# Patient Record
Sex: MALE | Race: BLACK OR AFRICAN AMERICAN | NOT HISPANIC OR LATINO | URBAN - METROPOLITAN AREA
[De-identification: names, ages, dates, MRNs, and addresses within clinical notes are randomized per-mention and may not be internally consistent; named-entity substitution may affect disease eponyms.]

---

## 2017-07-20 PROBLEM — Z00.00 ENCOUNTER FOR PREVENTIVE HEALTH EXAMINATION: Status: ACTIVE | Noted: 2017-07-20

## 2017-07-27 ENCOUNTER — INPATIENT (INPATIENT)
Facility: HOSPITAL | Age: 72
LOS: 5 days | Discharge: HOME HEALTH | DRG: 234 | End: 2017-08-02
Attending: THORACIC SURGERY (CARDIOTHORACIC VASCULAR SURGERY) | Admitting: INTERNAL MEDICINE
Payer: COMMERCIAL

## 2017-07-27 VITALS
HEIGHT: 68 IN | TEMPERATURE: 98 F | OXYGEN SATURATION: 100 % | DIASTOLIC BLOOD PRESSURE: 90 MMHG | RESPIRATION RATE: 18 BRPM | WEIGHT: 139.99 LBS | HEART RATE: 53 BPM | SYSTOLIC BLOOD PRESSURE: 186 MMHG

## 2017-07-27 DIAGNOSIS — I25.10 ATHEROSCLEROTIC HEART DISEASE OF NATIVE CORONARY ARTERY WITHOUT ANGINA PECTORIS: ICD-10-CM

## 2017-07-27 DIAGNOSIS — R94.39 ABNORMAL RESULT OF OTHER CARDIOVASCULAR FUNCTION STUDY: ICD-10-CM

## 2017-07-27 DIAGNOSIS — Z98.890 OTHER SPECIFIED POSTPROCEDURAL STATES: Chronic | ICD-10-CM

## 2017-07-27 LAB
ALBUMIN SERPL ELPH-MCNC: 4 G/DL — SIGNIFICANT CHANGE UP (ref 3.3–5)
ALBUMIN SERPL ELPH-MCNC: 4.2 G/DL — SIGNIFICANT CHANGE UP (ref 3.3–5)
ALP SERPL-CCNC: 63 U/L — SIGNIFICANT CHANGE UP (ref 40–120)
ALP SERPL-CCNC: 64 U/L — SIGNIFICANT CHANGE UP (ref 40–120)
ALT FLD-CCNC: 11 U/L RC — SIGNIFICANT CHANGE UP (ref 10–45)
ALT FLD-CCNC: 12 U/L RC — SIGNIFICANT CHANGE UP (ref 10–45)
ANION GAP SERPL CALC-SCNC: 11 MMOL/L — SIGNIFICANT CHANGE UP (ref 5–17)
APTT BLD: 41.4 SEC — HIGH (ref 27.5–37.4)
APTT BLD: > 200 SEC (ref 27.5–37.4)
AST SERPL-CCNC: 17 U/L — SIGNIFICANT CHANGE UP (ref 10–40)
AST SERPL-CCNC: 20 U/L — SIGNIFICANT CHANGE UP (ref 10–40)
BASE EXCESS BLDA CALC-SCNC: 0.9 MMOL/L — SIGNIFICANT CHANGE UP (ref -2–2)
BILIRUB DIRECT SERPL-MCNC: 0.1 MG/DL — SIGNIFICANT CHANGE UP (ref 0–0.2)
BILIRUB INDIRECT FLD-MCNC: 0.3 MG/DL — SIGNIFICANT CHANGE UP (ref 0.2–1)
BILIRUB SERPL-MCNC: 0.4 MG/DL — SIGNIFICANT CHANGE UP (ref 0.2–1.2)
BILIRUB SERPL-MCNC: 0.5 MG/DL — SIGNIFICANT CHANGE UP (ref 0.2–1.2)
BLD GP AB SCN SERPL QL: NEGATIVE — SIGNIFICANT CHANGE UP
BUN SERPL-MCNC: 15 MG/DL — SIGNIFICANT CHANGE UP (ref 7–23)
CALCIUM SERPL-MCNC: 9.5 MG/DL — SIGNIFICANT CHANGE UP (ref 8.4–10.5)
CHLORIDE SERPL-SCNC: 103 MMOL/L — SIGNIFICANT CHANGE UP (ref 96–108)
CHOLEST SERPL-MCNC: 110 MG/DL — SIGNIFICANT CHANGE UP (ref 10–199)
CO2 BLDA-SCNC: 26 MMOL/L — SIGNIFICANT CHANGE UP (ref 22–30)
CO2 SERPL-SCNC: 28 MMOL/L — SIGNIFICANT CHANGE UP (ref 22–31)
CREAT SERPL-MCNC: 1.08 MG/DL — SIGNIFICANT CHANGE UP (ref 0.5–1.3)
GLUCOSE SERPL-MCNC: 92 MG/DL — SIGNIFICANT CHANGE UP (ref 70–99)
HBA1C BLD-MCNC: 5.6 % — SIGNIFICANT CHANGE UP (ref 4–5.6)
HCO3 BLDA-SCNC: 25 MMOL/L — SIGNIFICANT CHANGE UP (ref 21–29)
HCT VFR BLD CALC: 40.8 % — SIGNIFICANT CHANGE UP (ref 39–50)
HCT VFR BLD CALC: 41 % — SIGNIFICANT CHANGE UP (ref 39–50)
HDLC SERPL-MCNC: 37 MG/DL — LOW (ref 40–125)
HGB BLD-MCNC: 14.3 G/DL — SIGNIFICANT CHANGE UP (ref 13–17)
HGB BLD-MCNC: 14.3 G/DL — SIGNIFICANT CHANGE UP (ref 13–17)
INR BLD: 1.42 RATIO — HIGH (ref 0.88–1.16)
INR BLD: 1.57 RATIO — HIGH (ref 0.88–1.16)
LIPID PNL WITH DIRECT LDL SERPL: 66 MG/DL — SIGNIFICANT CHANGE UP
MCHC RBC-ENTMCNC: 30 PG — SIGNIFICANT CHANGE UP (ref 27–34)
MCHC RBC-ENTMCNC: 30.1 PG — SIGNIFICANT CHANGE UP (ref 27–34)
MCHC RBC-ENTMCNC: 34.8 GM/DL — SIGNIFICANT CHANGE UP (ref 32–36)
MCHC RBC-ENTMCNC: 35 GM/DL — SIGNIFICANT CHANGE UP (ref 32–36)
MCV RBC AUTO: 85.8 FL — SIGNIFICANT CHANGE UP (ref 80–100)
MCV RBC AUTO: 86.1 FL — SIGNIFICANT CHANGE UP (ref 80–100)
NT-PROBNP SERPL-SCNC: 208 PG/ML — SIGNIFICANT CHANGE UP (ref 0–300)
PCO2 BLDA: 39 MMHG — SIGNIFICANT CHANGE UP (ref 32–46)
PH BLDA: 7.42 — SIGNIFICANT CHANGE UP (ref 7.35–7.45)
PLATELET # BLD AUTO: 194 K/UL — SIGNIFICANT CHANGE UP (ref 150–400)
PLATELET # BLD AUTO: 205 K/UL — SIGNIFICANT CHANGE UP (ref 150–400)
PO2 BLDA: 77 MMHG — SIGNIFICANT CHANGE UP (ref 74–108)
POTASSIUM SERPL-MCNC: 4.3 MMOL/L — SIGNIFICANT CHANGE UP (ref 3.5–5.3)
POTASSIUM SERPL-SCNC: 4.3 MMOL/L — SIGNIFICANT CHANGE UP (ref 3.5–5.3)
PROT SERPL-MCNC: 8 G/DL — SIGNIFICANT CHANGE UP (ref 6–8.3)
PROT SERPL-MCNC: 8.3 G/DL — SIGNIFICANT CHANGE UP (ref 6–8.3)
PROTHROM AB SERPL-ACNC: 15.5 SEC — HIGH (ref 9.8–12.7)
PROTHROM AB SERPL-ACNC: 17.1 SEC — HIGH (ref 9.8–12.7)
RBC # BLD: 4.76 M/UL — SIGNIFICANT CHANGE UP (ref 4.2–5.8)
RBC # BLD: 4.76 M/UL — SIGNIFICANT CHANGE UP (ref 4.2–5.8)
RBC # FLD: 11.8 % — SIGNIFICANT CHANGE UP (ref 10.3–14.5)
RBC # FLD: 11.8 % — SIGNIFICANT CHANGE UP (ref 10.3–14.5)
RH IG SCN BLD-IMP: POSITIVE — SIGNIFICANT CHANGE UP
RH IG SCN BLD-IMP: POSITIVE — SIGNIFICANT CHANGE UP
SAO2 % BLDA: 96 % — SIGNIFICANT CHANGE UP (ref 92–96)
SODIUM SERPL-SCNC: 142 MMOL/L — SIGNIFICANT CHANGE UP (ref 135–145)
TOTAL CHOLESTEROL/HDL RATIO MEASUREMENT: 3 RATIO — LOW (ref 3.4–9.6)
TRIGL SERPL-MCNC: 36 MG/DL — SIGNIFICANT CHANGE UP (ref 10–149)
WBC # BLD: 5.1 K/UL — SIGNIFICANT CHANGE UP (ref 3.8–10.5)
WBC # BLD: 6.6 K/UL — SIGNIFICANT CHANGE UP (ref 3.8–10.5)
WBC # FLD AUTO: 5.1 K/UL — SIGNIFICANT CHANGE UP (ref 3.8–10.5)
WBC # FLD AUTO: 6.6 K/UL — SIGNIFICANT CHANGE UP (ref 3.8–10.5)

## 2017-07-27 PROCEDURE — 93458 L HRT ARTERY/VENTRICLE ANGIO: CPT | Mod: 26,GC

## 2017-07-27 PROCEDURE — 71010: CPT | Mod: 26

## 2017-07-27 PROCEDURE — 71250 CT THORAX DX C-: CPT | Mod: 26

## 2017-07-27 PROCEDURE — 92978 ENDOLUMINL IVUS OCT C 1ST: CPT | Mod: 26,LD,GC

## 2017-07-27 RX ORDER — CARVEDILOL PHOSPHATE 80 MG/1
3.12 CAPSULE, EXTENDED RELEASE ORAL EVERY 12 HOURS
Qty: 0 | Refills: 0 | Status: DISCONTINUED | OUTPATIENT
Start: 2017-07-27 | End: 2017-07-28

## 2017-07-27 RX ORDER — ASPIRIN/CALCIUM CARB/MAGNESIUM 324 MG
325 TABLET ORAL DAILY
Qty: 0 | Refills: 0 | Status: DISCONTINUED | OUTPATIENT
Start: 2017-07-27 | End: 2017-07-28

## 2017-07-27 RX ORDER — CEFUROXIME AXETIL 250 MG
1500 TABLET ORAL ONCE
Qty: 0 | Refills: 0 | Status: DISCONTINUED | OUTPATIENT
Start: 2017-07-27 | End: 2017-07-28

## 2017-07-27 RX ORDER — ENOXAPARIN SODIUM 100 MG/ML
40 INJECTION SUBCUTANEOUS DAILY
Qty: 0 | Refills: 0 | Status: DISCONTINUED | OUTPATIENT
Start: 2017-07-27 | End: 2017-07-28

## 2017-07-27 RX ORDER — LEVOTHYROXINE SODIUM 125 MCG
50 TABLET ORAL DAILY
Qty: 0 | Refills: 0 | Status: DISCONTINUED | OUTPATIENT
Start: 2017-07-27 | End: 2017-07-28

## 2017-07-27 RX ORDER — ATORVASTATIN CALCIUM 80 MG/1
20 TABLET, FILM COATED ORAL AT BEDTIME
Qty: 0 | Refills: 0 | Status: DISCONTINUED | OUTPATIENT
Start: 2017-07-27 | End: 2017-07-28

## 2017-07-27 RX ORDER — LATANOPROST 0.05 MG/ML
1 SOLUTION/ DROPS OPHTHALMIC; TOPICAL AT BEDTIME
Qty: 0 | Refills: 0 | Status: DISCONTINUED | OUTPATIENT
Start: 2017-07-27 | End: 2017-07-28

## 2017-07-27 RX ORDER — CHLORHEXIDINE GLUCONATE 213 G/1000ML
1 SOLUTION TOPICAL
Qty: 0 | Refills: 0 | Status: DISCONTINUED | OUTPATIENT
Start: 2017-07-27 | End: 2017-07-28

## 2017-07-27 RX ORDER — SODIUM CHLORIDE 9 MG/ML
3 INJECTION INTRAMUSCULAR; INTRAVENOUS; SUBCUTANEOUS EVERY 8 HOURS
Qty: 0 | Refills: 0 | Status: DISCONTINUED | OUTPATIENT
Start: 2017-07-27 | End: 2017-07-28

## 2017-07-27 RX ORDER — AMLODIPINE BESYLATE 2.5 MG/1
2.5 TABLET ORAL ONCE
Qty: 0 | Refills: 0 | Status: COMPLETED | OUTPATIENT
Start: 2017-07-27 | End: 2017-07-27

## 2017-07-27 RX ORDER — ACETAMINOPHEN 500 MG
650 TABLET ORAL ONCE
Qty: 0 | Refills: 0 | Status: COMPLETED | OUTPATIENT
Start: 2017-07-27 | End: 2017-07-27

## 2017-07-27 RX ADMIN — ATORVASTATIN CALCIUM 20 MILLIGRAM(S): 80 TABLET, FILM COATED ORAL at 23:17

## 2017-07-27 RX ADMIN — Medication 650 MILLIGRAM(S): at 23:16

## 2017-07-27 RX ADMIN — CARVEDILOL PHOSPHATE 3.12 MILLIGRAM(S): 80 CAPSULE, EXTENDED RELEASE ORAL at 23:17

## 2017-07-27 RX ADMIN — CHLORHEXIDINE GLUCONATE 1 APPLICATION(S): 213 SOLUTION TOPICAL at 23:00

## 2017-07-27 RX ADMIN — AMLODIPINE BESYLATE 2.5 MILLIGRAM(S): 2.5 TABLET ORAL at 14:05

## 2017-07-27 RX ADMIN — SODIUM CHLORIDE 3 MILLILITER(S): 9 INJECTION INTRAMUSCULAR; INTRAVENOUS; SUBCUTANEOUS at 23:20

## 2017-07-27 NOTE — H&P CARDIOLOGY - PMH
CAD (coronary artery disease)    COPD (chronic obstructive pulmonary disease)    Former smoker    Glaucoma    Gout    HTN (hypertension)    Hypothyroid    MI (myocardial infarction)  2011 stent in [place]  Stented coronary artery

## 2017-07-27 NOTE — H&P CARDIOLOGY - FAMILY HISTORY
Father  Still living? Unknown  Family history of cancer, Age at diagnosis: Age Unknown     Mother  Still living? No  Family history of cancer, Age at diagnosis: Age Unknown

## 2017-07-27 NOTE — H&P CARDIOLOGY - HISTORY OF PRESENT ILLNESS
72 year old mal ept with PMHx of COPD, HTN, HLD, gout, hypothyroidism, former smoker, MI, CAD with stent(80% Ramus, 50% LM, no official report) in 2011 in [place] presents for cardiac cath. Pt reports he has been experiencing SOB on exertion for past 5 months, evaluated by Dr. Calero and had abnormal stress test. Pt denies chest pain, SOB or dizziness currently.     Stress test: only 2013 test results available small, mild, fixed perfusion defect of the anterior and anteroseptal wall consistent with infarct without significant ischemia EF 54%  CXR 4/2017: COPD with chr. pulmonary fibrosis 72 year old mal ept with PMHx of COPD, HTN, HLD, gout, hypothyroidism, former smoker, MI, CAD with stent(80% Ramus, 50% LM, no official report) in 2011 in [place] presents for cardiac cath. Pt reports he has been experiencing SOB on exertion for past 5 months, evaluated by Dr. Calero and had abnormal stress test. Pt denies chest pain, SOB or dizziness currently.     Amlodipine 2.5mg po given to maximize antianginal therapy and SBP>180     Stress test: only 2013 test results available (small, mild, fixed perfusion defect of the anterior and anteroseptal wall consistent with infarct without significant ischemia EF 54%), current stress test results requested to Lindsay Calero's office [phone]  CXR 4/2017: COPD with chr. pulmonary fibrosis

## 2017-07-27 NOTE — CONSULT NOTE ADULT - SUBJECTIVE AND OBJECTIVE BOX
History of Present Illness:  72y Male with PMHx of COPD, HTN, HLD, gout, hypothyroidism, former smoker, MI, CAD with stent 2011. Pt referred for cath s/p abnormal stress test and c/o REYNOLDS. Cath demonstrates 3VD.    Past Medical History  Hypothyroid  Glaucoma  Stented coronary artery  CAD (coronary artery disease)  MI (myocardial infarction): 2011 stent in [place]  HTN (hypertension)  Gout  Former smoker  COPD (chronic obstructive pulmonary disease)      Past Surgical History  H/O foot surgery      MEDICATIONS  (STANDING):  sodium chloride 0.9% lock flush 3 milliLiter(s) IV Push every 8 hours  chlorhexidine 4% Liquid 1 Application(s) Topical two times a day  cefuroxime  IVPB 1500 milliGRAM(s) IV Intermittent once  atorvastatin 20 milliGRAM(s) Oral at bedtime  aspirin 325 milliGRAM(s) Oral daily  carvedilol 3.125 milliGRAM(s) Oral every 12 hours  levothyroxine 50 MICROGram(s) Oral daily  latanoprost 0.005% Ophthalmic Solution 1 Drop(s) Both EYES at bedtime    MEDICATIONS  (PRN):    Antiplatelet therapy:                           Last dose/amt:    Allergies: No Known Allergies      SOCIAL HISTORY:  Smoker: [ ] Yes  [ x] No        PACK YEARS:  20                       WHEN QUIT? 25 yrs ago  ETOH use: [ ] Yes  [x ] No              FREQUENCY / QUANTITY:  Ilicit Drug use:  [ ] Yes  [ x] No  Occupation:  Live with:  Assist device use:    Relevant Family History  FAMILY HISTORY:  Family history of cancer       Review of Systems  GENERAL:  Fevers[] chills[] sweats[] fatigue[] weight loss[] weight gain []                                        NEURO:  parathesias[] seizures []  syncope []  confusion []                                                                                  EYES: glasses[]  blurry vision[]  discharge[] pain[] glaucoma []                                                                            ENMT:  difficulty hearing []  vertigo[]  dysphagia[] epistaxis[] recent dental work []                                      CV:  chest pain[] palpitations[] REYNOLDS [] diaphoresis [] edema[]                                                                                             RESPIRATORY:  wheezing[] SOB[x] cough [] sputum[] hemoptysis[]                                                                    GI:  nausea[]  vomiting []  diarrhea[] constipation [] melena []                                                                        : hematuria[ ]  dysuria[ ] urgency[] incontinence[]                                                                                              MUSKULOSKELETAL:  arthritis[ ]  joint swelling [ ] muscle weakness [ ]                                                                  SKIN/BREAST:  rash[ ] itching [ ]  hair loss[ ] masses[ ]                                                                                                PSYCH:  dementia [ ] depresion [ ] anxiety[ ]                                                                                                                  HEME/LYMPH:  bruises easily[ ] enlarged lymph nodes[ ] tender lymph nodes[ ]                                                 ENDOCRINE:  cold intolerance[ ] heat intolerance[ ] polydipsia[ ]                                                                              PHYSICAL EXAM  Vital Signs Last 24 Hrs  T(C): 36.4 (27 Jul 2017 13:26), Max: 36.4 (27 Jul 2017 13:26)  T(F): 97.6 (27 Jul 2017 13:26), Max: 97.6 (27 Jul 2017 13:26)  HR: 53 (27 Jul 2017 13:26) (53 - 53)  BP: 186/90 (27 Jul 2017 13:26) (186/90 - 186/90)  BP(mean): 122 (27 Jul 2017 13:26) (122 - 122)  RR: 18 (27 Jul 2017 13:26) (18 - 18)  SpO2: 100% (27 Jul 2017 13:26) (100% - 100%)    General: Well nourished, well developed, no acute distress.                                                         Neuro: Normal exam oriented to person/place & time with no focal motor or sensory  deficits.                    Eyes: Normal exam of conjunctiva & lids, pupils equally reactive.   ENT: Normal exam of nasal/oral mucosa with absence of cyanosis.   Neck: Normal exam of jugular veins, trachea & thyroid.   Chest: Normal lung exam with good air movement absence of wheezes, rales, or rhonchi:                                                                          CV:  Auscultation: normal [ ] S3[ ] S4[ ] Irregular [ ] Rub[ ] Clicks[ ]  Murmurs none:[ ]systolic [ ]  diastolic [ ] holosystolic [ ]  Carotids: No Bruits[ ] Other____________ Abdominal Aorta: normal [ ] nonpalpable[ ]                                                                         GI: Normal exam of abdomen, liver & spleen with no noted masses or tenderness.                                                                                              Extremities: Normal no evidence of cyanosis or deformity Edema: none[ ]trace[ ]1+[ ]2+[ ]3+[ ]4+[ ]  Lower Extremity Pulses: Right[ ] Left[ ]Varicosities[ ]  SKIN : Normal exam to inspection & palation.                                                           LABS:                        14.3   5.1   )-----------( 205      ( 27 Jul 2017 13:28 )             41.0     07-27    142  |  103  |  15  ----------------------------<  92  4.3   |  28  |  1.08    Ca    9.5      27 Jul 2017 13:28    TPro  8.3  /  Alb  4.2  /  TBili  0.5  /  DBili  x   /  AST  20  /  ALT  12  /  AlkPhos  64  07-27 History of Present Illness:  72y Male with PMHx of COPD, HTN, HLD, gout, hypothyroidism, former smoker, MI, CAD with stent 2011. Pt referred for cath s/p abnormal stress test and c/o REYNOLDS. Cath demonstrates 3VD.    Past Medical History  Hypothyroid  Glaucoma  Stented coronary artery  CAD (coronary artery disease)  MI (myocardial infarction): 2011 stent in [place]  HTN (hypertension)  Gout  Former smoker  COPD (chronic obstructive pulmonary disease)      Past Surgical History  H/O foot surgery      MEDICATIONS  (STANDING):  sodium chloride 0.9% lock flush 3 milliLiter(s) IV Push every 8 hours  chlorhexidine 4% Liquid 1 Application(s) Topical two times a day  cefuroxime  IVPB 1500 milliGRAM(s) IV Intermittent once  atorvastatin 20 milliGRAM(s) Oral at bedtime  aspirin 325 milliGRAM(s) Oral daily  carvedilol 3.125 milliGRAM(s) Oral every 12 hours  levothyroxine 50 MICROGram(s) Oral daily  latanoprost 0.005% Ophthalmic Solution 1 Drop(s) Both EYES at bedtime    MEDICATIONS  (PRN):    Antiplatelet therapy:                           Last dose/amt:    Allergies: No Known Allergies      SOCIAL HISTORY:  Smoker: [ ] Yes  [ x] No        PACK YEARS:  20                       WHEN QUIT? 25 yrs ago  ETOH use: [ ] Yes  [x ] No              FREQUENCY / QUANTITY:  Ilicit Drug use:  [ ] Yes  [ x] No  Occupation:  Live with:  Assist device use:    Relevant Family History  FAMILY HISTORY:  Family history of cancer       Review of Systems  GENERAL:  Fevers[] chills[] sweats[] fatigue[] weight loss[] weight gain []                                        NEURO:  parathesias[] seizures []  syncope []  confusion []                                                                                  EYES: glasses[]  blurry vision[]  discharge[] pain[] glaucoma []                                                                            ENMT:  difficulty hearing []  vertigo[]  dysphagia[] epistaxis[] recent dental work []                                      CV:  chest pain[] palpitations[] REYNOLDS [] diaphoresis [] edema[]                                                                                             RESPIRATORY:  wheezing[] SOB[x] cough [] sputum[] hemoptysis[]                                                                    GI:  nausea[]  vomiting []  diarrhea[] constipation [] melena []                                                                        : hematuria[ ]  dysuria[ ] urgency[] incontinence[]                                                                                              MUSKULOSKELETAL:  arthritis[ ]  joint swelling [ ] muscle weakness [ ]                                                                  SKIN/BREAST:  rash[ ] itching [ ]  hair loss[ ] masses[ ]                                                                                                PSYCH:  dementia [ ] depresion [ ] anxiety[ ]                                                                                                                  HEME/LYMPH:  bruises easily[ ] enlarged lymph nodes[ ] tender lymph nodes[ ]                                                 ENDOCRINE:  cold intolerance[ ] heat intolerance[ ] polydipsia[ ]                                                                              PHYSICAL EXAM  Vital Signs Last 24 Hrs  T(C): 36.4 (27 Jul 2017 13:26), Max: 36.4 (27 Jul 2017 13:26)  T(F): 97.6 (27 Jul 2017 13:26), Max: 97.6 (27 Jul 2017 13:26)  HR: 53 (27 Jul 2017 13:26) (53 - 53)  BP: 186/90 (27 Jul 2017 13:26) (186/90 - 186/90)  BP(mean): 122 (27 Jul 2017 13:26) (122 - 122)  RR: 18 (27 Jul 2017 13:26) (18 - 18)  SpO2: 100% (27 Jul 2017 13:26) (100% - 100%)    General: Well nourished, well developed, no acute distress.                                                         Neuro: Normal exam oriented to person/place & time with no focal motor or sensory  deficits.                    Eyes: Normal exam of conjunctiva & lids, pupils equally reactive.   ENT: Normal exam of nasal/oral mucosa with absence of cyanosis.   Neck: Normal exam of jugular veins, trachea & thyroid.   Chest: Normal lung exam with good air movement absence of wheezes, rales, or rhonchi:                                                                          CV:  Auscultation: normal [x ] S3[ ] S4[ ] Irregular [ ] Rub[ ] Clicks[ ]  Murmurs none:[x ]systolic [ ]  diastolic [ ] holosystolic [ ]  Carotids: No Bruits[x ] Other____________ Abdominal Aorta: normal [ ] nonpalpable[ ]                                                                         GI: Normal exam of abdomen, liver & spleen with no noted masses or tenderness.                                                                                              Extremities: Normal no evidence of cyanosis or deformity Edema: none[x ]trace[ ]1+[ ]2+[ ]3+[ ]4+[ ]  Lower Extremity Pulses: Right[ ] Left[ ]Varicosities[ ]  SKIN : Normal exam to inspection & palation.                                                           LABS:                        14.3   5.1   )-----------( 205      ( 27 Jul 2017 13:28 )             41.0     07-27    142  |  103  |  15  ----------------------------<  92  4.3   |  28  |  1.08    Ca    9.5      27 Jul 2017 13:28    TPro  8.3  /  Alb  4.2  /  TBili  0.5  /  DBili  x   /  AST  20  /  ALT  12  /  AlkPhos  64  07-27

## 2017-07-27 NOTE — CONSULT NOTE ADULT - ASSESSMENT
73 y/o M Hx of COPD, HTN, HLD, gout, hypothyroidism, former smoker, MI, CAD with stent 2011. Preop for 3VD.

## 2017-07-28 ENCOUNTER — APPOINTMENT (OUTPATIENT)
Dept: CARDIOTHORACIC SURGERY | Facility: HOSPITAL | Age: 72
End: 2017-07-28
Payer: MEDICARE

## 2017-07-28 DIAGNOSIS — J44.9 CHRONIC OBSTRUCTIVE PULMONARY DISEASE, UNSPECIFIED: ICD-10-CM

## 2017-07-28 LAB
ALBUMIN SERPL ELPH-MCNC: 2.5 G/DL — LOW (ref 3.3–5)
ALP SERPL-CCNC: 43 U/L — SIGNIFICANT CHANGE UP (ref 40–120)
ALT FLD-CCNC: 9 U/L RC — LOW (ref 10–45)
ANION GAP SERPL CALC-SCNC: 15 MMOL/L — SIGNIFICANT CHANGE UP (ref 5–17)
APTT BLD: 34 SEC — SIGNIFICANT CHANGE UP (ref 27.5–37.4)
AST SERPL-CCNC: 14 U/L — SIGNIFICANT CHANGE UP (ref 10–40)
BASOPHILS # BLD AUTO: 0 K/UL — SIGNIFICANT CHANGE UP (ref 0–0.2)
BASOPHILS NFR BLD AUTO: 0.6 % — SIGNIFICANT CHANGE UP (ref 0–2)
BILIRUB SERPL-MCNC: 0.5 MG/DL — SIGNIFICANT CHANGE UP (ref 0.2–1.2)
BUN SERPL-MCNC: 14 MG/DL — SIGNIFICANT CHANGE UP (ref 7–23)
CALCIUM SERPL-MCNC: 7.5 MG/DL — LOW (ref 8.4–10.5)
CHLORIDE SERPL-SCNC: 109 MMOL/L — HIGH (ref 96–108)
CK MB BLD-MCNC: 5.1 % — HIGH (ref 0–3.5)
CK MB CFR SERPL CALC: 7.8 NG/ML — HIGH (ref 0–6.7)
CK SERPL-CCNC: 152 U/L — SIGNIFICANT CHANGE UP (ref 30–200)
CO2 SERPL-SCNC: 23 MMOL/L — SIGNIFICANT CHANGE UP (ref 22–31)
CREAT SERPL-MCNC: 0.89 MG/DL — SIGNIFICANT CHANGE UP (ref 0.5–1.3)
EOSINOPHIL # BLD AUTO: 0.3 K/UL — SIGNIFICANT CHANGE UP (ref 0–0.5)
EOSINOPHIL NFR BLD AUTO: 5.8 % — SIGNIFICANT CHANGE UP (ref 0–6)
FIBRINOGEN PPP-MCNC: 353 MG/DL — SIGNIFICANT CHANGE UP (ref 310–510)
GAS PNL BLDA: SIGNIFICANT CHANGE UP
GLUCOSE SERPL-MCNC: 95 MG/DL — SIGNIFICANT CHANGE UP (ref 70–99)
HCT VFR BLD CALC: 34 % — LOW (ref 39–50)
HGB BLD-MCNC: 11.6 G/DL — LOW (ref 13–17)
INR BLD: 1.64 RATIO — HIGH (ref 0.88–1.16)
LYMPHOCYTES # BLD AUTO: 1 K/UL — SIGNIFICANT CHANGE UP (ref 1–3.3)
LYMPHOCYTES # BLD AUTO: 16.5 % — SIGNIFICANT CHANGE UP (ref 13–44)
MCHC RBC-ENTMCNC: 29.5 PG — SIGNIFICANT CHANGE UP (ref 27–34)
MCHC RBC-ENTMCNC: 34.1 GM/DL — SIGNIFICANT CHANGE UP (ref 32–36)
MCV RBC AUTO: 86.4 FL — SIGNIFICANT CHANGE UP (ref 80–100)
MONOCYTES # BLD AUTO: 0.2 K/UL — SIGNIFICANT CHANGE UP (ref 0–0.9)
MONOCYTES NFR BLD AUTO: 4.1 % — SIGNIFICANT CHANGE UP (ref 2–14)
MRSA PCR RESULT.: SIGNIFICANT CHANGE UP
NEUTROPHILS # BLD AUTO: 4.4 K/UL — SIGNIFICANT CHANGE UP (ref 1.8–7.4)
NEUTROPHILS NFR BLD AUTO: 73.1 % — SIGNIFICANT CHANGE UP (ref 43–77)
PLATELET # BLD AUTO: 149 K/UL — LOW (ref 150–400)
POTASSIUM SERPL-MCNC: 3.9 MMOL/L — SIGNIFICANT CHANGE UP (ref 3.5–5.3)
POTASSIUM SERPL-SCNC: 3.9 MMOL/L — SIGNIFICANT CHANGE UP (ref 3.5–5.3)
PROT SERPL-MCNC: 5.3 G/DL — LOW (ref 6–8.3)
PROTHROM AB SERPL-ACNC: 18.1 SEC — HIGH (ref 9.8–12.7)
RBC # BLD: 3.93 M/UL — LOW (ref 4.2–5.8)
RBC # FLD: 11.8 % — SIGNIFICANT CHANGE UP (ref 10.3–14.5)
S AUREUS DNA NOSE QL NAA+PROBE: SIGNIFICANT CHANGE UP
SODIUM SERPL-SCNC: 147 MMOL/L — HIGH (ref 135–145)
T3 SERPL-MCNC: 106 NG/DL — SIGNIFICANT CHANGE UP (ref 80–200)
T4 AB SER-ACNC: 9.7 UG/DL — SIGNIFICANT CHANGE UP (ref 4.6–12)
TROPONIN T SERPL-MCNC: 0.08 NG/ML — HIGH (ref 0–0.06)
TSH SERPL-MCNC: 2.2 UIU/ML — SIGNIFICANT CHANGE UP (ref 0.27–4.2)
WBC # BLD: 6 K/UL — SIGNIFICANT CHANGE UP (ref 3.8–10.5)
WBC # FLD AUTO: 6 K/UL — SIGNIFICANT CHANGE UP (ref 3.8–10.5)

## 2017-07-28 PROCEDURE — 71010: CPT | Mod: 26

## 2017-07-28 PROCEDURE — 94010 BREATHING CAPACITY TEST: CPT | Mod: 26

## 2017-07-28 PROCEDURE — 33533 CABG ARTERIAL SINGLE: CPT | Mod: AS

## 2017-07-28 PROCEDURE — 33533 CABG ARTERIAL SINGLE: CPT

## 2017-07-28 RX ORDER — CEFUROXIME AXETIL 250 MG
1500 TABLET ORAL EVERY 8 HOURS
Qty: 0 | Refills: 0 | Status: COMPLETED | OUTPATIENT
Start: 2017-07-28 | End: 2017-07-29

## 2017-07-28 RX ORDER — POTASSIUM CHLORIDE 20 MEQ
10 PACKET (EA) ORAL
Qty: 0 | Refills: 0 | Status: COMPLETED | OUTPATIENT
Start: 2017-07-28

## 2017-07-28 RX ORDER — POTASSIUM CHLORIDE 20 MEQ
10 PACKET (EA) ORAL
Qty: 0 | Refills: 0 | Status: DISCONTINUED | OUTPATIENT
Start: 2017-07-28 | End: 2017-07-29

## 2017-07-28 RX ORDER — IPRATROPIUM/ALBUTEROL SULFATE 18-103MCG
3 AEROSOL WITH ADAPTER (GRAM) INHALATION EVERY 6 HOURS
Qty: 0 | Refills: 0 | Status: DISCONTINUED | OUTPATIENT
Start: 2017-07-28 | End: 2017-08-02

## 2017-07-28 RX ORDER — ACETAMINOPHEN 500 MG
1000 TABLET ORAL ONCE
Qty: 0 | Refills: 0 | Status: COMPLETED | OUTPATIENT
Start: 2017-07-28 | End: 2017-07-28

## 2017-07-28 RX ORDER — PANTOPRAZOLE SODIUM 20 MG/1
40 TABLET, DELAYED RELEASE ORAL DAILY
Qty: 0 | Refills: 0 | Status: DISCONTINUED | OUTPATIENT
Start: 2017-07-28 | End: 2017-07-29

## 2017-07-28 RX ORDER — LEVOTHYROXINE SODIUM 125 MCG
50 TABLET ORAL DAILY
Qty: 0 | Refills: 0 | Status: DISCONTINUED | OUTPATIENT
Start: 2017-07-28 | End: 2017-08-02

## 2017-07-28 RX ORDER — ASPIRIN/CALCIUM CARB/MAGNESIUM 324 MG
325 TABLET ORAL ONCE
Qty: 0 | Refills: 0 | Status: COMPLETED | OUTPATIENT
Start: 2017-07-28 | End: 2017-07-28

## 2017-07-28 RX ORDER — LATANOPROST 0.05 MG/ML
1 SOLUTION/ DROPS OPHTHALMIC; TOPICAL AT BEDTIME
Qty: 0 | Refills: 0 | Status: DISCONTINUED | OUTPATIENT
Start: 2017-07-28 | End: 2017-08-02

## 2017-07-28 RX ORDER — MEPERIDINE HYDROCHLORIDE 50 MG/ML
25 INJECTION INTRAMUSCULAR; INTRAVENOUS; SUBCUTANEOUS ONCE
Qty: 0 | Refills: 0 | Status: DISCONTINUED | OUTPATIENT
Start: 2017-07-28 | End: 2017-07-29

## 2017-07-28 RX ORDER — METOCLOPRAMIDE HCL 10 MG
10 TABLET ORAL EVERY 8 HOURS
Qty: 0 | Refills: 0 | Status: COMPLETED | OUTPATIENT
Start: 2017-07-28 | End: 2017-07-30

## 2017-07-28 RX ORDER — POTASSIUM CHLORIDE 20 MEQ
10 PACKET (EA) ORAL
Qty: 0 | Refills: 0 | Status: COMPLETED | OUTPATIENT
Start: 2017-07-28 | End: 2017-07-28

## 2017-07-28 RX ORDER — POTASSIUM CHLORIDE 20 MEQ
10 PACKET (EA) ORAL ONCE
Qty: 0 | Refills: 0 | Status: DISCONTINUED | OUTPATIENT
Start: 2017-07-28 | End: 2017-07-29

## 2017-07-28 RX ORDER — ASPIRIN/CALCIUM CARB/MAGNESIUM 324 MG
300 TABLET ORAL ONCE
Qty: 0 | Refills: 0 | Status: DISCONTINUED | OUTPATIENT
Start: 2017-07-28 | End: 2017-07-28

## 2017-07-28 RX ORDER — FENTANYL CITRATE 50 UG/ML
25 INJECTION INTRAVENOUS ONCE
Qty: 0 | Refills: 0 | Status: DISCONTINUED | OUTPATIENT
Start: 2017-07-28 | End: 2017-07-28

## 2017-07-28 RX ORDER — DOCUSATE SODIUM 100 MG
100 CAPSULE ORAL THREE TIMES A DAY
Qty: 0 | Refills: 0 | Status: DISCONTINUED | OUTPATIENT
Start: 2017-07-28 | End: 2017-08-02

## 2017-07-28 RX ORDER — INSULIN HUMAN 100 [IU]/ML
2 INJECTION, SOLUTION SUBCUTANEOUS
Qty: 100 | Refills: 0 | Status: DISCONTINUED | OUTPATIENT
Start: 2017-07-28 | End: 2017-07-29

## 2017-07-28 RX ORDER — ASPIRIN/CALCIUM CARB/MAGNESIUM 324 MG
325 TABLET ORAL DAILY
Qty: 0 | Refills: 0 | Status: DISCONTINUED | OUTPATIENT
Start: 2017-07-29 | End: 2017-08-02

## 2017-07-28 RX ORDER — NICARDIPINE HYDROCHLORIDE 30 MG/1
3 CAPSULE, EXTENDED RELEASE ORAL
Qty: 40 | Refills: 0 | Status: DISCONTINUED | OUTPATIENT
Start: 2017-07-28 | End: 2017-07-29

## 2017-07-28 RX ADMIN — LATANOPROST 1 DROP(S): 0.05 SOLUTION/ DROPS OPHTHALMIC; TOPICAL at 16:54

## 2017-07-28 RX ADMIN — PANTOPRAZOLE SODIUM 40 MILLIGRAM(S): 20 TABLET, DELAYED RELEASE ORAL at 13:38

## 2017-07-28 RX ADMIN — FENTANYL CITRATE 25 MICROGRAM(S): 50 INJECTION INTRAVENOUS at 20:00

## 2017-07-28 RX ADMIN — Medication 400 MILLIGRAM(S): at 22:15

## 2017-07-28 RX ADMIN — Medication 10 MILLIGRAM(S): at 21:34

## 2017-07-28 RX ADMIN — Medication 100 MILLIEQUIVALENT(S): at 13:30

## 2017-07-28 RX ADMIN — Medication 325 MILLIGRAM(S): at 19:35

## 2017-07-28 RX ADMIN — Medication 100 MILLIEQUIVALENT(S): at 19:35

## 2017-07-28 RX ADMIN — Medication 100 MILLIGRAM(S): at 16:55

## 2017-07-28 RX ADMIN — Medication 100 MILLIGRAM(S): at 21:34

## 2017-07-28 RX ADMIN — Medication 400 MILLIGRAM(S): at 14:00

## 2017-07-28 RX ADMIN — FENTANYL CITRATE 25 MICROGRAM(S): 50 INJECTION INTRAVENOUS at 20:15

## 2017-07-28 RX ADMIN — Medication 50 MICROGRAM(S): at 06:30

## 2017-07-28 RX ADMIN — CARVEDILOL PHOSPHATE 3.12 MILLIGRAM(S): 80 CAPSULE, EXTENDED RELEASE ORAL at 06:30

## 2017-07-28 RX ADMIN — Medication 1000 MILLIGRAM(S): at 22:45

## 2017-07-28 RX ADMIN — Medication 10 MILLIGRAM(S): at 13:44

## 2017-07-28 RX ADMIN — Medication 100 MILLIEQUIVALENT(S): at 14:00

## 2017-07-28 RX ADMIN — SODIUM CHLORIDE 3 MILLILITER(S): 9 INJECTION INTRAMUSCULAR; INTRAVENOUS; SUBCUTANEOUS at 06:30

## 2017-07-28 RX ADMIN — Medication 100 MILLIEQUIVALENT(S): at 14:19

## 2017-07-28 RX ADMIN — Medication 650 MILLIGRAM(S): at 00:16

## 2017-07-28 RX ADMIN — Medication 3 MILLILITER(S): at 18:50

## 2017-07-28 RX ADMIN — CHLORHEXIDINE GLUCONATE 1 APPLICATION(S): 213 SOLUTION TOPICAL at 06:00

## 2017-07-28 RX ADMIN — Medication 100 MILLIEQUIVALENT(S): at 20:00

## 2017-07-28 RX ADMIN — Medication 3 MILLILITER(S): at 23:23

## 2017-07-28 NOTE — BRIEF OPERATIVE NOTE - PROCEDURE
CABG with single autogenous graft of internal mammary artery  07/28/2017  CABG x1 With LIMA to LAD off pump  Active  CHART1

## 2017-07-28 NOTE — CONSULT NOTE ADULT - PROBLEM SELECTOR RECOMMENDATION 2
Pt has COPD and the ct scan chest done showed scattered TIB opacities and mucus plugging with some nodules: Given the history of smoking and no real clinical features of infection, mucus plugging is more likely. In any event, he is post op, and received some antibiotics: He needs to be monitored clinically. He needs to be followed up with these nodules and to me malignancy seems less likely: Some mild enlargement of lymph nodes only. He does have little larger left lower lobe opacity and he will need repeat ct scan chest after dc: Fro now observe clinically.

## 2017-07-28 NOTE — CONSULT NOTE ADULT - ASSESSMENT
71 y/o M Hx of COPD, HTN, HLD, gout, hypothyroidism, former smoker, MI, CAD with stent 2011. Preop for 3VD.

## 2017-07-28 NOTE — CONSULT NOTE ADULT - SUBJECTIVE AND OBJECTIVE BOX
Patient is a 72y old  Male who presents with a chief complaint of     HPI:  72 year old mal ept with PMHx of COPD, HTN, HLD, gout, hypothyroidism, former smoker, MI, CAD with stent(80% Ramus, 50% LM, no official report) in 2011 in [place] presents for cardiac cath. Pt reports he has been experiencing SOB on exertion for past 5 months, evaluated by Dr. Calero and had abnormal stress test. Pt denies chest pain, SOB or dizziness currently.     Amlodipine 2.5mg po given to maximize antianginal therapy and SBP>180     Stress test: only 2013 test results available (small, mild, fixed perfusion defect of the anterior and anteroseptal wall consistent with infarct without significant ischemia EF 54%), current stress test results requested to Lindsay Calero's office [phone]  CXR 4/2017: COPD with chr. pulmonary fibrosis (27 Jul 2017 13:26)      ?FOLLOWING PRESENT  [ ] Hx of PE/DVT, [ ] Hx COPD, [ ] Hx of Asthma, [ ] Hx of Hospitalization, [ ]  Hx of BiPAP/CPAP use, [ ] Hx of KARON    Allergies    No Known Allergies    Intolerances        PAST MEDICAL & SURGICAL HISTORY:  Hypothyroid  Glaucoma  Stented coronary artery  CAD (coronary artery disease)  MI (myocardial infarction): 2011 stent in [place]  HTN (hypertension)  Gout  Former smoker  COPD (chronic obstructive pulmonary disease)  H/O foot surgery      FAMILY HISTORY:  Family history of cancer      Social History: [  ] TOBACCO                  [  ] ETOH                                 [  ] IVDA/DRUGS    REVIEW OF SYSTEMS      General:	    Skin/Breast:  	  Ophthalmologic:  	  ENMT:	    Respiratory and Thorax:  	  Cardiovascular:	    Gastrointestinal:	    Genitourinary:	    Musculoskeletal:	    Neurological:	    Psychiatric:	    Hematology/Lymphatics:	    Endocrine:	    Allergic/Immunologic:	    MEDICATIONS  (STANDING):  pantoprazole  Injectable 40 milliGRAM(s) IV Push daily  docusate sodium 100 milliGRAM(s) Oral three times a day  potassium chloride  10 mEq/50 mL IVPB 10 milliEquivalent(s) IV Intermittent every 1 hour  potassium chloride  10 mEq/50 mL IVPB 10 milliEquivalent(s) IV Intermittent every 1 hour  potassium chloride  10 mEq/50 mL IVPB 10 milliEquivalent(s) IV Intermittent once  insulin Infusion 2 Unit(s)/Hr (2 mL/Hr) IV Continuous <Continuous>  aspirin Suppository 300 milliGRAM(s) Rectal once  metoclopramide Injectable 10 milliGRAM(s) IV Push every 8 hours  cefuroxime  IVPB 1500 milliGRAM(s) IV Intermittent every 8 hours  levothyroxine 50 MICROGram(s) Oral daily  latanoprost 0.005% Ophthalmic Solution 1 Drop(s) Both EYES at bedtime    MEDICATIONS  (PRN):  meperidine     Injectable 25 milliGRAM(s) IV Push once PRN For Shivering       Vital Signs Last 24 Hrs  T(C): 36.4 (28 Jul 2017 05:40), Max: 37 (27 Jul 2017 20:49)  T(F): 97.6 (28 Jul 2017 05:40), Max: 98.6 (27 Jul 2017 20:49)  HR: 58 (28 Jul 2017 12:39) (53 - 62)  BP: 164/79 (28 Jul 2017 05:40) (164/79 - 186/90)  BP(mean): 122 (27 Jul 2017 13:26) (122 - 122)  RR: 18 (28 Jul 2017 05:40) (18 - 18)  SpO2: 100% (28 Jul 2017 12:39) (98% - 100%)    Mode: AC/ CMV (Assist Control/ Continuous Mandatory Ventilation)  RR (machine): 10  TV (machine): 550  FiO2: 100  PEEP: 5  ITime: 1  MAP: 11  PIP: 25      I&O's Summary      Physical Exam:   GENERAL: NAD, well-groomed, well-developed  HEENT: KULWINDER/   Atraumatic, Normocephalic  ENMT: No tonsillar erythema, exudates, or enlargement; Moist mucous membranes, Good dentition, No lesions  NECK: Supple, No JVD, Normal thyroid  CHEST/LUNG: Clear to auscultation bilaterally; No rales, rhonchi, wheezing, or rubs  CVS: Regular rate and rhythm; No murmurs, rubs, or gallops  GI: : Soft, Nontender, Nondistended; Bowel sounds present  NERVOUS SYSTEM:  Alert & Oriented X3, Good concentration; Motor Strength 5/5 B/L upper and lower extremities; DTRs 2+ intact and symmetric  EXTREMITIES:  2+ Peripheral Pulses, No clubbing, cyanosis, or edema  LYMPH: No lymphadenopathy noted  SKIN: No rashes or lesions  ENDOCRINOLOGY: No Thyromegaly  PSYCH: Appropriate    Labs:  ABG - ( 28 Jul 2017 12:32 )  pH: 7.32  /  pCO2: 47    /  pO2: 468   / HCO3: 24    / Base Excess: -2.0  /  SaO2: 100                                         14.3   6.6   )-----------( 194      ( 27 Jul 2017 23:08 )             40.8                         14.3   5.1   )-----------( 205      ( 27 Jul 2017 13:28 )             41.0     07-27    142  |  103  |  15  ----------------------------<  92  4.3   |  28  |  1.08    Ca    9.5      27 Jul 2017 13:28    TPro  8.0  /  Alb  4.0  /  TBili  0.4  /  DBili  0.1  /  AST  17  /  ALT  11  /  AlkPhos  63  07-27  TPro  8.3  /  Alb  4.2  /  TBili  0.5  /  DBili  x   /  AST  20  /  ALT  12  /  AlkPhos  64  07-27    CAPILLARY BLOOD GLUCOSE        LIVER FUNCTIONS - ( 27 Jul 2017 23:08 )  Alb: 4.0 g/dL / Pro: 8.0 g/dL / ALK PHOS: 63 U/L / ALT: 11 U/L RC / AST: 17 U/L / GGT: x           PT/INR - ( 27 Jul 2017 23:08 )   PT: 15.5 sec;   INR: 1.42 ratio         PTT - ( 27 Jul 2017 23:08 )  PTT:41.4 sec    D DImer  Serum Pro-Brain Natriuretic Peptide: 208 pg/mL (07-27 @ 23:08)    Cultures:                           Studies  Chest X-RAY  CT SCAN Chest   CT Abdomen  Venous Dopplers: LE:   Others The ct scan chest is abnormal and pt is an ex smoker  Patient is a 72y old  Male who presents with a chief complaint of     HPI:  72 year old mal ept with PMHx of COPD, HTN, HLD, gout, hypothyroidism, former smoker, MI, CAD with stent(80% Ramus, 50% LM, no official report) in 2011 in [place] presents for cardiac cath. Pt reports he has been experiencing SOB on exertion for past 5 months, evaluated by Dr. Calero and had abnormal stress test. Pt denies chest pain, SOB or dizziness currently.     Amlodipine 2.5mg po given to maximize antianginal therapy and SBP>180     Stress test: only 2013 test results available (small, mild, fixed perfusion defect of the anterior and anteroseptal wall consistent with infarct without significant ischemia EF 54%), current stress test results requested to Lindsay Calero's office [phone]  CXR 4/2017: COPD with chr. pulmonary fibrosis (27 Jul 2017 13:26)      ?FOLLOWING PRESENT  [x ] Hx of PE/DVT, [y] Hx COPD, [x ] Hx of Asthma, [x ] Hx of Hospitalization, [x ]  Hx of BiPAP/CPAP use, [x ] Hx of KARON    Allergies    No Known Allergies    Intolerances        PAST MEDICAL & SURGICAL HISTORY:  Hypothyroid  Glaucoma  Stented coronary artery  CAD (coronary artery disease)  MI (myocardial infarction): 2011 stent in [place]  HTN (hypertension)  Gout  Former smoker  COPD (chronic obstructive pulmonary disease)  H/O foot surgery      FAMILY HISTORY:  Family history of cancer      Social History: [ yes ] TOBACCO    > 30 yrs              [ x ] ETOH                                 [ x ] IVDA/DRUGS    REVIEW OF SYSTEMS      General:	x    Skin/Breast:x  	  Ophthalmologic:x  	  ENMT:	x    Respiratory and Thorax: chest pain  	  Cardiovascular:	x    Gastrointestinal:	x    Genitourinary:	x    Musculoskeletal:x	    Neurological:	x    Psychiatric:	x    Hematology/Lymphatics:x	    Endocrine:	x    Allergic/Immunologic:	    MEDICATIONS  (STANDING):  pantoprazole  Injectable 40 milliGRAM(s) IV Push daily  docusate sodium 100 milliGRAM(s) Oral three times a day  potassium chloride  10 mEq/50 mL IVPB 10 milliEquivalent(s) IV Intermittent every 1 hour  potassium chloride  10 mEq/50 mL IVPB 10 milliEquivalent(s) IV Intermittent every 1 hour  potassium chloride  10 mEq/50 mL IVPB 10 milliEquivalent(s) IV Intermittent once  insulin Infusion 2 Unit(s)/Hr (2 mL/Hr) IV Continuous <Continuous>  aspirin Suppository 300 milliGRAM(s) Rectal once  metoclopramide Injectable 10 milliGRAM(s) IV Push every 8 hours  cefuroxime  IVPB 1500 milliGRAM(s) IV Intermittent every 8 hours  levothyroxine 50 MICROGram(s) Oral daily  latanoprost 0.005% Ophthalmic Solution 1 Drop(s) Both EYES at bedtime    MEDICATIONS  (PRN):  meperidine     Injectable 25 milliGRAM(s) IV Push once PRN For Shivering       Vital Signs Last 24 Hrs  T(C): 36.4 (28 Jul 2017 05:40), Max: 37 (27 Jul 2017 20:49)  T(F): 97.6 (28 Jul 2017 05:40), Max: 98.6 (27 Jul 2017 20:49)  HR: 58 (28 Jul 2017 12:39) (53 - 62)  BP: 164/79 (28 Jul 2017 05:40) (164/79 - 186/90)  BP(mean): 122 (27 Jul 2017 13:26) (122 - 122)  RR: 18 (28 Jul 2017 05:40) (18 - 18)  SpO2: 100% (28 Jul 2017 12:39) (98% - 100%)    Mode: AC/ CMV (Assist Control/ Continuous Mandatory Ventilation)  RR (machine): 10  TV (machine): 550  FiO2: 100  PEEP: 5  ITime: 1  MAP: 11  PIP: 25      I&O's Summary      Physical Exam:   GENERAL: NAD, well-groomed, well-developed  HEENT: KULWINDER/   Atraumatic, Normocephalic  ENMT: No tonsillar erythema, exudates, or enlargement; Moist mucous membranes, Good dentition, No lesions  NECK: Supple, No JVD, Normal thyroid  CHEST/LUNG: coarse rhochi+  CVS: Regular rate and rhythm; No murmurs, rubs, or gallops  GI: : Soft, Nontender, Nondistended; Bowel sounds present  NERVOUS SYSTEM:  Alert & Oriented X3  EXTREMITIES:  2+ Peripheral Pulses, No clubbing, cyanosis, or edema  LYMPH: No lymphadenopathy noted  SKIN: No rashes or lesions  ENDOCRINOLOGY: No Thyromegaly  PSYCH: Appropriate    Labs:  ABG - ( 28 Jul 2017 12:32 )  pH: 7.32  /  pCO2: 47    /  pO2: 468   / HCO3: 24    / Base Excess: -2.0  /  SaO2: 100                             14.3   6.6   )-----------( 194      ( 27 Jul 2017 23:08 )             40.8                         14.3   5.1   )-----------( 205      ( 27 Jul 2017 13:28 )             41.0     07-27    142  |  103  |  15  ----------------------------<  92  4.3   |  28  |  1.08    Ca    9.5      27 Jul 2017 13:28    TPro  8.0  /  Alb  4.0  /  TBili  0.4  /  DBili  0.1  /  AST  17  /  ALT  11  /  AlkPhos  63  07-27  TPro  8.3  /  Alb  4.2  /  TBili  0.5  /  DBili  x   /  AST  20  /  ALT  12  /  AlkPhos  64  07-27    CAPILLARY BLOOD GLUCOSE        LIVER FUNCTIONS - ( 27 Jul 2017 23:08 )  Alb: 4.0 g/dL / Pro: 8.0 g/dL / ALK PHOS: 63 U/L / ALT: 11 U/L RC / AST: 17 U/L / GGT: x           PT/INR - ( 27 Jul 2017 23:08 )   PT: 15.5 sec;   INR: 1.42 ratio         PTT - ( 27 Jul 2017 23:08 )  PTT:41.4 sec    D DImer  Serum Pro-Brain Natriuretic Peptide: 208 pg/mL (07-27 @ 23:08)    Cultures:     Studies  Chest X-RAY  CT SCAN Chest   CT Abdomen  Venous Dopplers: LE:   Others    < from: Xray Chest 1 View AP -PORTABLE-Routine (07.28.17 @ 11:57) >    EXAM:  CHEST PORTABLE ROUTINE                            PROCEDURE DATE:  07/28/2017            INTERPRETATION:  CLINICAL INDICATION: Postop, CABG.    EXAM: Frontal view of the chest with comparison made to chest radiograph   on 7/27/2017    FINDINGS:   Status post median sternotomy and CABG.  Endotracheal tube with tip above the marium. Right-sided catheter with   tip in the SVC. 2 mediastinal drains in place. Left-sided chest tube in   place.  Left costophrenic angle cannot be fully visualized. No definite pleural   effusions or pneumothorax.  Mild prominence of the hilar vasculature. There are no pleural effusions   or pneumothorax.  The heart size is normal.   No acute bony pathology.    IMPRESSION:   Status post median sternotomy and CABG. Mild pulmonary edema. All lines   and tubes in place.    < end of copied text >      < from: CT Chest No Cont (07.27.17 @ 22:18) >  IMPRESSION:    Scattered tree-in-bud nodules with scattered nodular and patchy opacities   which may be infectious/inflammatory versus mucoid impaction.  A few   focal nodules measure up to 7 mm which are likely related to the same   process. Three-month follow-up CT needed for complete evaluation, to   exclude underlying abnormality.    < end of copied text >

## 2017-07-29 DIAGNOSIS — J44.9 CHRONIC OBSTRUCTIVE PULMONARY DISEASE, UNSPECIFIED: ICD-10-CM

## 2017-07-29 DIAGNOSIS — I25.118 ATHEROSCLEROTIC HEART DISEASE OF NATIVE CORONARY ARTERY WITH OTHER FORMS OF ANGINA PECTORIS: ICD-10-CM

## 2017-07-29 DIAGNOSIS — Z29.9 ENCOUNTER FOR PROPHYLACTIC MEASURES, UNSPECIFIED: ICD-10-CM

## 2017-07-29 DIAGNOSIS — I10 ESSENTIAL (PRIMARY) HYPERTENSION: ICD-10-CM

## 2017-07-29 LAB
ALBUMIN SERPL ELPH-MCNC: 3.5 G/DL — SIGNIFICANT CHANGE UP (ref 3.3–5)
ALP SERPL-CCNC: 54 U/L — SIGNIFICANT CHANGE UP (ref 40–120)
ALT FLD-CCNC: 12 U/L RC — SIGNIFICANT CHANGE UP (ref 10–45)
ANION GAP SERPL CALC-SCNC: 16 MMOL/L — SIGNIFICANT CHANGE UP (ref 5–17)
APTT BLD: 29.3 SEC — SIGNIFICANT CHANGE UP (ref 27.5–37.4)
AST SERPL-CCNC: 24 U/L — SIGNIFICANT CHANGE UP (ref 10–40)
BILIRUB SERPL-MCNC: 0.5 MG/DL — SIGNIFICANT CHANGE UP (ref 0.2–1.2)
BUN SERPL-MCNC: 16 MG/DL — SIGNIFICANT CHANGE UP (ref 7–23)
CALCIUM SERPL-MCNC: 8.5 MG/DL — SIGNIFICANT CHANGE UP (ref 8.4–10.5)
CHLORIDE SERPL-SCNC: 108 MMOL/L — SIGNIFICANT CHANGE UP (ref 96–108)
CK MB BLD-MCNC: 2.8 % — SIGNIFICANT CHANGE UP (ref 0–3.5)
CK MB CFR SERPL CALC: 12.3 NG/ML — HIGH (ref 0–6.7)
CK SERPL-CCNC: 439 U/L — HIGH (ref 30–200)
CO2 SERPL-SCNC: 19 MMOL/L — LOW (ref 22–31)
CREAT SERPL-MCNC: 0.93 MG/DL — SIGNIFICANT CHANGE UP (ref 0.5–1.3)
GAS PNL BLDA: SIGNIFICANT CHANGE UP
GAS PNL BLDA: SIGNIFICANT CHANGE UP
GLUCOSE SERPL-MCNC: 147 MG/DL — HIGH (ref 70–99)
HCT VFR BLD CALC: 39.2 % — SIGNIFICANT CHANGE UP (ref 39–50)
HGB BLD-MCNC: 13.5 G/DL — SIGNIFICANT CHANGE UP (ref 13–17)
INR BLD: 1.53 RATIO — HIGH (ref 0.88–1.16)
MCHC RBC-ENTMCNC: 30 PG — SIGNIFICANT CHANGE UP (ref 27–34)
MCHC RBC-ENTMCNC: 34.5 GM/DL — SIGNIFICANT CHANGE UP (ref 32–36)
MCV RBC AUTO: 86.7 FL — SIGNIFICANT CHANGE UP (ref 80–100)
PLATELET # BLD AUTO: 172 K/UL — SIGNIFICANT CHANGE UP (ref 150–400)
POTASSIUM SERPL-MCNC: 4.3 MMOL/L — SIGNIFICANT CHANGE UP (ref 3.5–5.3)
POTASSIUM SERPL-SCNC: 4.3 MMOL/L — SIGNIFICANT CHANGE UP (ref 3.5–5.3)
PROT SERPL-MCNC: 7.1 G/DL — SIGNIFICANT CHANGE UP (ref 6–8.3)
PROTHROM AB SERPL-ACNC: 16.8 SEC — HIGH (ref 9.8–12.7)
RBC # BLD: 4.52 M/UL — SIGNIFICANT CHANGE UP (ref 4.2–5.8)
RBC # FLD: 11.8 % — SIGNIFICANT CHANGE UP (ref 10.3–14.5)
SODIUM SERPL-SCNC: 143 MMOL/L — SIGNIFICANT CHANGE UP (ref 135–145)
TROPONIN T SERPL-MCNC: 0.11 NG/ML — HIGH (ref 0–0.06)
WBC # BLD: 11.7 K/UL — HIGH (ref 3.8–10.5)
WBC # FLD AUTO: 11.7 K/UL — HIGH (ref 3.8–10.5)

## 2017-07-29 PROCEDURE — 71010: CPT | Mod: 26

## 2017-07-29 PROCEDURE — 93010 ELECTROCARDIOGRAM REPORT: CPT

## 2017-07-29 RX ORDER — LISINOPRIL 2.5 MG/1
1 TABLET ORAL
Qty: 0 | Refills: 0 | COMMUNITY

## 2017-07-29 RX ORDER — CARVEDILOL PHOSPHATE 80 MG/1
3.12 CAPSULE, EXTENDED RELEASE ORAL EVERY 12 HOURS
Qty: 0 | Refills: 0 | Status: DISCONTINUED | OUTPATIENT
Start: 2017-07-29 | End: 2017-07-31

## 2017-07-29 RX ORDER — INSULIN LISPRO 100/ML
VIAL (ML) SUBCUTANEOUS
Qty: 0 | Refills: 0 | Status: DISCONTINUED | OUTPATIENT
Start: 2017-07-29 | End: 2017-07-30

## 2017-07-29 RX ORDER — ATORVASTATIN CALCIUM 80 MG/1
40 TABLET, FILM COATED ORAL AT BEDTIME
Qty: 0 | Refills: 0 | Status: DISCONTINUED | OUTPATIENT
Start: 2017-07-29 | End: 2017-08-02

## 2017-07-29 RX ORDER — INDOMETHACIN 50 MG
1 CAPSULE ORAL
Qty: 0 | Refills: 0 | COMMUNITY

## 2017-07-29 RX ORDER — COLCHICINE 0.6 MG
1 TABLET ORAL
Qty: 0 | Refills: 0 | COMMUNITY

## 2017-07-29 RX ORDER — ACLIDINIUM BROMIDE 400 UG/1
1 POWDER, METERED RESPIRATORY (INHALATION)
Qty: 0 | Refills: 0 | COMMUNITY

## 2017-07-29 RX ORDER — POTASSIUM CHLORIDE 20 MEQ
10 PACKET (EA) ORAL
Qty: 0 | Refills: 0 | Status: COMPLETED | OUTPATIENT
Start: 2017-07-29 | End: 2017-07-29

## 2017-07-29 RX ORDER — CARVEDILOL PHOSPHATE 80 MG/1
1 CAPSULE, EXTENDED RELEASE ORAL
Qty: 0 | Refills: 0 | COMMUNITY

## 2017-07-29 RX ORDER — PANTOPRAZOLE SODIUM 20 MG/1
40 TABLET, DELAYED RELEASE ORAL
Qty: 0 | Refills: 0 | Status: DISCONTINUED | OUTPATIENT
Start: 2017-07-29 | End: 2017-08-02

## 2017-07-29 RX ORDER — OXYCODONE AND ACETAMINOPHEN 5; 325 MG/1; MG/1
1 TABLET ORAL EVERY 6 HOURS
Qty: 0 | Refills: 0 | Status: DISCONTINUED | OUTPATIENT
Start: 2017-07-29 | End: 2017-08-02

## 2017-07-29 RX ORDER — ENOXAPARIN SODIUM 100 MG/ML
40 INJECTION SUBCUTANEOUS DAILY
Qty: 0 | Refills: 0 | Status: DISCONTINUED | OUTPATIENT
Start: 2017-07-29 | End: 2017-08-02

## 2017-07-29 RX ORDER — FLUTICASONE PROPIONATE 50 MCG
1 SPRAY, SUSPENSION NASAL
Qty: 0 | Refills: 0 | COMMUNITY

## 2017-07-29 RX ORDER — OXYCODONE AND ACETAMINOPHEN 5; 325 MG/1; MG/1
2 TABLET ORAL EVERY 6 HOURS
Qty: 0 | Refills: 0 | Status: DISCONTINUED | OUTPATIENT
Start: 2017-07-29 | End: 2017-08-02

## 2017-07-29 RX ORDER — INSULIN LISPRO 100/ML
VIAL (ML) SUBCUTANEOUS AT BEDTIME
Qty: 0 | Refills: 0 | Status: DISCONTINUED | OUTPATIENT
Start: 2017-07-29 | End: 2017-07-30

## 2017-07-29 RX ORDER — FENTANYL CITRATE 50 UG/ML
25 INJECTION INTRAVENOUS ONCE
Qty: 0 | Refills: 0 | Status: DISCONTINUED | OUTPATIENT
Start: 2017-07-29 | End: 2017-07-29

## 2017-07-29 RX ADMIN — ENOXAPARIN SODIUM 40 MILLIGRAM(S): 100 INJECTION SUBCUTANEOUS at 11:35

## 2017-07-29 RX ADMIN — Medication 0.1 MILLIGRAM(S): at 05:21

## 2017-07-29 RX ADMIN — Medication 100 MILLIEQUIVALENT(S): at 04:48

## 2017-07-29 RX ADMIN — FENTANYL CITRATE 25 MICROGRAM(S): 50 INJECTION INTRAVENOUS at 02:15

## 2017-07-29 RX ADMIN — PANTOPRAZOLE SODIUM 40 MILLIGRAM(S): 20 TABLET, DELAYED RELEASE ORAL at 11:36

## 2017-07-29 RX ADMIN — Medication 50 MICROGRAM(S): at 05:21

## 2017-07-29 RX ADMIN — CARVEDILOL PHOSPHATE 3.12 MILLIGRAM(S): 80 CAPSULE, EXTENDED RELEASE ORAL at 17:25

## 2017-07-29 RX ADMIN — NICARDIPINE HYDROCHLORIDE 15 MG/HR: 30 CAPSULE, EXTENDED RELEASE ORAL at 05:07

## 2017-07-29 RX ADMIN — Medication 100 MILLIGRAM(S): at 21:24

## 2017-07-29 RX ADMIN — Medication 3 MILLILITER(S): at 17:25

## 2017-07-29 RX ADMIN — CARVEDILOL PHOSPHATE 3.12 MILLIGRAM(S): 80 CAPSULE, EXTENDED RELEASE ORAL at 11:53

## 2017-07-29 RX ADMIN — FENTANYL CITRATE 25 MICROGRAM(S): 50 INJECTION INTRAVENOUS at 02:00

## 2017-07-29 RX ADMIN — OXYCODONE AND ACETAMINOPHEN 1 TABLET(S): 5; 325 TABLET ORAL at 12:05

## 2017-07-29 RX ADMIN — Medication 10 MILLIGRAM(S): at 14:00

## 2017-07-29 RX ADMIN — Medication 100 MILLIGRAM(S): at 17:26

## 2017-07-29 RX ADMIN — Medication 100 MILLIGRAM(S): at 05:21

## 2017-07-29 RX ADMIN — Medication 100 MILLIGRAM(S): at 23:07

## 2017-07-29 RX ADMIN — INSULIN HUMAN 2 UNIT(S)/HR: 100 INJECTION, SOLUTION SUBCUTANEOUS at 05:07

## 2017-07-29 RX ADMIN — Medication 3 MILLILITER(S): at 05:28

## 2017-07-29 RX ADMIN — Medication 325 MILLIGRAM(S): at 11:35

## 2017-07-29 RX ADMIN — Medication 100 MILLIGRAM(S): at 08:02

## 2017-07-29 RX ADMIN — Medication 3 MILLILITER(S): at 12:00

## 2017-07-29 RX ADMIN — Medication 100 MILLIGRAM(S): at 00:01

## 2017-07-29 RX ADMIN — OXYCODONE AND ACETAMINOPHEN 1 TABLET(S): 5; 325 TABLET ORAL at 11:35

## 2017-07-29 RX ADMIN — ATORVASTATIN CALCIUM 40 MILLIGRAM(S): 80 TABLET, FILM COATED ORAL at 21:24

## 2017-07-29 RX ADMIN — LATANOPROST 1 DROP(S): 0.05 SOLUTION/ DROPS OPHTHALMIC; TOPICAL at 22:09

## 2017-07-29 RX ADMIN — Medication 10 MILLIGRAM(S): at 21:25

## 2017-07-29 RX ADMIN — Medication 3 MILLILITER(S): at 23:09

## 2017-07-29 RX ADMIN — Medication 10 MILLIGRAM(S): at 05:21

## 2017-07-29 RX ADMIN — OXYCODONE AND ACETAMINOPHEN 2 TABLET(S): 5; 325 TABLET ORAL at 21:20

## 2017-07-29 RX ADMIN — OXYCODONE AND ACETAMINOPHEN 2 TABLET(S): 5; 325 TABLET ORAL at 20:40

## 2017-07-29 NOTE — PHYSICAL THERAPY INITIAL EVALUATION ADULT - ADDITIONAL COMMENTS
7/27 CT chest: Scattered tree-in-bud nodules with scattered nodular and patchy opacities which may be infectious/inflammatory versus mucoid impaction.  A few focal nodules measure up to 7 mm which are likely related to the same process. Three-month follow-up CT needed for complete evaluation, to exclude underlying abnormality. 7/28 XR chest: Status post median sternotomy and CABG. Mild pulmonary edema. All lines and tubes in place. 7/29 XR chest: Pneumoperitoneum with elevation of right hemidiaphragm. Recommend CT abd and pelvis to further evaluate for source of free air.  Interval decrease in bilateral interstitial markings.

## 2017-07-29 NOTE — PROGRESS NOTE ADULT - PROBLEM SELECTOR PLAN 2
C/w ASA, cardene. Clonidine 0.1mg PO Q8H started. Consider starting beta blocker if heart rate can tolerate.

## 2017-07-29 NOTE — PHYSICAL THERAPY INITIAL EVALUATION ADULT - PERTINENT HX OF CURRENT PROBLEM, REHAB EVAL
72 year old male pt with PMHx of COPD, HTN, HLD, gout, hypothyroidism, former smoker, MI, CAD with stent(80% Ramus, 50% LM, no official report) in 2011 in [place] presents for cardiac cath. Pt reports he has been experiencing SOB on exertion for past 5 months, evaluated by Dr. Calero and had abnormal stress test. Pt denies chest pain, SOB or dizziness currently.

## 2017-07-29 NOTE — PROGRESS NOTE ADULT - SUBJECTIVE AND OBJECTIVE BOX
Pulmonary f/u (covering Dr Balbuena)  Pt complains of post op pain, otherwise no significant cough or sputum production  /56 on nicardipine drip, SpO2 97% on NC  Alert and oriented, NAD  MMM  reg rhythm  no crackles or wheeze  abd soft  no significant edema    Labs, CXR, CT chest reviewed.

## 2017-07-29 NOTE — PROGRESS NOTE ADULT - ASSESSMENT
71 y/o M Hx of COPD, HTN, HLD, gout, hypothyroidism, voice hoarseness, former smoker, MI, CAD with stent 2011 now POD 1 s/p C1L off pump. 71 y/o M Hx of COPD, HTN, HLD, gout, hypothyroidism, voice hoarseness, former smoker, MI, CAD with stent 2011 now POD 1 s/p C1L off pump. Now remains on cardene gtt for HTN and insulin gtt for hyperglycemia. Extubated, A&Ox3, asymptomatic. 73 y/o M Hx of COPD, HTN, HLD, gout, hypothyroidism, voice hoarseness, former smoker, MI, CAD with stent 2011 now POD 1 s/p C1L off pump. Now remains on cardene gtt for HTN and insulin gtt for hyperglycemia. Extubated, A&Ox3, asymptomatic. UOP 40cc/hr.

## 2017-07-29 NOTE — PROGRESS NOTE ADULT - PROBLEM SELECTOR PLAN 1
C/w ASA, cardene. Consider starting beta blocker. C/w ASA, cardene. Clonidine 0.1mg PO Q8H started. Consider starting beta blocker if heart rate can tolerate. Start statin.

## 2017-07-29 NOTE — PHYSICAL THERAPY INITIAL EVALUATION ADULT - LIVES WITH, PROFILE
spouse/pt lives with spouse in  with 4 steps at entrance with no handrails, +20 steps landing +3 steps/unilateral HR to 2nd level with bedroom/bathroom.  PTA pt independent with all functional mobility, & ambulating without AD.

## 2017-07-29 NOTE — PROGRESS NOTE ADULT - SUBJECTIVE AND OBJECTIVE BOX
Operation  POD  Narrative     Vital Signs Last 24 Hrs  T(C): 36 (28 Jul 2017 23:00), Max: 36.9 (28 Jul 2017 15:00)  T(F): 96.8 (28 Jul 2017 23:00), Max: 98.4 (28 Jul 2017 15:00)  HR: 68 (29 Jul 2017 02:00) (53 - 88)  BP: 164/79 (28 Jul 2017 05:40) (164/79 - 164/79)  BP(mean): --  RR: 14 (29 Jul 2017 02:00) (7 - 31)  SpO2: 100% (29 Jul 2017 02:00) (98% - 100%)  I&O's Detail    28 Jul 2017 07:01  -  29 Jul 2017 02:53  --------------------------------------------------------  IN:    Albumin 5%  - 250 mL: 250 mL    IV PiggyBack: 550 mL    Lactated Ringers IV Bolus: 1000 mL    niCARdipine Infusion: 195 mL    Oral Fluid: 480 mL  Total IN: 2475 mL    OUT:    Chest Tube: 280 mL    Chest Tube: 150 mL    Indwelling Catheter - Urethral: 1135 mL  Total OUT: 1565 mL    Total NET: 910 mL          LABS:    MEDICATIONS  (STANDING):  pantoprazole  Injectable 40 milliGRAM(s) IV Push daily  docusate sodium 100 milliGRAM(s) Oral three times a day  potassium chloride  10 mEq/50 mL IVPB 10 milliEquivalent(s) IV Intermittent every 1 hour  potassium chloride  10 mEq/50 mL IVPB 10 milliEquivalent(s) IV Intermittent every 1 hour  potassium chloride  10 mEq/50 mL IVPB 10 milliEquivalent(s) IV Intermittent once  insulin Infusion 2 Unit(s)/Hr (2 mL/Hr) IV Continuous <Continuous>  aspirin enteric coated 325 milliGRAM(s) Oral daily  metoclopramide Injectable 10 milliGRAM(s) IV Push every 8 hours  cefuroxime  IVPB 1500 milliGRAM(s) IV Intermittent every 8 hours  levothyroxine 50 MICROGram(s) Oral daily  latanoprost 0.005% Ophthalmic Solution 1 Drop(s) Both EYES at bedtime  ALBUTerol/ipratropium for Nebulization 3 milliLiter(s) Nebulizer every 6 hours  niCARdipine Infusion 3 mG/Hr (15 mL/Hr) IV Continuous <Continuous>    MEDICATIONS  (PRN):      General: A+Ox3, in NAD  Chest: sternal dressing C/D/I  Heart: S1, S2, RRR  Lungs: CTA B/L, without W/R/R  Abdomen: Soft, ND, NT, positive BS  Extremeties: without edema B/L LE, positive DP pulses B/L     Does the patient have a history of CHF:  -If yes, systolic or diastolic:  -pre-operative EF:    Extubation within 24 hours:    Does the patient have a history of kidney disease:  -give CKD stage if applicable:  -what is patient's baseline Creatinine:    Beta Blockers contraindicated for the first 24 hours due to vasopressor/inotrpic medication:  -If on pressors, indication:    Did the patient receive transfusion of blood and/or products:  -If yes, indication:    DVT PPX:    Hanna-operative antibiotics discontinued within 48 hours of CTU admission:  -name/date/time of discontinue    RADIOLOGY & ADDITIONAL TESTS:    Patient care discussed on morning interdisciplinary rounds with CTS team. Operation C1L off pump  POD 1  Narrative 73 y/o M Hx of COPD, HTN, HLD, gout, hypothyroidism, former smoker, MI, CAD with stent 2011 now POD 1 s/p C1L off pump.    Vital Signs Last 24 Hrs  T(C): 36 (28 Jul 2017 23:00), Max: 36.9 (28 Jul 2017 15:00)  T(F): 96.8 (28 Jul 2017 23:00), Max: 98.4 (28 Jul 2017 15:00)  HR: 68 (29 Jul 2017 02:00) (53 - 88)  BP: 164/79 (28 Jul 2017 05:40) (164/79 - 164/79)  BP(mean): --  RR: 14 (29 Jul 2017 02:00) (7 - 31)  SpO2: 100% (29 Jul 2017 02:00) (98% - 100%)  I&O's Detail    28 Jul 2017 07:01  -  29 Jul 2017 02:53  --------------------------------------------------------  IN:    Albumin 5%  - 250 mL: 250 mL    IV PiggyBack: 550 mL    Lactated Ringers IV Bolus: 1000 mL    niCARdipine Infusion: 195 mL    Oral Fluid: 480 mL  Total IN: 2475 mL    OUT:    Chest Tube: 280 mL    Chest Tube: 150 mL    Indwelling Catheter - Urethral: 1135 mL  Total OUT: 1565 mL    Total NET: 910 mL    MEDICATIONS  (STANDING):  pantoprazole  Injectable 40 milliGRAM(s) IV Push daily  docusate sodium 100 milliGRAM(s) Oral three times a day  potassium chloride  10 mEq/50 mL IVPB 10 milliEquivalent(s) IV Intermittent every 1 hour  potassium chloride  10 mEq/50 mL IVPB 10 milliEquivalent(s) IV Intermittent every 1 hour  potassium chloride  10 mEq/50 mL IVPB 10 milliEquivalent(s) IV Intermittent once  insulin Infusion 2 Unit(s)/Hr (2 mL/Hr) IV Continuous <Continuous>  aspirin enteric coated 325 milliGRAM(s) Oral daily  metoclopramide Injectable 10 milliGRAM(s) IV Push every 8 hours  cefuroxime  IVPB 1500 milliGRAM(s) IV Intermittent every 8 hours  levothyroxine 50 MICROGram(s) Oral daily  latanoprost 0.005% Ophthalmic Solution 1 Drop(s) Both EYES at bedtime  ALBUTerol/ipratropium for Nebulization 3 milliLiter(s) Nebulizer every 6 hours  niCARdipine Infusion 3 mG/Hr (15 mL/Hr) IV Continuous <Continuous>    General: A+Ox3, in NAD  Chest: sternal dressing C/D/I  Heart: S1, S2, RRR  Lungs: CTA B/L, without W/R/R  Abdomen: Soft, ND, NT, positive BS  Extremeties: without edema B/L LE, positive DP pulses B/L     Does the patient have a history of CHF: No    Extubation within 24 hours:    Does the patient have a history of kidney disease:  -give CKD stage if applicable:  -what is patient's baseline Creatinine:    Beta Blockers contraindicated for the first 24 hours due to vasopressor/inotropic medication: No    Did the patient receive transfusion of blood and/or products: No    DVT PPX: SCDs    Hanna-operative antibiotics discontinued within 48 hours of CTU admission:  -name/date/time of discontinue    RADIOLOGY & ADDITIONAL TESTS:    Patient care discussed on morning interdisciplinary rounds with CTS team. Operation C1L off pump  POD 1  Narrative 71 y/o M Hx of COPD, HTN, HLD, gout, hypothyroidism, former smoker, MI, CAD with stent 2011 now POD 1 s/p C1L off pump.    Vital Signs Last 24 Hrs  T(C): 36 (28 Jul 2017 23:00), Max: 36.9 (28 Jul 2017 15:00)  T(F): 96.8 (28 Jul 2017 23:00), Max: 98.4 (28 Jul 2017 15:00)  HR: 68 (29 Jul 2017 02:00) (53 - 88)  BP: 164/79 (28 Jul 2017 05:40) (164/79 - 164/79)  BP(mean): --  RR: 14 (29 Jul 2017 02:00) (7 - 31)  SpO2: 100% (29 Jul 2017 02:00) (98% - 100%)  I&O's Detail    28 Jul 2017 07:01  -  29 Jul 2017 02:53  --------------------------------------------------------  IN:    Albumin 5%  - 250 mL: 250 mL    IV PiggyBack: 550 mL    Lactated Ringers IV Bolus: 1000 mL    niCARdipine Infusion: 195 mL    Oral Fluid: 480 mL  Total IN: 2475 mL    OUT:    Chest Tube: 280 mL    Chest Tube: 150 mL    Indwelling Catheter - Urethral: 1135 mL  Total OUT: 1565 mL    Total NET: 910 mL    MEDICATIONS  (STANDING):  pantoprazole  Injectable 40 milliGRAM(s) IV Push daily  docusate sodium 100 milliGRAM(s) Oral three times a day  potassium chloride  10 mEq/50 mL IVPB 10 milliEquivalent(s) IV Intermittent every 1 hour  potassium chloride  10 mEq/50 mL IVPB 10 milliEquivalent(s) IV Intermittent every 1 hour  potassium chloride  10 mEq/50 mL IVPB 10 milliEquivalent(s) IV Intermittent once  insulin Infusion 2 Unit(s)/Hr (2 mL/Hr) IV Continuous <Continuous>  aspirin enteric coated 325 milliGRAM(s) Oral daily  metoclopramide Injectable 10 milliGRAM(s) IV Push every 8 hours  cefuroxime  IVPB 1500 milliGRAM(s) IV Intermittent every 8 hours  levothyroxine 50 MICROGram(s) Oral daily  latanoprost 0.005% Ophthalmic Solution 1 Drop(s) Both EYES at bedtime  ALBUTerol/ipratropium for Nebulization 3 milliLiter(s) Nebulizer every 6 hours  niCARdipine Infusion 3 mG/Hr (15 mL/Hr) IV Continuous <Continuous>    General: A+Ox3, in NAD  Chest: sternal dressing C/D/I  Heart: S1, S2, RRR  Lungs: CTA B/L, without W/R/R  Abdomen: Soft, ND, NT, positive BS  Extremeties: without edema B/L LE, positive DP pulses B/L     Does the patient have a history of CHF: No    Extubation within 24 hours: Yes    Does the patient have a history of kidney disease: No  -what is patient's baseline Creatinine: 1.08    Beta Blockers contraindicated for the first 24 hours due to vasopressor/inotropic medication: No    Did the patient receive transfusion of blood and/or products: No    DVT PPX: SCDs    Hanna-operative antibiotics discontinued within 48 hours of CTU admission:  -name/date/time of discontinue 7/30/17 @ 00:00 Operation C1L off pump  POD 1  Narrative 73 y/o M Hx of COPD, HTN, HLD, gout, hypothyroidism, former smoker, MI, CAD with stent 2011 now POD 1 s/p C1L off pump.    Vital Signs Last 24 Hrs  T(C): 36 (28 Jul 2017 23:00), Max: 36.9 (28 Jul 2017 15:00)  T(F): 96.8 (28 Jul 2017 23:00), Max: 98.4 (28 Jul 2017 15:00)  HR: 68 (29 Jul 2017 02:00) (53 - 88)  BP: 164/79 (28 Jul 2017 05:40) (164/79 - 164/79)  BP(mean): --  RR: 14 (29 Jul 2017 02:00) (7 - 31)  SpO2: 100% (29 Jul 2017 02:00) (98% - 100%)  I&O's Detail    28 Jul 2017 07:01  -  29 Jul 2017 02:53  --------------------------------------------------------  IN:    Albumin 5%  - 250 mL: 250 mL    IV PiggyBack: 550 mL    Lactated Ringers IV Bolus: 1000 mL    niCARdipine Infusion: 195 mL    Oral Fluid: 480 mL  Total IN: 2475 mL    OUT:    Chest Tube: 280 mL    Chest Tube: 150 mL    Indwelling Catheter - Urethral: 1135 mL  Total OUT: 1565 mL    Total NET: 910 mL    MEDICATIONS  (STANDING):  pantoprazole  Injectable 40 milliGRAM(s) IV Push daily  docusate sodium 100 milliGRAM(s) Oral three times a day  potassium chloride  10 mEq/50 mL IVPB 10 milliEquivalent(s) IV Intermittent every 1 hour  potassium chloride  10 mEq/50 mL IVPB 10 milliEquivalent(s) IV Intermittent every 1 hour  potassium chloride  10 mEq/50 mL IVPB 10 milliEquivalent(s) IV Intermittent once  insulin Infusion 2 Unit(s)/Hr (2 mL/Hr) IV Continuous <Continuous>  aspirin enteric coated 325 milliGRAM(s) Oral daily  metoclopramide Injectable 10 milliGRAM(s) IV Push every 8 hours  cefuroxime  IVPB 1500 milliGRAM(s) IV Intermittent every 8 hours  levothyroxine 50 MICROGram(s) Oral daily  latanoprost 0.005% Ophthalmic Solution 1 Drop(s) Both EYES at bedtime  ALBUTerol/ipratropium for Nebulization 3 milliLiter(s) Nebulizer every 6 hours  niCARdipine Infusion 3 mG/Hr (15 mL/Hr) IV Continuous <Continuous>    General: A+Ox3, in NAD. RIJ intact.  Chest: sternal dressing C/D/I. 2 mediastinal chest tubes and 1 left pleural chest tube intact.  Heart: S1, S2, RRR. + PW, VVI 40.  Lungs: CTA B/L, without W/R/R  Abdomen: Soft, ND, NT, positive BS.  Extremities: without edema B/L LE, positive DP pulses B/L. + yu.    Does the patient have a history of CHF: No    Extubation within 24 hours: Yes    Does the patient have a history of kidney disease: No  -what is patient's baseline Creatinine: 1.08    Beta Blockers contraindicated for the first 24 hours due to vasopressor/inotropic medication: No    Did the patient receive transfusion of blood and/or products: No    DVT PPX: SCDs    Hanna-operative antibiotics discontinued within 48 hours of CTU admission:  -name/date/time of discontinue 7/30/17 @ 00:00 Operation C1L off pump  POD 1  Narrative 73 y/o M Hx of COPD, HTN, HLD, gout, hypothyroidism, former smoker, MI, CAD with stent 2011 now POD 1 s/p C1L off pump.    Vital Signs Last 24 Hrs  T(C): 36 (28 Jul 2017 23:00), Max: 36.9 (28 Jul 2017 15:00)  T(F): 96.8 (28 Jul 2017 23:00), Max: 98.4 (28 Jul 2017 15:00)  HR: 68 (29 Jul 2017 02:00) (53 - 88)  BP: 164/79 (28 Jul 2017 05:40) (164/79 - 164/79)  BP(mean): --  RR: 14 (29 Jul 2017 02:00) (7 - 31)  SpO2: 100% (29 Jul 2017 02:00) (98% - 100%)  I&O's Detail    28 Jul 2017 07:01  -  29 Jul 2017 02:53  --------------------------------------------------------  IN:    Albumin 5%  - 250 mL: 250 mL    IV PiggyBack: 550 mL    Lactated Ringers IV Bolus: 1000 mL    niCARdipine Infusion: 195 mL    Oral Fluid: 480 mL  Total IN: 2475 mL    OUT:    Chest Tube: 280 mL    Chest Tube: 150 mL    Indwelling Catheter - Urethral: 1135 mL  Total OUT: 1565 mL    Total NET: 910 mL    MEDICATIONS  (STANDING):  pantoprazole  Injectable 40 milliGRAM(s) IV Push daily  docusate sodium 100 milliGRAM(s) Oral three times a day  potassium chloride  10 mEq/50 mL IVPB 10 milliEquivalent(s) IV Intermittent every 1 hour  potassium chloride  10 mEq/50 mL IVPB 10 milliEquivalent(s) IV Intermittent every 1 hour  potassium chloride  10 mEq/50 mL IVPB 10 milliEquivalent(s) IV Intermittent once  insulin Infusion 2 Unit(s)/Hr (2 mL/Hr) IV Continuous <Continuous>  aspirin enteric coated 325 milliGRAM(s) Oral daily  metoclopramide Injectable 10 milliGRAM(s) IV Push every 8 hours  cefuroxime  IVPB 1500 milliGRAM(s) IV Intermittent every 8 hours  levothyroxine 50 MICROGram(s) Oral daily  latanoprost 0.005% Ophthalmic Solution 1 Drop(s) Both EYES at bedtime  ALBUTerol/ipratropium for Nebulization 3 milliLiter(s) Nebulizer every 6 hours  niCARdipine Infusion 3 mG/Hr (15 mL/Hr) IV Continuous <Continuous>    General: A+Ox3, in NAD. RIJ intact.  Chest: sternal dressing C/D/I. 2 mediastinal chest tubes and 1 left pleural chest tube intact.  Heart: S1, S2, RRR. + PW, VVI 40.  Lungs: CTA B/L, without W/R/R  Abdomen: Soft, ND, NT, positive BS.  Extremities: without edema B/L LE, positive DP pulses B/L. Left radial a-line intact. + yu.    Does the patient have a history of CHF: No    Extubation within 24 hours: Yes    Does the patient have a history of kidney disease: No  -what is patient's baseline Creatinine: 1.08    Beta Blockers contraindicated for the first 24 hours due to vasopressor/inotropic medication: No    Did the patient receive transfusion of blood and/or products: No    DVT PPX: SCDs    Hanna-operative antibiotics discontinued within 48 hours of CTU admission:  -name/date/time of discontinue 7/30/17 @ 00:00 Operation C1L off pump  POD 1  Narrative 73 y/o M Hx of COPD, HTN, HLD, gout, hypothyroidism, former smoker, MI, CAD with stent 2011 now POD 1 s/p C1L off pump.    Vital Signs Last 24 Hrs  T(C): 36 (28 Jul 2017 23:00), Max: 36.9 (28 Jul 2017 15:00)  T(F): 96.8 (28 Jul 2017 23:00), Max: 98.4 (28 Jul 2017 15:00)  HR: 68 (29 Jul 2017 02:00) (53 - 88)  BP: 164/79 (28 Jul 2017 05:40) (164/79 - 164/79)  RR: 14 (29 Jul 2017 02:00) (7 - 31)  SpO2: 100% (29 Jul 2017 02:00) (98% - 100%)  I&O's Detail    28 Jul 2017 07:01  -  29 Jul 2017 02:53  --------------------------------------------------------  IN:    Albumin 5%  - 250 mL: 250 mL    IV PiggyBack: 550 mL    Lactated Ringers IV Bolus: 1000 mL    niCARdipine Infusion: 195 mL    Oral Fluid: 480 mL  Total IN: 2475 mL    OUT:    Chest Tube: 280 mL    Chest Tube: 150 mL    Indwelling Catheter - Urethral: 1135 mL  Total OUT: 1565 mL    Total NET: 910 mL    MEDICATIONS  (STANDING):  pantoprazole  Injectable 40 milliGRAM(s) IV Push daily  docusate sodium 100 milliGRAM(s) Oral three times a day  potassium chloride  10 mEq/50 mL IVPB 10 milliEquivalent(s) IV Intermittent every 1 hour  potassium chloride  10 mEq/50 mL IVPB 10 milliEquivalent(s) IV Intermittent every 1 hour  potassium chloride  10 mEq/50 mL IVPB 10 milliEquivalent(s) IV Intermittent once  insulin Infusion 2 Unit(s)/Hr (2 mL/Hr) IV Continuous <Continuous>  aspirin enteric coated 325 milliGRAM(s) Oral daily  metoclopramide Injectable 10 milliGRAM(s) IV Push every 8 hours  cefuroxime  IVPB 1500 milliGRAM(s) IV Intermittent every 8 hours  levothyroxine 50 MICROGram(s) Oral daily  latanoprost 0.005% Ophthalmic Solution 1 Drop(s) Both EYES at bedtime  ALBUTerol/ipratropium for Nebulization 3 milliLiter(s) Nebulizer every 6 hours  niCARdipine Infusion 3 mG/Hr (15 mL/Hr) IV Continuous <Continuous>    General: A+Ox3, in NAD. RIJ intact.  Chest: sternal dressing C/D/I. 2 mediastinal chest tubes and 1 left pleural chest tube intact.  Heart: S1, S2, RRR. + PW, VVI 40.  Lungs: CTA B/L, without W/R/R  Abdomen: Soft, ND, NT, positive BS.  Extremities: without edema B/L LE, positive DP pulses B/L. Left radial a-line intact. + yu.    Does the patient have a history of CHF: No    Extubation within 24 hours: Yes    Does the patient have a history of kidney disease: No  -what is patient's baseline Creatinine: 1.08    Beta Blockers contraindicated for the first 24 hours due to vasopressor/inotropic medication: No    Did the patient receive transfusion of blood and/or products: No    DVT PPX: SCDs    Hanna-operative antibiotics discontinued within 48 hours of CTU admission:  -name/date/time of discontinue 7/30/17 @ 00:00

## 2017-07-29 NOTE — PROGRESS NOTE ADULT - ASSESSMENT
72 male with COPD, CAD, HTN, Gout, now s/p CABG.  CT chest shows emphysema and multiple tree-in-bud opacities in upper lobes.  May be due to VOLODYMYR.    Recommendations:  - incentive spirometry, bronchodilators, OOB  - repeat chest imaging after discharge  -titrate down Fio2 to maintain SpO2 92%  Dr Balbuena to return monday.

## 2017-07-30 LAB
ANION GAP SERPL CALC-SCNC: 7 MMOL/L — SIGNIFICANT CHANGE UP (ref 5–17)
APTT BLD: 25.5 SEC — LOW (ref 27.5–37.4)
BUN SERPL-MCNC: 16 MG/DL — SIGNIFICANT CHANGE UP (ref 7–23)
CALCIUM SERPL-MCNC: 8.7 MG/DL — SIGNIFICANT CHANGE UP (ref 8.4–10.5)
CHLORIDE SERPL-SCNC: 107 MMOL/L — SIGNIFICANT CHANGE UP (ref 96–108)
CO2 SERPL-SCNC: 28 MMOL/L — SIGNIFICANT CHANGE UP (ref 22–31)
CREAT SERPL-MCNC: 0.98 MG/DL — SIGNIFICANT CHANGE UP (ref 0.5–1.3)
GLUCOSE SERPL-MCNC: 111 MG/DL — HIGH (ref 70–99)
HCT VFR BLD CALC: 36.8 % — LOW (ref 39–50)
HGB BLD-MCNC: 12.2 G/DL — LOW (ref 13–17)
INR BLD: 1.58 RATIO — HIGH (ref 0.88–1.16)
MCHC RBC-ENTMCNC: 28.8 PG — SIGNIFICANT CHANGE UP (ref 27–34)
MCHC RBC-ENTMCNC: 33.1 GM/DL — SIGNIFICANT CHANGE UP (ref 32–36)
MCV RBC AUTO: 87.1 FL — SIGNIFICANT CHANGE UP (ref 80–100)
PLATELET # BLD AUTO: 160 K/UL — SIGNIFICANT CHANGE UP (ref 150–400)
POTASSIUM SERPL-MCNC: 4.2 MMOL/L — SIGNIFICANT CHANGE UP (ref 3.5–5.3)
POTASSIUM SERPL-SCNC: 4.2 MMOL/L — SIGNIFICANT CHANGE UP (ref 3.5–5.3)
PROTHROM AB SERPL-ACNC: 17.2 SEC — HIGH (ref 9.8–12.7)
RBC # BLD: 4.22 M/UL — SIGNIFICANT CHANGE UP (ref 4.2–5.8)
RBC # FLD: 11.9 % — SIGNIFICANT CHANGE UP (ref 10.3–14.5)
SODIUM SERPL-SCNC: 142 MMOL/L — SIGNIFICANT CHANGE UP (ref 135–145)
WBC # BLD: 12.6 K/UL — HIGH (ref 3.8–10.5)
WBC # FLD AUTO: 12.6 K/UL — HIGH (ref 3.8–10.5)

## 2017-07-30 PROCEDURE — 71010: CPT | Mod: 26

## 2017-07-30 PROCEDURE — 71010: CPT | Mod: 26,77

## 2017-07-30 RX ORDER — HYDRALAZINE HCL 50 MG
25 TABLET ORAL
Qty: 0 | Refills: 0 | Status: DISCONTINUED | OUTPATIENT
Start: 2017-07-30 | End: 2017-07-31

## 2017-07-30 RX ORDER — HYDRALAZINE HCL 50 MG
10 TABLET ORAL
Qty: 0 | Refills: 0 | Status: DISCONTINUED | OUTPATIENT
Start: 2017-07-30 | End: 2017-07-30

## 2017-07-30 RX ORDER — AMLODIPINE BESYLATE 2.5 MG/1
5 TABLET ORAL DAILY
Qty: 0 | Refills: 0 | Status: DISCONTINUED | OUTPATIENT
Start: 2017-07-30 | End: 2017-07-30

## 2017-07-30 RX ORDER — LISINOPRIL 2.5 MG/1
40 TABLET ORAL DAILY
Qty: 0 | Refills: 0 | Status: DISCONTINUED | OUTPATIENT
Start: 2017-07-30 | End: 2017-08-01

## 2017-07-30 RX ORDER — HYDRALAZINE HCL 50 MG
5 TABLET ORAL ONCE
Qty: 0 | Refills: 0 | Status: COMPLETED | OUTPATIENT
Start: 2017-07-30 | End: 2017-07-30

## 2017-07-30 RX ORDER — POLYETHYLENE GLYCOL 3350 17 G/17G
17 POWDER, FOR SOLUTION ORAL DAILY
Qty: 0 | Refills: 0 | Status: DISCONTINUED | OUTPATIENT
Start: 2017-07-30 | End: 2017-08-02

## 2017-07-30 RX ORDER — NICARDIPINE HYDROCHLORIDE 30 MG/1
3 CAPSULE, EXTENDED RELEASE ORAL
Qty: 40 | Refills: 0 | Status: DISCONTINUED | OUTPATIENT
Start: 2017-07-30 | End: 2017-07-30

## 2017-07-30 RX ADMIN — Medication 100 MILLIGRAM(S): at 06:26

## 2017-07-30 RX ADMIN — LATANOPROST 1 DROP(S): 0.05 SOLUTION/ DROPS OPHTHALMIC; TOPICAL at 21:34

## 2017-07-30 RX ADMIN — Medication 3 MILLILITER(S): at 11:27

## 2017-07-30 RX ADMIN — Medication 10 MILLIGRAM(S): at 06:26

## 2017-07-30 RX ADMIN — OXYCODONE AND ACETAMINOPHEN 2 TABLET(S): 5; 325 TABLET ORAL at 16:58

## 2017-07-30 RX ADMIN — ENOXAPARIN SODIUM 40 MILLIGRAM(S): 100 INJECTION SUBCUTANEOUS at 11:27

## 2017-07-30 RX ADMIN — Medication 10 MILLIGRAM(S): at 13:16

## 2017-07-30 RX ADMIN — LISINOPRIL 40 MILLIGRAM(S): 2.5 TABLET ORAL at 11:27

## 2017-07-30 RX ADMIN — Medication 3 MILLILITER(S): at 17:01

## 2017-07-30 RX ADMIN — CARVEDILOL PHOSPHATE 3.12 MILLIGRAM(S): 80 CAPSULE, EXTENDED RELEASE ORAL at 17:54

## 2017-07-30 RX ADMIN — Medication 325 MILLIGRAM(S): at 11:27

## 2017-07-30 RX ADMIN — PANTOPRAZOLE SODIUM 40 MILLIGRAM(S): 20 TABLET, DELAYED RELEASE ORAL at 06:26

## 2017-07-30 RX ADMIN — Medication 50 MICROGRAM(S): at 06:27

## 2017-07-30 RX ADMIN — CARVEDILOL PHOSPHATE 3.12 MILLIGRAM(S): 80 CAPSULE, EXTENDED RELEASE ORAL at 06:26

## 2017-07-30 RX ADMIN — Medication 5 MILLIGRAM(S): at 13:16

## 2017-07-30 RX ADMIN — Medication 25 MILLIGRAM(S): at 16:48

## 2017-07-30 RX ADMIN — AMLODIPINE BESYLATE 5 MILLIGRAM(S): 2.5 TABLET ORAL at 09:45

## 2017-07-30 RX ADMIN — OXYCODONE AND ACETAMINOPHEN 2 TABLET(S): 5; 325 TABLET ORAL at 02:31

## 2017-07-30 RX ADMIN — ATORVASTATIN CALCIUM 40 MILLIGRAM(S): 80 TABLET, FILM COATED ORAL at 21:34

## 2017-07-30 RX ADMIN — OXYCODONE AND ACETAMINOPHEN 2 TABLET(S): 5; 325 TABLET ORAL at 03:10

## 2017-07-30 RX ADMIN — Medication 100 MILLIGRAM(S): at 21:34

## 2017-07-30 RX ADMIN — Medication 5 MILLIGRAM(S): at 16:48

## 2017-07-30 RX ADMIN — Medication 3 MILLILITER(S): at 06:26

## 2017-07-30 RX ADMIN — POLYETHYLENE GLYCOL 3350 17 GRAM(S): 17 POWDER, FOR SOLUTION ORAL at 11:27

## 2017-07-30 RX ADMIN — Medication 100 MILLIGRAM(S): at 13:18

## 2017-07-30 RX ADMIN — OXYCODONE AND ACETAMINOPHEN 2 TABLET(S): 5; 325 TABLET ORAL at 17:40

## 2017-07-30 RX ADMIN — Medication 3 MILLILITER(S): at 23:44

## 2017-07-30 NOTE — PROGRESS NOTE ADULT - ASSESSMENT
71 y/o M Hx of COPD, HTN, HLD, gout, hypothyroidism, voice hoarseness, former smoker, MI, CAD with stent 2011 7/28- s/p C1L off pump. Now remains on cardene gtt for HTN and insulin gtt for hyperglycemia. Extubated, A&Ox3, asymptomatic. UOP 40cc/hr.  [facility]  [facility]/30 Chest tube D/C -xary to follow-  D/C IJ  Norvasc 5 given x1 Lisinopril 40-daily  restarted  transfer to floor

## 2017-07-30 NOTE — PROGRESS NOTE ADULT - SUBJECTIVE AND OBJECTIVE BOX
Subjective Hello ambulated no acute distress    VITAL SIGNS    Telemetry:      Vital Signs Last 24 Hrs  T(C): 36.6 (17 @ 07:34), Max: 37 (17 @ 06:11)  T(F): 97.8 (17 @ 07:34), Max: 98.6 (17 @ 06:11)  HR: 80 (17 @ 08:21) (65 - 84)  BP: 169/81 (17 @ 08:21) (114/57 - 174/82)  RR: 18 (17 @ 07:34) (16 - 19)  SpO2: 98% (17 @ 07:34) (95% - 99%)                    @ 07:01  -   @ 07:00  --------------------------------------------------------  IN: 1030 mL / OUT: 2260 mL / NET: -1230 mL          Daily     Daily Weight in k.4 (2017 06:11)        CAPILLARY BLOOD GLUCOSE  119 (2017 08:00)  112 (2017 21:46)  130 (2017 16:15)  120 (2017 11:00)              Drains:     MS         [  ] Drainage:                 L Pleural  [  ]  Drainage:                R Pleural  [  ]  Drainage:    Pacing Wires        [  ]   Settings:                                  Isolated  [  ]    Coumadin    [ ] YES          [  ]      NO         Reason:                               PHYSICAL EXAM        Neurology: alert and oriented x 3, nonfocal, no gross deficits    CV :    Sternal Wound :  CDI , Stable    Lungs:    Abdomen: soft, nontender, nondistended, positive bowel sounds, last bowel movement     :               Extremities:                                           Physical Therapy Rec:   Home  [  ]   Home w/ PT  [  ]  Rehab  [  ]    Discussed with Cardiothoracic Team at AM rounds. Subjective Hello ambulated no acute distress    VITAL SIGNS    Telemetry:  NSR 60-80    Vital Signs Last 24 Hrs  T(C): 36.6 (17 @ 07:34), Max: 37 (17 @ 06:11)  HR: 80 (17 @ 08:21) (65 - 84)  BP: 169/81 (17 @ 08:21) (114/57 - 174/82)  RR: 18 (17 @ 07:34) (16 - 19)  SpO2: 98% (17 @ 07:34) (95% - 99%)            @ 07:01  -   @ 07:00  --------------------------------------------------------  IN: 1030 mL / OUT: 2260 mL / NET: -1230 mL    Daily Weight in k.4 (2017 06:11)    CAPILLARY BLOOD GLUCOSE  119 (2017 08:00)  112 (2017 21:46)  130 (2017 16:15)  120 (2017 11:00)              Drains:      L Pleural  [ x ]  Drainage:               :    Pacing Wires        [  ]   Settings:                                  Isolated  [  ]    Coumadin    [ ] YES          [  ]      NO         Reason:                               PHYSICAL EXAM        Neurology: alert and oriented x 3, nonfocal, no gross deficits    CV :    Sternal Wound :  CDI , Stable    Lungs:    Abdomen: soft, nontender, nondistended, positive bowel sounds, last bowel movement     :               Extremities:                                           Physical Therapy Rec:   Home  [  ]   Home w/ PT  [  ]  Rehab  [  ]    Discussed with Cardiothoracic Team at AM rounds. Subjective Hello ambulated no acute distress    VITAL SIGNS    Telemetry:  NSR 60-80    Vital Signs Last 24 Hrs  T(C): 36.6 (17 @ 07:34), Max: 37 (17 @ 06:11)  HR: 80 (17 @ 08:21) (65 - 84)  BP: 169/81 (17 @ 08:21) (114/57 - 174/82)  RR: 18 (17 @ 07:34) (16 - 19)  SpO2: 98% (17 @ 07:34) (95% - 99%)            @ 07:01  -   @ 07:00  --------------------------------------------------------  IN: 1030 mL / OUT: 2260 mL / NET: -1230 mL    Daily Weight in k.4 (2017 06:11)    CAPILLARY BLOOD GLUCOSE  119 (2017 08:00)  112 (2017 21:46)  130 (2017 16:15)  120 (2017 11:00)    Drains:      L Pleural  [ x ]  Drainage:   Pacing Wires   x2  ep, off    PHYSICAL EXAM  Neurology: alert and oriented x 3, nonfocal, no gross deficits  CV RRR S1S2:  Sternal Wound :  CDI , Stable  Lungs  B/L breath sounds -anterior expiratory wheeze cleared with cough   Abdomen: soft, nontender, nondistended, positive bowel sounds,  :  voiding           Extremities: trace edema + DP        Physical Therapy Recommedation-Home w/ PT    Discussed with Cardiothoracic Team at AM rounds.

## 2017-07-31 ENCOUNTER — TRANSCRIPTION ENCOUNTER (OUTPATIENT)
Age: 72
End: 2017-07-31

## 2017-07-31 DIAGNOSIS — R91.8 OTHER NONSPECIFIC ABNORMAL FINDING OF LUNG FIELD: ICD-10-CM

## 2017-07-31 LAB
ANION GAP SERPL CALC-SCNC: 11 MMOL/L — SIGNIFICANT CHANGE UP (ref 5–17)
BUN SERPL-MCNC: 11 MG/DL — SIGNIFICANT CHANGE UP (ref 7–23)
CALCIUM SERPL-MCNC: 9.3 MG/DL — SIGNIFICANT CHANGE UP (ref 8.4–10.5)
CHLORIDE SERPL-SCNC: 102 MMOL/L — SIGNIFICANT CHANGE UP (ref 96–108)
CO2 SERPL-SCNC: 26 MMOL/L — SIGNIFICANT CHANGE UP (ref 22–31)
CREAT SERPL-MCNC: 0.92 MG/DL — SIGNIFICANT CHANGE UP (ref 0.5–1.3)
GLUCOSE SERPL-MCNC: 118 MG/DL — HIGH (ref 70–99)
HCT VFR BLD CALC: 39.5 % — SIGNIFICANT CHANGE UP (ref 39–50)
HGB BLD-MCNC: 13.6 G/DL — SIGNIFICANT CHANGE UP (ref 13–17)
MCHC RBC-ENTMCNC: 29.6 PG — SIGNIFICANT CHANGE UP (ref 27–34)
MCHC RBC-ENTMCNC: 34.3 GM/DL — SIGNIFICANT CHANGE UP (ref 32–36)
MCV RBC AUTO: 86.5 FL — SIGNIFICANT CHANGE UP (ref 80–100)
PLATELET # BLD AUTO: 184 K/UL — SIGNIFICANT CHANGE UP (ref 150–400)
POTASSIUM SERPL-MCNC: 4.1 MMOL/L — SIGNIFICANT CHANGE UP (ref 3.5–5.3)
POTASSIUM SERPL-SCNC: 4.1 MMOL/L — SIGNIFICANT CHANGE UP (ref 3.5–5.3)
RBC # BLD: 4.57 M/UL — SIGNIFICANT CHANGE UP (ref 4.2–5.8)
RBC # FLD: 11.7 % — SIGNIFICANT CHANGE UP (ref 10.3–14.5)
SODIUM SERPL-SCNC: 139 MMOL/L — SIGNIFICANT CHANGE UP (ref 135–145)
WBC # BLD: 9.8 K/UL — SIGNIFICANT CHANGE UP (ref 3.8–10.5)
WBC # FLD AUTO: 9.8 K/UL — SIGNIFICANT CHANGE UP (ref 3.8–10.5)

## 2017-07-31 PROCEDURE — 71010: CPT | Mod: 26

## 2017-07-31 RX ORDER — TIOTROPIUM BROMIDE 18 UG/1
1 CAPSULE ORAL; RESPIRATORY (INHALATION) DAILY
Qty: 0 | Refills: 0 | Status: DISCONTINUED | OUTPATIENT
Start: 2017-07-31 | End: 2017-08-02

## 2017-07-31 RX ORDER — CARVEDILOL PHOSPHATE 80 MG/1
6.25 CAPSULE, EXTENDED RELEASE ORAL EVERY 12 HOURS
Qty: 0 | Refills: 0 | Status: DISCONTINUED | OUTPATIENT
Start: 2017-07-31 | End: 2017-08-01

## 2017-07-31 RX ORDER — HYDRALAZINE HCL 50 MG
5 TABLET ORAL ONCE
Qty: 0 | Refills: 0 | Status: COMPLETED | OUTPATIENT
Start: 2017-07-31 | End: 2017-07-31

## 2017-07-31 RX ORDER — ALPRAZOLAM 0.25 MG
0.25 TABLET ORAL AT BEDTIME
Qty: 0 | Refills: 0 | Status: DISCONTINUED | OUTPATIENT
Start: 2017-07-31 | End: 2017-08-02

## 2017-07-31 RX ORDER — HYDRALAZINE HCL 50 MG
25 TABLET ORAL THREE TIMES A DAY
Qty: 0 | Refills: 0 | Status: DISCONTINUED | OUTPATIENT
Start: 2017-07-31 | End: 2017-08-01

## 2017-07-31 RX ORDER — CARVEDILOL PHOSPHATE 80 MG/1
3.12 CAPSULE, EXTENDED RELEASE ORAL ONCE
Qty: 0 | Refills: 0 | Status: COMPLETED | OUTPATIENT
Start: 2017-07-31 | End: 2017-07-31

## 2017-07-31 RX ADMIN — Medication 25 MILLIGRAM(S): at 21:42

## 2017-07-31 RX ADMIN — CARVEDILOL PHOSPHATE 3.12 MILLIGRAM(S): 80 CAPSULE, EXTENDED RELEASE ORAL at 06:38

## 2017-07-31 RX ADMIN — CARVEDILOL PHOSPHATE 6.25 MILLIGRAM(S): 80 CAPSULE, EXTENDED RELEASE ORAL at 18:42

## 2017-07-31 RX ADMIN — OXYCODONE AND ACETAMINOPHEN 2 TABLET(S): 5; 325 TABLET ORAL at 22:15

## 2017-07-31 RX ADMIN — Medication 3 MILLILITER(S): at 14:50

## 2017-07-31 RX ADMIN — CARVEDILOL PHOSPHATE 3.12 MILLIGRAM(S): 80 CAPSULE, EXTENDED RELEASE ORAL at 09:01

## 2017-07-31 RX ADMIN — Medication 325 MILLIGRAM(S): at 14:49

## 2017-07-31 RX ADMIN — Medication 25 MILLIGRAM(S): at 06:38

## 2017-07-31 RX ADMIN — LISINOPRIL 40 MILLIGRAM(S): 2.5 TABLET ORAL at 06:38

## 2017-07-31 RX ADMIN — Medication 100 MILLIGRAM(S): at 21:42

## 2017-07-31 RX ADMIN — OXYCODONE AND ACETAMINOPHEN 2 TABLET(S): 5; 325 TABLET ORAL at 21:44

## 2017-07-31 RX ADMIN — LATANOPROST 1 DROP(S): 0.05 SOLUTION/ DROPS OPHTHALMIC; TOPICAL at 21:42

## 2017-07-31 RX ADMIN — OXYCODONE AND ACETAMINOPHEN 2 TABLET(S): 5; 325 TABLET ORAL at 14:53

## 2017-07-31 RX ADMIN — OXYCODONE AND ACETAMINOPHEN 2 TABLET(S): 5; 325 TABLET ORAL at 15:20

## 2017-07-31 RX ADMIN — Medication 100 MILLIGRAM(S): at 14:49

## 2017-07-31 RX ADMIN — ENOXAPARIN SODIUM 40 MILLIGRAM(S): 100 INJECTION SUBCUTANEOUS at 14:49

## 2017-07-31 RX ADMIN — OXYCODONE AND ACETAMINOPHEN 2 TABLET(S): 5; 325 TABLET ORAL at 06:39

## 2017-07-31 RX ADMIN — PANTOPRAZOLE SODIUM 40 MILLIGRAM(S): 20 TABLET, DELAYED RELEASE ORAL at 06:38

## 2017-07-31 RX ADMIN — ATORVASTATIN CALCIUM 40 MILLIGRAM(S): 80 TABLET, FILM COATED ORAL at 21:42

## 2017-07-31 RX ADMIN — POLYETHYLENE GLYCOL 3350 17 GRAM(S): 17 POWDER, FOR SOLUTION ORAL at 14:49

## 2017-07-31 RX ADMIN — Medication 50 MICROGRAM(S): at 06:38

## 2017-07-31 RX ADMIN — Medication 3 MILLILITER(S): at 06:38

## 2017-07-31 RX ADMIN — Medication 5 MILLIGRAM(S): at 00:37

## 2017-07-31 RX ADMIN — Medication 100 MILLIGRAM(S): at 06:38

## 2017-07-31 RX ADMIN — OXYCODONE AND ACETAMINOPHEN 2 TABLET(S): 5; 325 TABLET ORAL at 07:15

## 2017-07-31 RX ADMIN — Medication 3 MILLILITER(S): at 18:41

## 2017-07-31 RX ADMIN — Medication 25 MILLIGRAM(S): at 14:50

## 2017-07-31 NOTE — PROGRESS NOTE ADULT - SUBJECTIVE AND OBJECTIVE BOX
Patient is a 72y old  Male who presents with a chief complaint of     headache+  some chest pain +    Any change in ROS:     MEDICATIONS  (STANDING):  docusate sodium 100 milliGRAM(s) Oral three times a day  aspirin enteric coated 325 milliGRAM(s) Oral daily  levothyroxine 50 MICROGram(s) Oral daily  latanoprost 0.005% Ophthalmic Solution 1 Drop(s) Both EYES at bedtime  ALBUTerol/ipratropium for Nebulization 3 milliLiter(s) Nebulizer every 6 hours  atorvastatin 40 milliGRAM(s) Oral at bedtime  enoxaparin Injectable 40 milliGRAM(s) SubCutaneous daily  pantoprazole    Tablet 40 milliGRAM(s) Oral before breakfast  polyethylene glycol 3350 17 Gram(s) Oral daily  lisinopril 40 milliGRAM(s) Oral daily  hydrALAZINE 25 milliGRAM(s) Oral three times a day  carvedilol 6.25 milliGRAM(s) Oral every 12 hours    MEDICATIONS  (PRN):  oxyCODONE    5 mG/acetaminophen 325 mG 1 Tablet(s) Oral every 6 hours PRN Moderate Pain (4 - 6)  oxyCODONE    5 mG/acetaminophen 325 mG 2 Tablet(s) Oral every 6 hours PRN Severe Pain (7 - 10)  ALPRAZolam 0.25 milliGRAM(s) Oral at bedtime PRN insomnia    Vital Signs Last 24 Hrs  T(C): 36.7 (31 Jul 2017 07:13), Max: 37 (31 Jul 2017 06:31)  T(F): 98 (31 Jul 2017 07:13), Max: 98.6 (31 Jul 2017 06:31)  HR: 84 (31 Jul 2017 07:13) (71 - 98)  BP: 161/96 (31 Jul 2017 07:13) (142/68 - 187/89)  BP(mean): 122 (31 Jul 2017 07:13) (98 - 128)  RR: 18 (31 Jul 2017 07:13) (18 - 18)  SpO2: 97% (31 Jul 2017 07:13) (93% - 100%)    I&O's Summary    30 Jul 2017 07:01  -  31 Jul 2017 07:00  --------------------------------------------------------  IN: 1140 mL / OUT: 3691 mL / NET: -2551 mL    31 Jul 2017 07:01  -  31 Jul 2017 10:31  --------------------------------------------------------  IN: 200 mL / OUT: 200 mL / NET: 0 mL          Physical Exam:   GENERAL: NAD, well-groomed, well-developed  HEENT: KULWINDER/   Atraumatic, Normocephalic  ENMT: No tonsillar erythema, exudates, or enlargement; Moist mucous membranes, Good dentition, No lesions  NECK: Supple, No JVD, Normal thyroid  CHEST/LUNG: poor air entry bilaterally   CVS: Regular rate and rhythm; No murmurs, rubs, or gallops  GI: : Soft, Nontender, Nondistended; Bowel sounds present  NERVOUS SYSTEM:  Alert & Oriented X  EXTREMITIES:  2+ Peripheral Pulses, No clubbing, cyanosis, or edema  LYMPH: No lymphadenopathy noted  SKIN: No rashes or lesions  ENDOCRINOLOGY: No Thyromegaly  PSYCH: Appropriate    Labs:                              13.6   9.8   )-----------( 184      ( 31 Jul 2017 02:59 )             39.5                         12.2   12.6  )-----------( 160      ( 30 Jul 2017 02:50 )             36.8                         13.5   11.7  )-----------( 172      ( 29 Jul 2017 01:45 )             39.2                         11.6   6.0   )-----------( 149      ( 28 Jul 2017 12:44 )             34.0                         14.3   6.6   )-----------( 194      ( 27 Jul 2017 23:08 )             40.8                         14.3   5.1   )-----------( 205      ( 27 Jul 2017 13:28 )             41.0     07-31    139  |  102  |  11  ----------------------------<  118<H>  4.1   |  26  |  0.92  07-30    142  |  107  |  16  ----------------------------<  111<H>  4.2   |  28  |  0.98  07-29    143  |  108  |  16  ----------------------------<  147<H>  4.3   |  19<L>  |  0.93  07-28    147<H>  |  109<H>  |  14  ----------------------------<  95  3.9   |  23  |  0.89  07-27    142  |  103  |  15  ----------------------------<  92  4.3   |  28  |  1.08    Ca    9.3      31 Jul 2017 02:59  Ca    8.7      30 Jul 2017 02:50    TPro  7.1  /  Alb  3.5  /  TBili  0.5  /  DBili  x   /  AST  24  /  ALT  12  /  AlkPhos  54  07-29  TPro  5.3<L>  /  Alb  2.5<L>  /  TBili  0.5  /  DBili  x   /  AST  14  /  ALT  9<L>  /  AlkPhos  43  07-28  TPro  8.0  /  Alb  4.0  /  TBili  0.4  /  DBili  0.1  /  AST  17  /  ALT  11  /  AlkPhos  63  07-27  TPro  8.3  /  Alb  4.2  /  TBili  0.5  /  DBili  x   /  AST  20  /  ALT  12  /  AlkPhos  64  07-27    CAPILLARY BLOOD GLUCOSE            PT/INR - ( 30 Jul 2017 02:50 )   PT: 17.2 sec;   INR: 1.58 ratio         PTT - ( 30 Jul 2017 02:50 )  PTT:25.5 sec    Cultures:   Studies  Chest X-RAY  CT SCAN Chest < from: Xray Chest 1 View AP- PORTABLE-Urgent (07.30.17 @ 12:14) >    EXAM:  CHEST-PORTABLE URGENT                            PROCEDURE DATE:  07/30/2017            INTERPRETATION:    DATE OF STUDY: 7/30/17 at 12:16hrs.    PRIOR: Earlier 7/30/17 chest.    CLINICAL INDICATION: S/P OHS.    TECHNIQUE: portable chest.    FINDINGS:   Right Cordis sheath has been removed.  S/P sternotomy.  Left chest tube has been removed.  The heart size is normal.  The lungs are clear. No pleural effusion or pneumothorax.    IMPRESSION:   Clear lungs.    < end of copied text >    Venous Dopplers: LE:   CT Abdomen  Others      < from: CT Chest No Cont (07.27.17 @ 22:18) >  IMPRESSION:    Scattered tree-in-bud nodules with scattered nodular and patchy opacities   which may be infectious/inflammatory versus mucoid impaction.  A few   focal nodules measure up to 7 mm which are likely related to the same   process. Three-month follow-up CT needed for complete evaluation, to   exclude underlying abnormality.    < end of copied text >

## 2017-07-31 NOTE — PROGRESS NOTE ADULT - SUBJECTIVE AND OBJECTIVE BOX
VITAL SIGNS    Telemetry:  rsr 70-90  Vital Signs Last 24 Hrs  T(C): 36.7 (17 @ 12:00), Max: 37 (17 @ 06:31)  T(F): 98.1 (17 @ 12:00), Max: 98.6 (17 @ 06:31)  HR: 80 (17 @ 12:00) (75 - 98)  BP: 168/96 (17 @ 12:00) (142/68 - 187/89)  RR: 18 (17 @ 12:00) (18 - 18)  SpO2: 98% (17 @ 12:00) (93% - 100%)             @ 07:  -   @ 07:00  --------------------------------------------------------  IN: 1140 mL / OUT: 3691 mL / NET: -2551 mL     @ 07:  -   @ 12:46  --------------------------------------------------------  IN: 200 mL / OUT: 200 mL / NET: 0 mL       Daily     Daily Weight in k.8 (2017 07:13)  Admit Wt: Drug Dosing Weight  Height (cm): 172.72 (2017 20:49)  Weight (kg): 61 (2017 20:49)  BMI (kg/m2): 20.4 (2017 20:49)  BSA (m2): 1.73 (2017 20:49)      PHYSICAL EXAM    Subjective: "I feel pretty good."  Neurology: alert and oriented x 3, nonfocal, no gross deficits  CV : tele: rsr 70-90  Sternal Wound :  CDI with dressing , Stable  +pw- vvi 40   Lungs: clear. RR easy, unlabored   Abdomen: soft, nontender, nondistended, positive bowel sounds, + bowel movement   Neg N/V/D   :  pt voiding without difficulty   Extremities:   CORONADO; neg edema, neg calf tenderness. PPP bilaterally     PW: +pw - vvi 40   Chest tubes: none

## 2017-07-31 NOTE — DISCHARGE NOTE ADULT - MEDICATION SUMMARY - MEDICATIONS TO TAKE
I will START or STAY ON the medications listed below when I get home from the hospital:    acetaminophen-oxycodone 325 mg-5 mg oral tablet  -- 1 tab(s) by mouth every 6 hours, As needed, Moderate Pain (4 - 6) MDD:4  -- Indication: For Pain    aspirin 325 mg oral delayed release tablet  -- 1 tab(s) by mouth once a day  -- Indication: For blood thinner    lisinopril 40 mg oral tablet  -- 1 tab(s) by mouth 2 times a day  -- Indication: For Hypetension    atorvastatin 40 mg oral tablet  -- 1 tab(s) by mouth once a day (at bedtime)  -- Indication: For Cholesterol    carvedilol 12.5 mg oral tablet  -- 1 tab(s) by mouth every 12 hours  -- Indication: For anti-arrythmic    tiotropium 18 mcg inhalation capsule  -- 1 cap(s) inhaled once a day  -- Indication: For bronchodilator    albuterol 90 mcg/inh inhalation aerosol  -- 2 puff(s) inhaled 4 times a day, As Needed  -- Indication: For bronchodilator    amLODIPine 5 mg oral tablet  -- 1 tab(s) by mouth once a day  -- Indication: For Hypertension    docusate sodium 100 mg oral capsule  -- 1 cap(s) by mouth 3 times a day  -- Indication: For Stool softener    latanoprost 0.005% ophthalmic solution  -- 1 drop(s) to each affected eye once a day (in the evening)  -- Indication: For Eye drops    pantoprazole 40 mg oral delayed release tablet  -- 1 tab(s) by mouth once a day (before a meal)  -- Indication: For antacid    levothyroxine 50 mcg (0.05 mg) oral tablet  -- 1 tab(s) by mouth once a day  -- Indication: For Hypothyroid I will START or STAY ON the medications listed below when I get home from the hospital:    acetaminophen-oxycodone 325 mg-5 mg oral tablet  -- 1 tab(s) by mouth every 6 hours, As needed, Moderate Pain (4 - 6) MDD:4  -- Indication: For Pain    aspirin 325 mg oral delayed release tablet  -- 1 tab(s) by mouth once a day  -- Indication: For blood thinner    lisinopril 40 mg oral tablet  -- 1 tab(s) by mouth 2 times a day  -- Indication: For Hypetension    atorvastatin 40 mg oral tablet  -- 1 tab(s) by mouth once a day (at bedtime)  -- Indication: For Cholesterol    carvedilol 12.5 mg oral tablet  -- 1 tab(s) by mouth every 12 hours  -- Indication: For anti-arrythmic    albuterol 90 mcg/inh inhalation aerosol  -- 2 puff(s) inhaled 4 times a day, As Needed  -- Indication: For bronchodilator    Tudorza Pressair 400 mcg/inh inhalation powder  -- 400 microgram(s) inhaled 2 times a day  -- Check with your doctor before becoming pregnant.  For inhalation only.  Obtain medical advice before taking any non-prescription drugs as some may affect the action of this medication.    -- Indication: For inhaler- bronchodilator     amLODIPine 5 mg oral tablet  -- 1 tab(s) by mouth once a day  -- Indication: For Hypertension    docusate sodium 100 mg oral capsule  -- 1 cap(s) by mouth 3 times a day  -- Indication: For Stool softener    latanoprost 0.005% ophthalmic solution  -- 1 drop(s) to each affected eye once a day (in the evening)  -- Indication: For Eye drops    pantoprazole 40 mg oral delayed release tablet  -- 1 tab(s) by mouth once a day (before a meal)  -- Indication: For antacid    levothyroxine 50 mcg (0.05 mg) oral tablet  -- 1 tab(s) by mouth once a day  -- Indication: For Hypothyroid

## 2017-07-31 NOTE — DISCHARGE NOTE ADULT - INSTRUCTIONS
no added salt; low fat; low cholesterol, low salt diet Appointments, Diet, Activity, Medication Administration, Incision Care

## 2017-07-31 NOTE — DISCHARGE NOTE ADULT - PATIENT PORTAL LINK FT
“You can access the [employer] Patient Portal, offered by [facility], by registering with the following website: [email or web address]”

## 2017-07-31 NOTE — DISCHARGE NOTE ADULT - HOSPITAL COURSE
71 y/o M Hx of COPD, HTN, HLD, gout, hypothyroidism, voice hoarseness, former smoker, MI, CAD with stent 2011 7/28- s/p C1L off pump. Now remains on cardene gtt for HTN and insulin gtt for hyperglycemia. Extubated  pulm consulted for copd and pulm nodules noted on Chest Ct preop- continue bronchodilators as per Dr. Evans and follow up Chest ct in 6-8 weeks   7/30 HTN- ace and hydrazaline initiated  7/31 pt stable- transferred to floor today  8/1 ace d/c- b-blockers increased for htn and norvasc added/ pw d/c   8/2 discharge pt home as per Dr. Campbell

## 2017-07-31 NOTE — PROGRESS NOTE ADULT - PROBLEM SELECTOR PLAN 1
continue postop care  ASA/ increase bblockers coreg 6.25 bid as per Dr. Campbell today   Atorvastatin 40 mg  isolate pw today   transfer to floor  discharge planing- home tue/ wed when bp controlled

## 2017-07-31 NOTE — DISCHARGE NOTE ADULT - NS AS ACTIVITY OBS
Do not make important decisions/Do not drive or operate machinery/Showering allowed/No Heavy lifting/straining/Walking-Outdoors allowed/Walking-Indoors allowed/no driving until cleared by DR. Campbell/Sex allowed/Stairs allowed

## 2017-07-31 NOTE — DISCHARGE NOTE ADULT - PLAN OF CARE
complete recovery shower daily  weigh yourself daily  continue current prescriptions as ordered  increase activity as tolerated   no added salt; low fat; low cholesterol, low salt diet.   follow up with Cardiologist, Dr. Martell,  in 1-2 weeks. Call to schedule appointment.  follow up with cardiac surgeon, Dr. Campbell on Aug 14th at 10:00 am. Call to confirm appointment [phone]

## 2017-07-31 NOTE — DISCHARGE NOTE ADULT - CARE PLAN
Principal Discharge DX:	S/P CABG x 1  Goal:	complete recovery  Instructions for follow-up, activity and diet:	shower daily  weigh yourself daily  continue current prescriptions as ordered  increase activity as tolerated   no added salt; low fat; low cholesterol, low salt diet.   follow up with Cardiologist, Dr. Martell,  in 1-2 weeks. Call to schedule appointment.  follow up with cardiac surgeon, Dr. Campbell on Aug 14th at 10:00 am. Call to confirm appointment [phone] Principal Discharge DX:	S/P CABG x 1  Goal:	complete recovery  Instructions for follow-up, activity and diet:	shower daily  weigh yourself daily  continue current prescriptions as ordered  increase activity as tolerated   no added salt; low fat; low cholesterol, low salt diet.   follow up with Cardiologist, Dr. Martell,  in 1-2 weeks. Call to schedule appointment.  follow up with cardiac surgeon, Dr. Campbell on Aug 14th at 10:00 am. Call to confirm appointment [phone] Principal Discharge DX:	S/P CABG x 1  Goal:	complete recovery  Instructions for follow-up, activity and diet:	shower daily  weigh yourself daily  continue current prescriptions as ordered  increase activity as tolerated   no added salt; low fat; low cholesterol, low salt diet.   follow up with Cardiologist, Dr. Martell,  in 1-2 weeks. Call to schedule appointment.  follow up with cardiac surgeon, Dr. Campbell on Aug 14th at 10:00 am. Call to confirm appointment [phone] Principal Discharge DX:	S/P CABG x 1  Goal:	complete recovery  Instructions for follow-up, activity and diet:	shower daily  weigh yourself daily  continue current prescriptions as ordered  increase activity as tolerated   no added salt; low fat; low cholesterol, low salt diet.   follow up with Cardiologist, Dr. Martell,  in 1-2 weeks. Call to schedule appointment.  follow up with cardiac surgeon, Dr. Campbell on Aug 14th at 10:00 am. Call to confirm appointment [phone] Principal Discharge DX:	S/P CABG x 1  Goal:	complete recovery  Instructions for follow-up, activity and diet:	shower daily  weigh yourself daily  continue current prescriptions as ordered  increase activity as tolerated   no added salt; low fat; low cholesterol, low salt diet.   follow up with Cardiologist, Dr. Martell,  in 1-2 weeks. Call to schedule appointment.  follow up with cardiac surgeon, Dr. Campbell on Aug 14th at 10:00 am. Call to confirm appointment [phone] Principal Discharge DX:	S/P CABG x 1  Goal:	complete recovery  Instructions for follow-up, activity and diet:	shower daily  weigh yourself daily  continue current prescriptions as ordered  increase activity as tolerated   no added salt; low fat; low cholesterol, low salt diet.   follow up with Cardiologist, Dr. Martell,  in 1-2 weeks. Call to schedule appointment.  follow up with cardiac surgeon, Dr. Campbell on Aug 14th at 10:00 am. Call to confirm appointment [phone]

## 2017-07-31 NOTE — DISCHARGE NOTE ADULT - HOME CARE AGENCY
[facility] [phone] for RN Assessment and P/T evaluation. Start of Care the day after discharge

## 2017-07-31 NOTE — CHART NOTE - NSCHARTNOTEFT_GEN_A_CORE
Called by RN for HTN: current /84  Received lisinopril and hydralazine    Vital Signs Last 24 Hrs  T(C): 36.6 (30 Jul 2017 23:38), Max: 37 (30 Jul 2017 06:11)  T(F): 97.9 (30 Jul 2017 23:38), Max: 98.6 (30 Jul 2017 06:11)  HR: 82 (30 Jul 2017 23:38) (71 - 93)  BP: 170/84 (30 Jul 2017 23:52) (142/68 - 187/89)  BP(mean): 119 (30 Jul 2017 23:38) (98 - 128)  RR: 18 (30 Jul 2017 23:38) (18 - 19)  SpO2: 99% (30 Jul 2017 23:38) (93% - 100%)    MEDICATIONS  (STANDING):  docusate sodium 100 milliGRAM(s) Oral three times a day  aspirin enteric coated 325 milliGRAM(s) Oral daily  levothyroxine 50 MICROGram(s) Oral daily  latanoprost 0.005% Ophthalmic Solution 1 Drop(s) Both EYES at bedtime  ALBUTerol/ipratropium for Nebulization 3 milliLiter(s) Nebulizer every 6 hours  atorvastatin 40 milliGRAM(s) Oral at bedtime  enoxaparin Injectable 40 milliGRAM(s) SubCutaneous daily  pantoprazole    Tablet 40 milliGRAM(s) Oral before breakfast  carvedilol 3.125 milliGRAM(s) Oral every 12 hours  polyethylene glycol 3350 17 Gram(s) Oral daily  lisinopril 40 milliGRAM(s) Oral daily  hydrALAZINE 25 milliGRAM(s) Oral two times a day  hydrALAZINE Injectable 5 milliGRAM(s) IV Push once     Review of Systems: negative unless indicated   GENERAL:  Fevers[] chills[] sweats[] fatigue[] weight loss[] weight gain []                                        NEURO:  parathesias[] seizures []  syncope []  confusion []    headache [  ]   Denies headache and visual changes                                                                           EYES: glasses[]  blurry vision[]  discharge[] pain[] glaucoma []                                                                            ENMT:  difficulty hearing []  vertigo[]  dysphagia[] epistaxis[] recent dental work []                                      CV:  chest pain[] palpitations[] REYNOLDS [] diaphoresis [] edema[]                                                                                             RESPIRATORY:  wheezing[] SOB[] cough [] sputum[] hemoptysis[]                                                                    GI:  nausea[]  vomiting []  diarrhea[] constipation [] melena []                                                                        : hematuria[ ]  dysuria[ ] urgency[] incontinence[]                                                                                              MUSKULOSKELETAL:  arthritis[ ]  joint swelling [ ] muscle weakness [ ]              General: WN/WD NAD  offers no complaints  Neurology: A&Ox3, nonfocal, CORONADO x 4  ENT:  Mucosa moist, no ulcerations  Neck:  Supple, no sinuses or palpable masses  Lymphatic:  No palpable cervical, supraclavicular, axillary or inguinal adenopathy  Respiratory: CTA B/L   CV: RRR, S1S2, no murmur  Abdominal: Soft, NT, ND no palpable mass  Incisions: intact, no erythema or drainage         Assessment:  73 y/o M Hx of COPD, HTN, HLD, gout, hypothyroidism, voice hoarseness, former smoker, MI, CAD with stent 2011 7/28- s/p C1L off pump requiring  cardene gtt in the immediate post-op period for HTN now with persistent hypertension, ACE started today along with po hydralazine    Plan:  1) administer 5mg IVP hydralazine  2) continue to monitor     Will discuss with CTU attending on AM rounds

## 2017-07-31 NOTE — DISCHARGE NOTE ADULT - CARE PROVIDER_API CALL
Cheryl Campbell), Surgery; Thoracic and Cardiac Surgery  [address] [place] [address]  Phone: [phone]  Fax: [phone]    Robert Martell), Cardiovascular Disease; Nuclear Cardiology  [address] [place] [address]  Phone: [phone]  Fax: [phone]

## 2017-07-31 NOTE — DISCHARGE NOTE ADULT - MEDICATION SUMMARY - MEDICATIONS TO STOP TAKING
I will STOP taking the medications listed below when I get home from the hospital:    atorvastatin 20 mg oral tablet  -- 1 tab(s) by mouth once a day

## 2017-07-31 NOTE — DISCHARGE NOTE ADULT - NSFTFSERV1RD_GEN_ALL_CORE
medication teaching and assessment/observation and assessment/wound care and assessment/teaching and training

## 2017-07-31 NOTE — DISCHARGE NOTE ADULT - OTHER SIGNIFICANT FINDINGS
VSS  tele: rsr 70-80  midsternal incision cdi gabo  lungs clear  neg calf tenderness/ neg edema  discharge pt home as per dr. Campbell today 8/2

## 2017-07-31 NOTE — PROGRESS NOTE ADULT - ASSESSMENT
71 y/o M Hx of COPD, HTN, HLD, gout, hypothyroidism, voice hoarseness, former smoker, MI, CAD with stent 2011 7/28- s/p C1L off pump. Now remains on cardene gtt for HTN and insulin gtt for hyperglycemia. Extubated  pulm consulted for copd and pulm nodules noted on Chest Ct preop- continue bronchodilators as per Dr. Evans    7/30 HTN- ace and hydrazaline initiated  7/31 pt stable- transferred to floor today  discharge planning- home tue/wed 71 y/o M Hx of COPD, HTN, HLD, gout, hypothyroidism, voice hoarseness, former smoker, MI, CAD with stent 2011 7/28- s/p C1L off pump. Now remains on cardene gtt for HTN and insulin gtt for hyperglycemia. Extubated  pulm consulted for copd and pulm nodules noted on Chest Ct preop- continue bronchodilators as per Dr. Evans and follow up Chest ct in 6-8 weeks   7/30 HTN- ace and hydrazaline initiated  7/31 pt stable- transferred to floor today  discharge planning- home tue/wed

## 2017-07-31 NOTE — PROGRESS NOTE ADULT - PROBLEM SELECTOR PLAN 2
increase Coreg 6.25   lisinopril 40 daily  hydrazaline 25 tid increase Coreg 6.25   lisinopril 40 daily  increase hydrazaline 25 tid  monitor VS closely

## 2017-07-31 NOTE — DISCHARGE NOTE ADULT - ADDITIONAL INSTRUCTIONS
follow up with Cardiologist, Dr. Martell,  in 1-2 weeks. Call to schedule appointment.  follow up with cardiac surgeon, Dr. Campbell on Aug 14th at 10:00 am. Call to confirm appointment [phone]

## 2017-07-31 NOTE — PROGRESS NOTE ADULT - ASSESSMENT
72 male with COPD, CAD, HTN, Gout, now s/p CABG.  CT chest shows emphysema and multiple tree-in-bud opacities in upper lobes.

## 2017-08-01 LAB
ANION GAP SERPL CALC-SCNC: 13 MMOL/L — SIGNIFICANT CHANGE UP (ref 5–17)
BUN SERPL-MCNC: 13 MG/DL — SIGNIFICANT CHANGE UP (ref 7–23)
CALCIUM SERPL-MCNC: 9.5 MG/DL — SIGNIFICANT CHANGE UP (ref 8.4–10.5)
CHLORIDE SERPL-SCNC: 101 MMOL/L — SIGNIFICANT CHANGE UP (ref 96–108)
CO2 SERPL-SCNC: 26 MMOL/L — SIGNIFICANT CHANGE UP (ref 22–31)
CREAT SERPL-MCNC: 0.95 MG/DL — SIGNIFICANT CHANGE UP (ref 0.5–1.3)
GLUCOSE SERPL-MCNC: 105 MG/DL — HIGH (ref 70–99)
HCT VFR BLD CALC: 38.2 % — LOW (ref 39–50)
HGB BLD-MCNC: 13.1 G/DL — SIGNIFICANT CHANGE UP (ref 13–17)
MCHC RBC-ENTMCNC: 29.5 PG — SIGNIFICANT CHANGE UP (ref 27–34)
MCHC RBC-ENTMCNC: 34.4 GM/DL — SIGNIFICANT CHANGE UP (ref 32–36)
MCV RBC AUTO: 85.7 FL — SIGNIFICANT CHANGE UP (ref 80–100)
PLATELET # BLD AUTO: 217 K/UL — SIGNIFICANT CHANGE UP (ref 150–400)
POTASSIUM SERPL-MCNC: 4.2 MMOL/L — SIGNIFICANT CHANGE UP (ref 3.5–5.3)
POTASSIUM SERPL-SCNC: 4.2 MMOL/L — SIGNIFICANT CHANGE UP (ref 3.5–5.3)
RBC # BLD: 4.46 M/UL — SIGNIFICANT CHANGE UP (ref 4.2–5.8)
RBC # FLD: 11.7 % — SIGNIFICANT CHANGE UP (ref 10.3–14.5)
SODIUM SERPL-SCNC: 140 MMOL/L — SIGNIFICANT CHANGE UP (ref 135–145)
WBC # BLD: 8.6 K/UL — SIGNIFICANT CHANGE UP (ref 3.8–10.5)
WBC # FLD AUTO: 8.6 K/UL — SIGNIFICANT CHANGE UP (ref 3.8–10.5)

## 2017-08-01 RX ORDER — CARVEDILOL PHOSPHATE 80 MG/1
6.25 CAPSULE, EXTENDED RELEASE ORAL ONCE
Qty: 0 | Refills: 0 | Status: COMPLETED | OUTPATIENT
Start: 2017-08-01 | End: 2017-08-01

## 2017-08-01 RX ORDER — CARVEDILOL PHOSPHATE 80 MG/1
12.5 CAPSULE, EXTENDED RELEASE ORAL EVERY 12 HOURS
Qty: 0 | Refills: 0 | Status: DISCONTINUED | OUTPATIENT
Start: 2017-08-01 | End: 2017-08-02

## 2017-08-01 RX ORDER — LISINOPRIL 2.5 MG/1
40 TABLET ORAL
Qty: 0 | Refills: 0 | Status: DISCONTINUED | OUTPATIENT
Start: 2017-08-01 | End: 2017-08-02

## 2017-08-01 RX ORDER — AMLODIPINE BESYLATE 2.5 MG/1
5 TABLET ORAL DAILY
Qty: 0 | Refills: 0 | Status: DISCONTINUED | OUTPATIENT
Start: 2017-08-01 | End: 2017-08-02

## 2017-08-01 RX ADMIN — TIOTROPIUM BROMIDE 1 CAPSULE(S): 18 CAPSULE ORAL; RESPIRATORY (INHALATION) at 12:32

## 2017-08-01 RX ADMIN — Medication 100 MILLIGRAM(S): at 15:13

## 2017-08-01 RX ADMIN — ENOXAPARIN SODIUM 40 MILLIGRAM(S): 100 INJECTION SUBCUTANEOUS at 15:13

## 2017-08-01 RX ADMIN — CARVEDILOL PHOSPHATE 12.5 MILLIGRAM(S): 80 CAPSULE, EXTENDED RELEASE ORAL at 21:29

## 2017-08-01 RX ADMIN — LISINOPRIL 40 MILLIGRAM(S): 2.5 TABLET ORAL at 05:42

## 2017-08-01 RX ADMIN — LATANOPROST 1 DROP(S): 0.05 SOLUTION/ DROPS OPHTHALMIC; TOPICAL at 21:29

## 2017-08-01 RX ADMIN — Medication 3 MILLILITER(S): at 00:54

## 2017-08-01 RX ADMIN — OXYCODONE AND ACETAMINOPHEN 2 TABLET(S): 5; 325 TABLET ORAL at 13:00

## 2017-08-01 RX ADMIN — LISINOPRIL 40 MILLIGRAM(S): 2.5 TABLET ORAL at 18:24

## 2017-08-01 RX ADMIN — OXYCODONE AND ACETAMINOPHEN 2 TABLET(S): 5; 325 TABLET ORAL at 04:02

## 2017-08-01 RX ADMIN — CARVEDILOL PHOSPHATE 6.25 MILLIGRAM(S): 80 CAPSULE, EXTENDED RELEASE ORAL at 05:41

## 2017-08-01 RX ADMIN — Medication 100 MILLIGRAM(S): at 05:42

## 2017-08-01 RX ADMIN — Medication 325 MILLIGRAM(S): at 12:32

## 2017-08-01 RX ADMIN — Medication 100 MILLIGRAM(S): at 21:30

## 2017-08-01 RX ADMIN — Medication 50 MICROGRAM(S): at 05:41

## 2017-08-01 RX ADMIN — OXYCODONE AND ACETAMINOPHEN 2 TABLET(S): 5; 325 TABLET ORAL at 12:36

## 2017-08-01 RX ADMIN — OXYCODONE AND ACETAMINOPHEN 2 TABLET(S): 5; 325 TABLET ORAL at 04:32

## 2017-08-01 RX ADMIN — Medication 3 MILLILITER(S): at 12:32

## 2017-08-01 RX ADMIN — Medication 25 MILLIGRAM(S): at 05:42

## 2017-08-01 RX ADMIN — Medication 3 MILLILITER(S): at 18:24

## 2017-08-01 RX ADMIN — POLYETHYLENE GLYCOL 3350 17 GRAM(S): 17 POWDER, FOR SOLUTION ORAL at 12:32

## 2017-08-01 RX ADMIN — ATORVASTATIN CALCIUM 40 MILLIGRAM(S): 80 TABLET, FILM COATED ORAL at 21:29

## 2017-08-01 RX ADMIN — CARVEDILOL PHOSPHATE 6.25 MILLIGRAM(S): 80 CAPSULE, EXTENDED RELEASE ORAL at 12:32

## 2017-08-01 RX ADMIN — AMLODIPINE BESYLATE 5 MILLIGRAM(S): 2.5 TABLET ORAL at 18:24

## 2017-08-01 RX ADMIN — OXYCODONE AND ACETAMINOPHEN 2 TABLET(S): 5; 325 TABLET ORAL at 23:39

## 2017-08-01 RX ADMIN — Medication 3 MILLILITER(S): at 05:42

## 2017-08-01 RX ADMIN — PANTOPRAZOLE SODIUM 40 MILLIGRAM(S): 20 TABLET, DELAYED RELEASE ORAL at 05:42

## 2017-08-01 RX ADMIN — Medication 3 MILLILITER(S): at 23:10

## 2017-08-01 RX ADMIN — Medication 25 MILLIGRAM(S): at 15:13

## 2017-08-01 RX ADMIN — OXYCODONE AND ACETAMINOPHEN 2 TABLET(S): 5; 325 TABLET ORAL at 23:09

## 2017-08-01 NOTE — PROGRESS NOTE ADULT - PROBLEM SELECTOR PLAN 1
continue postop care  ASA/ increase  coreg 12.5 bid as per Dr. Campbell today   Atorvastatin 40 mg  isolate pw today   transfer to floor  discharge planing- home tue/ wed when bp controlled

## 2017-08-01 NOTE — PROGRESS NOTE ADULT - SUBJECTIVE AND OBJECTIVE BOX
VITAL SIGNS-Telemetry:    Vital Signs Last 24 Hrs  T(C): 36.5 (17 @ 05:08), Max: 36.8 (17 @ 20:04)  T(F): 97.7 (17 @ 05:08), Max: 98.2 (17 @ 20:04)  HR: 80 (17 @ 05:08) (78 - 80)  BP: 163/84 (17 @ 05:08) (163/84 - 169/85)  RR: 17 (17 @ 05:08) (17 - 18)  SpO2: 98% (17 @ 05:08) (98% - 99%)          @ 07:01  -   @ 07:00  --------------------------------------------------------  IN: 970 mL / OUT: 1975 mL / NET: -1005 mL        Daily     Daily Weight in k.5 (01 Aug 2017 08:12)        Pacing Wires        [  ]   Settings:                                  Isolated  [ x ]  PHYSICAL EXAM:  Neurology: alert and oriented x 3, nonfocal, no gross deficits  CV : S1S2  Sternal Wound :  CDI , Stable  Lungs: Diminished BS   Abdomen: soft, nontender, nondistended, positive bowel sounds, last bowel movement         Extremities:     no edema, no calf tenderness    docusate sodium 100 milliGRAM(s) Oral three times a day  aspirin enteric coated 325 milliGRAM(s) Oral daily  levothyroxine 50 MICROGram(s) Oral daily  latanoprost 0.005% Ophthalmic Solution 1 Drop(s) Both EYES at bedtime  ALBUTerol/ipratropium for Nebulization 3 milliLiter(s) Nebulizer every 6 hours  atorvastatin 40 milliGRAM(s) Oral at bedtime  enoxaparin Injectable 40 milliGRAM(s) SubCutaneous daily  pantoprazole    Tablet 40 milliGRAM(s) Oral before breakfast  oxyCODONE    5 mG/acetaminophen 325 mG 1 Tablet(s) Oral every 6 hours PRN  oxyCODONE    5 mG/acetaminophen 325 mG 2 Tablet(s) Oral every 6 hours PRN  polyethylene glycol 3350 17 Gram(s) Oral daily  lisinopril 40 milliGRAM(s) Oral daily  ALPRAZolam 0.25 milliGRAM(s) Oral at bedtime PRN  hydrALAZINE 25 milliGRAM(s) Oral three times a day  carvedilol 6.25 milliGRAM(s) Oral every 12 hours  tiotropium 18 MICROgram(s) Capsule 1 Capsule(s) Inhalation daily      Physical Therapy Rec:   Home  [ x ]   Home w/ PT  [  ]  Rehab  [  ]  Discussed with Cardiothoracic Team at AM rounds.

## 2017-08-01 NOTE — PROGRESS NOTE ADULT - ASSESSMENT
73 y/o M Hx of COPD, HTN, HLD, gout, hypothyroidism, voice hoarseness, former smoker, MI, CAD with stent 2011 7/28- s/p C1L off pump. Now remains on cardene gtt for HTN and insulin gtt for hyperglycemia. Extubated  pulm consulted for copd and pulm nodules noted on Chest Ct preop- continue bronchodilators as per Dr. Evans and follow up Chest ct in 6-8 weeks   7/30 HTN- ace and hydrazaline initiated  7/31 pt stable- transferred to floor today  discharge planning- home wednesday  d/c pw's today  increase Coreg to 12.5mg bid

## 2017-08-01 NOTE — PROGRESS NOTE ADULT - PROBLEM SELECTOR PLAN 1
on nebs q 6 hours  can change to spiriva: and albuterol PRN q 6 hours!!: dc atrovent as pt is on spiriva

## 2017-08-01 NOTE — PROGRESS NOTE ADULT - SUBJECTIVE AND OBJECTIVE BOX
Patient is a 72y old  Male who presents with a chief complaint of     feels much better     Any change in ROS:     MEDICATIONS  (STANDING):  docusate sodium 100 milliGRAM(s) Oral three times a day  aspirin enteric coated 325 milliGRAM(s) Oral daily  levothyroxine 50 MICROGram(s) Oral daily  latanoprost 0.005% Ophthalmic Solution 1 Drop(s) Both EYES at bedtime  ALBUTerol/ipratropium for Nebulization 3 milliLiter(s) Nebulizer every 6 hours  atorvastatin 40 milliGRAM(s) Oral at bedtime  enoxaparin Injectable 40 milliGRAM(s) SubCutaneous daily  pantoprazole    Tablet 40 milliGRAM(s) Oral before breakfast  polyethylene glycol 3350 17 Gram(s) Oral daily  lisinopril 40 milliGRAM(s) Oral daily  hydrALAZINE 25 milliGRAM(s) Oral three times a day  tiotropium 18 MICROgram(s) Capsule 1 Capsule(s) Inhalation daily  carvedilol 12.5 milliGRAM(s) Oral every 12 hours  carvedilol 6.25 milliGRAM(s) Oral once    MEDICATIONS  (PRN):  oxyCODONE    5 mG/acetaminophen 325 mG 1 Tablet(s) Oral every 6 hours PRN Moderate Pain (4 - 6)  oxyCODONE    5 mG/acetaminophen 325 mG 2 Tablet(s) Oral every 6 hours PRN Severe Pain (7 - 10)  ALPRAZolam 0.25 milliGRAM(s) Oral at bedtime PRN insomnia    Vital Signs Last 24 Hrs  T(C): 36.5 (01 Aug 2017 05:08), Max: 36.8 (31 Jul 2017 20:04)  T(F): 97.7 (01 Aug 2017 05:08), Max: 98.2 (31 Jul 2017 20:04)  HR: 80 (01 Aug 2017 05:08) (78 - 80)  BP: 163/84 (01 Aug 2017 05:08) (163/84 - 169/85)  BP(mean): --  RR: 17 (01 Aug 2017 05:08) (17 - 18)  SpO2: 98% (01 Aug 2017 05:08) (98% - 99%)    I&O's Summary    31 Jul 2017 07:01  -  01 Aug 2017 07:00  --------------------------------------------------------  IN: 970 mL / OUT: 1975 mL / NET: -1005 mL          Physical Exam:   GENERAL: NAD, well-groomed, well-developed  HEENT: KULWINDER/   Atraumatic, Normocephalic  ENMT: No tonsillar erythema, exudates, or enlargement; Moist mucous membranes, Good dentition, No lesions  NECK: Supple, No JVD, Normal thyroid  CHEST/LUNG: Clear to auscultaion, ; No rales, rhonchi, wheezing, or rubs  CVS: Regular rate and rhythm; No murmurs, rubs, or gallops  GI: : Soft, Nontender, Nondistended; Bowel sounds present  NERVOUS SYSTEM:  Alert & Oriented X3  EXTREMITIES:  2+ Peripheral Pulses, No clubbing, cyanosis, or edema  LYMPH: No lymphadenopathy noted  SKIN: No rashes or lesions  ENDOCRINOLOGY: No Thyromegaly  PSYCH: Appropriate    Labs:                              13.1   8.6   )-----------( 217      ( 01 Aug 2017 06:50 )             38.2                         13.6   9.8   )-----------( 184      ( 31 Jul 2017 02:59 )             39.5                         12.2   12.6  )-----------( 160      ( 30 Jul 2017 02:50 )             36.8                         13.5   11.7  )-----------( 172      ( 29 Jul 2017 01:45 )             39.2                         11.6   6.0   )-----------( 149      ( 28 Jul 2017 12:44 )             34.0     08-01    140  |  101  |  13  ----------------------------<  105<H>  4.2   |  26  |  0.95  07-31    139  |  102  |  11  ----------------------------<  118<H>  4.1   |  26  |  0.92  07-30    142  |  107  |  16  ----------------------------<  111<H>  4.2   |  28  |  0.98  07-29    143  |  108  |  16  ----------------------------<  147<H>  4.3   |  19<L>  |  0.93  07-28    147<H>  |  109<H>  |  14  ----------------------------<  95  3.9   |  23  |  0.89    Ca    9.5      01 Aug 2017 06:50  Ca    9.3      31 Jul 2017 02:59    TPro  7.1  /  Alb  3.5  /  TBili  0.5  /  DBili  x   /  AST  24  /  ALT  12  /  AlkPhos  54  07-29  TPro  5.3<L>  /  Alb  2.5<L>  /  TBili  0.5  /  DBili  x   /  AST  14  /  ALT  9<L>  /  AlkPhos  43  07-28    CAPILLARY BLOOD GLUCOSE        Studies  Chest X-RAY  CT SCAN Chest < from: Xray Chest 1 View AP -PORTABLE-Routine (07.31.17 @ 04:55) >  AM:  CHEST PORTABLE ROUTINE                            PROCEDURE DATE:  07/31/2017            INTERPRETATION:  CLINICAL INDICATION: Status post open heart surgery.    EXAM: Frontal view of the chest with comparison made to chest radiograph   on 7/30/2017    FINDINGS:   Status post median sternotomy and CABG.   Known pneumoperitoneum, possibly decreased in size with findings   previously discussed to NESS White in SICU on 7/29/2017.  Decreased bilateral patchy opacities predominantly in the upper and   middle lungs, better visualized on recent chest CT 7/27/2017.  Costophrenic angles cannot be fully visualized bilaterally. There is no   pleural effusion or pneumothorax.  The heart size is normal.   No acute bony pathology.    IMPRESSION:   Decreased bilateral patchy opacities predominantly in the upper and   middle lungs, better visualized on recent CT.  Known pneumoperitoneum, possibly decreased in size, findings previously   discussed with recommendation for CT abdomen and pelvis.    JERRELL NUR M.D., RADIOLOGY RESIDENT  This document has been electronically signed.  SHAILESH AGEE M.D., ATTENDING RADIOLOGIST    < end of copied text >    Venous Dopplers: LE:   CT Abdomen  Others

## 2017-08-02 VITALS — WEIGHT: 134.7 LBS

## 2017-08-02 PROBLEM — Z95.5 PRESENCE OF CORONARY ANGIOPLASTY IMPLANT AND GRAFT: Chronic | Status: ACTIVE | Noted: 2017-07-27

## 2017-08-02 PROBLEM — E03.9 HYPOTHYROIDISM, UNSPECIFIED: Chronic | Status: ACTIVE | Noted: 2017-07-27

## 2017-08-02 PROBLEM — I10 ESSENTIAL (PRIMARY) HYPERTENSION: Chronic | Status: ACTIVE | Noted: 2017-07-27

## 2017-08-02 PROBLEM — J44.9 CHRONIC OBSTRUCTIVE PULMONARY DISEASE, UNSPECIFIED: Chronic | Status: ACTIVE | Noted: 2017-07-27

## 2017-08-02 PROBLEM — Z87.891 PERSONAL HISTORY OF NICOTINE DEPENDENCE: Chronic | Status: ACTIVE | Noted: 2017-07-27

## 2017-08-02 PROBLEM — H40.9 UNSPECIFIED GLAUCOMA: Chronic | Status: ACTIVE | Noted: 2017-07-27

## 2017-08-02 PROBLEM — I25.10 ATHEROSCLEROTIC HEART DISEASE OF NATIVE CORONARY ARTERY WITHOUT ANGINA PECTORIS: Chronic | Status: ACTIVE | Noted: 2017-07-27

## 2017-08-02 PROBLEM — I21.3 ST ELEVATION (STEMI) MYOCARDIAL INFARCTION OF UNSPECIFIED SITE: Chronic | Status: ACTIVE | Noted: 2017-07-27

## 2017-08-02 PROBLEM — M10.9 GOUT, UNSPECIFIED: Chronic | Status: ACTIVE | Noted: 2017-07-27

## 2017-08-02 LAB
ANION GAP SERPL CALC-SCNC: 12 MMOL/L — SIGNIFICANT CHANGE UP (ref 5–17)
BUN SERPL-MCNC: 17 MG/DL — SIGNIFICANT CHANGE UP (ref 7–23)
CALCIUM SERPL-MCNC: 9.6 MG/DL — SIGNIFICANT CHANGE UP (ref 8.4–10.5)
CHLORIDE SERPL-SCNC: 103 MMOL/L — SIGNIFICANT CHANGE UP (ref 96–108)
CO2 SERPL-SCNC: 28 MMOL/L — SIGNIFICANT CHANGE UP (ref 22–31)
CREAT SERPL-MCNC: 1.08 MG/DL — SIGNIFICANT CHANGE UP (ref 0.5–1.3)
GLUCOSE SERPL-MCNC: 110 MG/DL — HIGH (ref 70–99)
HCT VFR BLD CALC: 38.6 % — LOW (ref 39–50)
HGB BLD-MCNC: 13.3 G/DL — SIGNIFICANT CHANGE UP (ref 13–17)
MCHC RBC-ENTMCNC: 29.8 PG — SIGNIFICANT CHANGE UP (ref 27–34)
MCHC RBC-ENTMCNC: 34.3 GM/DL — SIGNIFICANT CHANGE UP (ref 32–36)
MCV RBC AUTO: 86.8 FL — SIGNIFICANT CHANGE UP (ref 80–100)
PLATELET # BLD AUTO: 253 K/UL — SIGNIFICANT CHANGE UP (ref 150–400)
POTASSIUM SERPL-MCNC: 4.5 MMOL/L — SIGNIFICANT CHANGE UP (ref 3.5–5.3)
POTASSIUM SERPL-SCNC: 4.5 MMOL/L — SIGNIFICANT CHANGE UP (ref 3.5–5.3)
RBC # BLD: 4.45 M/UL — SIGNIFICANT CHANGE UP (ref 4.2–5.8)
RBC # FLD: 11.6 % — SIGNIFICANT CHANGE UP (ref 10.3–14.5)
SODIUM SERPL-SCNC: 143 MMOL/L — SIGNIFICANT CHANGE UP (ref 135–145)
WBC # BLD: 6.5 K/UL — SIGNIFICANT CHANGE UP (ref 3.8–10.5)
WBC # FLD AUTO: 6.5 K/UL — SIGNIFICANT CHANGE UP (ref 3.8–10.5)

## 2017-08-02 PROCEDURE — 84436 ASSAY OF TOTAL THYROXINE: CPT

## 2017-08-02 PROCEDURE — 92978 ENDOLUMINL IVUS OCT C 1ST: CPT

## 2017-08-02 PROCEDURE — 82947 ASSAY GLUCOSE BLOOD QUANT: CPT

## 2017-08-02 PROCEDURE — 86901 BLOOD TYPING SEROLOGIC RH(D): CPT

## 2017-08-02 PROCEDURE — 80048 BASIC METABOLIC PNL TOTAL CA: CPT

## 2017-08-02 PROCEDURE — 85730 THROMBOPLASTIN TIME PARTIAL: CPT

## 2017-08-02 PROCEDURE — C1769: CPT

## 2017-08-02 PROCEDURE — 85384 FIBRINOGEN ACTIVITY: CPT

## 2017-08-02 PROCEDURE — 86850 RBC ANTIBODY SCREEN: CPT

## 2017-08-02 PROCEDURE — 83036 HEMOGLOBIN GLYCOSYLATED A1C: CPT

## 2017-08-02 PROCEDURE — 83605 ASSAY OF LACTIC ACID: CPT

## 2017-08-02 PROCEDURE — 82330 ASSAY OF CALCIUM: CPT

## 2017-08-02 PROCEDURE — 82553 CREATINE MB FRACTION: CPT

## 2017-08-02 PROCEDURE — 94010 BREATHING CAPACITY TEST: CPT

## 2017-08-02 PROCEDURE — 86923 COMPATIBILITY TEST ELECTRIC: CPT

## 2017-08-02 PROCEDURE — 97116 GAIT TRAINING THERAPY: CPT

## 2017-08-02 PROCEDURE — 93005 ELECTROCARDIOGRAM TRACING: CPT

## 2017-08-02 PROCEDURE — 80061 LIPID PANEL: CPT

## 2017-08-02 PROCEDURE — 84295 ASSAY OF SERUM SODIUM: CPT

## 2017-08-02 PROCEDURE — 80076 HEPATIC FUNCTION PANEL: CPT

## 2017-08-02 PROCEDURE — 82550 ASSAY OF CK (CPK): CPT

## 2017-08-02 PROCEDURE — 94640 AIRWAY INHALATION TREATMENT: CPT

## 2017-08-02 PROCEDURE — 84480 ASSAY TRIIODOTHYRONINE (T3): CPT

## 2017-08-02 PROCEDURE — 85027 COMPLETE CBC AUTOMATED: CPT

## 2017-08-02 PROCEDURE — 85014 HEMATOCRIT: CPT

## 2017-08-02 PROCEDURE — 71045 X-RAY EXAM CHEST 1 VIEW: CPT

## 2017-08-02 PROCEDURE — 86891 AUTOLOGOUS BLOOD OP SALVAGE: CPT

## 2017-08-02 PROCEDURE — C1889: CPT

## 2017-08-02 PROCEDURE — P9047: CPT

## 2017-08-02 PROCEDURE — 97530 THERAPEUTIC ACTIVITIES: CPT

## 2017-08-02 PROCEDURE — C1894: CPT

## 2017-08-02 PROCEDURE — 87640 STAPH A DNA AMP PROBE: CPT

## 2017-08-02 PROCEDURE — 84443 ASSAY THYROID STIM HORMONE: CPT

## 2017-08-02 PROCEDURE — 86900 BLOOD TYPING SEROLOGIC ABO: CPT

## 2017-08-02 PROCEDURE — 85610 PROTHROMBIN TIME: CPT

## 2017-08-02 PROCEDURE — 71250 CT THORAX DX C-: CPT

## 2017-08-02 PROCEDURE — 83880 ASSAY OF NATRIURETIC PEPTIDE: CPT

## 2017-08-02 PROCEDURE — 82435 ASSAY OF BLOOD CHLORIDE: CPT

## 2017-08-02 PROCEDURE — 36600 WITHDRAWAL OF ARTERIAL BLOOD: CPT

## 2017-08-02 PROCEDURE — 87641 MR-STAPH DNA AMP PROBE: CPT

## 2017-08-02 PROCEDURE — 82803 BLOOD GASES ANY COMBINATION: CPT

## 2017-08-02 PROCEDURE — C1751: CPT

## 2017-08-02 PROCEDURE — P9016: CPT

## 2017-08-02 PROCEDURE — 97162 PT EVAL MOD COMPLEX 30 MIN: CPT

## 2017-08-02 PROCEDURE — 80053 COMPREHEN METABOLIC PANEL: CPT

## 2017-08-02 PROCEDURE — 93458 L HRT ARTERY/VENTRICLE ANGIO: CPT

## 2017-08-02 PROCEDURE — 84484 ASSAY OF TROPONIN QUANT: CPT

## 2017-08-02 PROCEDURE — 94002 VENT MGMT INPAT INIT DAY: CPT

## 2017-08-02 PROCEDURE — 84132 ASSAY OF SERUM POTASSIUM: CPT

## 2017-08-02 PROCEDURE — C1887: CPT

## 2017-08-02 RX ORDER — CARVEDILOL PHOSPHATE 80 MG/1
1 CAPSULE, EXTENDED RELEASE ORAL
Qty: 60 | Refills: 0
Start: 2017-08-02 | End: 2017-09-01

## 2017-08-02 RX ORDER — LEVOTHYROXINE SODIUM 125 MCG
1 TABLET ORAL
Qty: 30 | Refills: 0
Start: 2017-08-02 | End: 2017-09-01

## 2017-08-02 RX ORDER — ATORVASTATIN CALCIUM 80 MG/1
1 TABLET, FILM COATED ORAL
Qty: 0 | Refills: 0 | COMMUNITY

## 2017-08-02 RX ORDER — OXYCODONE HYDROCHLORIDE 5 MG/1
1 TABLET ORAL
Qty: 40 | Refills: 0 | OUTPATIENT
Start: 2017-08-02 | End: 2017-08-12

## 2017-08-02 RX ORDER — LEVOTHYROXINE SODIUM 125 MCG
1 TABLET ORAL
Qty: 0 | Refills: 0 | COMMUNITY

## 2017-08-02 RX ORDER — ASPIRIN/CALCIUM CARB/MAGNESIUM 324 MG
1 TABLET ORAL
Qty: 30 | Refills: 0
Start: 2017-08-02 | End: 2017-09-01

## 2017-08-02 RX ORDER — TIOTROPIUM BROMIDE 18 UG/1
1 CAPSULE ORAL; RESPIRATORY (INHALATION)
Qty: 1 | Refills: 0 | OUTPATIENT
Start: 2017-08-02

## 2017-08-02 RX ORDER — ACLIDINIUM BROMIDE 400 UG/1
400 POWDER, METERED RESPIRATORY (INHALATION)
Qty: 1 | Refills: 0
Start: 2017-08-02 | End: 2017-09-01

## 2017-08-02 RX ORDER — DOCUSATE SODIUM 100 MG
1 CAPSULE ORAL
Qty: 30 | Refills: 0
Start: 2017-08-02 | End: 2017-08-12

## 2017-08-02 RX ORDER — PANTOPRAZOLE SODIUM 20 MG/1
1 TABLET, DELAYED RELEASE ORAL
Qty: 10 | Refills: 0
Start: 2017-08-02 | End: 2017-08-12

## 2017-08-02 RX ORDER — ATORVASTATIN CALCIUM 80 MG/1
1 TABLET, FILM COATED ORAL
Qty: 30 | Refills: 0
Start: 2017-08-02 | End: 2017-09-01

## 2017-08-02 RX ORDER — LISINOPRIL 2.5 MG/1
1 TABLET ORAL
Qty: 60 | Refills: 0
Start: 2017-08-02 | End: 2017-09-01

## 2017-08-02 RX ORDER — AMLODIPINE BESYLATE 2.5 MG/1
1 TABLET ORAL
Qty: 30 | Refills: 0
Start: 2017-08-02 | End: 2017-09-01

## 2017-08-02 RX ADMIN — Medication 50 MICROGRAM(S): at 06:07

## 2017-08-02 RX ADMIN — OXYCODONE AND ACETAMINOPHEN 2 TABLET(S): 5; 325 TABLET ORAL at 06:40

## 2017-08-02 RX ADMIN — CARVEDILOL PHOSPHATE 12.5 MILLIGRAM(S): 80 CAPSULE, EXTENDED RELEASE ORAL at 06:07

## 2017-08-02 RX ADMIN — Medication 3 MILLILITER(S): at 06:10

## 2017-08-02 RX ADMIN — Medication 100 MILLIGRAM(S): at 06:07

## 2017-08-02 RX ADMIN — AMLODIPINE BESYLATE 5 MILLIGRAM(S): 2.5 TABLET ORAL at 06:07

## 2017-08-02 RX ADMIN — LISINOPRIL 40 MILLIGRAM(S): 2.5 TABLET ORAL at 06:07

## 2017-08-02 RX ADMIN — TIOTROPIUM BROMIDE 1 CAPSULE(S): 18 CAPSULE ORAL; RESPIRATORY (INHALATION) at 12:14

## 2017-08-02 RX ADMIN — OXYCODONE AND ACETAMINOPHEN 2 TABLET(S): 5; 325 TABLET ORAL at 06:07

## 2017-08-02 RX ADMIN — POLYETHYLENE GLYCOL 3350 17 GRAM(S): 17 POWDER, FOR SOLUTION ORAL at 12:15

## 2017-08-02 RX ADMIN — PANTOPRAZOLE SODIUM 40 MILLIGRAM(S): 20 TABLET, DELAYED RELEASE ORAL at 06:07

## 2017-08-02 RX ADMIN — Medication 325 MILLIGRAM(S): at 12:14

## 2017-08-02 NOTE — PROGRESS NOTE ADULT - SUBJECTIVE AND OBJECTIVE BOX
Patient is a 72y old  Male who presents with a chief complaint of     have been doing ok  no SOB   no pain     Any change in ROS:     MEDICATIONS  (STANDING):  docusate sodium 100 milliGRAM(s) Oral three times a day  aspirin enteric coated 325 milliGRAM(s) Oral daily  levothyroxine 50 MICROGram(s) Oral daily  latanoprost 0.005% Ophthalmic Solution 1 Drop(s) Both EYES at bedtime  ALBUTerol/ipratropium for Nebulization 3 milliLiter(s) Nebulizer every 6 hours  atorvastatin 40 milliGRAM(s) Oral at bedtime  enoxaparin Injectable 40 milliGRAM(s) SubCutaneous daily  pantoprazole    Tablet 40 milliGRAM(s) Oral before breakfast  polyethylene glycol 3350 17 Gram(s) Oral daily  tiotropium 18 MICROgram(s) Capsule 1 Capsule(s) Inhalation daily  carvedilol 12.5 milliGRAM(s) Oral every 12 hours  lisinopril 40 milliGRAM(s) Oral two times a day  amLODIPine   Tablet 5 milliGRAM(s) Oral daily    MEDICATIONS  (PRN):  oxyCODONE    5 mG/acetaminophen 325 mG 1 Tablet(s) Oral every 6 hours PRN Moderate Pain (4 - 6)  oxyCODONE    5 mG/acetaminophen 325 mG 2 Tablet(s) Oral every 6 hours PRN Severe Pain (7 - 10)  ALPRAZolam 0.25 milliGRAM(s) Oral at bedtime PRN insomnia    Vital Signs Last 24 Hrs  T(C): 36.7 (02 Aug 2017 05:01), Max: 36.8 (01 Aug 2017 20:23)  T(F): 98 (02 Aug 2017 05:01), Max: 98.2 (01 Aug 2017 20:23)  HR: 74 (02 Aug 2017 05:01) (74 - 91)  BP: 126/71 (02 Aug 2017 05:01) (126/71 - 153/94)  BP(mean): --  RR: 18 (02 Aug 2017 05:01) (17 - 18)  SpO2: 96% (02 Aug 2017 05:01) (96% - 98%)    I&O's Summary    01 Aug 2017 07:01  -  02 Aug 2017 07:00  --------------------------------------------------------  IN: 1370 mL / OUT: 2050 mL / NET: -680 mL          Physical Exam:   GENERAL: NAD, well-groomed, well-developed  HEENT: KULWINDER/   Atraumatic, Normocephalic  ENMT: No tonsillar erythema, exudates, or enlargement; Moist mucous membranes, Good dentition, No lesions  NECK: Supple, No JVD, Normal thyroid  CHEST/LUNG: Clear to auscultaion, ; No rales, rhonchi, wheezing, or rubs  CVS: Regular rate and rhythm; No murmurs, rubs, or gallops  GI: : Soft, Nontender, Nondistended; Bowel sounds present  NERVOUS SYSTEM:  Alert & Oriented X3  EXTREMITIES:  2+ Peripheral Pulses, No clubbing, cyanosis, or edema  LYMPH: No lymphadenopathy noted  SKIN: No rashes or lesions  ENDOCRINOLOGY: No Thyromegaly  PSYCH: Appropriate    Labs:                              13.3   6.5   )-----------( 253      ( 02 Aug 2017 06:36 )             38.6                         13.1   8.6   )-----------( 217      ( 01 Aug 2017 06:50 )             38.2                         13.6   9.8   )-----------( 184      ( 31 Jul 2017 02:59 )             39.5                         12.2   12.6  )-----------( 160      ( 30 Jul 2017 02:50 )             36.8     08-02    143  |  103  |  17  ----------------------------<  110<H>  4.5   |  28  |  1.08  08-01    140  |  101  |  13  ----------------------------<  105<H>  4.2   |  26  |  0.95  07-31    139  |  102  |  11  ----------------------------<  118<H>  4.1   |  26  |  0.92  07-30    142  |  107  |  16  ----------------------------<  111<H>  4.2   |  28  |  0.98    Ca    9.6      02 Aug 2017 06:36  Ca    9.5      01 Aug 2017 06:50      CAPILLARY BLOOD GLUCOSE          Studies  Chest X-RAY  CT SCAN Chest   Venous Dopplers: LE:   CT Abdomen  Others    < from: Xray Chest 1 View AP -PORTABLE-Routine (07.31.17 @ 04:55) >  IMPRESSION:   Decreased bilateral patchy opacities predominantly in the upper and   middle lungs, better visualized on recent CT.  Known pneumoperitoneum, possibly decreased in size, findings previously   discussed with recommendation for CT abdomen and pelvis.                JERRELL NUR M.D., RADIOLOGY RESIDENT  This document has been electronically signed.  SHAILESH AGEE M.D., ATTENDING RADIOLOGIST  This document has been electronically signed. Jul 31 2017  5:01PM    < end of copied text >

## 2017-08-02 NOTE — PROGRESS NOTE ADULT - PROBLEM SELECTOR PROBLEM 1
Chronic obstructive pulmonary disease, unspecified COPD type
Chronic obstructive pulmonary disease, unspecified COPD type
Coronary artery disease involving native coronary artery of native heart with other form of angina pectoris
Chronic obstructive pulmonary disease, unspecified COPD type
Coronary artery disease involving native coronary artery of native heart with other form of angina pectoris

## 2017-08-02 NOTE — PROGRESS NOTE ADULT - PROBLEM SELECTOR PLAN 3
Pulm following. C/w linsey.  follow up Chest ct in 6- 8 weeks as per pulm
Pulm following. C/w linsey.  follow up Chest ct in 6- 8 weeks as per pulm
ant current therapy!: S/P CABG: Doing great
ant current therapy!: S/P CABG: Doing great
Pulm following. CHAVO/lazaro stiles.
con tune current therapy!: S/P CABG: Doing great
Pulm following. CHAVO/lazaro stiles.

## 2017-08-02 NOTE — PROGRESS NOTE ADULT - PROBLEM SELECTOR PROBLEM 4
Prophylactic measure
Prophylactic measure
Pulmonary infiltrate
Pulmonary infiltrate
Prophylactic measure
Pulmonary infiltrate
Prophylactic measure

## 2017-08-02 NOTE — PROGRESS NOTE ADULT - PROBLEM SELECTOR PROBLEM 3
CAD (coronary artery disease)
CAD (coronary artery disease)
Chronic obstructive pulmonary disease, unspecified COPD type
CAD (coronary artery disease)
Chronic obstructive pulmonary disease, unspecified COPD type

## 2017-08-02 NOTE — PROGRESS NOTE ADULT - PROBLEM SELECTOR PLAN 4
C/w GI/DVT prophylaxis  C/w pain control  C/w glucose control  C/w OOB, incentive spirometry, ambulation as tolerated.  add miralax
C/w GI/DVT prophylaxis  C/w pain control  C/w glucose control  C/w OOB, incentive spirometry, ambulation as tolerated.  add miralax
Pt has scattered TIB opacities, etiology is not clear!!  he will need repeat ct scan chest in 6-8 weeks time: No clinical features of pneumonia!!  DW patient: outpatient follow up: ?mucus plugging: I have discussed in detail with the patient today about the need to follow up these pulmonary infiltrates
Pt has scattered TIB opacities, etiology is not different!!  he will need repeat ct scan chest in 6-8 weeks time: No clinical features of pneumonia!!  DW patient: outpatient follow up
C/w GI/DVT prophylaxis  C/w pain control  C/w glucose control  C/w OOB, incentive spirometry, ambulation as tolerated.  add miralax
Pt has scattered TIB opacities, etiology is not different!!  he will need repeat ct scan chest in 6-8 weeks time: No clinical features on pneumonia!!
C/w GI/DVT prophylaxis  C/w pain control  C/w glucose control  C/w OOB, incentive spirometry, ambulation as tolerated.

## 2017-08-09 ENCOUNTER — RECORD ABSTRACTING (OUTPATIENT)
Age: 72
End: 2017-08-09

## 2017-08-09 DIAGNOSIS — Z80.9 FAMILY HISTORY OF MALIGNANT NEOPLASM, UNSPECIFIED: ICD-10-CM

## 2017-08-09 DIAGNOSIS — R49.0 DYSPHONIA: ICD-10-CM

## 2017-08-09 DIAGNOSIS — I10 ESSENTIAL (PRIMARY) HYPERTENSION: ICD-10-CM

## 2017-08-09 DIAGNOSIS — Z95.1 PRESENCE OF AORTOCORONARY BYPASS GRAFT: ICD-10-CM

## 2017-08-09 DIAGNOSIS — Z86.39 PERSONAL HISTORY OF OTHER ENDOCRINE, NUTRITIONAL AND METABOLIC DISEASE: ICD-10-CM

## 2017-08-09 DIAGNOSIS — E03.9 HYPOTHYROIDISM, UNSPECIFIED: ICD-10-CM

## 2017-08-09 DIAGNOSIS — R91.8 OTHER NONSPECIFIC ABNORMAL FINDING OF LUNG FIELD: ICD-10-CM

## 2017-08-09 DIAGNOSIS — R94.39 ABNORMAL RESULT OF OTHER CARDIOVASCULAR FUNCTION STUDY: ICD-10-CM

## 2017-08-09 DIAGNOSIS — Z86.69 PERSONAL HISTORY OF OTHER DISEASES OF THE NERVOUS SYSTEM AND SENSE ORGANS: ICD-10-CM

## 2017-08-09 DIAGNOSIS — I21.3 ST ELEVATION (STEMI) MYOCARDIAL INFARCTION OF UNSPECIFIED SITE: ICD-10-CM

## 2017-08-09 DIAGNOSIS — Z87.891 PERSONAL HISTORY OF NICOTINE DEPENDENCE: ICD-10-CM

## 2017-08-09 DIAGNOSIS — I25.10 ATHEROSCLEROTIC HEART DISEASE OF NATIVE CORONARY ARTERY W/OUT ANGINA PECTORIS: ICD-10-CM

## 2017-08-09 DIAGNOSIS — Z87.39 PERSONAL HISTORY OF OTHER DISEASES OF THE MUSCULOSKELETAL SYSTEM AND CONNECTIVE TISSUE: ICD-10-CM

## 2017-08-09 RX ORDER — PANTOPRAZOLE 40 MG/1
40 TABLET, DELAYED RELEASE ORAL DAILY
Refills: 0 | Status: ACTIVE | COMMUNITY

## 2017-08-09 RX ORDER — ALBUTEROL 90 MCG
90 AEROSOL (GRAM) INHALATION
Refills: 0 | Status: ACTIVE | COMMUNITY

## 2017-08-09 RX ORDER — ATORVASTATIN CALCIUM 40 MG/1
40 TABLET, FILM COATED ORAL
Refills: 0 | Status: ACTIVE | COMMUNITY

## 2017-08-09 RX ORDER — LISINOPRIL 40 MG/1
40 TABLET ORAL TWICE DAILY
Refills: 0 | Status: ACTIVE | COMMUNITY

## 2017-08-09 RX ORDER — ASPIRIN 325 MG/1
325 TABLET, COATED ORAL DAILY
Refills: 0 | Status: ACTIVE | COMMUNITY

## 2017-08-09 RX ORDER — LATANOPROST/PF 0.005 %
0.01 DROPS OPHTHALMIC (EYE) DAILY
Refills: 0 | Status: ACTIVE | COMMUNITY

## 2017-08-09 RX ORDER — CARVEDILOL 12.5 MG/1
12.5 TABLET, FILM COATED ORAL
Refills: 0 | Status: ACTIVE | COMMUNITY

## 2017-08-09 RX ORDER — DOCUSATE SODIUM 100 MG/1
100 CAPSULE, LIQUID FILLED ORAL 3 TIMES DAILY
Refills: 0 | Status: ACTIVE | COMMUNITY

## 2017-08-14 ENCOUNTER — APPOINTMENT (OUTPATIENT)
Dept: CARDIOTHORACIC SURGERY | Facility: CLINIC | Age: 72
End: 2017-08-14

## 2017-08-14 VITALS
BODY MASS INDEX: 20.92 KG/M2 | TEMPERATURE: 97.7 F | OXYGEN SATURATION: 100 % | SYSTOLIC BLOOD PRESSURE: 110 MMHG | WEIGHT: 138 LBS | RESPIRATION RATE: 16 BRPM | HEART RATE: 58 BPM | DIASTOLIC BLOOD PRESSURE: 63 MMHG | HEIGHT: 68 IN

## 2017-08-14 RX ORDER — AMLODIPINE BESYLATE 5 MG/1
5 TABLET ORAL DAILY
Refills: 0 | Status: DISCONTINUED | COMMUNITY
End: 2017-08-14

## 2018-10-04 ENCOUNTER — INPATIENT (INPATIENT)
Facility: HOSPITAL | Age: 73
LOS: 2 days | Discharge: HOME | DRG: 871 | End: 2018-10-07
Attending: INTERNAL MEDICINE | Admitting: INTERNAL MEDICINE
Payer: COMMERCIAL

## 2018-10-04 VITALS
DIASTOLIC BLOOD PRESSURE: 115 MMHG | SYSTOLIC BLOOD PRESSURE: 209 MMHG | TEMPERATURE: 98 F | WEIGHT: 134.92 LBS | RESPIRATION RATE: 26 BRPM | HEART RATE: 105 BPM | OXYGEN SATURATION: 94 % | HEIGHT: 67 IN

## 2018-10-04 DIAGNOSIS — J18.9 PNEUMONIA, UNSPECIFIED ORGANISM: ICD-10-CM

## 2018-10-04 DIAGNOSIS — Z98.890 OTHER SPECIFIED POSTPROCEDURAL STATES: Chronic | ICD-10-CM

## 2018-10-04 LAB
ALBUMIN SERPL ELPH-MCNC: 4.2 G/DL — SIGNIFICANT CHANGE UP (ref 3.3–5)
ALP SERPL-CCNC: 82 U/L — SIGNIFICANT CHANGE UP (ref 40–120)
ALT FLD-CCNC: 18 U/L — SIGNIFICANT CHANGE UP (ref 10–45)
ANION GAP SERPL CALC-SCNC: 12 MMOL/L — SIGNIFICANT CHANGE UP (ref 5–17)
APTT BLD: 30.5 SEC — SIGNIFICANT CHANGE UP (ref 27.5–37.4)
AST SERPL-CCNC: 28 U/L — SIGNIFICANT CHANGE UP (ref 10–40)
BASOPHILS # BLD AUTO: 0 K/UL — SIGNIFICANT CHANGE UP (ref 0–0.2)
BASOPHILS NFR BLD AUTO: 0.4 % — SIGNIFICANT CHANGE UP (ref 0–2)
BILIRUB SERPL-MCNC: 0.9 MG/DL — SIGNIFICANT CHANGE UP (ref 0.2–1.2)
BUN SERPL-MCNC: 13 MG/DL — SIGNIFICANT CHANGE UP (ref 7–23)
CALCIUM SERPL-MCNC: 9.5 MG/DL — SIGNIFICANT CHANGE UP (ref 8.4–10.5)
CHLORIDE SERPL-SCNC: 102 MMOL/L — SIGNIFICANT CHANGE UP (ref 96–108)
CO2 SERPL-SCNC: 24 MMOL/L — SIGNIFICANT CHANGE UP (ref 22–31)
CREAT SERPL-MCNC: 0.92 MG/DL — SIGNIFICANT CHANGE UP (ref 0.5–1.3)
D DIMER BLD IA.RAPID-MCNC: 353 NG/ML DDU — HIGH
EOSINOPHIL # BLD AUTO: 0.4 K/UL — SIGNIFICANT CHANGE UP (ref 0–0.5)
EOSINOPHIL NFR BLD AUTO: 5.4 % — SIGNIFICANT CHANGE UP (ref 0–6)
GAS PNL BLDV: SIGNIFICANT CHANGE UP
GLUCOSE SERPL-MCNC: 129 MG/DL — HIGH (ref 70–99)
HCT VFR BLD CALC: 42.2 % — SIGNIFICANT CHANGE UP (ref 39–50)
HGB BLD-MCNC: 14.1 G/DL — SIGNIFICANT CHANGE UP (ref 13–17)
INR BLD: 1.44 RATIO — HIGH (ref 0.88–1.16)
LYMPHOCYTES # BLD AUTO: 0.8 K/UL — LOW (ref 1–3.3)
LYMPHOCYTES # BLD AUTO: 12 % — LOW (ref 13–44)
MCHC RBC-ENTMCNC: 28 PG — SIGNIFICANT CHANGE UP (ref 27–34)
MCHC RBC-ENTMCNC: 33.5 GM/DL — SIGNIFICANT CHANGE UP (ref 32–36)
MCV RBC AUTO: 83.7 FL — SIGNIFICANT CHANGE UP (ref 80–100)
MONOCYTES # BLD AUTO: 0.4 K/UL — SIGNIFICANT CHANGE UP (ref 0–0.9)
MONOCYTES NFR BLD AUTO: 5.8 % — SIGNIFICANT CHANGE UP (ref 2–14)
NEUTROPHILS # BLD AUTO: 5.2 K/UL — SIGNIFICANT CHANGE UP (ref 1.8–7.4)
NEUTROPHILS NFR BLD AUTO: 76.4 % — SIGNIFICANT CHANGE UP (ref 43–77)
PLATELET # BLD AUTO: 238 K/UL — SIGNIFICANT CHANGE UP (ref 150–400)
POTASSIUM SERPL-MCNC: 4 MMOL/L — SIGNIFICANT CHANGE UP (ref 3.5–5.3)
POTASSIUM SERPL-SCNC: 4 MMOL/L — SIGNIFICANT CHANGE UP (ref 3.5–5.3)
PROT SERPL-MCNC: 9.1 G/DL — HIGH (ref 6–8.3)
PROTHROM AB SERPL-ACNC: 15.7 SEC — HIGH (ref 9.8–12.7)
RAPID RVP RESULT: DETECTED
RBC # BLD: 5.05 M/UL — SIGNIFICANT CHANGE UP (ref 4.2–5.8)
RBC # FLD: 12.9 % — SIGNIFICANT CHANGE UP (ref 10.3–14.5)
RV+EV RNA SPEC QL NAA+PROBE: DETECTED
SODIUM SERPL-SCNC: 138 MMOL/L — SIGNIFICANT CHANGE UP (ref 135–145)
TROPONIN T, HIGH SENSITIVITY RESULT: <6 NG/L — SIGNIFICANT CHANGE UP (ref 0–51)
WBC # BLD: 6.7 K/UL — SIGNIFICANT CHANGE UP (ref 3.8–10.5)
WBC # FLD AUTO: 6.7 K/UL — SIGNIFICANT CHANGE UP (ref 3.8–10.5)

## 2018-10-04 PROCEDURE — 93308 TTE F-UP OR LMTD: CPT | Mod: 26

## 2018-10-04 PROCEDURE — 99291 CRITICAL CARE FIRST HOUR: CPT

## 2018-10-04 PROCEDURE — 71045 X-RAY EXAM CHEST 1 VIEW: CPT | Mod: 26

## 2018-10-04 PROCEDURE — 71275 CT ANGIOGRAPHY CHEST: CPT | Mod: 26

## 2018-10-04 PROCEDURE — 93010 ELECTROCARDIOGRAM REPORT: CPT

## 2018-10-04 PROCEDURE — 99223 1ST HOSP IP/OBS HIGH 75: CPT | Mod: GC

## 2018-10-04 RX ORDER — CEFTRIAXONE 500 MG/1
1 INJECTION, POWDER, FOR SOLUTION INTRAMUSCULAR; INTRAVENOUS EVERY 24 HOURS
Qty: 0 | Refills: 0 | Status: DISCONTINUED | OUTPATIENT
Start: 2018-10-05 | End: 2018-10-07

## 2018-10-04 RX ORDER — HEPARIN SODIUM 5000 [USP'U]/ML
5000 INJECTION INTRAVENOUS; SUBCUTANEOUS EVERY 8 HOURS
Qty: 0 | Refills: 0 | Status: DISCONTINUED | OUTPATIENT
Start: 2018-10-04 | End: 2018-10-04

## 2018-10-04 RX ORDER — IPRATROPIUM/ALBUTEROL SULFATE 18-103MCG
3 AEROSOL WITH ADAPTER (GRAM) INHALATION ONCE
Qty: 0 | Refills: 0 | Status: COMPLETED | OUTPATIENT
Start: 2018-10-04 | End: 2018-10-04

## 2018-10-04 RX ORDER — AZITHROMYCIN 500 MG/1
500 TABLET, FILM COATED ORAL ONCE
Qty: 0 | Refills: 0 | Status: COMPLETED | OUTPATIENT
Start: 2018-10-04 | End: 2018-10-04

## 2018-10-04 RX ORDER — PANTOPRAZOLE SODIUM 20 MG/1
40 TABLET, DELAYED RELEASE ORAL DAILY
Qty: 0 | Refills: 0 | Status: DISCONTINUED | OUTPATIENT
Start: 2018-10-04 | End: 2018-10-04

## 2018-10-04 RX ORDER — BUDESONIDE AND FORMOTEROL FUMARATE DIHYDRATE 160; 4.5 UG/1; UG/1
2 AEROSOL RESPIRATORY (INHALATION)
Qty: 0 | Refills: 0 | Status: DISCONTINUED | OUTPATIENT
Start: 2018-10-04 | End: 2018-10-05

## 2018-10-04 RX ORDER — SODIUM CHLORIDE 9 MG/ML
1000 INJECTION INTRAMUSCULAR; INTRAVENOUS; SUBCUTANEOUS ONCE
Qty: 0 | Refills: 0 | Status: COMPLETED | OUTPATIENT
Start: 2018-10-04 | End: 2018-10-04

## 2018-10-04 RX ORDER — CEFTRIAXONE 500 MG/1
1 INJECTION, POWDER, FOR SOLUTION INTRAMUSCULAR; INTRAVENOUS ONCE
Qty: 0 | Refills: 0 | Status: COMPLETED | OUTPATIENT
Start: 2018-10-04 | End: 2018-10-04

## 2018-10-04 RX ORDER — IPRATROPIUM/ALBUTEROL SULFATE 18-103MCG
3 AEROSOL WITH ADAPTER (GRAM) INHALATION EVERY 4 HOURS
Qty: 0 | Refills: 0 | Status: DISCONTINUED | OUTPATIENT
Start: 2018-10-04 | End: 2018-10-07

## 2018-10-04 RX ADMIN — Medication 3 MILLILITER(S): at 08:19

## 2018-10-04 RX ADMIN — CEFTRIAXONE 1 GRAM(S): 500 INJECTION, POWDER, FOR SOLUTION INTRAMUSCULAR; INTRAVENOUS at 11:25

## 2018-10-04 RX ADMIN — Medication 3 MILLILITER(S): at 13:22

## 2018-10-04 RX ADMIN — AZITHROMYCIN 250 MILLIGRAM(S): 500 TABLET, FILM COATED ORAL at 11:40

## 2018-10-04 RX ADMIN — Medication 3 MILLILITER(S): at 19:58

## 2018-10-04 RX ADMIN — Medication 125 MILLIGRAM(S): at 05:48

## 2018-10-04 RX ADMIN — Medication 3 MILLILITER(S): at 06:21

## 2018-10-04 RX ADMIN — CEFTRIAXONE 100 GRAM(S): 500 INJECTION, POWDER, FOR SOLUTION INTRAMUSCULAR; INTRAVENOUS at 09:53

## 2018-10-04 RX ADMIN — SODIUM CHLORIDE 1000 MILLILITER(S): 9 INJECTION INTRAMUSCULAR; INTRAVENOUS; SUBCUTANEOUS at 06:50

## 2018-10-04 RX ADMIN — Medication 3 MILLILITER(S): at 06:20

## 2018-10-04 RX ADMIN — Medication 40 MILLIGRAM(S): at 19:58

## 2018-10-04 RX ADMIN — SODIUM CHLORIDE 3000 MILLILITER(S): 9 INJECTION INTRAMUSCULAR; INTRAVENOUS; SUBCUTANEOUS at 05:48

## 2018-10-04 RX ADMIN — AZITHROMYCIN 500 MILLIGRAM(S): 500 TABLET, FILM COATED ORAL at 12:45

## 2018-10-04 NOTE — ED PROVIDER NOTE - ATTENDING CONTRIBUTION TO CARE
Attending Note (Klaudia): patient complaining of shortness of breath. in respiratory distress.  tachypnea, retracting, wheezing. copd vs pna vs chf.  nebs, steroids, US.  US concerning for pna.

## 2018-10-04 NOTE — ED PROVIDER NOTE - OBJECTIVE STATEMENT
Resident: 73y M PMH COPD, CAD s/p CABG, hypothyroid Resident: 73y M PMH COPD, CAD s/p CABG, hypothyroid presents with shortness of breath. Per wife, patient developed dry cough yesterday not improved with home inhalers, this morning developed severe shortness of breath. Denied fever, chills.

## 2018-10-04 NOTE — ED PROVIDER NOTE - MEDICAL DECISION MAKING DETAILS
Resident: cough, shortness of breath. tachycardic, tachypneic. lungs with wheezing. will obtain ekg, cxr, labs, trop, give fluids, steroids, bipap.

## 2018-10-04 NOTE — ED ADULT NURSE REASSESSMENT NOTE - NS ED NURSE REASSESS COMMENT FT1
report received from [facility]. pt A&OX4, PERRL, no numbness or tingling present. Pt continues on BiPAP, tolerating well. Able to speak coherently without effort. Expiratory wheezes b/l upon auscultation. pt complains of cough with scants amount of sputum production, tachypneic, RR 24, breathing unlabored, equal and symmetric chest rise. Skin dry, pink, intact. no complaints of CP or cardiac S&S. No swelling or edema noted on extremities. b/l 18G IV. pt waiting comfortably on stretcher, safety maintained, will reassess. report received from [facility] Peace. pt A&OX4, PERRL, no numbness or tingling present. Pt continues on BiPAP, tolerating well. Able to speak coherently without effort. Expiratory wheezes b/l upon auscultation. pt complains of cough with scants amount of sputum production, tachypneic, RR 24, breathing unlabored, equal and symmetric chest rise. Pt. in no acute respiratory distress. Skin dry, pink, intact. no complaints of CP or cardiac S&S. No swelling or edema noted on extremities. b/l 18G IV. Pt. on CM - NSR to the 90s. pt waiting comfortably on stretcher, safety maintained. report received from [facility] Peace. pt A&OX4, PERRL, no numbness or tingling present. Pt continues on BiPAP, tolerating well. Able to speak coherently without effort. Expiratory wheezes b/l upon auscultation. pt complains of cough with scants amount of sputum production, tachypneic, RR 24, breathing unlabored, equal and symmetric chest rise. Pt. in no acute respiratory distress. Skin dry, pink, intact. no complaints of CP. No swelling or edema noted on extremities. b/l 18G IV. Pt. on CM - NSR to the 90s. pt waiting comfortably on stretcher, safety maintained.

## 2018-10-04 NOTE — ED PROVIDER NOTE - PROGRESS NOTE DETAILS
Resident: patient breathing and vitals improved on bipap. Pt is feels his breathing is much better with bipap.  He is still slightly tachypenic to mid 20s with scattered wheezes.  Pna on cxr - will add abx for CAP.  Pt also +entero/rhino virus.  Awaiting CTA chest.  - Felicia Nieto, DO cta negative for PE but demonstartes PNA again.  Pt found to be more tachypneic and using more accessory muscles on exam.  MICU consulted. MICU rejected.  Attempted to call listed PMD who states they have never seen the patient.  Pt cannot tell me another PMD name.  Will admit to unattached.  Pt appears more comfortable now on berthaap  - Felicia Nieto, DO As above: patient became tachypneic and with increased WOB, so MICU consulted.  Patient found to have consolidation on CT chest, no evidence of PE.  ABx coverage for CAP initiated at that time, and patient continued to improve.  MICU evaluated and feels patient stable for floor, will admit to medicine service for further evaluation and management of likely pneumonia and COPD exacerbation. Julianna

## 2018-10-04 NOTE — H&P ADULT - NSHPLABSRESULTS_GEN_ALL_CORE
14.1   6.7   )-----------( 238      ( 04 Oct 2018 05:58 )             42.2   10-04    138  |  102  |  13  ----------------------------<  129<H>  4.0   |  24  |  0.92    Ca    9.5      04 Oct 2018 05:58    TPro  9.1<H>  /  Alb  4.2  /  TBili  0.9  /  DBili  x   /  AST  28  /  ALT  18  /  AlkPhos  82  10-04

## 2018-10-04 NOTE — ED ADULT NURSE REASSESSMENT NOTE - NS ED NURSE REASSESS COMMENT FT1
pt. appears much more comfortable. RR 21-22. Pt. reports feeling much better. Pt.'s BP 150s/80s. Pt. able to speak w/o becoming increasingly SOB and w/o increased WOB.

## 2018-10-04 NOTE — ED ADULT NURSE NOTE - OBJECTIVE STATEMENT
pt BIBA from home and as per EMS, pt "had a cough today and this evening woke up having difficulty breathing and feeling SOB. Upon arrival, pt O2 saturation was 76% on room air." pt denies any lightheadedness, dizziness, chest pain, n/v/d, abd. pain, numbness/tingling at present. pt endorses feeling SOB. Accessory muscle use note at present. pt received x1 albuterol nebulizer treatment PTA.

## 2018-10-04 NOTE — CONSULT NOTE ADULT - SUBJECTIVE AND OBJECTIVE BOX
CHIEF COMPLAINT: Cough and SOB    HPI:  Patient is a 72 yo M with PMHx of COPD, HTN, HLD, gout, hypothyroidism, CAD (s/p stent in 2011 in [place], and s/p CABG x1 in 2017) who presents with complaints of cough and SOB x 2 days. Patient states that 2 days prior to presentation, he began to developed a non productive cough. He states that since that time he has been experiencing progressively worsening SOB. This AM, he awoke with severe SOB and called EMS. Per chart review, patient was found to be hypoxic by EMS to 76% on RA, and was placed on 6L NC.     In the ED, patient was found to be tachycardic, hypertensive, tachypneic       FAMILY HISTORY:      SOCIAL HISTORY:  Smoking: __ packs x ___ years  EtOH Use:  Marital Status:  Occupation:  Recent Travel:  Country of Birth:  Advance Directives:    Allergies    No Known Allergies    Intolerances        HOME MEDICATIONS:    REVIEW OF SYSTEMS:  Constitutional:   Eyes:  ENT:  CV:  Resp:  GI:  :  MSK:  Integumentary:  Neurological:  Psychiatric:  Endocrine:  Hematologic/Lymphatic:  Allergic/Immunologic:  [ ] All other systems negative  [ ] Unable to assess ROS because ________    OBJECTIVE:  ICU Vital Signs Last 24 Hrs  T(C): 36.7 (04 Oct 2018 07:17), Max: 36.7 (04 Oct 2018 07:17)  T(F): 98.1 (04 Oct 2018 07:17), Max: 98.1 (04 Oct 2018 07:17)  HR: 87 (04 Oct 2018 13:35) (80 - 105)  BP: 164/92 (04 Oct 2018 13:35) (158/92 - 216/104)  BP(mean): --  ABP: --  ABP(mean): --  RR: 16 (04 Oct 2018 13:35) (16 - 30)  SpO2: 100% (04 Oct 2018 13:35) (94% - 100%)        10-04 @ 07:01  -  10-04 @ 14:04  --------------------------------------------------------  IN: 350 mL / OUT: 400 mL / NET: -50 mL      CAPILLARY BLOOD GLUCOSE          PHYSICAL EXAM:  GENERAL: NAD, well-developed  HEAD:  Atraumatic, Normocephalic  EYES: EOMI, PERRLA, conjunctiva and sclera clear  NECK: Supple, No JVD  CHEST/LUNG: Clear to auscultation bilaterally; No wheeze  HEART: Regular rate and rhythm; No murmurs, rubs, or gallops  ABDOMEN: Soft, Nontender, Nondistended; Bowel sounds present  EXTREMITIES:  2+ Peripheral Pulses, No clubbing, cyanosis, or edema  PSYCH: AAOx3  NEUROLOGY: non-focal  SKIN: No rashes or lesions      HOSPITAL MEDICATIONS:  MEDICATIONS  (STANDING):    MEDICATIONS  (PRN):      LABS:                        14.1   6.7   )-----------( 238      ( 04 Oct 2018 05:58 )             42.2     10-04    138  |  102  |  13  ----------------------------<  129<H>  4.0   |  24  |  0.92    Ca    9.5      04 Oct 2018 05:58    TPro  9.1<H>  /  Alb  4.2  /  TBili  0.9  /  DBili  x   /  AST  28  /  ALT  18  /  AlkPhos  82  10-04    PT/INR - ( 04 Oct 2018 05:58 )   PT: 15.7 sec;   INR: 1.44 ratio         PTT - ( 04 Oct 2018 05:58 )  PTT:30.5 sec      Venous Blood Gas:  10-04 @ 05:58  7.33/53/45/28/76  VBG Lactate: 1.7      MICROBIOLOGY:     RADIOLOGY:  [ ] Reviewed and interpreted by me    EKG: CHIEF COMPLAINT: Cough and SOB    HPI:  Patient is a 74 yo M with PMHx of COPD, HTN, HLD, gout, hypothyroidism, CAD (s/p stent in 2011 in [place], and s/p CABG x1 in 2017) who presents with complaints of cough and SOB x 2 days. Patient states that 2 days prior to presentation, he began to developed a non productive cough. He states that since that time he has been experiencing progressively worsening SOB. This AM, he awoke with severe SOB and called EMS. Per chart review, patient was found to be hypoxic by EMS to 76% on RA, and was placed on 6L NC.     In the ED, patient was found to be tachycardic (), hypertensive (SBP in the 210s), tachypneic (RR 30). Labs significant for pCO2 of 53. CT performed which showed right upper lobe peripheral opacities, and scattered tree-in-bud opacities and peripheral consolidations in all five lobes. RVP positive for Entero/Rhinovirus. Patient was placed on BiPAP, and given Azithro and Ceftriaxone, 125mg Solumedrol, 1L NS bolus, and 4 Duonebs.       FAMILY HISTORY:      SOCIAL HISTORY:  Smokin.5 packs x 20 years. Quit 20 years ago  EtOH Use:  Marital Status:  Occupation:  Recent Travel:  Country of Birth:  Advance Directives:    Allergies    No Known Allergies    Intolerances        HOME MEDICATIONS:      OBJECTIVE:  ICU Vital Signs Last 24 Hrs  T(C): 36.7 (04 Oct 2018 07:17), Max: 36.7 (04 Oct 2018 07:17)  T(F): 98.1 (04 Oct 2018 07:17), Max: 98.1 (04 Oct 2018 07:17)  HR: 87 (04 Oct 2018 13:35) (80 - 105)  BP: 164/92 (04 Oct 2018 13:35) (158/92 - 216/104)  BP(mean): --  ABP: --  ABP(mean): --  RR: 16 (04 Oct 2018 13:35) (16 - 30)  SpO2: 100% (04 Oct 2018 13:35) (94% - 100%)        10-04 @ 07:01  -  10-04 @ 14:04  --------------------------------------------------------  IN: 350 mL / OUT: 400 mL / NET: -50 mL      CAPILLARY BLOOD GLUCOSE        PHYSICAL EXAM:  GENERAL: NAD, well-developed  HEAD:  Atraumatic, Normocephalic  EYES: EOMI, PERRLA, conjunctiva and sclera clear  NECK: Supple, No JVD  CHEST/LUNG: Wheezes in the right lung fields  HEART: Regular rate and rhythm; No murmurs, rubs, or gallops  ABDOMEN: Soft, Nontender, Nondistended; Bowel sounds present  EXTREMITIES:  2+ Peripheral Pulses, No clubbing, cyanosis, or edema  PSYCH: AAOx3  NEUROLOGY: non-focal  SKIN: No rashes or lesions      HOSPITAL MEDICATIONS:  MEDICATIONS  (STANDING):    MEDICATIONS  (PRN):      LABS:                        14.1   6.7   )-----------( 238      ( 04 Oct 2018 05:58 )             42.2     10-04    138  |  102  |  13  ----------------------------<  129<H>  4.0   |  24  |  0.92    Ca    9.5      04 Oct 2018 05:58    TPro  9.1<H>  /  Alb  4.2  /  TBili  0.9  /  DBili  x   /  AST  28  /  ALT  18  /  AlkPhos  82  10-04    PT/INR - ( 04 Oct 2018 05:58 )   PT: 15.7 sec;   INR: 1.44 ratio         PTT - ( 04 Oct 2018 05:58 )  PTT:30.5 sec      Venous Blood Gas:  10-04 @ 05:58  7.33/53/45/28/76  VBG Lactate: 1.7      MICROBIOLOGY:     RADIOLOGY:  [ ] Reviewed and interpreted by me    EKG:

## 2018-10-04 NOTE — ED ADULT NURSE REASSESSMENT NOTE - NS ED NURSE REASSESS COMMENT FT1
tube found to be dislodged from bipap. pt.'s WOB increased and pt. became hypertensive - 200s/100s. MD aware and at bedside. bipap secured. pt. mentating well. O2 sat remained 99%.

## 2018-10-04 NOTE — ED PROVIDER NOTE - PHYSICAL EXAMINATION
Resident:   Gen: thin, in severe respiratory distress  Head: NC, AT  ENT: PERRL, MMM  Neck: supple with full ROM   Chest: wheezes bilaterally, increased work of breathing, tachypneic  Heart: tachy regular S1S2, No peripheral edema, bilateral pulses in arms and legs  Abd: Soft non-tender, no rebound or guarding  Back: No spinal deformity, no CVAT  Ext: Moving all 4 extremities without obvious impairment to ROM, no obvious weakness  Neuro: fluid speech  Psych: No anxiety, depression or pressured speech noted  Skin: no urticaria, no diffuse rash

## 2018-10-04 NOTE — ED ADULT TRIAGE NOTE - CHIEF COMPLAINT QUOTE
brought in by ems for shortness of breath, sat of 76% on RA at home, not on home o2  improve to 93 on 6l NC

## 2018-10-04 NOTE — H&P ADULT - HISTORY OF PRESENT ILLNESS
Patient is a 74 yo M with PMHx of COPD, HTN, HLD, gout, hypothyroidism, CAD (s/p stent in 2011 in [place], and s/p CABG x1 in 2017) who presents with complaints of cough and SOB x 2 days. Patient states that 2 days prior to presentation, he began to developed a non productive cough. He states that since that time he has been experiencing progressively worsening SOB. This AM, he awoke with severe SOB and called EMS. Per chart review, patient was found to be hypoxic by EMS to 76% on RA, and was placed on 6L NC.     In the ED, patient was found to be tachycardic (), hypertensive (SBP in the 210s), tachypneic (RR 30). Labs significant for pCO2 of 53. CT performed which showed right upper lobe peripheral opacities, and scattered tree-in-bud opacities and peripheral consolidations in all five lobes. RVP positive for Entero/Rhinovirus. Patient was placed on BiPAP, and given Azithro and Ceftriaxone, 125mg Solumedrol, 1L NS bolus, and 4 Duonebs.

## 2018-10-04 NOTE — ED PROVIDER NOTE - SHIFT CHANGE DETAILS
I have received sign out on this patient, briefly: 74y/o M with h/o CAD, COPD, HTN, prior MI, cardiac stents, presenting with worsening SOB, likely COPD exacerbation, given nebs/steroids and improving now on BIPAP.  labs also notable for elevated d dimer, pending CTA chest, then admission. -Mane

## 2018-10-04 NOTE — H&P ADULT - NSHPPHYSICALEXAM_GEN_ALL_CORE
pt. seen and examined, on Bipap    Vital Signs Last 24 Hrs  T(C): 36.7 (05 Oct 2018 00:06), Max: 36.8 (04 Oct 2018 11:25)  T(F): 98 (05 Oct 2018 00:06), Max: 98.2 (04 Oct 2018 11:25)  HR: 79 (05 Oct 2018 00:06) (64 - 105)  BP: 180/96 (05 Oct 2018 00:06) (146/91 - 216/104)  BP(mean): --  RR: 18 (05 Oct 2018 00:06) (16 - 30)  SpO2: 100% (05 Oct 2018 00:06) (94% - 100%)    heent: nc/at  neck; supple, no JVD  lungs: B/L decrease A/E, wheezing pos   heart: s1s2 nml  abd: soft, NABS, NT/ND  ext: no e/c/c  neuro: aaox3

## 2018-10-04 NOTE — ED ADULT NURSE REASSESSMENT NOTE - NS ED NURSE REASSESS COMMENT FT1
pt.'s WOB becoming increasingly worse. positive accessory muscle use w/ worsening SOB. pt. tachypneic to the 20s and 30s. pt. remains hypertensive. MD aware. MICU consulted.

## 2018-10-05 DIAGNOSIS — I10 ESSENTIAL (PRIMARY) HYPERTENSION: ICD-10-CM

## 2018-10-05 DIAGNOSIS — I25.10 ATHEROSCLEROTIC HEART DISEASE OF NATIVE CORONARY ARTERY WITHOUT ANGINA PECTORIS: ICD-10-CM

## 2018-10-05 DIAGNOSIS — J44.1 CHRONIC OBSTRUCTIVE PULMONARY DISEASE WITH (ACUTE) EXACERBATION: ICD-10-CM

## 2018-10-05 DIAGNOSIS — J18.9 PNEUMONIA, UNSPECIFIED ORGANISM: ICD-10-CM

## 2018-10-05 LAB
ANION GAP SERPL CALC-SCNC: 11 MMOL/L — SIGNIFICANT CHANGE UP (ref 5–17)
APTT BLD: 28.5 SEC — SIGNIFICANT CHANGE UP (ref 27.5–37.4)
BASE EXCESS BLDA CALC-SCNC: 2.9 MMOL/L — HIGH (ref -2–2)
BUN SERPL-MCNC: 16 MG/DL — SIGNIFICANT CHANGE UP (ref 7–23)
CALCIUM SERPL-MCNC: 9.4 MG/DL — SIGNIFICANT CHANGE UP (ref 8.4–10.5)
CHLORIDE SERPL-SCNC: 102 MMOL/L — SIGNIFICANT CHANGE UP (ref 96–108)
CO2 BLDA-SCNC: 28 MMOL/L — SIGNIFICANT CHANGE UP (ref 22–30)
CO2 SERPL-SCNC: 25 MMOL/L — SIGNIFICANT CHANGE UP (ref 22–31)
CREAT SERPL-MCNC: 0.93 MG/DL — SIGNIFICANT CHANGE UP (ref 0.5–1.3)
GAS PNL BLDA: SIGNIFICANT CHANGE UP
GLUCOSE SERPL-MCNC: 124 MG/DL — HIGH (ref 70–99)
HCO3 BLDA-SCNC: 27 MMOL/L — SIGNIFICANT CHANGE UP (ref 21–29)
HCT VFR BLD CALC: 36.4 % — LOW (ref 39–50)
HGB BLD-MCNC: 12.4 G/DL — LOW (ref 13–17)
HOROWITZ INDEX BLDA+IHG-RTO: 35 — SIGNIFICANT CHANGE UP
INR BLD: 1.29 RATIO — HIGH (ref 0.88–1.16)
MCHC RBC-ENTMCNC: 27.6 PG — SIGNIFICANT CHANGE UP (ref 27–34)
MCHC RBC-ENTMCNC: 34.1 GM/DL — SIGNIFICANT CHANGE UP (ref 32–36)
MCV RBC AUTO: 80.9 FL — SIGNIFICANT CHANGE UP (ref 80–100)
PCO2 BLDA: 40 MMHG — SIGNIFICANT CHANGE UP (ref 32–46)
PH BLDA: 7.44 — SIGNIFICANT CHANGE UP (ref 7.35–7.45)
PLATELET # BLD AUTO: 218 K/UL — SIGNIFICANT CHANGE UP (ref 150–400)
PO2 BLDA: 121 MMHG — HIGH (ref 74–108)
POTASSIUM SERPL-MCNC: 4.5 MMOL/L — SIGNIFICANT CHANGE UP (ref 3.5–5.3)
POTASSIUM SERPL-SCNC: 4.5 MMOL/L — SIGNIFICANT CHANGE UP (ref 3.5–5.3)
PROTHROM AB SERPL-ACNC: 14.7 SEC — HIGH (ref 10–13.1)
RBC # BLD: 4.5 M/UL — SIGNIFICANT CHANGE UP (ref 4.2–5.8)
RBC # FLD: 13.8 % — SIGNIFICANT CHANGE UP (ref 10.3–14.5)
SAO2 % BLDA: 99 % — HIGH (ref 92–96)
SODIUM SERPL-SCNC: 138 MMOL/L — SIGNIFICANT CHANGE UP (ref 135–145)
WBC # BLD: 7.72 K/UL — SIGNIFICANT CHANGE UP (ref 3.8–10.5)
WBC # FLD AUTO: 7.72 K/UL — SIGNIFICANT CHANGE UP (ref 3.8–10.5)

## 2018-10-05 RX ORDER — CARVEDILOL PHOSPHATE 80 MG/1
12.5 CAPSULE, EXTENDED RELEASE ORAL EVERY 12 HOURS
Qty: 0 | Refills: 0 | Status: DISCONTINUED | OUTPATIENT
Start: 2018-10-05 | End: 2018-10-07

## 2018-10-05 RX ORDER — INFLUENZA VIRUS VACCINE 15; 15; 15; 15 UG/.5ML; UG/.5ML; UG/.5ML; UG/.5ML
0.5 SUSPENSION INTRAMUSCULAR ONCE
Qty: 0 | Refills: 0 | Status: DISCONTINUED | OUTPATIENT
Start: 2018-10-05 | End: 2018-10-07

## 2018-10-05 RX ORDER — AMLODIPINE BESYLATE 2.5 MG/1
5 TABLET ORAL DAILY
Qty: 0 | Refills: 0 | Status: DISCONTINUED | OUTPATIENT
Start: 2018-10-05 | End: 2018-10-07

## 2018-10-05 RX ORDER — ASPIRIN/CALCIUM CARB/MAGNESIUM 324 MG
81 TABLET ORAL DAILY
Qty: 0 | Refills: 0 | Status: DISCONTINUED | OUTPATIENT
Start: 2018-10-05 | End: 2018-10-07

## 2018-10-05 RX ORDER — AZITHROMYCIN 500 MG/1
250 TABLET, FILM COATED ORAL DAILY
Qty: 0 | Refills: 0 | Status: DISCONTINUED | OUTPATIENT
Start: 2018-10-05 | End: 2018-10-07

## 2018-10-05 RX ORDER — BUDESONIDE AND FORMOTEROL FUMARATE DIHYDRATE 160; 4.5 UG/1; UG/1
2 AEROSOL RESPIRATORY (INHALATION)
Qty: 0 | Refills: 0 | Status: DISCONTINUED | OUTPATIENT
Start: 2018-10-05 | End: 2018-10-07

## 2018-10-05 RX ORDER — LEVOTHYROXINE SODIUM 125 MCG
50 TABLET ORAL DAILY
Qty: 0 | Refills: 0 | Status: DISCONTINUED | OUTPATIENT
Start: 2018-10-05 | End: 2018-10-07

## 2018-10-05 RX ORDER — LISINOPRIL 2.5 MG/1
40 TABLET ORAL EVERY 12 HOURS
Qty: 0 | Refills: 0 | Status: DISCONTINUED | OUTPATIENT
Start: 2018-10-05 | End: 2018-10-07

## 2018-10-05 RX ADMIN — Medication 20 MILLIGRAM(S): at 13:01

## 2018-10-05 RX ADMIN — BUDESONIDE AND FORMOTEROL FUMARATE DIHYDRATE 2 PUFF(S): 160; 4.5 AEROSOL RESPIRATORY (INHALATION) at 06:20

## 2018-10-05 RX ADMIN — Medication 3 MILLILITER(S): at 02:29

## 2018-10-05 RX ADMIN — CARVEDILOL PHOSPHATE 12.5 MILLIGRAM(S): 80 CAPSULE, EXTENDED RELEASE ORAL at 00:31

## 2018-10-05 RX ADMIN — Medication 3 MILLILITER(S): at 05:23

## 2018-10-05 RX ADMIN — Medication 3 MILLILITER(S): at 10:14

## 2018-10-05 RX ADMIN — CEFTRIAXONE 100 GRAM(S): 500 INJECTION, POWDER, FOR SOLUTION INTRAMUSCULAR; INTRAVENOUS at 00:06

## 2018-10-05 RX ADMIN — CARVEDILOL PHOSPHATE 12.5 MILLIGRAM(S): 80 CAPSULE, EXTENDED RELEASE ORAL at 05:23

## 2018-10-05 RX ADMIN — CEFTRIAXONE 100 GRAM(S): 500 INJECTION, POWDER, FOR SOLUTION INTRAMUSCULAR; INTRAVENOUS at 23:35

## 2018-10-05 RX ADMIN — Medication 3 MILLILITER(S): at 22:12

## 2018-10-05 RX ADMIN — LISINOPRIL 40 MILLIGRAM(S): 2.5 TABLET ORAL at 19:24

## 2018-10-05 RX ADMIN — Medication 50 MICROGRAM(S): at 05:23

## 2018-10-05 RX ADMIN — LISINOPRIL 40 MILLIGRAM(S): 2.5 TABLET ORAL at 02:28

## 2018-10-05 RX ADMIN — Medication 3 MILLILITER(S): at 14:02

## 2018-10-05 RX ADMIN — Medication 81 MILLIGRAM(S): at 12:54

## 2018-10-05 RX ADMIN — Medication 3 MILLILITER(S): at 19:22

## 2018-10-05 RX ADMIN — CARVEDILOL PHOSPHATE 12.5 MILLIGRAM(S): 80 CAPSULE, EXTENDED RELEASE ORAL at 19:31

## 2018-10-05 RX ADMIN — AZITHROMYCIN 250 MILLIGRAM(S): 500 TABLET, FILM COATED ORAL at 12:54

## 2018-10-05 RX ADMIN — BUDESONIDE AND FORMOTEROL FUMARATE DIHYDRATE 2 PUFF(S): 160; 4.5 AEROSOL RESPIRATORY (INHALATION) at 19:22

## 2018-10-05 RX ADMIN — Medication 20 MILLIGRAM(S): at 23:34

## 2018-10-05 RX ADMIN — Medication 20 MILLIGRAM(S): at 19:28

## 2018-10-05 NOTE — CONSULT NOTE ADULT - SUBJECTIVE AND OBJECTIVE BOX
Patient is a 73y old  Male who presents with a chief complaint of SOB (04 Oct 2018 21:26)      HPI:        ?FOLLOWING PRESENT  [ ] Hx of PE/DVT, [ ] Hx COPD, [ ] Hx of Asthma, [ ] Hx of Hospitalization, [ ]  Hx of BiPAP/CPAP use, [ ] Hx of KARON    Allergies    No Known Allergies    Intolerances        PAST MEDICAL & SURGICAL HISTORY:  Hypothyroid  Glaucoma  Stented coronary artery  CAD (coronary artery disease)  MI (myocardial infarction): 2011 stent in [place]  HTN (hypertension)  Gout  Former smoker  COPD (chronic obstructive pulmonary disease)  H/O foot surgery      FAMILY HISTORY:  No pertinent family history in first degree relatives      Social History: [  ] TOBACCO                  [  ] ETOH                                 [  ] IVDA/DRUGS    REVIEW OF SYSTEMS      General:	    Skin/Breast:  	  Ophthalmologic:  	  ENMT:	    Respiratory and Thorax:  	  Cardiovascular:	    Gastrointestinal:	    Genitourinary:	    Musculoskeletal:	    Neurological:	    Psychiatric:	    Hematology/Lymphatics:	    Endocrine:	    Allergic/Immunologic:	    MEDICATIONS  (STANDING):  ALBUTerol/ipratropium for Nebulization 3 milliLiter(s) Nebulizer every 4 hours  aspirin enteric coated 81 milliGRAM(s) Oral daily  azithromycin   Tablet 250 milliGRAM(s) Oral daily  buDESOnide  80 MICROgram(s)/formoterol 4.5 MICROgram(s) Inhaler 2 Puff(s) Inhalation two times a day  carvedilol 12.5 milliGRAM(s) Oral every 12 hours  cefTRIAXone   IVPB 1 Gram(s) IV Intermittent every 24 hours  influenza   Vaccine 0.5 milliLiter(s) IntraMuscular once  levothyroxine 50 MICROGram(s) Oral daily  lisinopril 40 milliGRAM(s) Oral every 12 hours  predniSONE   Tablet 40 milliGRAM(s) Oral daily    MEDICATIONS  (PRN):       Vital Signs Last 24 Hrs  T(C): 36.5 (05 Oct 2018 07:45), Max: 36.8 (04 Oct 2018 11:25)  T(F): 97.7 (05 Oct 2018 07:45), Max: 98.2 (04 Oct 2018 11:25)  HR: 63 (05 Oct 2018 07:45) (63 - 104)  BP: 174/89 (05 Oct 2018 07:45) (146/91 - 216/104)  BP(mean): --  RR: 18 (05 Oct 2018 07:45) (16 - 29)  SpO2: 99% (05 Oct 2018 07:45) (99% - 100%)        I&O's Summary    04 Oct 2018 07:01  -  05 Oct 2018 07:00  --------------------------------------------------------  IN: 350 mL / OUT: 400 mL / NET: -50 mL        Physical Exam:   GENERAL: NAD, well-groomed, well-developed  HEENT: KULWINDER/   Atraumatic, Normocephalic  ENMT: No tonsillar erythema, exudates, or enlargement; Moist mucous membranes, Good dentition, No lesions  NECK: Supple, No JVD, Normal thyroid  CHEST/LUNG: Clear to auscultation bilaterally; No rales, rhonchi, wheezing, or rubs  CVS: Regular rate and rhythm; No murmurs, rubs, or gallops  GI: : Soft, Nontender, Nondistended; Bowel sounds present  NERVOUS SYSTEM:  Alert & Oriented X3, Good concentration; Motor Strength 5/5 B/L upper and lower extremities; DTRs 2+ intact and symmetric  EXTREMITIES:  2+ Peripheral Pulses, No clubbing, cyanosis, or edema  LYMPH: No lymphadenopathy noted  SKIN: No rashes or lesions  ENDOCRINOLOGY: No Thyromegaly  PSYCH: Appropriate    Labs:  Venous<53<4>>45<<7.335>>Venous<<3><<4><<5<<459>>                            14.1   6.7   )-----------( 238      ( 04 Oct 2018 05:58 )             42.2     10-05    138  |  102  |  16  ----------------------------<  124<H>  4.5   |  25  |  0.93  10-04    138  |  102  |  13  ----------------------------<  129<H>  4.0   |  24  |  0.92    Ca    9.4      05 Oct 2018 07:22  Ca    9.5      04 Oct 2018 05:58    TPro  9.1<H>  /  Alb  4.2  /  TBili  0.9  /  DBili  x   /  AST  28  /  ALT  18  /  AlkPhos  82  10-04    CAPILLARY BLOOD GLUCOSE        LIVER FUNCTIONS - ( 04 Oct 2018 05:58 )  Alb: 4.2 g/dL / Pro: 9.1 g/dL / ALK PHOS: 82 U/L / ALT: 18 U/L / AST: 28 U/L / GGT: x           PT/INR - ( 04 Oct 2018 05:58 )   PT: 15.7 sec;   INR: 1.44 ratio         PTT - ( 04 Oct 2018 05:58 )  PTT:30.5 sec    D DImer  D-Dimer Assay, Quantitative: 353 ng/mL DDU (10-04 @ 05:58)  Serum Pro-Brain Natriuretic Peptide: 254 pg/mL (10-04 @ 05:58)      Studies  Chest X-RAY  CT SCAN Chest   CT Abdomen  Venous Dopplers: LE:   Others        < from: CT Angio Chest w/ IV Cont (10.04.18 @ 09:23) >  dy. Unchanged left apicalscarring.   PLEURA: No pleural effusion.  VESSELS: No pulmonary embolus. Atherosclerotic calcifications of the   aorta.  HEART: Heart size is normal. Coronary stent. No pericardial effusion.   Sternotomy and CABG.  MEDIASTINUM AND ROYAL: No lymphadenopathy.  CHEST WALL AND LOWER NECK: Within normal limits.  VISUALIZED UPPER ABDOMEN: Within normal limits.  BONES: Status post sternotomy.    IMPRESSION:   No pulmonary embolus.    Scattered tree-in-bud opacities and peripheral consolidations in all five   lobes are predominantly unchanged from 7/27/2017 including a 7 mm   irregular nodule. Right upper lobe peripheral opacities are new/increased   from the prior study and may be infectious/inflammatory. Three-month   follow-up CT recommended.                JOEY SEGOVIA M.D., RADIOLOGY RESIDENT  This document has been electronically signed.  ARMEN MONTGOMERY M.D., ATTENDING RADIOLOGIST  This document has been electronically signed. Oct  4 2018 10:08AM    < end of copied text > Patient is a 73y old  Male who presents with a chief complaint of SOB (04 Oct 2018 21:26)      HPI:      Patient is a 74 yo M with PMHx of COPD, HTN, HLD, gout, hypothyroidism, CAD (s/p stent in 2011 in [place], and s/p CABG x1 in 2017) who presents with complaints of cough and SOB x 2 days. Patient states that 2 days prior to presentation, he began to developed a non productive cough. He states that since that time he has been experiencing progressively worsening SOB. This AM, he awoke with severe SOB and called EMS. Per chart review, patient was found to be hypoxic by EMS to 76% on RA, and was placed on 6L NC.     In the ED, patient was found to be tachycardic (), hypertensive (SBP in the 210s), tachypneic (RR 30). Labs significant for pCO2 of 53. CT performed which showed right upper lobe peripheral opacities, and scattered tree-in-bud opacities and peripheral consolidations in all five lobes. RVP positive for Entero/Rhinovirus. Patient was placed on BiPAP, and given Azithro and Ceftriaxone, 125mg Solumedrol, 1L NS bolus, and 4 Duonebs.     He had CABG last year and at that time too he had TIB and he was supposed to follow up as an outpatient but he did nto and he never had another ct scan following dc last time: This time he is admitted with increasing SOB with wheezing as well as viral infection: He is not using any oxygen at home and he can walk about two blocks without SOB , at his baseline    ?FOLLOWING PRESENT  [x ] Hx of PE/DVT, [ y] Hx COPD, [ x] Hx of Asthma, [y ] Hx of Hospitalization, [y ]  Hx of BiPAP/CPAP use, [ x] Hx of KARON    Allergies    No Known Allergies    Intolerances        PAST MEDICAL & SURGICAL HISTORY:  Hypothyroid  Glaucoma  Stented coronary artery  CAD (coronary artery disease)  MI (myocardial infarction): 2011 stent in [place]  HTN (hypertension)  Gout  Former smoker  COPD (chronic obstructive pulmonary disease)  H/O foot surgery      FAMILY HISTORY:  No pertinent family history in first degree relatives      Social History: [ > 40 years  2 pks: now quit ] TOBACCO                  [  x] ETOH                                 [ x ] IVDA/DRUGS    REVIEW OF SYSTEMS      General:x	    Skin/Breast:x  	  Ophthalmologic:x  	  ENMT:	x    Respiratory and Thorax: SOB, COUGH   	  Cardiovascular:	x    Gastrointestinal:	x    Genitourinary:	x    Musculoskeletal:	x    Neurological:	x    Psychiatric:	x    Hematology/Lymphatics:	x    Endocrine:	x    Allergic/Immunologic:	x    MEDICATIONS  (STANDING):  ALBUTerol/ipratropium for Nebulization 3 milliLiter(s) Nebulizer every 4 hours  aspirin enteric coated 81 milliGRAM(s) Oral daily  azithromycin   Tablet 250 milliGRAM(s) Oral daily  buDESOnide  80 MICROgram(s)/formoterol 4.5 MICROgram(s) Inhaler 2 Puff(s) Inhalation two times a day  carvedilol 12.5 milliGRAM(s) Oral every 12 hours  cefTRIAXone   IVPB 1 Gram(s) IV Intermittent every 24 hours  influenza   Vaccine 0.5 milliLiter(s) IntraMuscular once  levothyroxine 50 MICROGram(s) Oral daily  lisinopril 40 milliGRAM(s) Oral every 12 hours  predniSONE   Tablet 40 milliGRAM(s) Oral daily    MEDICATIONS  (PRN):       Vital Signs Last 24 Hrs  T(C): 36.5 (05 Oct 2018 07:45), Max: 36.8 (04 Oct 2018 11:25)  T(F): 97.7 (05 Oct 2018 07:45), Max: 98.2 (04 Oct 2018 11:25)  HR: 63 (05 Oct 2018 07:45) (63 - 104)  BP: 174/89 (05 Oct 2018 07:45) (146/91 - 216/104)  BP(mean): --  RR: 18 (05 Oct 2018 07:45) (16 - 29)  SpO2: 99% (05 Oct 2018 07:45) (99% - 100%)        I&O's Summary    04 Oct 2018 07:01  -  05 Oct 2018 07:00  --------------------------------------------------------  IN: 350 mL / OUT: 400 mL / NET: -50 mL        Physical Exam:   GENERAL: NAD, well-groomed, well-developed  HEENT: KULWINDER/   Atraumatic, Normocephalic  ENMT: No tonsillar erythema, exudates, or enlargement; Moist mucous membranes, Good dentition, No lesions  NECK: Supple, No JVD, Normal thyroid  CHEST/LUNG: Wheezing++  CVS: Regular rate and rhythm; No murmurs, rubs, or gallops  GI: : Soft, Nontender, Nondistended; Bowel sounds present  NERVOUS SYSTEM:  Alert & Oriented X3  EXTREMITIES:  2+ Peripheral Pulses, No clubbing, cyanosis, or edema  LYMPH: No lymphadenopathy noted  SKIN: No rashes or lesions  ENDOCRINOLOGY: No Thyromegaly  PSYCH: Appropriate    Labs:  Venous<53<4>>45<<7.335>>Venous<<3><<4><<5<<459>>                            14.1   6.7   )-----------( 238      ( 04 Oct 2018 05:58 )             42.2     10-05    138  |  102  |  16  ----------------------------<  124<H>  4.5   |  25  |  0.93  10-04    138  |  102  |  13  ----------------------------<  129<H>  4.0   |  24  |  0.92    Ca    9.4      05 Oct 2018 07:22  Ca    9.5      04 Oct 2018 05:58    TPro  9.1<H>  /  Alb  4.2  /  TBili  0.9  /  DBili  x   /  AST  28  /  ALT  18  /  AlkPhos  82  10-04    CAPILLARY BLOOD GLUCOSE        LIVER FUNCTIONS - ( 04 Oct 2018 05:58 )  Alb: 4.2 g/dL / Pro: 9.1 g/dL / ALK PHOS: 82 U/L / ALT: 18 U/L / AST: 28 U/L / GGT: x           PT/INR - ( 04 Oct 2018 05:58 )   PT: 15.7 sec;   INR: 1.44 ratio         PTT - ( 04 Oct 2018 05:58 )  PTT:30.5 sec    D DImer  D-Dimer Assay, Quantitative: 353 ng/mL DDU (10-04 @ 05:58)  Serum Pro-Brain Natriuretic Peptide: 254 pg/mL (10-04 @ 05:58)      Studies  Chest X-RAY  CT SCAN Chest   CT Abdomen  Venous Dopplers: LE:   Others        < from: CT Angio Chest w/ IV Cont (10.04.18 @ 09:23) >  dy. Unchanged left apicalscarring.   PLEURA: No pleural effusion.  VESSELS: No pulmonary embolus. Atherosclerotic calcifications of the   aorta.  HEART: Heart size is normal. Coronary stent. No pericardial effusion.   Sternotomy and CABG.  MEDIASTINUM AND ROYAL: No lymphadenopathy.  CHEST WALL AND LOWER NECK: Within normal limits.  VISUALIZED UPPER ABDOMEN: Within normal limits.  BONES: Status post sternotomy.    IMPRESSION:   No pulmonary embolus.    Scattered tree-in-bud opacities and peripheral consolidations in all five   lobes are predominantly unchanged from 7/27/2017 including a 7 mm   irregular nodule. Right upper lobe peripheral opacities are new/increased   from the prior study and may be infectious/inflammatory. Three-month   follow-up CT recommended.                JOEY SEGOVIA M.D., RADIOLOGY RESIDENT  This document has been electronically signed.  ARMEN MONTGOMERY M.D., ATTENDING RADIOLOGIST  This document has been electronically signed. Oct  4 2018 10:08AM    < end of copied text >

## 2018-10-05 NOTE — CONSULT NOTE ADULT - ASSESSMENT
Patient is a 72 yo M with PMHx of COPD, HTN, HLD, gout, hypothyroidism, CAD (s/p stent in 2011 in [place], and s/p CABG x1 in 2017) who presented with complaints of cough and SOB x 2 days, found to have likely viral PNA and COPD exacerbation    # PNA  - patient with new opacification in the RUL on CT  - RVP positive for Entero/Rhino virus  - s/p Ceftriaxone and Azithromycin. would continue for now  - would check urine legionella and d/c Azithro if negative  - would continue Ceftriaxone for total 5 day course     # COPD exacerbation  - patient with likely COPD exacerbation 2/2 PNA  - please start Symbicort, as well as Prednisone 40mg qd for 5 day course.   - would start standing Duonebs q4, and change to PRN when improved  - please obtain sputum cultures  - continue with BiPAP as needed, and wean to NC when respiratory status improves    Patient does not meet MICU admission criteria at this time
Patient is a 72 yo M with PMHx of COPD, HTN, HLD, gout, hypothyroidism, CAD (s/p stent in 2011 in [place], and s/p CABG x1 in 2017) who presents with complaints of cough and SOB x 2 days. Patient states that 2 days prior to presentation, he began to developed a non productive cough. He states that since that time he has been experiencing progressively worsening SOB. This AM, he awoke with severe SOB and called EMS. Per chart review, patient was found to be hypoxic by EMS to 76% on RA, and was placed on 6L NC.     In the ED, patient was found to be tachycardic (), hypertensive (SBP in the 210s), tachypneic (RR 30). Labs significant for pCO2 of 53. CT performed which showed right upper lobe peripheral opacities, and scattered tree-in-bud opacities and peripheral consolidations in all five lobes. RVP positive for Entero/Rhinovirus. Patient was placed on BiPAP, and given Azithro and Ceftriaxone, 125mg Solumedrol, 1L NS bolus, and 4 Duonebs. (04 Oct 2018 21:26)

## 2018-10-05 NOTE — CONSULT NOTE ADULT - PROBLEM SELECTOR RECOMMENDATION 2
Has ac on chr hypercapnic resp failure: He  has been on bipap whole night: Will dc bipap and switch to oxygen to maintain o2 sao2 around 88%: And switch oral prednisone to IV steroids as well as increase Symbicort dosages: He will need a repeat ABG on nasal cannula

## 2018-10-05 NOTE — CONSULT NOTE ADULT - PROBLEM SELECTOR RECOMMENDATION 9
has viral infection. Has increased opacities in right upper lobe and same peripheral opacities from last year: Empiric antibiotics is OK at this time : he has persistent opacities from last year : ? etiology: Will  order collagen vascular disease workup too: Will DW radiology : Mild underlying pulmonary fibrosis?? His 7 mm  nodule also seems stable from last  year:

## 2018-10-06 DIAGNOSIS — J84.10 PULMONARY FIBROSIS, UNSPECIFIED: ICD-10-CM

## 2018-10-06 LAB
ANION GAP SERPL CALC-SCNC: 9 MMOL/L — SIGNIFICANT CHANGE UP (ref 5–17)
BUN SERPL-MCNC: 26 MG/DL — HIGH (ref 7–23)
CALCIUM SERPL-MCNC: 9 MG/DL — SIGNIFICANT CHANGE UP (ref 8.4–10.5)
CHLORIDE SERPL-SCNC: 102 MMOL/L — SIGNIFICANT CHANGE UP (ref 96–108)
CO2 SERPL-SCNC: 27 MMOL/L — SIGNIFICANT CHANGE UP (ref 22–31)
CREAT SERPL-MCNC: 0.98 MG/DL — SIGNIFICANT CHANGE UP (ref 0.5–1.3)
GLUCOSE SERPL-MCNC: 129 MG/DL — HIGH (ref 70–99)
HCT VFR BLD CALC: 35.9 % — LOW (ref 39–50)
HGB BLD-MCNC: 12.2 G/DL — LOW (ref 13–17)
LEGIONELLA AG UR QL: NEGATIVE — SIGNIFICANT CHANGE UP
MCHC RBC-ENTMCNC: 27.5 PG — SIGNIFICANT CHANGE UP (ref 27–34)
MCHC RBC-ENTMCNC: 34 GM/DL — SIGNIFICANT CHANGE UP (ref 32–36)
MCV RBC AUTO: 80.9 FL — SIGNIFICANT CHANGE UP (ref 80–100)
PLATELET # BLD AUTO: 218 K/UL — SIGNIFICANT CHANGE UP (ref 150–400)
POTASSIUM SERPL-MCNC: 4.5 MMOL/L — SIGNIFICANT CHANGE UP (ref 3.5–5.3)
POTASSIUM SERPL-SCNC: 4.5 MMOL/L — SIGNIFICANT CHANGE UP (ref 3.5–5.3)
RBC # BLD: 4.44 M/UL — SIGNIFICANT CHANGE UP (ref 4.2–5.8)
RBC # FLD: 13.9 % — SIGNIFICANT CHANGE UP (ref 10.3–14.5)
SODIUM SERPL-SCNC: 138 MMOL/L — SIGNIFICANT CHANGE UP (ref 135–145)
WBC # BLD: 12.84 K/UL — HIGH (ref 3.8–10.5)
WBC # FLD AUTO: 12.84 K/UL — HIGH (ref 3.8–10.5)

## 2018-10-06 RX ADMIN — AZITHROMYCIN 250 MILLIGRAM(S): 500 TABLET, FILM COATED ORAL at 12:11

## 2018-10-06 RX ADMIN — Medication 3 MILLILITER(S): at 09:50

## 2018-10-06 RX ADMIN — Medication 50 MICROGRAM(S): at 06:11

## 2018-10-06 RX ADMIN — Medication 3 MILLILITER(S): at 14:00

## 2018-10-06 RX ADMIN — Medication 3 MILLILITER(S): at 17:54

## 2018-10-06 RX ADMIN — CARVEDILOL PHOSPHATE 12.5 MILLIGRAM(S): 80 CAPSULE, EXTENDED RELEASE ORAL at 06:10

## 2018-10-06 RX ADMIN — Medication 20 MILLIGRAM(S): at 18:00

## 2018-10-06 RX ADMIN — Medication 20 MILLIGRAM(S): at 12:11

## 2018-10-06 RX ADMIN — BUDESONIDE AND FORMOTEROL FUMARATE DIHYDRATE 2 PUFF(S): 160; 4.5 AEROSOL RESPIRATORY (INHALATION) at 06:17

## 2018-10-06 RX ADMIN — CARVEDILOL PHOSPHATE 12.5 MILLIGRAM(S): 80 CAPSULE, EXTENDED RELEASE ORAL at 18:00

## 2018-10-06 RX ADMIN — Medication 81 MILLIGRAM(S): at 12:10

## 2018-10-06 RX ADMIN — LISINOPRIL 40 MILLIGRAM(S): 2.5 TABLET ORAL at 18:00

## 2018-10-06 RX ADMIN — Medication 3 MILLILITER(S): at 22:12

## 2018-10-06 RX ADMIN — Medication 3 MILLILITER(S): at 06:09

## 2018-10-06 RX ADMIN — Medication 20 MILLIGRAM(S): at 06:09

## 2018-10-06 RX ADMIN — BUDESONIDE AND FORMOTEROL FUMARATE DIHYDRATE 2 PUFF(S): 160; 4.5 AEROSOL RESPIRATORY (INHALATION) at 17:56

## 2018-10-06 RX ADMIN — AMLODIPINE BESYLATE 5 MILLIGRAM(S): 2.5 TABLET ORAL at 06:17

## 2018-10-06 RX ADMIN — LISINOPRIL 40 MILLIGRAM(S): 2.5 TABLET ORAL at 06:14

## 2018-10-06 NOTE — DOWNTIME INTERRUPTION NOTE - WHICH MANUAL FORMS INITIATED?
Documentation hold for POC and A&I flowsheets and Adult Patient Profile. See paper record for additional documentation recorded during downtime.

## 2018-10-07 ENCOUNTER — TRANSCRIPTION ENCOUNTER (OUTPATIENT)
Age: 73
End: 2018-10-07

## 2018-10-07 VITALS
TEMPERATURE: 98 F | DIASTOLIC BLOOD PRESSURE: 76 MMHG | HEART RATE: 71 BPM | OXYGEN SATURATION: 95 % | SYSTOLIC BLOOD PRESSURE: 156 MMHG | RESPIRATION RATE: 18 BRPM

## 2018-10-07 DIAGNOSIS — A41.9 SEPSIS, UNSPECIFIED ORGANISM: ICD-10-CM

## 2018-10-07 DIAGNOSIS — J18.9 PNEUMONIA, UNSPECIFIED ORGANISM: ICD-10-CM

## 2018-10-07 DIAGNOSIS — I16.0 HYPERTENSIVE URGENCY: ICD-10-CM

## 2018-10-07 LAB
ANION GAP SERPL CALC-SCNC: 13 MMOL/L — SIGNIFICANT CHANGE UP (ref 5–17)
BUN SERPL-MCNC: 26 MG/DL — HIGH (ref 7–23)
CALCIUM SERPL-MCNC: 9.4 MG/DL — SIGNIFICANT CHANGE UP (ref 8.4–10.5)
CHLORIDE SERPL-SCNC: 100 MMOL/L — SIGNIFICANT CHANGE UP (ref 96–108)
CO2 SERPL-SCNC: 25 MMOL/L — SIGNIFICANT CHANGE UP (ref 22–31)
CREAT SERPL-MCNC: 1.01 MG/DL — SIGNIFICANT CHANGE UP (ref 0.5–1.3)
GLUCOSE SERPL-MCNC: 107 MG/DL — HIGH (ref 70–99)
GRAM STN FLD: SIGNIFICANT CHANGE UP
HCT VFR BLD CALC: 35.8 % — LOW (ref 39–50)
HGB BLD-MCNC: 12.4 G/DL — LOW (ref 13–17)
MCHC RBC-ENTMCNC: 28 PG — SIGNIFICANT CHANGE UP (ref 27–34)
MCHC RBC-ENTMCNC: 34.6 GM/DL — SIGNIFICANT CHANGE UP (ref 32–36)
MCV RBC AUTO: 80.8 FL — SIGNIFICANT CHANGE UP (ref 80–100)
PLATELET # BLD AUTO: 214 K/UL — SIGNIFICANT CHANGE UP (ref 150–400)
POTASSIUM SERPL-MCNC: 4.3 MMOL/L — SIGNIFICANT CHANGE UP (ref 3.5–5.3)
POTASSIUM SERPL-SCNC: 4.3 MMOL/L — SIGNIFICANT CHANGE UP (ref 3.5–5.3)
RBC # BLD: 4.43 M/UL — SIGNIFICANT CHANGE UP (ref 4.2–5.8)
RBC # FLD: 13.8 % — SIGNIFICANT CHANGE UP (ref 10.3–14.5)
SODIUM SERPL-SCNC: 138 MMOL/L — SIGNIFICANT CHANGE UP (ref 135–145)
SPECIMEN SOURCE: SIGNIFICANT CHANGE UP
WBC # BLD: 16.83 K/UL — HIGH (ref 3.8–10.5)
WBC # FLD AUTO: 16.83 K/UL — HIGH (ref 3.8–10.5)

## 2018-10-07 RX ORDER — AZITHROMYCIN 500 MG/1
1 TABLET, FILM COATED ORAL
Qty: 3 | Refills: 0
Start: 2018-10-07 | End: 2018-10-09

## 2018-10-07 RX ORDER — CEFUROXIME AXETIL 250 MG
1 TABLET ORAL
Qty: 12 | Refills: 0
Start: 2018-10-07 | End: 2018-10-12

## 2018-10-07 RX ADMIN — CARVEDILOL PHOSPHATE 12.5 MILLIGRAM(S): 80 CAPSULE, EXTENDED RELEASE ORAL at 06:21

## 2018-10-07 RX ADMIN — LISINOPRIL 40 MILLIGRAM(S): 2.5 TABLET ORAL at 06:20

## 2018-10-07 RX ADMIN — Medication 20 MILLIGRAM(S): at 06:21

## 2018-10-07 RX ADMIN — Medication 40 MILLIGRAM(S): at 12:16

## 2018-10-07 RX ADMIN — AMLODIPINE BESYLATE 5 MILLIGRAM(S): 2.5 TABLET ORAL at 06:21

## 2018-10-07 RX ADMIN — Medication 81 MILLIGRAM(S): at 12:17

## 2018-10-07 RX ADMIN — BUDESONIDE AND FORMOTEROL FUMARATE DIHYDRATE 2 PUFF(S): 160; 4.5 AEROSOL RESPIRATORY (INHALATION) at 06:21

## 2018-10-07 RX ADMIN — Medication 50 MICROGRAM(S): at 06:20

## 2018-10-07 RX ADMIN — CEFTRIAXONE 100 GRAM(S): 500 INJECTION, POWDER, FOR SOLUTION INTRAMUSCULAR; INTRAVENOUS at 00:32

## 2018-10-07 RX ADMIN — Medication 20 MILLIGRAM(S): at 00:32

## 2018-10-07 RX ADMIN — AZITHROMYCIN 250 MILLIGRAM(S): 500 TABLET, FILM COATED ORAL at 12:17

## 2018-10-07 RX ADMIN — Medication 3 MILLILITER(S): at 02:59

## 2018-10-07 RX ADMIN — Medication 3 MILLILITER(S): at 12:17

## 2018-10-07 RX ADMIN — Medication 3 MILLILITER(S): at 06:21

## 2018-10-07 NOTE — DISCHARGE NOTE ADULT - ADDITIONAL INSTRUCTIONS
Pt to follow up with pulmonologist in 1 week - call for appointment   Follow up with your PCP within 5 days of hospital discharge.

## 2018-10-07 NOTE — DISCHARGE NOTE ADULT - CARE PLAN
Principal Discharge DX:	Sepsis  Goal:	improved pneumonia  Assessment and plan of treatment:	Take all antibiotics and medications as prescribed. Follow up with your PCP within 5 days of hospital discharge.  Secondary Diagnosis:	Multifocal pneumonia  Secondary Diagnosis:	Hypertensive urgency  Secondary Diagnosis:	COPD exacerbation  Secondary Diagnosis:	CAD (coronary artery disease)  Secondary Diagnosis:	Pulmonary fibrosis Principal Discharge DX:	Sepsis  Goal:	improved pneumonia  Assessment and plan of treatment:	Take all antibiotics and medications as prescribed.   Follow up with your PCP within 5 days of hospital discharge.  If you develop fever, chills, malaise, or change in mental status call your Health Care Provider or go to the Emergency Department.  Nutrition is important, eat small frequent meals to help ensure you get adequate calories.  Do not stay in bed all day!  Increase your activity daily as tolerated.  Secondary Diagnosis:	Multifocal pneumonia  Assessment and plan of treatment:	Pneumonia is a lung infection that can cause a fever, cough, and trouble breathing.  Continue all antibiotics as ordered until complete.  Nutrition is important, eat small frequent meals.  Get lots of rest and drink fluids.  Call your health care provider upon arrival home from hospital and make a follow up appointment for one week.  If your cough worsens, you develop fever greater than 101', you have shaking chills, a fast heartbeat, trouble breathing and/or feel your are breathing much faster than usual, call your healthcare provider.  Make sure you wash your hands frequently.  Secondary Diagnosis:	Hypertensive urgency  Assessment and plan of treatment:	Take medications for your blood pressure as recommended.  Eat a heart healthy diet that is low in saturated fats and salt, and includes whole grains, fruits, vegetables and lean protein   Exercise regularly (consult with your physician or cardiologist first); maintain a heart healthy weight.   If you smoke - quit (A resource to help you stop smoking is the [facility] – phone number [phone].). Continue to follow with your primary physician or cardiologist.   Seek medical help for dizziness, Lightheadedness, Blurry vision, Headache, Chest pain, Shortness of breath  Follow up with your medical doctor to establish long term blood pressure treatment goals.  Secondary Diagnosis:	COPD exacerbation  Assessment and plan of treatment:	Call your Health Care provider upon arrival home to make a follow up appointment within one week.  Take all inhalers as prescribed by your Health Care Provider.  Take steroids as prescribed by your Health Care Provider.  If your cough increases infrequency and severity and/or you have shortness of breath or increased shortness of breath call your Health Care Provider.  If you develop fever, chills, night sweats, malaise, and/or change in mental status call your Health care Provider.  Nutrition is very important.  Eat small frequent meals.  Increase your activity as tolerated.  Do not stay in bed all day  Secondary Diagnosis:	CAD (coronary artery disease)  Assessment and plan of treatment:	Continue with your cholesterol medications. Eat a heart healthy diet that is low in saturated fats and salt, and includes whole grains, fruits, vegetables and lean protein; exercise regularly (consult with your physician or cardiologist first); maintain a heart healthy weight; if you smoke - quit (A resource to help you stop smoking is the [facility] – phone number [phone].). Continue to follow with your primary physician or cardiologist.  Seek medical help for dizziness, Lightheadedness, Blurry vision, Headache, Chest pain, Shortness of breath  Secondary Diagnosis:	Pulmonary fibrosis  Assessment and plan of treatment:	Pt to follow up with pulmonologist in 1 week - call for appointment

## 2018-10-07 NOTE — PROGRESS NOTE ADULT - PROBLEM SELECTOR PLAN 3
c/w home meds
Pt is still wheezing a little: He has much improved so far: Would cont vi steroids today too in similar dosages and change to low dose tomorrow: He is on 2-3 l of oxygen and is off bipap: His ABG from yesterday looks pretty good:
Pt is still wheezing a little: He has much improved so far: Would cont vi steroids today too in similar dosages and change to low dose tomorrow: He is on 2-3 l of oxygen and is off bipap: His ABG from yesterday looks pretty good:  10/7: Tolerated off bi pap: change to po steroids for 5 days: He is still wheezing a little bit:
On Admission  Improved  Hemodynamically stable  c/w antihypertensive rx  pt educated on rx compliance

## 2018-10-07 NOTE — PROGRESS NOTE ADULT - PROBLEM SELECTOR PLAN 4
On Admission   Improved  2/2 complicated PNA and viral URTI and hypertensive urgency  resume home rx upon discharge
stable   -c/w home meds
Controlled
Controlled

## 2018-10-07 NOTE — DISCHARGE NOTE ADULT - HOSPITAL COURSE
Patient is a 72 yo M with PMHx of COPD, HTN, HLD, gout, hypothyroidism, CAD (s/p stent in 2011 in [place], and s/p CABG x1 in 2017) who presents with complaints of cough and SOB x 2 days. Patient states that 2 days prior to presentation, he began to developed a non productive cough. He states that since that time he has been experiencing progressively worsening SOB. Then developed severe SOB and called EMS. Per chart review, patient was found to be hypoxic by EMS to 76% on RA, and was placed on 6L NC.   In the ED, patient was found to be tachycardic (), hypertensive (SBP in the 210s), tachypneic (RR 30). Labs significant for pCO2 of 53.   CT performed which showed right upper lobe peripheral opacities, and scattered tree-in-bud opacities and peripheral consolidations in all five lobes. Abnormal chest CT scan from last year: He never followed up after dc: He has bilateral peripheral opacities, the etiology is not very clear: He has TIB opacities too: His basic workup for collagen vascular disease is ordered  10/7: Workup is pending".    RVP positive for Entero/Rhinovirus. Patient was placed on BiPAP, and given Azithro and Ceftriaxone, 125mg Solumedrol, 1L NS bolus, and 4 Duonebs  Pt admitted with sepsis sec to complicated Multifocal Bacterial Pneumonia & Viral Upper Respiratory Tract Infection. Treated with IV Ceftriaxone and Azithromycin and transitioned to PO abts  Also found to have Hypertensive urgency improved with antihypertensive treatment  Now hemodynamically stable, pt educated on treatment compliance.   COPD exacerbation -  2/2 complicated PNA and viral URI and hypertensive urgency, S/P IV Solumedrol and transitioned to PO prednisone on discharge  Clinically improved, afebrile, pt feels much better  Pt is medically optimized for safe discharge home on po abx.  Pt to follow up with pulmonologist in 1 week - call for appointment

## 2018-10-07 NOTE — DISCHARGE NOTE ADULT - MEDICATION SUMMARY - MEDICATIONS TO TAKE
I will START or STAY ON the medications listed below when I get home from the hospital:    predniSONE 20 mg oral tablet  -- 2 tab(s) by mouth once a day x 5 days  -- Indication: For COPD exacerbation    acetaminophen-oxycodone 325 mg-5 mg oral tablet  -- 1 tab(s) by mouth every 6 hours, As needed, Moderate Pain (4 - 6) MDD:4  -- Indication: For Pain    aspirin 325 mg oral delayed release tablet  -- 1 tab(s) by mouth once a day  -- Indication: For Cardiac protection    lisinopril 40 mg oral tablet  -- 1 tab(s) by mouth 2 times a day  -- Indication: For Blood pressure control    atorvastatin 40 mg oral tablet  -- 1 tab(s) by mouth once a day (at bedtime)  -- Indication: For Cholesterol control    carvedilol 12.5 mg oral tablet  -- 1 tab(s) by mouth every 12 hours  -- Indication: For Blood pressure control    albuterol 90 mcg/inh inhalation aerosol  -- 2 puff(s) inhaled 4 times a day, As Needed  -- Indication: For Shortness of breath, COPD    Tudorza Pressair 400 mcg/inh inhalation powder  -- 400 microgram(s) inhaled 2 times a day  -- Check with your doctor before becoming pregnant.  For inhalation only.  Obtain medical advice before taking any non-prescription drugs as some may affect the action of this medication.    -- Indication: For Shortness of breath, COPD    amLODIPine 5 mg oral tablet  -- 1 tab(s) by mouth once a day  -- Indication: For Blood pressure control    cefuroxime 500 mg oral tablet  -- 1 tab(s) by mouth 2 times a day MDD:2  -- Finish all this medication unless otherwise directed by prescriber.  Medication should be taken with plenty of water.  Take with food or milk.    -- Indication: For Pneumonia    docusate sodium 100 mg oral capsule  -- 1 cap(s) by mouth 3 times a day  -- Indication: For COnstipation    azithromycin 250 mg oral tablet  -- 1 tab(s) by mouth once a day MDD:1  -- Indication: For Pneumonia    latanoprost 0.005% ophthalmic solution  -- 1 drop(s) to each affected eye once a day (in the evening)  -- Indication: For Glaucoma    pantoprazole 40 mg oral delayed release tablet  -- 1 tab(s) by mouth once a day (before a meal)  -- Indication: For Stomach protection    levothyroxine 50 mcg (0.05 mg) oral tablet  -- 1 tab(s) by mouth once a day  -- Indication: For Thyroid

## 2018-10-07 NOTE — PROGRESS NOTE ADULT - ASSESSMENT
Patient is a 72 yo M with PMHx of COPD, HTN, HLD, gout, hypothyroidism, CAD (s/p stent in 2011 in [place], and s/p CABG x1 in 2017) who presents with complaints of cough and SOB x 2 days. Patient states that 2 days prior to presentation, he began to developed a non productive cough. He states that since that time he has been experiencing progressively worsening SOB. This AM, he awoke with severe SOB and called EMS. Per chart review, patient was found to be hypoxic by EMS to 76% on RA, and was placed on 6L NC.     In the ED, patient was found to be tachycardic (), hypertensive (SBP in the 210s), tachypneic (RR 30). Labs significant for pCO2 of 53. CT performed which showed right upper lobe peripheral opacities, and scattered tree-in-bud opacities and peripheral consolidations in all five lobes. RVP positive for Entero/Rhinovirus. Patient was placed on BiPAP, and given Azithro and Ceftriaxone, 125mg Solumedrol, 1L NS bolus, and 4 Duonebs. (04 Oct 2018 21:26)
Patient is a 72 yo M with PMHx of COPD, HTN, HLD, gout, hypothyroidism, CAD (s/p stent in 2011 in [place], and s/p CABG x1 in 2017) who presents with complaints of cough and SOB x 2 days. Patient states that 2 days prior to presentation, he began to developed a non productive cough. He states that since that time he has been experiencing progressively worsening SOB. This AM, he awoke with severe SOB and called EMS. Per chart review, patient was found to be hypoxic by EMS to 76% on RA, and was placed on 6L NC.     In the ED, patient was found to be tachycardic (), hypertensive (SBP in the 210s), tachypneic (RR 30). Labs significant for pCO2 of 53. CT performed which showed right upper lobe peripheral opacities, and scattered tree-in-bud opacities and peripheral consolidations in all five lobes. RVP positive for Entero/Rhinovirus. Patient was placed on BiPAP, and given Azithro and Ceftriaxone, 125mg Solumedrol, 1L NS bolus, and 4 Duonebs. (04 Oct 2018 21:26)

## 2018-10-07 NOTE — CHART NOTE - NSCHARTNOTEFT_GEN_A_CORE
Request from Dr. Pavon to facilitate patient discharge. Medication reconciliation reviewed, and resolved with Dr. Pavon who had medically cleared patient for discharge with follow-up as advised. Please refer to discharge note for detailed hospital course. Patient is currently stable for discharge to home at this time.    Contact #[phone]

## 2018-10-07 NOTE — DISCHARGE NOTE ADULT - CARE PROVIDERS DIRECT ADDRESSES
[email or web address]

## 2018-10-07 NOTE — DISCHARGE NOTE ADULT - PATIENT PORTAL LINK FT
You can access the [facility] Patient Portal, offered by [facility], by registering with the following website: [email or web address]

## 2018-10-07 NOTE — DISCHARGE NOTE ADULT - SECONDARY DIAGNOSIS.
Multifocal pneumonia Hypertensive urgency COPD exacerbation CAD (coronary artery disease) Pulmonary fibrosis

## 2018-10-07 NOTE — PROGRESS NOTE ADULT - SUBJECTIVE AND OBJECTIVE BOX
Patient is a 73y old  Male who presents with a chief complaint of SOB (07 Oct 2018 09:39)      Any change in ROS: Feeling much better: No bipap use last nigth    MEDICATIONS  (STANDING):  ALBUTerol/ipratropium for Nebulization 3 milliLiter(s) Nebulizer every 4 hours  amLODIPine   Tablet 5 milliGRAM(s) Oral daily  aspirin enteric coated 81 milliGRAM(s) Oral daily  azithromycin   Tablet 250 milliGRAM(s) Oral daily  buDESOnide 160 MICROgram(s)/formoterol 4.5 MICROgram(s) Inhaler 2 Puff(s) Inhalation two times a day  carvedilol 12.5 milliGRAM(s) Oral every 12 hours  cefTRIAXone   IVPB 1 Gram(s) IV Intermittent every 24 hours  influenza   Vaccine 0.5 milliLiter(s) IntraMuscular once  levothyroxine 50 MICROGram(s) Oral daily  lisinopril 40 milliGRAM(s) Oral every 12 hours    MEDICATIONS  (PRN):    Vital Signs Last 24 Hrs  T(C): 36.6 (07 Oct 2018 07:58), Max: 36.8 (06 Oct 2018 15:58)  T(F): 97.9 (07 Oct 2018 07:58), Max: 98.2 (06 Oct 2018 15:58)  HR: 71 (07 Oct 2018 07:58) (57 - 107)  BP: 156/76 (07 Oct 2018 07:58) (137/77 - 156/76)  BP(mean): --  RR: 18 (07 Oct 2018 07:58) (18 - 18)  SpO2: 95% (07 Oct 2018 07:58) (93% - 97%)    I&O's Summary    06 Oct 2018 07:01  -  07 Oct 2018 07:00  --------------------------------------------------------  IN: 800 mL / OUT: 750 mL / NET: 50 mL          Physical Exam:   GENERAL: NAD, well-groomed, well-developed  HEENT: KULWINDER/   Atraumatic, Normocephalic  ENMT: No tonsillar erythema, exudates, or enlargement; Moist mucous membranes, Good dentition, No lesions  NECK: Supple, No JVD, Normal thyroid  CHEST/LUNG: Clear to auscultaion, ; No rales, rhonchi, wheezing, or rubs  CVS: Regular rate and rhythm; No murmurs, rubs, or gallops  GI: : Soft, Nontender, Nondistended; Bowel sounds present  NERVOUS SYSTEM:  Alert & Oriented X3  EXTREMITIES:  2+ Peripheral Pulses, No clubbing, cyanosis, or edema  LYMPH: No lymphadenopathy noted  SKIN: No rashes or lesions  ENDOCRINOLOGY: No Thyromegaly  PSYCH: Appropriate    Labs:  ABG - ( 05 Oct 2018 14:42 )  pH, Arterial: 7.44  pH, Blood: x     /  pCO2: 40    /  pO2: 121   / HCO3: 27    / Base Excess: 2.9   /  SaO2: 99              28                            12.2   12.84 )-----------( 218      ( 06 Oct 2018 08:47 )             35.9                         12.4   7.72  )-----------( 218      ( 05 Oct 2018 08:13 )             36.4                         14.1   6.7   )-----------( 238      ( 04 Oct 2018 05:58 )             42.2     10-07    138  |  100  |  26<H>  ----------------------------<  107<H>  4.3   |  25  |  1.01  10-06    138  |  102  |  26<H>  ----------------------------<  129<H>  4.5   |  27  |  0.98  10-05    138  |  102  |  16  ----------------------------<  124<H>  4.5   |  25  |  0.93  10-04    138  |  102  |  13  ----------------------------<  129<H>  4.0   |  24  |  0.92    Ca    9.4      07 Oct 2018 08:12  Ca    9.0      06 Oct 2018 07:02    TPro  9.1<H>  /  Alb  4.2  /  TBili  0.9  /  DBili  x   /  AST  28  /  ALT  18  /  AlkPhos  82  10-04    CAPILLARY BLOOD GLUCOSE                D-Dimer Assay, Quantitative: 353 ng/mL DDU (10-04 @ 05:58)        RECENT CULTURES:  10-06 @ 17:35 .Sputum Sputum       Few polymorphonuclear leukocytes per low power field  Moderate Squamous epithelial cells per low power field  Few Yeast like cells seen per oil power field  Moderate Gram Negative Rods seen per oil power field  Few Gram Positive Cocci in Pairs and Chains seen per oil power field  Moderate Gram Variable Cocci seen per oil power field                   RESPIRATORY CULTURES:          Studies  Chest X-RAY  CT SCAN Chest   Venous Dopplers: LE:   CT Abdomen  Others
Patient is a 73y old  Male who presents with a chief complaint of SOB (05 Oct 2018 08:45)    pt. seen and examined, still SOB, little better then yesterday     INTERVAL HPI/OVERNIGHT EVENTS:  T(C): 36.6 (10-05-18 @ 17:48), Max: 36.8 (10-05-18 @ 16:53)  HR: 60 (10-05-18 @ 17:48) (60 - 79)  BP: 127/84 (10-05-18 @ 17:48) (127/84 - 180/96)  RR: 16 (10-05-18 @ 17:48) (16 - 18)  SpO2: 99% (10-05-18 @ 17:48) (99% - 100%)  Wt(kg): --  I&O's Summary    04 Oct 2018 07:01  -  05 Oct 2018 07:00  --------------------------------------------------------  IN: 350 mL / OUT: 400 mL / NET: -50 mL    05 Oct 2018 07:01  -  05 Oct 2018 20:14  --------------------------------------------------------  IN: 320 mL / OUT: 0 mL / NET: 320 mL        PAST MEDICAL & SURGICAL HISTORY:  Hypothyroid  Glaucoma  Stented coronary artery  CAD (coronary artery disease)  MI (myocardial infarction): 2011 stent in [place]  HTN (hypertension)  Gout  Former smoker  COPD (chronic obstructive pulmonary disease)  H/O foot surgery      SOCIAL HISTORY  Alcohol:  Tobacco:  Illicit substance use:    FAMILY HISTORY:    REVIEW OF SYSTEMS:  CONSTITUTIONAL: No fever, weight loss, or fatigue  EYES: No eye pain, visual disturbances, or discharge  ENMT:  No difficulty hearing, tinnitus, vertigo; No sinus or throat pain  NECK: No pain or stiffness  RESPIRATORY: No cough, wheezing, chills or hemoptysis; No shortness of breath  CARDIOVASCULAR: No chest pain, palpitations, dizziness, or leg swelling  GASTROINTESTINAL: No abdominal or epigastric pain. No nausea, vomiting, or hematemesis; No diarrhea or constipation. No melena or hematochezia.  GENITOURINARY: No dysuria, frequency, hematuria, or incontinence  NEUROLOGICAL: No headaches, memory loss, loss of strength, numbness, or tremors  SKIN: No itching, burning, rashes, or lesions   LYMPH NODES: No enlarged glands  ENDOCRINE: No heat or cold intolerance; No hair loss  MUSCULOSKELETAL: No joint pain or swelling; No muscle, back, or extremity pain  PSYCHIATRIC: No depression, anxiety, mood swings, or difficulty sleeping  HEME/LYMPH: No easy bruising, or bleeding gums  ALLERY AND IMMUNOLOGIC: No hives or eczema    RADIOLOGY & ADDITIONAL TESTS:    Imaging Personally Reviewed:  [ ] YES  [ ] NO    Consultant(s) Notes Reviewed:  [ ] YES  [ ] NO    PHYSICAL EXAM:  GENERAL: NAD, well-groomed, well-developed  HEAD:  Atraumatic, Normocephalic  EYES: EOMI, PERRLA, conjunctiva and sclera clear  ENMT: No tonsillar erythema, exudates, or enlargement; Moist mucous membranes, Good dentition, No lesions  NECK: Supple, No JVD, Normal thyroid  NERVOUS SYSTEM:  Alert & Oriented X3, Good concentration; Motor Strength 5/5 B/L upper and lower extremities; DTRs 2+ intact and symmetric  CHEST/LUNG: Clear to percussion bilaterally; No rales, rhonchi, wheezing, or rubs  HEART: Regular rate and rhythm; No murmurs, rubs, or gallops  ABDOMEN: Soft, Nontender, Nondistended; Bowel sounds present  EXTREMITIES:  2+ Peripheral Pulses, No clubbing, cyanosis, or edema  LYMPH: No lymphadenopathy noted  SKIN: No rashes or lesions    LABS:                        12.4   7.72  )-----------( 218      ( 05 Oct 2018 08:13 )             36.4     10-05    138  |  102  |  16  ----------------------------<  124<H>  4.5   |  25  |  0.93    Ca    9.4      05 Oct 2018 07:22    TPro  9.1<H>  /  Alb  4.2  /  TBili  0.9  /  DBili  x   /  AST  28  /  ALT  18  /  AlkPhos  82  10-04    PT/INR - ( 05 Oct 2018 09:10 )   PT: 14.7 sec;   INR: 1.29 ratio         PTT - ( 05 Oct 2018 09:10 )  PTT:28.5 sec    CAPILLARY BLOOD GLUCOSE        ABG - ( 05 Oct 2018 14:42 )  pH, Arterial: 7.44  pH, Blood: x     /  pCO2: 40    /  pO2: 121   / HCO3: 27    / Base Excess: 2.9   /  SaO2: 99                      MEDICATIONS  (STANDING):  ALBUTerol/ipratropium for Nebulization 3 milliLiter(s) Nebulizer every 4 hours  amLODIPine   Tablet 5 milliGRAM(s) Oral daily  aspirin enteric coated 81 milliGRAM(s) Oral daily  azithromycin   Tablet 250 milliGRAM(s) Oral daily  buDESOnide 160 MICROgram(s)/formoterol 4.5 MICROgram(s) Inhaler 2 Puff(s) Inhalation two times a day  carvedilol 12.5 milliGRAM(s) Oral every 12 hours  cefTRIAXone   IVPB 1 Gram(s) IV Intermittent every 24 hours  influenza   Vaccine 0.5 milliLiter(s) IntraMuscular once  levothyroxine 50 MICROGram(s) Oral daily  lisinopril 40 milliGRAM(s) Oral every 12 hours  methylPREDNISolone sodium succinate Injectable 20 milliGRAM(s) IV Push every 6 hours    MEDICATIONS  (PRN):      Care Discussed with Consultants/Other Providers [ ] YES  [ ] NO
Patient is a 73y old  Male who presents with a chief complaint of SOB (05 Oct 2018 20:14)      Any change in ROS: Doing much better: no SOB : says her is feeling much better    MEDICATIONS  (STANDING):  ALBUTerol/ipratropium for Nebulization 3 milliLiter(s) Nebulizer every 4 hours  amLODIPine   Tablet 5 milliGRAM(s) Oral daily  aspirin enteric coated 81 milliGRAM(s) Oral daily  azithromycin   Tablet 250 milliGRAM(s) Oral daily  buDESOnide 160 MICROgram(s)/formoterol 4.5 MICROgram(s) Inhaler 2 Puff(s) Inhalation two times a day  carvedilol 12.5 milliGRAM(s) Oral every 12 hours  cefTRIAXone   IVPB 1 Gram(s) IV Intermittent every 24 hours  influenza   Vaccine 0.5 milliLiter(s) IntraMuscular once  levothyroxine 50 MICROGram(s) Oral daily  lisinopril 40 milliGRAM(s) Oral every 12 hours  methylPREDNISolone sodium succinate Injectable 20 milliGRAM(s) IV Push every 6 hours    MEDICATIONS  (PRN):    Vital Signs Last 24 Hrs  T(C): 36.5 (06 Oct 2018 09:07), Max: 36.8 (05 Oct 2018 16:53)  T(F): 97.7 (06 Oct 2018 09:07), Max: 98.3 (05 Oct 2018 16:53)  HR: 56 (06 Oct 2018 09:07) (54 - 77)  BP: 157/72 (06 Oct 2018 09:07) (146/68 - 169/87)  BP(mean): --  RR: 18 (06 Oct 2018 09:07) (16 - 18)  SpO2: 99% (06 Oct 2018 09:07) (98% - 100%)    I&O's Summary    05 Oct 2018 07:01  -  06 Oct 2018 07:00  --------------------------------------------------------  IN: 370 mL / OUT: 600 mL / NET: -230 mL    06 Oct 2018 07:01  -  06 Oct 2018 09:59  --------------------------------------------------------  IN: 0 mL / OUT: 150 mL / NET: -150 mL          Physical Exam:   GENERAL: NAD, well-groomed, well-developed  HEENT: KULWINDER/   Atraumatic, Normocephalic  ENMT: No tonsillar erythema, exudates, or enlargement; Moist mucous membranes, Good dentition, No lesions  NECK: Supple, No JVD, Normal thyroid  CHEST/LUNG:wheze  CVS: Regular rate and rhythm; No murmurs, rubs, or gallops  GI: : Soft, Nontender, Nondistended; Bowel sounds present  NERVOUS SYSTEM:  Alert & Oriented X3  EXTREMITIES:  2+ Peripheral Pulses, No clubbing, cyanosis, or edema  LYMPH: No lymphadenopathy noted  SKIN: No rashes or lesions  ENDOCRINOLOGY: No Thyromegaly  PSYCH: Appropriate    Labs:  ABG - ( 05 Oct 2018 14:42 )  pH, Arterial: 7.44  pH, Blood: x     /  pCO2: 40    /  pO2: 121   / HCO3: 27    / Base Excess: 2.9   /  SaO2: 99              28                            12.4   7.72  )-----------( 218      ( 05 Oct 2018 08:13 )             36.4                         14.1   6.7   )-----------( 238      ( 04 Oct 2018 05:58 )             42.2     10-06    138  |  102  |  26<H>  ----------------------------<  129<H>  4.5   |  27  |  0.98  10-05    138  |  102  |  16  ----------------------------<  124<H>  4.5   |  25  |  0.93  10-04    138  |  102  |  13  ----------------------------<  129<H>  4.0   |  24  |  0.92    Ca    9.0      06 Oct 2018 07:02  Ca    9.4      05 Oct 2018 07:22    TPro  9.1<H>  /  Alb  4.2  /  TBili  0.9  /  DBili  x   /  AST  28  /  ALT  18  /  AlkPhos  82  10-04    CAPILLARY BLOOD GLUCOSE            PT/INR - ( 05 Oct 2018 09:10 )   PT: 14.7 sec;   INR: 1.29 ratio         PTT - ( 05 Oct 2018 09:10 )  PTT:28.5 sec    D-Dimer Assay, Quantitative: 353 ng/mL DDU (10-04 @ 05:58)  Serum Pro-Brain Natriuretic Peptide: 254 pg/mL (10-04 @ 05:58)        RECENT CULTURES:        RESPIRATORY CULTURES:    < from: CT Angio Chest w/ IV Cont (10.04.18 @ 09:23) >  CHEST:     LUNGS AND LARGE AIRWAYS: Mucoid impaction and peribronchial thickening.   Scattered tree-in-bud opacities and peripheral opacities bilaterally are   predominantly unchanged from 7/27/2017 including a 7 mm irregular nodule.   Right upper lobe peripheral opacities are new/increased from the prior   study. Unchanged left apicalscarring.   PLEURA: No pleural effusion.  VESSELS: No pulmonary embolus. Atherosclerotic calcifications of the   aorta.  HEART: Heart size is normal. Coronary stent. No pericardial effusion.   Sternotomy and CABG.  MEDIASTINUM AND ROYAL: No lymphadenopathy.  CHEST WALL AND LOWER NECK: Within normal limits.  VISUALIZED UPPER ABDOMEN: Within normal limits.  BONES: Status post sternotomy.    IMPRESSION:   No pulmonary embolus.    Scattered tree-in-bud opacities and peripheral consolidations in all five   lobes are predominantly unchanged from 7/27/2017 including a 7 mm   irregular nodule. Right upper lobe peripheral opacities are new/increased   from the prior study and may be infectious/inflammatory. Three-month   follow-up CT recommended.                JOEY SEGOVIA M.D., RADIOLOGY RESIDENT  This document has been electronically signed.  ARMEN MONTGOMERY M.D., ATTENDING RADIOLOGIST  This document has been electronically signed. Oct  4 2018 10:08AM        < end of copied text >        Studies  Chest X-RAY  CT SCAN Chest   Venous Dopplers: LE:   CT Abdomen  Others
Patient seen and examined at bedside  No acute events noted overnight  Case discussed with medical team    HPI:  Patient is a 72 yo M with PMHx of COPD, HTN, HLD, gout, hypothyroidism, CAD (s/p stent in 2011 in [place], and s/p CABG x1 in 2017) who presents with complaints of cough and SOB x 2 days. Patient states that 2 days prior to presentation, he began to developed a non productive cough. He states that since that time he has been experiencing progressively worsening SOB. This AM, he awoke with severe SOB and called EMS. Per chart review, patient was found to be hypoxic by EMS to 76% on RA, and was placed on 6L NC.     In the ED, patient was found to be tachycardic (), hypertensive (SBP in the 210s), tachypneic (RR 30). Labs significant for pCO2 of 53. CT performed which showed right upper lobe peripheral opacities, and scattered tree-in-bud opacities and peripheral consolidations in all five lobes. RVP positive for Entero/Rhinovirus. Patient was placed on BiPAP, and given Azithro and Ceftriaxone, 125mg Solumedrol, 1L NS bolus, and 4 Duonebs. (04 Oct 2018 21:26)      PAST MEDICAL & SURGICAL HISTORY:  Hypothyroid  Glaucoma  Stented coronary artery  CAD (coronary artery disease)  MI (myocardial infarction): 2011 stent in [place]  HTN (hypertension)  Gout  Former smoker  COPD (chronic obstructive pulmonary disease)  H/O foot surgery      No Known Allergies       MEDICATIONS  (STANDING):  ALBUTerol/ipratropium for Nebulization 3 milliLiter(s) Nebulizer every 4 hours  amLODIPine   Tablet 5 milliGRAM(s) Oral daily  aspirin enteric coated 81 milliGRAM(s) Oral daily  azithromycin   Tablet 250 milliGRAM(s) Oral daily  buDESOnide 160 MICROgram(s)/formoterol 4.5 MICROgram(s) Inhaler 2 Puff(s) Inhalation two times a day  carvedilol 12.5 milliGRAM(s) Oral every 12 hours  cefTRIAXone   IVPB 1 Gram(s) IV Intermittent every 24 hours  influenza   Vaccine 0.5 milliLiter(s) IntraMuscular once  levothyroxine 50 MICROGram(s) Oral daily  lisinopril 40 milliGRAM(s) Oral every 12 hours    MEDICATIONS  (PRN):      REVIEW OF SYSTEMS:  CONSTITUTIONAL: (+) malaise.   EYES: No acute change in vision   ENT:  No tinnitus  NECK: No stiffness  RESPIRATORY: No hemoptysis  CARDIOVASCULAR: No chest pain, palpitations, syncope  GASTROINTESTINAL: No hematemesis, diarrhea, melena, or hematochezia.  GENITOURINARY: No hematuria  NEUROLOGICAL: No headaches  LYMPH Nodes: No enlarged glands  ENDOCRINE: No heat or cold intolerance	    T(C): 36.6 (10-07-18 @ 07:58), Max: 36.8 (10-06-18 @ 15:58)  HR: 71 (10-07-18 @ 07:58) (57 - 107)  BP: 156/76 (10-07-18 @ 07:58) (137/77 - 156/76)  RR: 18 (10-07-18 @ 07:58) (18 - 18)  SpO2: 95% (10-07-18 @ 07:58) (93% - 97%)    PHYSICAL EXAMINATION:   Constitutional: WD, NAD  HEENT: NC, AT  Neck:  Supple  Respiratory:  Adequate airflow b/l. Not using accessory muscles of respiration.  Cardiovascular:  S1 & S2 intact, no R/G, 2+ radial pulses b/l  Gastrointestinal: Soft, NT, ND, normoactive b.s., no organomegaly/RT/rigidity  Extremities: WWP  Neurological:  Alert and awake.  No acute focal motor deficits. Crude sensation intact.     Labs and imaging reviewed    LABS:                        12.2   12.84 )-----------( 218      ( 06 Oct 2018 08:47 )             35.9     10-07    138  |  100  |  26<H>  ----------------------------<  107<H>  4.3   |  25  |  1.01    Ca    9.4      07 Oct 2018 08:12              CAPILLARY BLOOD GLUCOSE            Culture - Sputum (collected 10-06-18 @ 17:35)  Source: .Sputum Sputum  Gram Stain (10-07-18 @ 02:48):    Few polymorphonuclear leukocytes per low power field    Moderate Squamous epithelial cells per low power field    Few Yeast like cells seen per oil power field    Moderate Gram Negative Rods seen per oil power field    Few Gram Positive Cocci in Pairs and Chains seen per oil power field    Moderate Gram Variable Cocci seen per oil power field        ABG - ( 05 Oct 2018 14:42 )  pH, Arterial: 7.44  pH, Blood: x     /  pCO2: 40    /  pO2: 121   / HCO3: 27    / Base Excess: 2.9   /  SaO2: 99                  RADIOLOGY & ADDITIONAL STUDIES:

## 2018-10-07 NOTE — DISCHARGE NOTE ADULT - PLAN OF CARE
improved pneumonia Take all antibiotics and medications as prescribed. Follow up with your PCP within 5 days of hospital discharge. Take all antibiotics and medications as prescribed.   Follow up with your PCP within 5 days of hospital discharge.  If you develop fever, chills, malaise, or change in mental status call your Health Care Provider or go to the Emergency Department.  Nutrition is important, eat small frequent meals to help ensure you get adequate calories.  Do not stay in bed all day!  Increase your activity daily as tolerated. Pneumonia is a lung infection that can cause a fever, cough, and trouble breathing.  Continue all antibiotics as ordered until complete.  Nutrition is important, eat small frequent meals.  Get lots of rest and drink fluids.  Call your health care provider upon arrival home from hospital and make a follow up appointment for one week.  If your cough worsens, you develop fever greater than 101', you have shaking chills, a fast heartbeat, trouble breathing and/or feel your are breathing much faster than usual, call your healthcare provider.  Make sure you wash your hands frequently. Take medications for your blood pressure as recommended.  Eat a heart healthy diet that is low in saturated fats and salt, and includes whole grains, fruits, vegetables and lean protein   Exercise regularly (consult with your physician or cardiologist first); maintain a heart healthy weight.   If you smoke - quit (A resource to help you stop smoking is the [facility] – phone number 295-445-5803.). Continue to follow with your primary physician or cardiologist.   Seek medical help for dizziness, Lightheadedness, Blurry vision, Headache, Chest pain, Shortness of breath  Follow up with your medical doctor to establish long term blood pressure treatment goals. Call your Health Care provider upon arrival home to make a follow up appointment within one week.  Take all inhalers as prescribed by your Health Care Provider.  Take steroids as prescribed by your Health Care Provider.  If your cough increases infrequency and severity and/or you have shortness of breath or increased shortness of breath call your Health Care Provider.  If you develop fever, chills, night sweats, malaise, and/or change in mental status call your Health care Provider.  Nutrition is very important.  Eat small frequent meals.  Increase your activity as tolerated.  Do not stay in bed all day Continue with your cholesterol medications. Eat a heart healthy diet that is low in saturated fats and salt, and includes whole grains, fruits, vegetables and lean protein; exercise regularly (consult with your physician or cardiologist first); maintain a heart healthy weight; if you smoke - quit (A resource to help you stop smoking is the [facility] – phone number [phone].). Continue to follow with your primary physician or cardiologist.  Seek medical help for dizziness, Lightheadedness, Blurry vision, Headache, Chest pain, Shortness of breath Pt to follow up with pulmonologist in 1 week - call for appointment

## 2018-10-07 NOTE — DISCHARGE NOTE ADULT - CARE PROVIDER_API CALL
Cole Hobbs), Medicine  [address] [place] [address]  Phone: [phone]  Fax: [phone] Cole Hobbs), Medicine  [address] [place] [address]  Phone: [phone]  Fax: [phone]    Alfonso Balbuena), Critical Care Medicine; Internal Medicine; Pulmonary Disease  [address] [place] [address]  Phone: [phone]  Fax: [phone]

## 2018-10-07 NOTE — PROGRESS NOTE ADULT - PROBLEM SELECTOR PLAN 2
BRENDA Ulrich, the radiologist,  yesterday : He has had abnormal chest ct scan from last year: He never followed up after dc: He has bilateral peripheral opacities, the etiology is not very clear: He has TIB opacities too: His basic workup for collagen vascular disease is ordered  10/7: Workup is pending"
ac resp failure   -started on Bipap  -pul consult Dr. melendez called   -c/w neb rx  -prednisone
BRENDA Ulrich, the radiologist,  yesterday : He has had abnormal chest ct scan from last year: He never followed up after dc: He has bilateral peripheral opacities, the etiology is not very clear: He has TIB opacities too: His basic workup for collagen vascular disease is ordered
On Admission  As above  Improving  D/c on oral abx

## 2018-10-08 LAB
CULTURE RESULTS: SIGNIFICANT CHANGE UP
DSDNA AB FLD-ACNC: <0.2 AI — SIGNIFICANT CHANGE UP
ENA SCL70 AB SER-ACNC: <0.2 AI — SIGNIFICANT CHANGE UP
ENA SS-A AB FLD IA-ACNC: <0.2 AI — SIGNIFICANT CHANGE UP
SPECIMEN SOURCE: SIGNIFICANT CHANGE UP

## 2018-10-09 LAB — DSDNA AB SER-ACNC: <12 IU/ML — SIGNIFICANT CHANGE UP

## 2018-10-10 LAB
ANA PAT FLD IF-IMP: ABNORMAL
ANA TITR SER: ABNORMAL

## 2018-11-11 PROCEDURE — 93005 ELECTROCARDIOGRAM TRACING: CPT | Mod: 76

## 2018-11-11 PROCEDURE — 86038 ANTINUCLEAR ANTIBODIES: CPT

## 2018-11-11 PROCEDURE — 96361 HYDRATE IV INFUSION ADD-ON: CPT

## 2018-11-11 PROCEDURE — 86225 DNA ANTIBODY NATIVE: CPT

## 2018-11-11 PROCEDURE — 82330 ASSAY OF CALCIUM: CPT

## 2018-11-11 PROCEDURE — 94660 CPAP INITIATION&MGMT: CPT

## 2018-11-11 PROCEDURE — 87798 DETECT AGENT NOS DNA AMP: CPT

## 2018-11-11 PROCEDURE — 36600 WITHDRAWAL OF ARTERIAL BLOOD: CPT

## 2018-11-11 PROCEDURE — 96367 TX/PROPH/DG ADDL SEQ IV INF: CPT | Mod: XU

## 2018-11-11 PROCEDURE — 96366 THER/PROPH/DIAG IV INF ADDON: CPT

## 2018-11-11 PROCEDURE — 82947 ASSAY GLUCOSE BLOOD QUANT: CPT

## 2018-11-11 PROCEDURE — 84295 ASSAY OF SERUM SODIUM: CPT

## 2018-11-11 PROCEDURE — 87581 M.PNEUMON DNA AMP PROBE: CPT

## 2018-11-11 PROCEDURE — 94640 AIRWAY INHALATION TREATMENT: CPT

## 2018-11-11 PROCEDURE — 85730 THROMBOPLASTIN TIME PARTIAL: CPT

## 2018-11-11 PROCEDURE — 87633 RESP VIRUS 12-25 TARGETS: CPT

## 2018-11-11 PROCEDURE — 85379 FIBRIN DEGRADATION QUANT: CPT

## 2018-11-11 PROCEDURE — 82435 ASSAY OF BLOOD CHLORIDE: CPT

## 2018-11-11 PROCEDURE — 71045 X-RAY EXAM CHEST 1 VIEW: CPT

## 2018-11-11 PROCEDURE — 87449 NOS EACH ORGANISM AG IA: CPT

## 2018-11-11 PROCEDURE — 86235 NUCLEAR ANTIGEN ANTIBODY: CPT

## 2018-11-11 PROCEDURE — 83605 ASSAY OF LACTIC ACID: CPT

## 2018-11-11 PROCEDURE — 87486 CHLMYD PNEUM DNA AMP PROBE: CPT

## 2018-11-11 PROCEDURE — 84484 ASSAY OF TROPONIN QUANT: CPT

## 2018-11-11 PROCEDURE — 96365 THER/PROPH/DIAG IV INF INIT: CPT | Mod: XU

## 2018-11-11 PROCEDURE — 71275 CT ANGIOGRAPHY CHEST: CPT

## 2018-11-11 PROCEDURE — 99291 CRITICAL CARE FIRST HOUR: CPT | Mod: 25

## 2018-11-11 PROCEDURE — 93308 TTE F-UP OR LMTD: CPT

## 2018-11-11 PROCEDURE — 83880 ASSAY OF NATRIURETIC PEPTIDE: CPT

## 2018-11-11 PROCEDURE — 85027 COMPLETE CBC AUTOMATED: CPT

## 2018-11-11 PROCEDURE — 85610 PROTHROMBIN TIME: CPT

## 2018-11-11 PROCEDURE — 80048 BASIC METABOLIC PNL TOTAL CA: CPT

## 2018-11-11 PROCEDURE — 82803 BLOOD GASES ANY COMBINATION: CPT

## 2018-11-11 PROCEDURE — 80053 COMPREHEN METABOLIC PANEL: CPT

## 2018-11-11 PROCEDURE — 85014 HEMATOCRIT: CPT

## 2018-11-11 PROCEDURE — 87070 CULTURE OTHR SPECIMN AEROBIC: CPT

## 2018-11-11 PROCEDURE — 96375 TX/PRO/DX INJ NEW DRUG ADDON: CPT | Mod: XU

## 2018-11-11 PROCEDURE — 84132 ASSAY OF SERUM POTASSIUM: CPT

## 2019-01-31 ENCOUNTER — LABORATORY RESULT (OUTPATIENT)
Age: 74
End: 2019-01-31

## 2019-01-31 ENCOUNTER — MED ADMIN CHARGE (OUTPATIENT)
Age: 74
End: 2019-01-31

## 2019-01-31 ENCOUNTER — APPOINTMENT (OUTPATIENT)
Dept: PULMONOLOGY | Facility: CLINIC | Age: 74
End: 2019-01-31
Payer: MEDICARE

## 2019-01-31 VITALS
TEMPERATURE: 98.6 F | HEIGHT: 67.5 IN | HEART RATE: 60 BPM | OXYGEN SATURATION: 98 % | SYSTOLIC BLOOD PRESSURE: 129 MMHG | DIASTOLIC BLOOD PRESSURE: 77 MMHG | BODY MASS INDEX: 20.47 KG/M2 | RESPIRATION RATE: 16 BRPM | WEIGHT: 132 LBS

## 2019-01-31 PROCEDURE — 94726 PLETHYSMOGRAPHY LUNG VOLUMES: CPT

## 2019-01-31 PROCEDURE — 99205 OFFICE O/P NEW HI 60 MIN: CPT | Mod: 25

## 2019-01-31 PROCEDURE — ZZZZZ: CPT

## 2019-01-31 PROCEDURE — 94729 DIFFUSING CAPACITY: CPT

## 2019-01-31 PROCEDURE — 94060 EVALUATION OF WHEEZING: CPT

## 2019-01-31 PROCEDURE — 36415 COLL VENOUS BLD VENIPUNCTURE: CPT

## 2019-01-31 RX ORDER — OXYCODONE HYDROCHLORIDE AND ACETAMINOPHEN 5; 325 MG/1; MG/1
5-325 TABLET ORAL EVERY 6 HOURS
Refills: 0 | Status: DISCONTINUED | COMMUNITY
End: 2019-01-31

## 2019-01-31 RX ORDER — OMEPRAZOLE 20 MG/1
20 CAPSULE, DELAYED RELEASE ORAL
Qty: 1 | Refills: 5 | Status: ACTIVE | COMMUNITY
Start: 2019-01-31 | End: 1900-01-01

## 2019-01-31 RX ORDER — LEVOTHYROXINE SODIUM 0.05 MG/1
50 TABLET ORAL DAILY
Refills: 0 | Status: DISCONTINUED | COMMUNITY
End: 2019-01-31

## 2019-01-31 RX ORDER — METHYLPREDNISOLONE 40 MG/ML
40 INJECTION, POWDER, LYOPHILIZED, FOR SOLUTION INTRAMUSCULAR; INTRAVENOUS
Qty: 1 | Refills: 0 | Status: COMPLETED | OUTPATIENT
Start: 2019-01-31

## 2019-01-31 RX ORDER — TIOTROPIUM BROMIDE 18 UG/1
18 CAPSULE ORAL; RESPIRATORY (INHALATION) DAILY
Refills: 0 | Status: DISCONTINUED | COMMUNITY
End: 2019-01-31

## 2019-01-31 RX ORDER — PREDNISONE 10 MG/1
10 TABLET ORAL DAILY
Qty: 60 | Refills: 0 | Status: ACTIVE | COMMUNITY
Start: 2019-01-31 | End: 1900-01-01

## 2019-01-31 RX ORDER — BUDESONIDE AND FORMOTEROL FUMARATE DIHYDRATE 160; 4.5 UG/1; UG/1
160-4.5 AEROSOL RESPIRATORY (INHALATION) TWICE DAILY
Qty: 1 | Refills: 3 | Status: ACTIVE | COMMUNITY
Start: 2019-01-31 | End: 1900-01-01

## 2019-01-31 RX ORDER — AZITHROMYCIN 250 MG/1
250 TABLET, FILM COATED ORAL
Qty: 1 | Refills: 0 | Status: ACTIVE | COMMUNITY
Start: 2019-01-31 | End: 1900-01-01

## 2019-01-31 RX ORDER — ACLIDINIUM BROMIDE 400 UG/1
400 POWDER, METERED RESPIRATORY (INHALATION)
Refills: 0 | Status: ACTIVE | COMMUNITY

## 2019-01-31 RX ADMIN — METHYLPREDNISOLONE SODIUM SUCCINATE 40 MG: 40 INJECTION, POWDER, FOR SOLUTION INTRAMUSCULAR; INTRAVENOUS at 00:00

## 2019-01-31 NOTE — HISTORY OF PRESENT ILLNESS
[FreeTextEntry1] : This letter  is regarding your patient  who  attended pulmonary out patient office today.  I have reviewed  patient's  past history, social history, family history and medication list. I also  reviewed nurse practitioners/ and fellows  notes and assessment and agree with it.  \par The patient was referred by [facility] for copd, abn CT.\par Former smoker- quit 20years ago- 50 pack year history.\par PMH of CABg 2017 w/Dr Lam\par \par ------No history of , fever, chills , rigors, chest pain, or hemoptysis. Questionable history of Raynaud's phenomenon. No h/o significant weight loss in last few months. No history of liver dysfunction , collagen vascular disorder or chronic thromboembolic disease. I would classify the patient's dyspnea as WHO  FUNCTIONAL CLASS II--------\par \par ----Echo  date-----2018 IMPRESSION: \par No Pericardial Effusion.\par Concern for right sided pneumonia-\par ----Pft date------1/2019 severe obstruction with bronchodilator response---\par ----Ct scan date 10/2018  IMPRESSION: \par No pulmonary embolus. \par \par Scattered tree-in-bud opacities and peripheral consolidations in all five \par lobes are predominantly unchanged from 7/27/2017 including a 7 mm irregular \par nodule. Right upper lobe peripheral opacities are new/increased from the \par prior study and may be infectious/inflammatory. Three-month follow-up CT \par recommended. \par -------\par ----Cath date------2017 left sided------\par \par jan 2019 c/o cough and exertional dyspnea. Has copd- using albuterol and tudorza\par declining pneumovax

## 2019-01-31 NOTE — REVIEW OF SYSTEMS
[Cough] : cough [Dyspnea] : dyspnea [As Noted in HPI] : as noted in HPI [Diabetes] : diabetes mellitus [Negative] : Sleep Disorder

## 2019-01-31 NOTE — DISCUSSION/SUMMARY
[FreeTextEntry1] : ---Assessment plan----------The patient has been referred here for further opinion regarding pulmonary problem, 74 yo w/copd and abn CT chest. PMH CABG and hypothyroid. Former smoker.\par 1) cough- s/p medrol 40mg IM- will take prednisone 20mg w/GI prophylaxis- taper weekly by 5mg until off\par 2) continue albuterol and tudorza- add symbicort\par 3) start zpack\par 4) repeat CT chest noncontrast\par 5) labs drawn today\par 6) f/u in 4-6 weeks\par \par \par Thanks for allowing  me to participate  in the care of this patient.  Patient at this time  will follow  the above mentioned recommendations and return back for follow up visit. If you have any questions  I can be reached  at #[phone] (beeper)  or  [phone] (office).\par \par Ranulfo Guardado MD, [facility] \par Director, Pulmonary Hypertension Program \par [facility] \par Division of Pulmonary, Critical Care and Sleep Medicine \par  Professor of Medicine \par [employer]\par

## 2019-01-31 NOTE — PHYSICAL EXAM
[General Appearance - Well Developed] : well developed [Normal Appearance] : normal appearance [Well Groomed] : well groomed [General Appearance - Well Nourished] : well nourished [No Deformities] : no deformities [General Appearance - In No Acute Distress] : no acute distress [Normal Conjunctiva] : the conjunctiva exhibited no abnormalities [Eyelids - No Xanthelasma] : the eyelids demonstrated no xanthelasmas [Normal Oropharynx] : normal oropharynx [Neck Appearance] : the appearance of the neck was normal [Neck Cervical Mass (___cm)] : no neck mass was observed [Jugular Venous Distention Increased] : there was no jugular-venous distention [Thyroid Diffuse Enlargement] : the thyroid was not enlarged [Thyroid Nodule] : there were no palpable thyroid nodules [Heart Rate And Rhythm] : heart rate and rhythm were normal [Heart Sounds] : normal S1 and S2 [Murmurs] : no murmurs present [FreeTextEntry1] : bibasilar crackles [Tenderness: ____] : tenderness [unfilled] [Abdomen Soft] : soft [Abdomen Tenderness] : non-tender [Abdomen Mass (___ Cm)] : no abdominal mass palpated [Abnormal Walk] : normal gait [Gait - Sufficient For Exercise Testing] : the gait was sufficient for exercise testing [Nail Clubbing] : no clubbing of the fingernails [Cyanosis, Localized] : no localized cyanosis [Petechial Hemorrhages (___cm)] : no petechial hemorrhages [Skin Color & Pigmentation] : normal skin color and pigmentation [Skin Turgor] : normal skin turgor [] : no rash [Deep Tendon Reflexes (DTR)] : deep tendon reflexes were 2+ and symmetric [Sensation] : the sensory exam was normal to light touch and pinprick [No Focal Deficits] : no focal deficits [Oriented To Time, Place, And Person] : oriented to person, place, and time [Impaired Insight] : insight and judgment were intact [Affect] : the affect was normal

## 2019-02-01 LAB
25(OH)D3 SERPL-MCNC: 42.6 NG/ML
A1AT SERPL-MCNC: 179 MG/DL
ALBUMIN SERPL ELPH-MCNC: 4.2 G/DL
ALP BLD-CCNC: 67 U/L
ALT SERPL-CCNC: 20 U/L
ANION GAP SERPL CALC-SCNC: 12 MMOL/L
AST SERPL-CCNC: 35 U/L
BASOPHILS # BLD AUTO: 0.02 K/UL
BASOPHILS NFR BLD AUTO: 0.5 %
BILIRUB SERPL-MCNC: 0.3 MG/DL
BUN SERPL-MCNC: 14 MG/DL
CALCIUM SERPL-MCNC: 9.7 MG/DL
CALCIUM SERPL-MCNC: 9.7 MG/DL
CHLORIDE SERPL-SCNC: 98 MMOL/L
CO2 SERPL-SCNC: 27 MMOL/L
CREAT SERPL-MCNC: 1.05 MG/DL
EOSINOPHIL # BLD AUTO: 0.18 K/UL
EOSINOPHIL NFR BLD AUTO: 4.1 %
ERYTHROCYTE [SEDIMENTATION RATE] IN BLOOD BY WESTERGREN METHOD: 43 MM/HR
GLUCOSE SERPL-MCNC: 78 MG/DL
HCT VFR BLD CALC: 42.4 %
HGB BLD-MCNC: 14.1 G/DL
IMM GRANULOCYTES NFR BLD AUTO: 0.2 %
LYMPHOCYTES # BLD AUTO: 1.1 K/UL
LYMPHOCYTES NFR BLD AUTO: 25.2 %
MAN DIFF?: NORMAL
MCHC RBC-ENTMCNC: 28 PG
MCHC RBC-ENTMCNC: 33.3 GM/DL
MCV RBC AUTO: 84.3 FL
MONOCYTES # BLD AUTO: 0.45 K/UL
MONOCYTES NFR BLD AUTO: 10.3 %
NEUTROPHILS # BLD AUTO: 2.6 K/UL
NEUTROPHILS NFR BLD AUTO: 59.7 %
NT-PROBNP SERPL-MCNC: 81 PG/ML
PARATHYROID HORMONE INTACT: 21 PG/ML
PHOSPHATE SERPL-MCNC: 3.6 MG/DL
PLATELET # BLD AUTO: 221 K/UL
POTASSIUM SERPL-SCNC: 4.7 MMOL/L
PROT SERPL-MCNC: 8.6 G/DL
RBC # BLD: 5.03 M/UL
RBC # FLD: 13.9 %
RHEUMATOID FACT SER QL: 14 IU/ML
SODIUM SERPL-SCNC: 137 MMOL/L
URATE SERPL-MCNC: 6.9 MG/DL
VIT B12 SERPL-MCNC: 1565 PG/ML
WBC # FLD AUTO: 4.36 K/UL

## 2019-02-02 ENCOUNTER — MOBILE ON CALL (OUTPATIENT)
Age: 74
End: 2019-02-02

## 2019-02-02 RX ORDER — PREDNISONE 5 MG/1
5 TABLET ORAL
Qty: 70 | Refills: 0 | Status: ACTIVE | COMMUNITY
Start: 2019-02-02 | End: 1900-01-01

## 2019-02-05 ENCOUNTER — RECORD ABSTRACTING (OUTPATIENT)
Age: 74
End: 2019-02-05

## 2019-02-05 DIAGNOSIS — D89.2 HYPERGAMMAGLOBULINEMIA, UNSPECIFIED: ICD-10-CM

## 2019-02-05 DIAGNOSIS — R76.8 OTHER SPECIFIED ABNORMAL IMMUNOLOGICAL FINDINGS IN SERUM: ICD-10-CM

## 2019-02-05 LAB
ALBUMIN MFR SERPL ELPH: 47.6 %
ALBUMIN SERPL-MCNC: 4.1 G/DL
ALBUMIN/GLOB SERPL: 0.9 RATIO
ALPHA1 GLOB MFR SERPL ELPH: 4.2 %
ALPHA1 GLOB SERPL ELPH-MCNC: 0.4 G/DL
ALPHA2 GLOB MFR SERPL ELPH: 11.8 %
ALPHA2 GLOB SERPL ELPH-MCNC: 1 G/DL
B-GLOBULIN MFR SERPL ELPH: 9.4 %
B-GLOBULIN SERPL ELPH-MCNC: 0.8 G/DL
CARDIOLIPIN AB SER IA-ACNC: POSITIVE
CCP AB SER IA-ACNC: >250 UNITS
DEPRECATED KAPPA LC FREE/LAMBDA SER: 1.38 RATIO
ENA SCL70 IGG SER IA-ACNC: <0.2 AL
ENA SS-A AB SER IA-ACNC: <0.2 AL
ENA SS-B AB SER IA-ACNC: <0.2 AL
GAMMA GLOB FLD ELPH-MCNC: 2.3 G/DL
GAMMA GLOB MFR SERPL ELPH: 27 %
IGA SER QL IEP: 151 MG/DL
IGG SER QL IEP: 2272 MG/DL
IGM SER QL IEP: 92 MG/DL
INTERPRETATION SERPL IEP-IMP: NORMAL
KAPPA LC CSF-MCNC: 5.39 MG/DL
KAPPA LC SERPL-MCNC: 7.43 MG/DL
M TB IFN-G BLD-IMP: NEGATIVE
PROT SERPL-MCNC: 8.6 G/DL
QUANTIFERON TB PLUS MITOGEN MINUS NIL: 4.95 IU/ML
QUANTIFERON TB PLUS NIL: 0.05 IU/ML
QUANTIFERON TB PLUS TB1 MINUS NIL: -0.01 IU/ML
QUANTIFERON TB PLUS TB2 MINUS NIL: -0.01 IU/ML
RF+CCP IGG SER-IMP: ABNORMAL
TOTAL IGE SMQN RAST: 411 KU/L
TSH SERPL-ACNC: 5.59 UIU/ML

## 2019-02-07 LAB
ANA PAT FLD IF-IMP: ABNORMAL
ANACR T: ABNORMAL

## 2019-02-18 ENCOUNTER — TRANSCRIPTION ENCOUNTER (OUTPATIENT)
Age: 74
End: 2019-02-18

## 2019-02-19 ENCOUNTER — APPOINTMENT (OUTPATIENT)
Dept: PULMONOLOGY | Facility: CLINIC | Age: 74
End: 2019-02-19

## 2019-02-23 ENCOUNTER — MOBILE ON CALL (OUTPATIENT)
Age: 74
End: 2019-02-23

## 2019-02-26 ENCOUNTER — MEDICATION RENEWAL (OUTPATIENT)
Age: 74
End: 2019-02-26

## 2019-03-06 ENCOUNTER — FORM ENCOUNTER (OUTPATIENT)
Age: 74
End: 2019-03-06

## 2019-03-07 ENCOUNTER — OUTPATIENT (OUTPATIENT)
Dept: OUTPATIENT SERVICES | Facility: HOSPITAL | Age: 74
LOS: 1 days | End: 2019-03-07
Payer: COMMERCIAL

## 2019-03-07 DIAGNOSIS — J44.9 CHRONIC OBSTRUCTIVE PULMONARY DISEASE, UNSPECIFIED: ICD-10-CM

## 2019-03-07 DIAGNOSIS — Z98.890 OTHER SPECIFIED POSTPROCEDURAL STATES: Chronic | ICD-10-CM

## 2019-03-07 PROCEDURE — 71250 CT THORAX DX C-: CPT | Mod: 26

## 2019-03-07 PROCEDURE — 71250 CT THORAX DX C-: CPT

## 2019-03-08 ENCOUNTER — MEDICATION RENEWAL (OUTPATIENT)
Age: 74
End: 2019-03-08

## 2019-03-08 ENCOUNTER — TRANSCRIPTION ENCOUNTER (OUTPATIENT)
Age: 74
End: 2019-03-08

## 2019-03-08 RX ORDER — AMOXICILLIN AND CLAVULANATE POTASSIUM 875; 125 MG/1; MG/1
875-125 TABLET, COATED ORAL
Qty: 20 | Refills: 0 | Status: ACTIVE | COMMUNITY
Start: 2019-03-08 | End: 1900-01-01

## 2019-03-18 ENCOUNTER — APPOINTMENT (OUTPATIENT)
Dept: PULMONOLOGY | Facility: CLINIC | Age: 74
End: 2019-03-18
Payer: MEDICARE

## 2019-03-18 VITALS
RESPIRATION RATE: 15 BRPM | WEIGHT: 134 LBS | HEART RATE: 73 BPM | OXYGEN SATURATION: 99 % | BODY MASS INDEX: 20.68 KG/M2 | DIASTOLIC BLOOD PRESSURE: 72 MMHG | TEMPERATURE: 97.3 F | SYSTOLIC BLOOD PRESSURE: 138 MMHG

## 2019-03-18 DIAGNOSIS — J44.9 CHRONIC OBSTRUCTIVE PULMONARY DISEASE, UNSPECIFIED: ICD-10-CM

## 2019-03-18 DIAGNOSIS — R93.89 ABNORMAL FINDINGS ON DIAGNOSTIC IMAGING OF OTHER SPECIFIED BODY STRUCTURES: ICD-10-CM

## 2019-03-18 DIAGNOSIS — R05 COUGH: ICD-10-CM

## 2019-03-18 DIAGNOSIS — R06.09 OTHER FORMS OF DYSPNEA: ICD-10-CM

## 2019-03-18 PROCEDURE — 36415 COLL VENOUS BLD VENIPUNCTURE: CPT

## 2019-03-18 PROCEDURE — 99215 OFFICE O/P EST HI 40 MIN: CPT | Mod: 25

## 2019-03-18 NOTE — DISCUSSION/SUMMARY
[FreeTextEntry1] : ---Assessment plan----------The patient has been referred here for further opinion regarding pulmonary problem, 74 yo w/copd and abn CT chest. PMH CABG and hypothyroid. Former smoker.\par 1) abn CT/ ? VOLODYMYR-- will schedule for bronchoscopy- instructions given -- check labs pre- bronch  R/O VOLODYMYR \par 2) copd--continue albuterol ,  tudorza- and  symbicort\par 3) hypothyroid- restarted synthyroid- referred to endo\par 4) hypergammaglobulinemia- referred to heme\par \par \par \par Thanks for allowing  me to participate  in the care of this patient.  Patient at this time  will follow  the above mentioned recommendations and return back for follow up visit. If you have any questions  I can be reached  at #[phone] (beeper)  or  [phone] (office).\par \par Ranulfo Guardado MD, FCCP \par Director, Pulmonary Hypertension Program \par [facility] \par Division of Pulmonary, Critical Care and Sleep Medicine \par  Professor of Medicine \par [employer]\par

## 2019-03-18 NOTE — PHYSICAL EXAM
[General Appearance - Well Developed] : well developed [Normal Appearance] : normal appearance [General Appearance - Well Nourished] : well nourished [Well Groomed] : well groomed [No Deformities] : no deformities [Normal Conjunctiva] : the conjunctiva exhibited no abnormalities [General Appearance - In No Acute Distress] : no acute distress [Normal Oropharynx] : normal oropharynx [Eyelids - No Xanthelasma] : the eyelids demonstrated no xanthelasmas [Neck Appearance] : the appearance of the neck was normal [Neck Cervical Mass (___cm)] : no neck mass was observed [Jugular Venous Distention Increased] : there was no jugular-venous distention [Thyroid Diffuse Enlargement] : the thyroid was not enlarged [Heart Rate And Rhythm] : heart rate and rhythm were normal [Thyroid Nodule] : there were no palpable thyroid nodules [Heart Sounds] : normal S1 and S2 [Murmurs] : no murmurs present [Tenderness: ____] : tenderness [unfilled] [Abdomen Tenderness] : non-tender [Abdomen Soft] : soft [Abdomen Mass (___ Cm)] : no abdominal mass palpated [Abnormal Walk] : normal gait [Gait - Sufficient For Exercise Testing] : the gait was sufficient for exercise testing [Nail Clubbing] : no clubbing of the fingernails [Cyanosis, Localized] : no localized cyanosis [Petechial Hemorrhages (___cm)] : no petechial hemorrhages [No Focal Deficits] : no focal deficits [Sensation] : the sensory exam was normal to light touch and pinprick [Deep Tendon Reflexes (DTR)] : deep tendon reflexes were 2+ and symmetric [Oriented To Time, Place, And Person] : oriented to person, place, and time [Impaired Insight] : insight and judgment were intact [Skin Color & Pigmentation] : normal skin color and pigmentation [Affect] : the affect was normal [Skin Turgor] : normal skin turgor [] : no rash [FreeTextEntry1] : bibasilar crackles

## 2019-03-18 NOTE — REVIEW OF SYSTEMS
[Cough] : cough [Dyspnea] : dyspnea [As Noted in HPI] : as noted in HPI [Diabetes] : diabetes mellitus [Negative] : Pulmonary Hypertension

## 2019-03-19 LAB
ALBUMIN SERPL ELPH-MCNC: 4 G/DL
ALP BLD-CCNC: 65 U/L
ALT SERPL-CCNC: 17 U/L
ANION GAP SERPL CALC-SCNC: 9 MMOL/L
APTT BLD: 33.3 SEC
AST SERPL-CCNC: 21 U/L
BASOPHILS # BLD AUTO: 0.05 K/UL
BASOPHILS NFR BLD AUTO: 1.1 %
BILIRUB SERPL-MCNC: 0.4 MG/DL
BUN SERPL-MCNC: 20 MG/DL
CALCIUM SERPL-MCNC: 9.6 MG/DL
CHLORIDE SERPL-SCNC: 102 MMOL/L
CO2 SERPL-SCNC: 27 MMOL/L
CREAT SERPL-MCNC: 1.11 MG/DL
EOSINOPHIL # BLD AUTO: 0.65 K/UL
EOSINOPHIL NFR BLD AUTO: 14.3 %
HCT VFR BLD CALC: 42 %
HGB BLD-MCNC: 13.2 G/DL
IMM GRANULOCYTES NFR BLD AUTO: 0 %
INR PPP: 1.32 RATIO
LYMPHOCYTES # BLD AUTO: 1.35 K/UL
LYMPHOCYTES NFR BLD AUTO: 29.7 %
MAN DIFF?: NORMAL
MCHC RBC-ENTMCNC: 27.2 PG
MCHC RBC-ENTMCNC: 31.4 GM/DL
MCV RBC AUTO: 86.4 FL
MONOCYTES # BLD AUTO: 0.45 K/UL
MONOCYTES NFR BLD AUTO: 9.9 %
NEUTROPHILS # BLD AUTO: 2.04 K/UL
NEUTROPHILS NFR BLD AUTO: 45 %
PLATELET # BLD AUTO: 241 K/UL
POTASSIUM SERPL-SCNC: 5 MMOL/L
PROT SERPL-MCNC: 8 G/DL
PT BLD: 15.2 SEC
RBC # BLD: 4.86 M/UL
RBC # FLD: 14.3 %
SODIUM SERPL-SCNC: 138 MMOL/L
WBC # FLD AUTO: 4.54 K/UL

## 2019-03-25 ENCOUNTER — MEDICATION RENEWAL (OUTPATIENT)
Age: 74
End: 2019-03-25

## 2019-04-03 ENCOUNTER — OUTPATIENT (OUTPATIENT)
Dept: OUTPATIENT SERVICES | Facility: HOSPITAL | Age: 74
LOS: 1 days | End: 2019-04-03
Payer: COMMERCIAL

## 2019-04-03 ENCOUNTER — APPOINTMENT (OUTPATIENT)
Dept: PULMONOLOGY | Facility: HOSPITAL | Age: 74
End: 2019-04-03
Payer: MEDICARE

## 2019-04-03 ENCOUNTER — RESULT REVIEW (OUTPATIENT)
Age: 74
End: 2019-04-03

## 2019-04-03 DIAGNOSIS — Z98.890 OTHER SPECIFIED POSTPROCEDURAL STATES: Chronic | ICD-10-CM

## 2019-04-03 DIAGNOSIS — R91.8 OTHER NONSPECIFIC ABNORMAL FINDING OF LUNG FIELD: ICD-10-CM

## 2019-04-03 LAB
GRAM STN FLD: SIGNIFICANT CHANGE UP
SPECIMEN SOURCE: SIGNIFICANT CHANGE UP

## 2019-04-03 PROCEDURE — 88112 CYTOPATH CELL ENHANCE TECH: CPT

## 2019-04-03 PROCEDURE — 31624 DX BRONCHOSCOPE/LAVAGE: CPT

## 2019-04-03 PROCEDURE — 88112 CYTOPATH CELL ENHANCE TECH: CPT | Mod: 26

## 2019-04-03 PROCEDURE — 87116 MYCOBACTERIA CULTURE: CPT

## 2019-04-03 PROCEDURE — 87015 SPECIMEN INFECT AGNT CONCNTJ: CPT

## 2019-04-03 PROCEDURE — 87102 FUNGUS ISOLATION CULTURE: CPT

## 2019-04-03 PROCEDURE — 87206 SMEAR FLUORESCENT/ACID STAI: CPT

## 2019-04-03 PROCEDURE — 87070 CULTURE OTHR SPECIMN AEROBIC: CPT

## 2019-04-03 PROCEDURE — 88305 TISSUE EXAM BY PATHOLOGIST: CPT | Mod: 26

## 2019-04-03 PROCEDURE — 88312 SPECIAL STAINS GROUP 1: CPT | Mod: 26

## 2019-04-03 PROCEDURE — 88312 SPECIAL STAINS GROUP 1: CPT

## 2019-04-03 PROCEDURE — 88305 TISSUE EXAM BY PATHOLOGIST: CPT

## 2019-04-03 RX ORDER — IPRATROPIUM/ALBUTEROL SULFATE 18-103MCG
3 AEROSOL WITH ADAPTER (GRAM) INHALATION ONCE
Qty: 0 | Refills: 0 | Status: DISCONTINUED | OUTPATIENT
Start: 2019-04-03 | End: 2019-04-18

## 2019-04-04 LAB
NIGHT BLUE STAIN TISS: SIGNIFICANT CHANGE UP
SPECIMEN SOURCE: SIGNIFICANT CHANGE UP

## 2019-04-05 LAB
CULTURE RESULTS: SIGNIFICANT CHANGE UP
SPECIMEN SOURCE: SIGNIFICANT CHANGE UP

## 2019-05-04 LAB
CULTURE RESULTS: SIGNIFICANT CHANGE UP
SPECIMEN SOURCE: SIGNIFICANT CHANGE UP

## 2019-05-06 ENCOUNTER — MEDICATION RENEWAL (OUTPATIENT)
Age: 74
End: 2019-05-06

## 2019-05-06 RX ORDER — LEVOTHYROXINE SODIUM 0.03 MG/1
25 TABLET ORAL DAILY
Qty: 30 | Refills: 0 | Status: ACTIVE | COMMUNITY
Start: 2019-02-26 | End: 1900-01-01

## 2019-05-25 LAB
CULTURE RESULTS: SIGNIFICANT CHANGE UP
SPECIMEN SOURCE: SIGNIFICANT CHANGE UP

## 2019-06-30 NOTE — CONSULT NOTE ADULT - ATTENDING COMMENTS
REPORT FROM ROGELIO HANNA. PT RESTING ON [place]. SITTER IN PLACE WITH EYES ON PT. 
ROOM SECURED. NADN. RESP EVEN AND UNLABORED. ALL CONCERNS ADRESSED. 73 year old man with COPD, HTN, HLD, gout, hypothyroidism, CAD (s/p stent and CABG) who presents with complaints of cough and SOB x 2 days.  This AM, he awoke with severe SOB and called EMS, found to be hypoxic to 76% on RA, and was placed on 6L [place].     In the ED, patient was found to be tachycardic (), hypertensive (SBP in the 210s), tachypneic (RR 30) and was placed on BiPAP for increased work of breathing.      CT Chest showed right upper lobe peripheral opacities, scattered tree-in-bud opacities and peripheral consolidations in all five lobes. RVP positive for Entero/Rhinovirus.     - multi focal pneumonia  -COPD exacerbation  - much improved with BiPAP support  - continue antibiotics  - bronchodilators and steroids for COPD exacerbation    not micu candidate at this time please call with questions

## 2019-07-01 NOTE — PHYSICAL THERAPY INITIAL EVALUATION ADULT - DISCHARGE DISPOSITION, PT EVAL
[Maximal Pain Intensity: 0/10] : 0/10 [Least Pain Intensity: 0/10] : 0/10 [90: Able to carry normal activity; minor signs or symptoms of disease.] : 90: Able to carry normal activity; minor signs or symptoms of disease.  home w/ home PT/home w/ assist

## 2021-04-24 NOTE — H&P CARDIOLOGY - PRO TOBACCO TYPE
Age-appropriate screening recommendations discussed  Due for Pap smear this year as missed 2020.  Cannot do today.  She will return for Pap smear later.  Arrange for mammogram  Advised to continue to follow healthy lifestyle with low-fat high-protein diet and regular exercise  Fasting lab tests ordered  Vaccination recommendations also discussed.  Advised to get pneumococcal vaccine due to known smoking history.  Also advised to get Tdap vaccination  Refuses to get COVID-19 vaccine   cigarettes/quit smoking 25 years ago

## 2021-04-29 NOTE — PROGRESS NOTE ADULT - PROBLEM SELECTOR PLAN 1
Cont antibiotics: He has been afebrile as well as clinically improving  10/7: can dc bithorax: already received for 4 days and his legionella is negative.
started on rocephine and zithromax
Cont antibiotics: He has been afebrile as well as clinically improving
On Admission  2/2 Complicated Mutlifocal Bacterial Pneumonia likely gram negative & Viral Upper Respiratory Tract Infection  Clinically improved, afebrile, pt feels much better  Pt is medically optimized for safe discharge home on po abx
Never smoker

## 2021-05-06 NOTE — DISCHARGE NOTE ADULT - CONDITIONS AT DISCHARGE
[FreeTextEntry1] : Importance of treatment and medication adherence discussed .S/e of medication explained and patient understands.The diagnosis and care plan were discussed in detail with patient .Understanding assessed via teach back method .All questions answered to best of my Knowledge .\par  Stable, VSS, no c/o pain or SOB, MT incision clean and dry, IVL and tele removed

## 2021-06-09 NOTE — ED PROVIDER NOTE - CRITICAL CARE PROVIDED
EGD/Colon Colonoscopy documentation/direct patient care (not related to procedure)/additional history taking/interpretation of diagnostic studies/consult w/ pt's family directly relating to pts condition

## 2021-10-11 NOTE — H&P CARDIOLOGY - TOBACCO USE
Former smoker Hatchet Flap Text: The defect edges were debeveled with a #15 scalpel blade.  Given the location of the defect, shape of the defect and the proximity to free margins a hatchet flap was deemed most appropriate.  Using a sterile surgical marker, an appropriate hatchet flap was drawn incorporating the defect and placing the expected incisions within the relaxed skin tension lines where possible.    The area thus outlined was incised deep to adipose tissue with a #15 scalpel blade.  The skin margins were undermined to an appropriate distance in all directions utilizing iris scissors.

## 2021-11-29 NOTE — H&P ADULT - MUSCULOSKELETAL
JORGE LUIS  GROUP DOCUMENTATION INDIVIDUAL                                                                          Group Therapy Note    Date: 11/29/2021    Group Start Time: 1500  Group End Time: 1600  Group Topic: Recreational/Music Therapy    [facility] [place] [address] ACUTE BEHAV [facility]    Les Mckeon [facility] GROUP    Group Therapy Note    Attendees: 3         Attendance: Did not attend    Patient's Goal:      Interventions/techniques:  Nadja Adames negative

## 2022-03-07 ENCOUNTER — INPATIENT (INPATIENT)
Facility: HOSPITAL | Age: 77
LOS: 2 days | Discharge: HOME | End: 2022-03-10
Attending: STUDENT IN AN ORGANIZED HEALTH CARE EDUCATION/TRAINING PROGRAM | Admitting: STUDENT IN AN ORGANIZED HEALTH CARE EDUCATION/TRAINING PROGRAM
Payer: MEDICARE

## 2022-03-07 VITALS
HEIGHT: 67 IN | DIASTOLIC BLOOD PRESSURE: 77 MMHG | SYSTOLIC BLOOD PRESSURE: 147 MMHG | TEMPERATURE: 98 F | OXYGEN SATURATION: 98 % | RESPIRATION RATE: 16 BRPM | HEART RATE: 82 BPM

## 2022-03-07 DIAGNOSIS — E87.1 HYPO-OSMOLALITY AND HYPONATREMIA: ICD-10-CM

## 2022-03-07 DIAGNOSIS — Z98.890 OTHER SPECIFIED POSTPROCEDURAL STATES: Chronic | ICD-10-CM

## 2022-03-07 DIAGNOSIS — F10.11 ALCOHOL ABUSE, IN REMISSION: ICD-10-CM

## 2022-03-07 DIAGNOSIS — F32.1 MAJOR DEPRESSIVE DISORDER, SINGLE EPISODE, MODERATE: ICD-10-CM

## 2022-03-07 DIAGNOSIS — E03.9 HYPOTHYROIDISM, UNSPECIFIED: ICD-10-CM

## 2022-03-07 DIAGNOSIS — Z29.9 ENCOUNTER FOR PROPHYLACTIC MEASURES, UNSPECIFIED: ICD-10-CM

## 2022-03-07 DIAGNOSIS — I25.10 ATHEROSCLEROTIC HEART DISEASE OF NATIVE CORONARY ARTERY WITHOUT ANGINA PECTORIS: ICD-10-CM

## 2022-03-07 DIAGNOSIS — I10 ESSENTIAL (PRIMARY) HYPERTENSION: ICD-10-CM

## 2022-03-07 LAB
ALBUMIN SERPL ELPH-MCNC: 4.2 G/DL — SIGNIFICANT CHANGE UP (ref 3.3–5)
ALP SERPL-CCNC: 54 U/L — SIGNIFICANT CHANGE UP (ref 40–120)
ALT FLD-CCNC: 21 U/L — SIGNIFICANT CHANGE UP (ref 4–41)
ANION GAP SERPL CALC-SCNC: 13 MMOL/L — SIGNIFICANT CHANGE UP (ref 7–14)
APAP SERPL-MCNC: <10 UG/ML — LOW (ref 15–25)
APPEARANCE UR: CLEAR — SIGNIFICANT CHANGE UP
AST SERPL-CCNC: 24 U/L — SIGNIFICANT CHANGE UP (ref 4–40)
BACTERIA # UR AUTO: ABNORMAL
BASOPHILS # BLD AUTO: 0.06 K/UL — SIGNIFICANT CHANGE UP (ref 0–0.2)
BASOPHILS NFR BLD AUTO: 1.8 % — SIGNIFICANT CHANGE UP (ref 0–2)
BILIRUB SERPL-MCNC: 0.8 MG/DL — SIGNIFICANT CHANGE UP (ref 0.2–1.2)
BILIRUB UR-MCNC: NEGATIVE — SIGNIFICANT CHANGE UP
BUN SERPL-MCNC: 12 MG/DL — SIGNIFICANT CHANGE UP (ref 7–23)
CALCIUM SERPL-MCNC: 9.4 MG/DL — SIGNIFICANT CHANGE UP (ref 8.4–10.5)
CHLORIDE SERPL-SCNC: 91 MMOL/L — LOW (ref 98–107)
CHLORIDE UR-SCNC: 26 MMOL/L — SIGNIFICANT CHANGE UP
CO2 SERPL-SCNC: 21 MMOL/L — LOW (ref 22–31)
COLOR SPEC: SIGNIFICANT CHANGE UP
CREAT ?TM UR-MCNC: 118 MG/DL — SIGNIFICANT CHANGE UP
CREAT SERPL-MCNC: 1.02 MG/DL — SIGNIFICANT CHANGE UP (ref 0.5–1.3)
DIFF PNL FLD: ABNORMAL
EGFR: 75 ML/MIN/1.73M2 — SIGNIFICANT CHANGE UP
EOSINOPHIL # BLD AUTO: 0.12 K/UL — SIGNIFICANT CHANGE UP (ref 0–0.5)
EOSINOPHIL NFR BLD AUTO: 3.5 % — SIGNIFICANT CHANGE UP (ref 0–6)
EPI CELLS # UR: 2 /HPF — SIGNIFICANT CHANGE UP (ref 0–5)
ETHANOL SERPL-MCNC: <10 MG/DL — SIGNIFICANT CHANGE UP
FLUAV AG NPH QL: SIGNIFICANT CHANGE UP
FLUBV AG NPH QL: SIGNIFICANT CHANGE UP
GIANT PLATELETS BLD QL SMEAR: PRESENT — SIGNIFICANT CHANGE UP
GLUCOSE SERPL-MCNC: 105 MG/DL — HIGH (ref 70–99)
GLUCOSE UR QL: NEGATIVE — SIGNIFICANT CHANGE UP
HCT VFR BLD CALC: 40.1 % — SIGNIFICANT CHANGE UP (ref 39–50)
HGB BLD-MCNC: 14.5 G/DL — SIGNIFICANT CHANGE UP (ref 13–17)
IANC: 1.81 K/UL — SIGNIFICANT CHANGE UP (ref 1.5–8.5)
KETONES UR-MCNC: ABNORMAL
LEUKOCYTE ESTERASE UR-ACNC: NEGATIVE — SIGNIFICANT CHANGE UP
LYMPHOCYTES # BLD AUTO: 0.7 K/UL — LOW (ref 1–3.3)
LYMPHOCYTES # BLD AUTO: 20.4 % — SIGNIFICANT CHANGE UP (ref 13–44)
MAGNESIUM SERPL-MCNC: 1.9 MG/DL — SIGNIFICANT CHANGE UP (ref 1.6–2.6)
MANUAL SMEAR VERIFICATION: SIGNIFICANT CHANGE UP
MCHC RBC-ENTMCNC: 28.8 PG — SIGNIFICANT CHANGE UP (ref 27–34)
MCHC RBC-ENTMCNC: 36.2 GM/DL — HIGH (ref 32–36)
MCV RBC AUTO: 79.7 FL — LOW (ref 80–100)
MONOCYTES # BLD AUTO: 0.33 K/UL — SIGNIFICANT CHANGE UP (ref 0–0.9)
MONOCYTES NFR BLD AUTO: 9.7 % — SIGNIFICANT CHANGE UP (ref 2–14)
NEUTROPHILS # BLD AUTO: 1.91 K/UL — SIGNIFICANT CHANGE UP (ref 1.8–7.4)
NEUTROPHILS NFR BLD AUTO: 55.8 % — SIGNIFICANT CHANGE UP (ref 43–77)
NITRITE UR-MCNC: NEGATIVE — SIGNIFICANT CHANGE UP
OSMOLALITY UR: 408 MOSM/KG — SIGNIFICANT CHANGE UP (ref 50–1200)
PH UR: 6.5 — SIGNIFICANT CHANGE UP (ref 5–8)
PHOSPHATE SERPL-MCNC: 3.1 MG/DL — SIGNIFICANT CHANGE UP (ref 2.5–4.5)
PLAT MORPH BLD: NORMAL — SIGNIFICANT CHANGE UP
PLATELET # BLD AUTO: 178 K/UL — SIGNIFICANT CHANGE UP (ref 150–400)
PLATELET COUNT - ESTIMATE: NORMAL — SIGNIFICANT CHANGE UP
POTASSIUM SERPL-MCNC: 4.3 MMOL/L — SIGNIFICANT CHANGE UP (ref 3.5–5.3)
POTASSIUM SERPL-SCNC: 4.3 MMOL/L — SIGNIFICANT CHANGE UP (ref 3.5–5.3)
POTASSIUM UR-SCNC: 39.1 MMOL/L — SIGNIFICANT CHANGE UP
PROT SERPL-MCNC: 7 G/DL — SIGNIFICANT CHANGE UP (ref 6–8.3)
PROT UR-MCNC: NEGATIVE — SIGNIFICANT CHANGE UP
RBC # BLD: 5.03 M/UL — SIGNIFICANT CHANGE UP (ref 4.2–5.8)
RBC # FLD: 13.8 % — SIGNIFICANT CHANGE UP (ref 10.3–14.5)
RBC BLD AUTO: NORMAL — SIGNIFICANT CHANGE UP
RBC CASTS # UR COMP ASSIST: 4 /HPF — SIGNIFICANT CHANGE UP (ref 0–4)
RSV RNA NPH QL NAA+NON-PROBE: SIGNIFICANT CHANGE UP
SALICYLATES SERPL-MCNC: <0.3 MG/DL — LOW (ref 15–30)
SARS-COV-2 RNA SPEC QL NAA+PROBE: SIGNIFICANT CHANGE UP
SMUDGE CELLS # BLD: PRESENT — SIGNIFICANT CHANGE UP
SODIUM SERPL-SCNC: 125 MMOL/L — LOW (ref 135–145)
SODIUM UR-SCNC: 30 MMOL/L — SIGNIFICANT CHANGE UP
SODIUM UR-SCNC: 32 MMOL/L — SIGNIFICANT CHANGE UP
SP GR SPEC: 1.01 — SIGNIFICANT CHANGE UP (ref 1–1.05)
TOXICOLOGY SCREEN, DRUGS OF ABUSE, SERUM RESULT: SIGNIFICANT CHANGE UP
TSH SERPL-MCNC: 2.79 UIU/ML — SIGNIFICANT CHANGE UP (ref 0.27–4.2)
UROBILINOGEN FLD QL: SIGNIFICANT CHANGE UP
VARIANT LYMPHS # BLD: 8.8 % — HIGH (ref 0–6)
WBC # BLD: 3.42 K/UL — LOW (ref 3.8–10.5)
WBC # FLD AUTO: 3.42 K/UL — LOW (ref 3.8–10.5)
WBC UR QL: 4 /HPF — SIGNIFICANT CHANGE UP (ref 0–5)

## 2022-03-07 PROCEDURE — 93010 ELECTROCARDIOGRAM REPORT: CPT

## 2022-03-07 PROCEDURE — 99223 1ST HOSP IP/OBS HIGH 75: CPT

## 2022-03-07 PROCEDURE — 99285 EMERGENCY DEPT VISIT HI MDM: CPT | Mod: 25

## 2022-03-07 RX ORDER — CARVEDILOL PHOSPHATE 80 MG/1
12.5 CAPSULE, EXTENDED RELEASE ORAL EVERY 12 HOURS
Refills: 0 | Status: DISCONTINUED | OUTPATIENT
Start: 2022-03-07 | End: 2022-03-10

## 2022-03-07 RX ORDER — ACETAMINOPHEN 500 MG
650 TABLET ORAL EVERY 6 HOURS
Refills: 0 | Status: DISCONTINUED | OUTPATIENT
Start: 2022-03-07 | End: 2022-03-10

## 2022-03-07 RX ORDER — ENOXAPARIN SODIUM 100 MG/ML
40 INJECTION SUBCUTANEOUS EVERY 24 HOURS
Refills: 0 | Status: DISCONTINUED | OUTPATIENT
Start: 2022-03-07 | End: 2022-03-10

## 2022-03-07 RX ORDER — ASPIRIN/CALCIUM CARB/MAGNESIUM 324 MG
81 TABLET ORAL DAILY
Refills: 0 | Status: DISCONTINUED | OUTPATIENT
Start: 2022-03-07 | End: 2022-03-10

## 2022-03-07 RX ORDER — ALBUTEROL 90 UG/1
2 AEROSOL, METERED ORAL EVERY 6 HOURS
Refills: 0 | Status: DISCONTINUED | OUTPATIENT
Start: 2022-03-07 | End: 2022-03-10

## 2022-03-07 RX ORDER — TRAZODONE HCL 50 MG
50 TABLET ORAL EVERY 24 HOURS
Refills: 0 | Status: DISCONTINUED | OUTPATIENT
Start: 2022-03-07 | End: 2022-03-10

## 2022-03-07 RX ORDER — ATORVASTATIN CALCIUM 80 MG/1
40 TABLET, FILM COATED ORAL AT BEDTIME
Refills: 0 | Status: DISCONTINUED | OUTPATIENT
Start: 2022-03-07 | End: 2022-03-10

## 2022-03-07 RX ORDER — LEVOTHYROXINE SODIUM 125 MCG
50 TABLET ORAL DAILY
Refills: 0 | Status: DISCONTINUED | OUTPATIENT
Start: 2022-03-08 | End: 2022-03-10

## 2022-03-07 RX ORDER — LANOLIN ALCOHOL/MO/W.PET/CERES
3 CREAM (GRAM) TOPICAL AT BEDTIME
Refills: 0 | Status: DISCONTINUED | OUTPATIENT
Start: 2022-03-07 | End: 2022-03-10

## 2022-03-07 RX ORDER — VALSARTAN 80 MG/1
320 TABLET ORAL DAILY
Refills: 0 | Status: DISCONTINUED | OUTPATIENT
Start: 2022-03-08 | End: 2022-03-10

## 2022-03-07 RX ORDER — BUDESONIDE AND FORMOTEROL FUMARATE DIHYDRATE 160; 4.5 UG/1; UG/1
2 AEROSOL RESPIRATORY (INHALATION)
Refills: 0 | Status: DISCONTINUED | OUTPATIENT
Start: 2022-03-07 | End: 2022-03-10

## 2022-03-07 RX ORDER — ONDANSETRON 8 MG/1
4 TABLET, FILM COATED ORAL EVERY 8 HOURS
Refills: 0 | Status: DISCONTINUED | OUTPATIENT
Start: 2022-03-07 | End: 2022-03-10

## 2022-03-07 RX ADMIN — Medication 50 MILLIGRAM(S): at 22:09

## 2022-03-07 RX ADMIN — ATORVASTATIN CALCIUM 40 MILLIGRAM(S): 80 TABLET, FILM COATED ORAL at 22:09

## 2022-03-07 RX ADMIN — CARVEDILOL PHOSPHATE 12.5 MILLIGRAM(S): 80 CAPSULE, EXTENDED RELEASE ORAL at 23:00

## 2022-03-07 NOTE — ED BEHAVIORAL HEALTH NOTE - BEHAVIORAL HEALTH NOTE
Worker met with  pt’s spouse Yesenia Holman ([phone]) who provided the following information. Mrs. Holman states that the patient has been depressed for the past three weeks off and on. She states that the patient contracted covid-19 prior to this and was in isolation and was home. She states that for the past three weeks the patient has been complaining of depression symptoms. She denies that the patient has any past mental health issues and never saw a psychiatrist prior to this. She states that the patient has been stating he cannot sleep, and he is having pains in his back. She states that the patient was referred through his PCP Dr. Varela ([phone]) to see a psychiatrist. Patient met with a psychiatrist? Therapist? In [place] and has an upcoming appointment on Tuesday. She states that the patient keeps telling her that he does not want anything to happen to her. Mrs. Holman states she has no safety concerns for the patient, but the patient has been very worrisome. She states that the patient’s niece had a car accident resulting in her ’s death last November. She states that the patient’s sister also passed in January. She states that the patient has no prior SA. She states that the patient was also seeing a psychotherapist online three to four weeks ago (unsure who that is). She states that the patient is not violent or aggressive. She states she is unsure if covid-19 brought on this feeling that the patient is experiencing. He states that the patient has no access to guns or weapons. Patient has been presenting very anxious but wife states that the patient has a history of being inpatient with things. Patient will be medically admitted. Worker provided information to wife.

## 2022-03-07 NOTE — ED BEHAVIORAL HEALTH ASSESSMENT NOTE - OTHER PAST PSYCHIATRIC HISTORY (INCLUDE DETAILS REGARDING ONSET, COURSE OF ILLNESS, INPATIENT/OUTPATIENT TREATMENT)
No formal PPH, no past inpatient hospitalization, saw therapist for the first time today, no past SA,

## 2022-03-07 NOTE — ED BEHAVIORAL HEALTH ASSESSMENT NOTE - AXIS III
COPD, HTN, HLD, gout, hypothyroidism, CAD (s/p stent in 2011 in [place], and s/p CABG x1 in 2017) COPD, HTN, HLD, gout, hypothyroidism, CAD (s/p stent in 2011 in [place], and s/p CABG x1 in 2017)  ongoing hyponatremia

## 2022-03-07 NOTE — ED BEHAVIORAL HEALTH ASSESSMENT NOTE - CURRENT MEDICATION
Per [facility], [facility] ([phone])-Carvedilol 3.125 mg BID, Valsartan 320 mg daily, Symbicort 80 mcg/4.5 mcg 2 puffs BID, latanoprost 0.005%1 gtt in both eyes daily

## 2022-03-07 NOTE — ED ADULT TRIAGE NOTE - CHIEF COMPLAINT QUOTE
Pt. c/o depression, anxiety and difficulty sleeping x 2-3 months. States he was seen by his PCP and has appointment with psychiatrist scheduled this week but states "I cant take it anymore". Denies any SI at this time but states "I can't promise if I go back home nothing would happen tonight".

## 2022-03-07 NOTE — ED BEHAVIORAL HEALTH NOTE - BEHAVIORAL HEALTH NOTE
Worker called pt’s spouse Yesenia Holman ([phone]) for collateral information and left a voicemail for call back.

## 2022-03-07 NOTE — ED BEHAVIORAL HEALTH ASSESSMENT NOTE - RISK ASSESSMENT
Moderate Acute Suicide Risk Acute risk factors include: male, global insomnia, hopelessness/helplessness, active mood episode, heighten state of anxiety, psychosocial stressors, noncompliant with treatment/not receiving treatment.   Protective factors include: help-seeking behaviors, no legal history, no past SA/SIB, no inpatient hospitalizations. Risk factors will be mitigated by voluntary admission for safety and stabilization

## 2022-03-07 NOTE — ED ADULT NURSE NOTE - OBJECTIVE STATEMENT
Pt a&ox3 presents with h/o anxiety/depression, c/o having worsening thoughts of hurting himself, has not been sleeping for the past 2-3 months, denies HI, no hallucinations, breathing even and unlabored, denies any present pain, md evaluating pt. Valuables given to wife, clothing secured in  closet.

## 2022-03-07 NOTE — ED PROVIDER NOTE - NSICDXPASTMEDICALHX_GEN_ALL_CORE_FT
PAST MEDICAL HISTORY:  CAD (coronary artery disease)     COPD (chronic obstructive pulmonary disease)     Former smoker     Glaucoma     Gout     HTN (hypertension)     Hypothyroid     MI (myocardial infarction) 2011 stent in [place]    Stented coronary artery

## 2022-03-07 NOTE — ED BEHAVIORAL HEALTH ASSESSMENT NOTE - SUMMARY
Pt is a 76 AA retired [employer] , domiciled with wife, no formal PPH, no past inpatient hospitalization, saw therapist for the first time today, no past SA, no hx of aggression/ legal issues, PMHx significant for COPD, HTN, HLD, gout, hypothyroidism, CAD (s/p stent in 2011 in [place], and s/p CABG x1 in 2017), brought in by self for worsening anxiety and depression and passive SI.     Pt admits to recent worsening of mood, feeling depressed and anxious, not functioning, uncertain if would be able to remain safe if he goes back home today,  Patient has been having impairment in his ability to function, not sleeping, feeling hopeless. Pt would benefit from inpatient psych hospitalization for safety, symptomatic stabilization and initiation of SSRI's. Pt is a 76 AA retired [employer] , domiciled with wife, no formal PPH, no past inpatient hospitalization, saw therapist for the first time today, no past SA, no hx of aggression/ legal issues, PMHx significant for COPD, HTN, HLD, gout, hypothyroidism, CAD (s/p stent in 2011 in [place], and s/p CABG x1 in 2017), brought in by self for worsening anxiety and depression and passive SI.     Pt admits to recent worsening of mood, feeling depressed and anxious, not functioning, uncertain if would be able to remain safe if he goes back home today,  Patient has been having impairment in his ability to function, not sleeping, feeling hopeless. Pt would benefit from inpatient psych hospitalization for safety, symptomatic stabilization and initiation of SSRI's.  -will admit to psychiatry after med clearance  -no 1:1 needed at this as pt denies active SIIP/HIIP, verbalized will seek out staff if symptoms worsen.

## 2022-03-07 NOTE — PATIENT PROFILE ADULT - FALL HARM RISK - RISK INTERVENTIONS

## 2022-03-07 NOTE — H&P ADULT - NSHPPHYSICALEXAM_GEN_ALL_CORE
PHYSICAL EXAM:  GENERAL: Flat affect, depressed looking  HEAD:  Atraumatic, Normocephalic  EYES: EOMI, PERRL, conjunctiva and sclera clear  NECK: Supple, No JVD  CHEST/LUNG: Clear to auscultation bilaterally; No wheezes, rales or rhonchi  HEART: Regular rate and rhythm; No murmurs, rubs, or gallops, (+)S1, S2  ABDOMEN: Soft, Nontender, Nondistended; Normal Bowel sounds   EXTREMITIES:  2+ Peripheral Pulses, No clubbing, cyanosis, or edema  PSYCH: normal mood and affect  NEUROLOGY: AAOx3, non-focal  SKIN: No rashes or lesions

## 2022-03-07 NOTE — ED BEHAVIORAL HEALTH ASSESSMENT NOTE - PSYCHIATRIC ISSUES AND PLAN (INCLUDE STANDING AND PRN MEDICATION)
Will recommend started Lexapro 5 mg daily, prn ativan 0.5mg PO/IM for anxiety /agitation, for insomnia melatonin 5 mg prn Will recommend initiation of  Mirtazapine 7.5 mg qhs, prn ativan 0.5mg PO/IM for anxiety /agitation, for insomnia melatonin 5 mg prn Will recommend initiation of  Mirtazapine 7.5 mg qhs, PRN: ativan 0.25mg PO q6Hrs for anxiety; ativan 0.5mg IM/ IV for severe agitation.  for insomnia melatonin 5 mg prn

## 2022-03-07 NOTE — ED BEHAVIORAL HEALTH ASSESSMENT NOTE - HPI (INCLUDE ILLNESS QUALITY, SEVERITY, DURATION, TIMING, CONTEXT, MODIFYING FACTORS, ASSOCIATED SIGNS AND SYMPTOMS)
Pt is a 76 AA retired [employer] , domiciled with wife, no formal PPH, no past inpatient hospitalization, saw therapist for the first time today, no past SA, no hx of aggression/ legal issues, PMHx significant for COPD, HTN, HLD, gout, hypothyroidism, CAD (s/p stent in 2011 in [place], and s/p CABG x1 in 2017), brought in by self for worsening anxiety and depression and passive SI.     On assessment pt is well appearing, calm, cooperative. Pt reports since he tested positive for COVID 19 3 months ago, started experiencing worsening mood symptoms including depressed mood, anhedonia, socially isolating, and poor appetite. Along with his depressed mood has been experiencing worsening anxiety with difficulty sleeping (1-2 hrs/night for the past week), now feeling fatigue with low energy, difficulty concentrating and sometimes feels hopeless. Compounding his depressed mood, pt reports that 2 months ago was notified [facility] [place]  [facility] [place] [facility]. States that he feels like an "evil spirit has possessed me", as he is unable to get back to a feeling of "normalcy."     Pt states that he has been trying to cope by distracting himself, taking walks, but for the past 3 weeks feels like "I am losing it", not able to gain "control" of his current situation. He endorses intermittent passive suicide thoughts of not wanting to be alive any more, however denies indulging in any suicidal behaviors or preparatory acts.  He is concern that these thoughts are progressing and unsure if he would make it through the night if it goes back home. He denies any specific plans to hurt himself or anyone, requesting "immediate treatment" for his mental anguish as his "pain is unbearable".     Pt reports three weeks ago his PMD prescribed fluoxetine 10 mg, however took it for about 1 week but self discontinued over concerns for its potential side effects. He saw a therapist for the first time today (unable to recall name) who referred him to see a physiatrist within the next week but does not think he can wait that long. States he is not functioning, feels like a burden to his wife and requesting inpatient hospitalization. He denies symptoms of psychosis, no AH/VH/TH, he is not delusional or paranoid on direct questioning. Pt denies symptoms of tom and none was elicited on exam. 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 Pt denies access to firearms.     See collateral from wife Alida [phone] Pt is a 76 AA retired [employer] , domiciled with wife, no formal PPH, no past inpatient hospitalization, saw therapist for the first time today, no past SA, no hx of aggression/ legal issues, PMHx significant for COPD, HTN, HLD, gout, hypothyroidism, CAD (s/p stent in 2011 in [place], and s/p CABG x1 in 2017), brought in by self for worsening anxiety and depression and passive SI.     On assessment pt is well appearing, calm, cooperative. Pt reports since he tested positive for COVID 19 three months ago, started experiencing worsening mood symptoms including depressed mood, anhedonia, socially isolating, and poor appetite. Along with his depressed mood has been experiencing worsening anxiety with difficulty sleeping (1-2 hrs/night for the past week), now feeling fatigue with low energy, difficulty concentrating and sometimes feels hopeless. Contributing to his depressed mood, pt reports of  recent death in the family, two family (nephew  from injuries sustained in a MVA  and older sister  due to medical issues). States that he feels like an "evil spirit has possessed me", as he is unable to get back to a feeling of "normalcy." He denies symptoms of psychosis, no AH/VH/TH, he is not delusional or paranoid on direct questioning. Pt denies symptoms of tom and none was elicited on exam.    Pt states that he has been trying to cope by distracting himself, taking walks, but for the past 3 weeks feels like "I am losing it", not able to gain "control" of his current situation. He endorses intermittent passive suicide thoughts of "not wanting to be alive any more", however denies indulging in any suicidal behaviors or preparatory acts. He is concern that these thoughts are progressing and unsure if he would make it through the night if it goes back home. He denies any specific plans to hurt himself or anyone, requesting "immediate treatment" for his mental anguish as his "pain is unbearable".     Pt reports three weeks ago his PMD prescribed fluoxetine 10 mg, however took it for about 1 week but self discontinued over concerns for its potential side effects. He saw a therapist for the first time today (unable to recall name) who referred him to see a physiatrist within the next week but does not think he can wait that long. States he is not functioning, feels like a burden to his wife and requesting inpatient hospitalization.  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 Pt denies access to firearms.     See collateral from wife Yesenia [phone].

## 2022-03-07 NOTE — H&P ADULT - PROBLEM SELECTOR PLAN 1
Likely 2/2 recent SSRI use vs hyponatremia  -Urine sodium pending  -TSH pending Likely 2/2 recent SSRI use vs hyponatremia  -Urine osmo pending but looks like siadh prelim, fluid retrict for now  -TSH pending, though free T4 normal

## 2022-03-07 NOTE — ED PROVIDER NOTE - PROGRESS NOTE DETAILS
TATO Can - discussed the case with the hospitalist who accepted for with hyponatremia tx, inpatient psychiatric team will follow pt as well. No lethargy, no seizure activity, vss, neuro intact.

## 2022-03-07 NOTE — ED BEHAVIORAL HEALTH ASSESSMENT NOTE - CASE SUMMARY
76/M with no established psych hx; does report feeling depressed and anxious during his lifetime; no hx of psych admissions, no reported hx of SA and hx of alcohol use (in remission).  pertinent medical issues include: COPD, HTN, HLD, gout, hypothyroidism, CAD s/p stent; s/p CABG x1 and recent hx of covid infection.  Self presented to the ED due to worsening anxiety and depression with passive SI.     at this time, reported experiencing worsening symptoms of depression and anxiety. depressive symptoms meeting MDD.  currently, endorses feeling hopeless; previously had passive SI. currently, denied harboring any passive or active SI or HI.  Pt is not psychotic; is not manic. no delirious process involved. ongoing worsening symptoms have caused severe functional impairment. Pt is help seeking and requests in-Pt psych admission as mitigating strategy aimed at mood stabilization (via re-initiation of appropriate antidepressant along with psychotherapeutic modalities) and ensuring safety.      RECOMMENDATIONS:   1. SUGGEST Remeron vs Wellbutrin in view of Pt having hyponatremia  2. Melatonin 5mg HS  3. PRN: ativan 0.25mg PO q6Hrs for anxiety; ativan 0.5mg IM/ IV for severe agitation  4. Psych CL service to follow him up once admitted to medicine as Pt is not medically cleared. has hyponatremia.   5. continue all other home meds: Carvedilol 3.125 mg BID, Valsartan 320 mg daily, Symbicort 80 mcg/4.5 mcg 2 puffs BID, latanoprost 0.005%1 gtt in both eyes daily

## 2022-03-07 NOTE — ED BEHAVIORAL HEALTH NOTE - BEHAVIORAL HEALTH NOTE
*Have you had a COVID-19 test in the last 90 days?  ( X ) Yes   (  ) No   (  ) Unknown- Reason: _____     IF YES PROCEED TO QUESTION #2. IF NO OR UNKNOWN, PLEASE SKIP TO QUESTION #3.     Date of test(s) and result(s): 1/12/22 (+)________     *Have you tested positive for COVID-19 antibodies? (  ) Yes   (  ) No   ( X ) Unknown- Reason: _____     IF YES PROCEED TO QUESTION #4. IF NO or UNKNOWN, PLEASE SKIP TO QUESTION #5.     Date of positive antibody test: ________     *Have you received 2 doses of the COVID-19 vaccine? (  ) Yes   ( X ) No   (  ) Unknown- Reason: _____      IF YES PROCEED TO QUESTION #6. IF NO or UNKNOWN, PLEASE SKIP TO QUESTION #7.     Date of second dose: ________     *In the past 10 days, have you been around anyone with a positive COVID-19 test?* (  ) Yes   (  X) No   (  ) Unknown- Reason: ____     IF YES PROCEED TO QUESTION #8. IF NO or UNKNOWN, PLEASE SKIP TO QUESTION #13.     Were you within 6 feet of them for at least 15 minutes? (  ) Yes   (  ) No   (  ) Unknown- Reason: _____     Have you provided care for them? (  ) Yes   (  ) No   (  ) Unknown- Reason: ______     Have you had direct physical contact with them (touched, hugged, or kissed them)? (  ) Yes   (  ) No    (  ) Unknown- Reason: _____     Have you shared eating or drinking utensils with them? (  ) Yes   (  ) No    (  ) Unknown- Reason: ____     Have they sneezed, coughed, or somehow gotten respiratory droplets on you? (  ) Yes   (  ) No    (  ) Unknown- Reason: ______     *Have you been out of [place] within the past 10 days?* (  ) Yes   ( X ) No   (  ) Unknown- Reason: _____     IF YES PLEASE ANSWER THE FOLLOWING QUESTIONS:     Which state/country have you been to? ______     Were you there over 24 hours? (  ) Yes   (  ) No    (  ) Unknown- Reason: ______     Date of return to [facility]: ______ *Have you had a COVID-19 test in the last 90 days?  ( X ) Yes   (  ) No   (  ) Unknown- Reason: _____     IF YES PROCEED TO QUESTION #2. IF NO OR UNKNOWN, PLEASE SKIP TO QUESTION #3.     Date of test(s) and result(s): 1/12/22 (+)________     *Have you tested positive for COVID-19 antibodies? (  ) Yes   (  ) No   ( X ) Unknown- Reason: _____     IF YES PROCEED TO QUESTION #4. IF NO or UNKNOWN, PLEASE SKIP TO QUESTION #5.     Date of positive antibody test: ________     *Have you received 2 doses of the COVID-19 vaccine? (  ) Yes   ( X ) No   (  ) Unknown- Reason: _____      IF YES PROCEED TO QUESTION #6. IF NO or UNKNOWN, PLEASE SKIP TO QUESTION #7.     Date of second dose: ________     *In the past 10 days, have you been around anyone with a positive COVID-19 test?* (  ) Yes   (  X) No   (  ) Unknown- Reason: ____     IF YES PROCEED TO QUESTION #8. IF NO or UNKNOWN, PLEASE SKIP TO QUESTION #13.     Were you within 6 feet of them for at least 15 minutes? (  ) Yes   (  ) No   (  ) Unknown- Reason: _____     Have you provided care for them? (  ) Yes   (  ) No   (  ) Unknown- Reason: ______     Have you had direct physical contact with them (touched, hugged, or kissed them)? (  ) Yes   (  ) No    (  ) Unknown- Reason: _____     Have you shared eating or drinking utensils with them? (  ) Yes   (  ) No    (  ) Unknown- Reason: ____     Have they sneezed, coughed, or somehow gotten respiratory droplets on you? (  ) Yes   (  ) No    (  ) Unknown- Reason: ______     *Have you been out of [place] within the past 10 days?* (  ) Yes   ( X ) No   (  ) Unknown- Reason: _____     IF YES PLEASE ANSWER THE FOLLOWING QUESTIONS:     Which state/country have you been to? ______     Were you there over 24 hours? (  ) Yes   (  ) No    (  ) Unknown- Reason: ______     Date of return to [facility]: ______      ** PATIENT EXPRESSED INTEREST IN GETTING VACCINATED **

## 2022-03-07 NOTE — ED ADULT TRIAGE NOTE - DOMESTIC TRAVEL HIGH RISK QUESTION
Chief Complaint   Patient presents with     Throat Pain     stuffy nose x 2 weeks       Initial /70 (BP Location: Right arm, Patient Position: Chair, Cuff Size: Adult Large)  Pulse 78  Temp 98.8  F (37.1  C) (Tympanic)  Ht 5' (1.524 m)  Wt 161 lb (73 kg)  SpO2 96%  BMI 31.44 kg/m2 Estimated body mass index is 31.44 kg/(m^2) as calculated from the following:    Height as of this encounter: 5' (1.524 m).    Weight as of this encounter: 161 lb (73 kg).  Medication Reconciliation: complete   Radha Corbett CMA       
No

## 2022-03-07 NOTE — ED BEHAVIORAL HEALTH ASSESSMENT NOTE - DESCRIPTION
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, has two children which keep in touch with him frequently COPD, HTN, HLD, gout, hypothyroidism, CAD (s/p stent in 2011 in [place], and s/p CABG x1 in 2017) calm, cooperative  Vital Signs Last 24 Hrs  T(C): 36.7 (03-07-22 @ 15:18), Max: 36.8 (03-07-22 @ 14:33)  T(F): 98.1 (03-07-22 @ 15:18), Max: 98.2 (03-07-22 @ 14:33)  HR: 80 (03-07-22 @ 15:18) (80 - 82)  BP: 151/68 (03-07-22 @ 15:18) (147/77 - 151/68)  BP(mean): --  RR: 16 (03-07-22 @ 15:18) (16 - 16)  SpO2: 100% (03-07-22 @ 15:18) (98% - 100%) cooperative  Vital Signs Last 24 Hrs  T(C): 36.7 (03-07-22 @ 15:18), Max: 36.8 (03-07-22 @ 14:33)  T(F): 98.1 (03-07-22 @ 15:18), Max: 98.2 (03-07-22 @ 14:33)  HR: 80 (03-07-22 @ 15:18) (80 - 82)  BP: 151/68 (03-07-22 @ 15:18) (147/77 - 151/68)  BP(mean): --  RR: 16 (03-07-22 @ 15:18) (16 - 16)  SpO2: 100% (03-07-22 @ 15:18) (98% - 100%)

## 2022-03-07 NOTE — H&P ADULT - NSHPREVIEWOFSYSTEMS_GEN_ALL_CORE
REVIEW OF SYSTEMS:    CONSTITUTIONAL: No weakness, fevers or chills, + fatigue  EYES/ENT: No visual changes;  No dysphagia  NECK: No pain or stiffness  RESPIRATORY: No cough, wheezing, hemoptysis; No shortness of breath  CARDIOVASCULAR: No chest pain or palpitations; No lower extremity edema  GASTROINTESTINAL: No abdominal or epigastric pain. No nausea, vomiting, or hematemesis; No diarrhea or constipation. No melena or hematochezia.  GENITOURINARY: No dysuria, frequency or hematuria  NEUROLOGICAL: No numbness or weakness  SKIN: No itching, burning, rashes, or lesions   PSYCH: No auditory or visual hallucinations  All other review of systems is negative unless indicated above.

## 2022-03-07 NOTE — ED BEHAVIORAL HEALTH ASSESSMENT NOTE - MEDICAL ISSUES AND PLAN (INCLUDE STANDING AND PRN MEDICATION)
COPD, HTN, HLD, gout, hypothyroidism, CAD, continue home meds COPD, HTN, HLD, gout, hypothyroidism, CAD, continue home meds -Carvedilol 3.125 mg BID, Valsartan 320 mg daily, Symbicort 80 mcg/4.5 mcg 2 puffs BID, latanoprost 0.005%1 gtt in both eyes daily Pt is hyponatremic per labs, Na 125, pt will need medical admit for Na hydration. Medical hx of COPD, HTN, HLD, gout, hypothyroidism, CAD, continue home meds -Carvedilol 3.125 mg BID, Valsartan 320 mg daily, Symbicort 80 mcg/4.5 mcg 2 puffs BID, latanoprost 0.005%1 gtt in both eyes daily

## 2022-03-07 NOTE — ED BEHAVIORAL HEALTH ASSESSMENT NOTE - NS ED BHA PLAN MSU PSYCHIATRIC ISSUES2 FT
Will recommend initiation of  Mirtazapine 7.5 mg qhs, prn ativan 0.5mg PO/IM for anxiety /agitation, for insomnia melatonin 5 mg prn Will recommend initiation of  Mirtazapine 7.5 mg qhs, PRN: ativan 0.25mg PO q6Hrs for anxiety; ativan 0.5mg IM/ IV for severe agitation.  for insomnia melatonin 5 mg prn

## 2022-03-07 NOTE — ED ADULT NURSE REASSESSMENT NOTE - NS ED NURSE REASSESS COMMENT FT1
Pt A&Ox4. Report given to essu2 rn. Respirations even and unlabored, no accessory muscle use. 20G iv placed to L forearm. Pt moved to ESSU2. Per MD Owens, no need for patient to be on a 1:1.

## 2022-03-07 NOTE — ED BEHAVIORAL HEALTH ASSESSMENT NOTE - NSBHROSSYSTEMS_PSY_ALL_CORE
Pt denied any headaches, no dizziness, no blurring of vision; no sorethroat; no cough, no fever. no chest pains, no SOB, no palpitations, no abdominal pains, no nausea/ vomiting/ diarrhea, no dysuria, no hesitancy, no arthralgia/ no pruritus

## 2022-03-07 NOTE — PATIENT PROFILE ADULT - NSPROPTRIGHTBILLOFRIGHTS_GEN_A_NUR
"Kim Mccallum is a 72 year old female who is being evaluated via a billable telephone visit.      The patient has been notified of following:     \"This telephone visit will be conducted via a call between you and your physician/provider. We have found that certain health care needs can be provided without the need for a physical exam.  This service lets us provide the care you need with a short phone conversation.  If a prescription is necessary we can send it directly to your pharmacy.  If lab work is needed we can place an order for that and you can then stop by our lab to have the test done at a later time.    Telephone visits are billed at different rates depending on your insurance coverage. During this emergency period, for some insurers they may be billed the same as an in-person visit.  Please reach out to your insurance provider with any questions.    If during the course of the call the physician/provider feels a telephone visit is not appropriate, you will not be charged for this service.\"    Patient has given verbal consent for Telephone visit?  Yes    What phone number would you like to be contacted at? [phone]    How would you like to obtain your AVS?     Subjective     Kim Mccallum is a 72 year old female who presents via phone visit today for the following health issues:    HPI    Hyperlipidemia Follow-Up      Are you regularly taking any medication or supplement to lower your cholesterol?   Yes- Simvastatin    Are you having muscle aches or other side effects that you think could be caused by your cholesterol lowering medication?  No-once in a while will get muscle aches    Hypertension Follow-up      Do you check your blood pressure regularly outside of the clinic? No     Are you following a low salt diet? Yes    Are your blood pressures ever more than 140 on the top number (systolic) OR more   than 90 on the bottom number (diastolic), for example 140/90? Not Checking      How " many servings of fruits and vegetables do you eat daily?  2-3    On average, how many sweetened beverages do you drink each day (Examples: soda, juice, sweet tea, etc.  Do NOT count diet or artificially sweetened beverages)?   0    How many days per week do you exercise enough to make your heart beat faster? none    How many minutes a day do you exercise enough to make your heart beat faster? none    How many days per week do you miss taking your medication? 0        Patient is physically feeling quite well.  During COVID 19 pandemic she has only driven to drive-through bank and drive-through pharmacy.  She has no desire to be out and about and more in public because she realizes how much a risk developing this infection would be for her.  Her contact with family and friends who get groceries etc. is outside at a distance.  Patient herself stays physically active doing yard work.  She thinks winter might be a tougher time because of inability be out but she says she will deal with it because the alternative is deadly.    Patient Active Problem List   Diagnosis     Tobacco use disorder     HYPERLIPIDEMIA LDL GOAL <130     History of uterine cancer     Advanced directives, counseling/discussion     Abnormal results of liver function studies     Grief reaction     Essential hypertension with goal blood pressure less than 140/90     Skin lesion     Past Surgical History:   Procedure Laterality Date     C IMPLANT PROCEDURE   9/1998    RADIATION IMPLANT IN VAGINAL AREA     C TOTAL ABDOM HYSTERECTOMY  1998    Hysterectomy, Total Abdominal     COLONOSCOPY  02/25/09    Repeat in 10 yrs     HC COLONOSCOPY W/WO BRUSH/WASH  1/24/2006     HC REMOVAL OF OVARY/TUBE(S)  1998    Salpingo-Oophorectomy, bilateral       Social History     Tobacco Use     Smoking status: Current Every Day Smoker     Packs/day: 0.25     Years: 40.00     Pack years: 10.00     Smokeless tobacco: Never Used   Substance Use Topics     Alcohol use: No      Alcohol/week: 0.0 standard drinks     Family History   Problem Relation Age of Onset     Cancer Father         LUNG CANCER ,      Cancer Sister         BREAST CA AND ENDOMETRIAL      Hypertension Sister          Current Outpatient Medications   Medication Sig Dispense Refill     aspirin 81 MG tablet Take 1 tablet (81 mg) by mouth daily 30 tablet      calcium carbonate (OS-DIOR 500 MG [place]ze. CA) 500 MG tablet Take 1 tablet (500 mg) by mouth 2 times daily 90 tablet      Cholecalciferol (VITAMIN D) 1000 UNIT capsule Take 1 capsule by mouth daily.       co-enzyme Q-10 100 MG CAPS Take by mouth daily  0     garlic 150 MG TABS tablet Take 150 mg by mouth daily       lisinopril (ZESTRIL) 10 MG tablet Take 1 tablet (10 mg) by mouth daily 90 tablet 1     MULTIVITAMINS OR TABS ONE DAILY 100 3     simvastatin (ZOCOR) 20 MG tablet Take 1 tablet (20 mg) by mouth At Bedtime 90 tablet 1     Allergies   Allergen Reactions     Atorvastatin      Hctz      welts     BP Readings from Last 3 Encounters:   19 128/74   18 138/78   18 (!) 157/92    Wt Readings from Last 3 Encounters:   19 81.2 kg (179 lb)   18 80.3 kg (177 lb 1.6 oz)   18 79.4 kg (175 lb)                    Reviewed and updated as needed this visit by Provider         Review of Systems   Constitutional, HEENT, cardiovascular, pulmonary, gi and gu systems are negative, except as otherwise noted.       Objective   Reported vitals:  There were no vitals taken for this visit.   healthy, alert and no distress  PSYCH: Alert and oriented times 3; coherent speech, normal   rate and volume, able to articulate logical thoughts, able   to abstract reason, no tangential thoughts, no hallucinations   or delusions  Her affect is normal  RESP: No cough, no audible wheezing, able to talk in full sentences  Remainder of exam unable to be completed due to telephone visits    Diagnostic Test Results:  Labs reviewed in Epic         Assessment/Plan:    1. Essential hypertension with goal blood pressure less than 140/90  At this point with COVID-19 pandemic, we can only assume blood pressure remains well controlled with lisinopril as it always has.  She is not going to the pharmacy to check periodically and cannot come to the clinic.  I certainly support her plan to stay as socially isolated as she remains.  COVID 19 infection could be easily very deadly for her given age and health issues.  I did tell her I would put lab orders in but they would be good for 1 year.  She will have refills of medication for 6 months.  Further plans pending how pandemic plays out.  - lisinopril (ZESTRIL) 10 MG tablet; Take 1 tablet (10 mg) by mouth daily  Dispense: 90 tablet; Refill: 1  - **CBC with platelets FUTURE anytime; Future  - **Comprehensive metabolic panel FUTURE anytime; Future    2. Hyperlipidemia LDL goal <130  See discussion above regarding hypertension.  - simvastatin (ZOCOR) 20 MG tablet; Take 1 tablet (20 mg) by mouth At Bedtime  Dispense: 90 tablet; Refill: 1  - Lipid panel reflex to direct LDL Fasting; Future    3. Tobacco use disorder  As always patient is aware she should be a non-smoker and tries to limit.      Return in about 6 months (around 1/29/2021) for BP Recheck.      Phone call duration: 17 minutes 2:42 PM to 2:59 PM    Ronny Murray MD         patient

## 2022-03-07 NOTE — H&P ADULT - HISTORY OF PRESENT ILLNESS
76 yr old M, pmh hypothyroid, CAD, COPD, heart attack 2011, open heart sx 2017 with c/o anxiety, depression and unable to sleep at least for the last 3 weeks. He reports he was covid + about 3 month ago and since then he has been having low energy, low mood. About 2 month ago his sister and his nephew passed away in a tragic accident. He went to his pmd who started him on fluoxetine and referred him to a psychiatrist but he can not wait any longer. Took fluoxtine for a few days without improvement. He lives with his wife Yesenia ( [phone]). Currently denies SI and does not have any passive SI. Does not take his levothyroxine consistently. Denies chest pain, abdominal pain, dyapnea, constipation, hair brittleness.    In the ED, VSS. Admitted to medicine because of hyponatremia 125.

## 2022-03-07 NOTE — ED BEHAVIORAL HEALTH ASSESSMENT NOTE - MODIFICATIONS
I have seen and examined the Pt with NP PETE Weiss and performed key elements of the History, Mental Status Examination and Physical Examination.  I concur with his assessment and recommendations apart from my additional recommendations as mentioned below. I have discussed the Pt's assessment and plan of care with NP PETE Weiss.  I agree with the note as stated above, having amended the EMR as needed to reflect my findings.  This includes during the time I functioned as the attending physician for this Pt at the [facility].

## 2022-03-07 NOTE — ED BEHAVIORAL HEALTH ASSESSMENT NOTE - SUBSTANCE ISSUES AND PLAN (INCLUDE STANDING AND PRN MEDICATION)
alcohol in remission, no need for CIWA alcohol in remission, denies withdrawal sxs so no need for CIWA

## 2022-03-07 NOTE — PATIENT PROFILE ADULT - VISION (WITH CORRECTIVE LENSES IF THE PATIENT USUALLY WEARS THEM):
with cataracts/Partially impaired: cannot see medication labels or newsprint, but can see obstacles in path, and the surrounding layout; can count fingers at arm's length

## 2022-03-07 NOTE — ED PROVIDER NOTE - OBJECTIVE STATEMENT
This is a 76 y chris ALVA, pmh hypothyroid, CAD, COPD with c/o anxiety, depression and unable to sleep at least for the last 3 weeks This is a 76 y chris ALVA, pmh hypothyroid, CAD, COPD, heart attack 2011, open heart sx 2017 with c/o anxiety, depression and unable to sleep at least for the last 3 weeks This is a 76 y chris ALVA, [facility] hypothyroid, CAD, COPD, heart attack 2011, open heart sx 2017 with c/o anxiety, depression and unable to sleep at least for the last 3 weeks. He reports he was covid + about 3 month ago and since then everything changed. About 2 month ago his sister and his nephew passed away in a tragic accident. He does not feel well. He went to his pmd who referred him to a therapist but he can not wait any longer. He lives with his wife Yesenia ( [phone])

## 2022-03-07 NOTE — H&P ADULT - NSHPLABSRESULTS_GEN_ALL_CORE
03-    125<L>  |  91<L>  |  12  ----------------------------<  105<H>  4.3   |  21<L>  |  1.02    Ca    9.4      07 Mar 2022 16:38  Phos  3.1       Mg     1.90     -    TPro  7.0  /  Alb  4.2  /  TBili  0.8  /  DBili  x   /  AST  24  /  ALT  21  /  AlkPhos  54                         14.5   3.42  )-----------( 178      ( 07 Mar 2022 16:38 )             40.1     LIVER FUNCTIONS - ( 07 Mar 2022 16:38 )  Alb: 4.2 g/dL / Pro: 7.0 g/dL / ALK PHOS: 54 U/L / ALT: 21 U/L / AST: 24 U/L / GGT: x           Urinalysis Basic - ( 07 Mar 2022 16:38 )    Color: Light Yellow / Appearance: Clear / S.010 / pH: x  Gluc: x / Ketone: Trace  / Bili: Negative / Urobili: <2 mg/dL   Blood: x / Protein: Negative / Nitrite: Negative   Leuk Esterase: Negative / RBC: 4 /HPF / WBC 4 /HPF   Sq Epi: x / Non Sq Epi: 2 /HPF / Bacteria: Few    EKG: NSR    Image: reviewed

## 2022-03-07 NOTE — H&P ADULT - ASSESSMENT
76 yr old M, pmh hypothyroid, CAD, COPD, heart attack 2011, open heart sx 2017 with c/o anxiety, depression and unable to sleep for the past few nights admitted for severe depression. Currently without active/passive SI. Admitted to medicine due to mild hyponatremia.

## 2022-03-08 LAB
ALBUMIN SERPL ELPH-MCNC: 4 G/DL — SIGNIFICANT CHANGE UP (ref 3.3–5)
ALP SERPL-CCNC: 54 U/L — SIGNIFICANT CHANGE UP (ref 40–120)
ALT FLD-CCNC: 17 U/L — SIGNIFICANT CHANGE UP (ref 4–41)
ANION GAP SERPL CALC-SCNC: 11 MMOL/L — SIGNIFICANT CHANGE UP (ref 7–14)
ANION GAP SERPL CALC-SCNC: 11 MMOL/L — SIGNIFICANT CHANGE UP (ref 7–14)
AST SERPL-CCNC: 19 U/L — SIGNIFICANT CHANGE UP (ref 4–40)
BASOPHILS # BLD AUTO: 0.02 K/UL — SIGNIFICANT CHANGE UP (ref 0–0.2)
BASOPHILS NFR BLD AUTO: 0.7 % — SIGNIFICANT CHANGE UP (ref 0–2)
BILIRUB SERPL-MCNC: 1 MG/DL — SIGNIFICANT CHANGE UP (ref 0.2–1.2)
BUN SERPL-MCNC: 12 MG/DL — SIGNIFICANT CHANGE UP (ref 7–23)
BUN SERPL-MCNC: 24 MG/DL — HIGH (ref 7–23)
CALCIUM SERPL-MCNC: 8.8 MG/DL — SIGNIFICANT CHANGE UP (ref 8.4–10.5)
CALCIUM SERPL-MCNC: 9.4 MG/DL — SIGNIFICANT CHANGE UP (ref 8.4–10.5)
CHLORIDE SERPL-SCNC: 94 MMOL/L — LOW (ref 98–107)
CHLORIDE SERPL-SCNC: 96 MMOL/L — LOW (ref 98–107)
CO2 SERPL-SCNC: 23 MMOL/L — SIGNIFICANT CHANGE UP (ref 22–31)
CO2 SERPL-SCNC: 25 MMOL/L — SIGNIFICANT CHANGE UP (ref 22–31)
COVID-19 SPIKE DOMAIN AB INTERP: POSITIVE
COVID-19 SPIKE DOMAIN ANTIBODY RESULT: 18.2 U/ML — HIGH
CREAT SERPL-MCNC: 1.04 MG/DL — SIGNIFICANT CHANGE UP (ref 0.5–1.3)
CREAT SERPL-MCNC: 1.23 MG/DL — SIGNIFICANT CHANGE UP (ref 0.5–1.3)
EGFR: 60 ML/MIN/1.73M2 — SIGNIFICANT CHANGE UP
EGFR: 74 ML/MIN/1.73M2 — SIGNIFICANT CHANGE UP
EOSINOPHIL # BLD AUTO: 0.22 K/UL — SIGNIFICANT CHANGE UP (ref 0–0.5)
EOSINOPHIL NFR BLD AUTO: 7.7 % — HIGH (ref 0–6)
GLUCOSE SERPL-MCNC: 114 MG/DL — HIGH (ref 70–99)
GLUCOSE SERPL-MCNC: 83 MG/DL — SIGNIFICANT CHANGE UP (ref 70–99)
HCT VFR BLD CALC: 40.3 % — SIGNIFICANT CHANGE UP (ref 39–50)
HGB BLD-MCNC: 14.4 G/DL — SIGNIFICANT CHANGE UP (ref 13–17)
IANC: 1.29 K/UL — LOW (ref 1.5–8.5)
IMM GRANULOCYTES NFR BLD AUTO: 0 % — SIGNIFICANT CHANGE UP (ref 0–1.5)
INR BLD: 1.38 RATIO — HIGH (ref 0.88–1.16)
LYMPHOCYTES # BLD AUTO: 0.9 K/UL — LOW (ref 1–3.3)
LYMPHOCYTES # BLD AUTO: 31.7 % — SIGNIFICANT CHANGE UP (ref 13–44)
MCHC RBC-ENTMCNC: 28.6 PG — SIGNIFICANT CHANGE UP (ref 27–34)
MCHC RBC-ENTMCNC: 35.7 GM/DL — SIGNIFICANT CHANGE UP (ref 32–36)
MCV RBC AUTO: 80.1 FL — SIGNIFICANT CHANGE UP (ref 80–100)
MONOCYTES # BLD AUTO: 0.41 K/UL — SIGNIFICANT CHANGE UP (ref 0–0.9)
MONOCYTES NFR BLD AUTO: 14.4 % — HIGH (ref 2–14)
NEUTROPHILS # BLD AUTO: 1.29 K/UL — LOW (ref 1.8–7.4)
NEUTROPHILS NFR BLD AUTO: 45.5 % — SIGNIFICANT CHANGE UP (ref 43–77)
NRBC # BLD: 0 /100 WBCS — SIGNIFICANT CHANGE UP
NRBC # FLD: 0 K/UL — SIGNIFICANT CHANGE UP
PLATELET # BLD AUTO: 166 K/UL — SIGNIFICANT CHANGE UP (ref 150–400)
POTASSIUM SERPL-MCNC: 4.2 MMOL/L — SIGNIFICANT CHANGE UP (ref 3.5–5.3)
POTASSIUM SERPL-MCNC: 4.7 MMOL/L — SIGNIFICANT CHANGE UP (ref 3.5–5.3)
POTASSIUM SERPL-SCNC: 4.2 MMOL/L — SIGNIFICANT CHANGE UP (ref 3.5–5.3)
POTASSIUM SERPL-SCNC: 4.7 MMOL/L — SIGNIFICANT CHANGE UP (ref 3.5–5.3)
PROT SERPL-MCNC: 7.1 G/DL — SIGNIFICANT CHANGE UP (ref 6–8.3)
PROTHROM AB SERPL-ACNC: 16.1 SEC — HIGH (ref 10.5–13.4)
RBC # BLD: 5.03 M/UL — SIGNIFICANT CHANGE UP (ref 4.2–5.8)
RBC # FLD: 13.9 % — SIGNIFICANT CHANGE UP (ref 10.3–14.5)
SARS-COV-2 IGG+IGM SERPL QL IA: 18.2 U/ML — HIGH
SARS-COV-2 IGG+IGM SERPL QL IA: POSITIVE
SODIUM SERPL-SCNC: 130 MMOL/L — LOW (ref 135–145)
SODIUM SERPL-SCNC: 130 MMOL/L — LOW (ref 135–145)
T4 FREE SERPL-MCNC: 1.8 NG/DL — SIGNIFICANT CHANGE UP (ref 0.9–1.8)
WBC # BLD: 2.84 K/UL — LOW (ref 3.8–10.5)
WBC # FLD AUTO: 2.84 K/UL — LOW (ref 3.8–10.5)

## 2022-03-08 PROCEDURE — 99232 SBSQ HOSP IP/OBS MODERATE 35: CPT | Mod: GC

## 2022-03-08 PROCEDURE — 99232 SBSQ HOSP IP/OBS MODERATE 35: CPT

## 2022-03-08 RX ORDER — MIRTAZAPINE 45 MG/1
7.5 TABLET, ORALLY DISINTEGRATING ORAL AT BEDTIME
Refills: 0 | Status: DISCONTINUED | OUTPATIENT
Start: 2022-03-08 | End: 2022-03-09

## 2022-03-08 RX ADMIN — Medication 50 MICROGRAM(S): at 05:35

## 2022-03-08 RX ADMIN — VALSARTAN 320 MILLIGRAM(S): 80 TABLET ORAL at 05:35

## 2022-03-08 RX ADMIN — CARVEDILOL PHOSPHATE 12.5 MILLIGRAM(S): 80 CAPSULE, EXTENDED RELEASE ORAL at 22:01

## 2022-03-08 RX ADMIN — Medication 50 MILLIGRAM(S): at 21:57

## 2022-03-08 RX ADMIN — ATORVASTATIN CALCIUM 40 MILLIGRAM(S): 80 TABLET, FILM COATED ORAL at 22:01

## 2022-03-08 RX ADMIN — ENOXAPARIN SODIUM 40 MILLIGRAM(S): 100 INJECTION SUBCUTANEOUS at 05:35

## 2022-03-08 RX ADMIN — CARVEDILOL PHOSPHATE 12.5 MILLIGRAM(S): 80 CAPSULE, EXTENDED RELEASE ORAL at 10:35

## 2022-03-08 RX ADMIN — BUDESONIDE AND FORMOTEROL FUMARATE DIHYDRATE 2 PUFF(S): 160; 4.5 AEROSOL RESPIRATORY (INHALATION) at 21:57

## 2022-03-08 RX ADMIN — Medication 81 MILLIGRAM(S): at 11:42

## 2022-03-08 RX ADMIN — MIRTAZAPINE 7.5 MILLIGRAM(S): 45 TABLET, ORALLY DISINTEGRATING ORAL at 22:01

## 2022-03-08 NOTE — PROGRESS NOTE ADULT - SUBJECTIVE AND OBJECTIVE BOX
DATE OF SERVICE: 22 @ 08:07    Patient is a 76y old  Male who presents with a chief complaint of Depression (07 Mar 2022 20:37)      SUBJECTIVE / OVERNIGHT EVENTS:  No acute overnight events.    MEDICATIONS  (STANDING):  aspirin  chewable 81 milliGRAM(s) Oral daily  atorvastatin 40 milliGRAM(s) Oral at bedtime  budesonide 160 MICROgram(s)/formoterol 4.5 MICROgram(s) Inhaler 2 Puff(s) Inhalation two times a day  carvedilol 12.5 milliGRAM(s) Oral every 12 hours  enoxaparin Injectable 40 milliGRAM(s) SubCutaneous every 24 hours  levothyroxine 50 MICROGram(s) Oral daily  traZODone 50 milliGRAM(s) Oral every 24 hours  valsartan 320 milliGRAM(s) Oral daily    MEDICATIONS  (PRN):  acetaminophen     Tablet .. 650 milliGRAM(s) Oral every 6 hours PRN Temp greater or equal to 38C (100.4F), Mild Pain (1 - 3)  ALBUTerol    90 MICROgram(s) HFA Inhaler 2 Puff(s) Inhalation every 6 hours PRN Wheezing  aluminum hydroxide/magnesium hydroxide/simethicone Suspension 30 milliLiter(s) Oral every 4 hours PRN Dyspepsia  melatonin 3 milliGRAM(s) Oral at bedtime PRN Insomnia  ondansetron Injectable 4 milliGRAM(s) IV Push every 8 hours PRN Nausea and/or Vomiting      Vital Signs Last 24 Hrs  T(C): 36.9 (08 Mar 2022 05:29), Max: 36.9 (08 Mar 2022 05:29)  T(F): 98.5 (08 Mar 2022 05:29), Max: 98.5 (08 Mar 2022 05:29)  HR: 66 (08 Mar 2022 05:29) (62 - 82)  BP: 144/79 (08 Mar 2022 05:29) (141/60 - 178/96)  BP(mean): --  RR: 17 (08 Mar 2022 05:29) (16 - 18)  SpO2: 100% (08 Mar 2022 05:29) (98% - 100%)  CAPILLARY BLOOD GLUCOSE      POCT Blood Glucose.: 100 mg/dL (07 Mar 2022 14:42)    I&O's Summary      PHYSICAL EXAM:  GENERAL: Flat affect, NAD  HEAD:  Atraumatic, Normocephalic  EYES: EOMI, PERRL, conjunctiva and sclera clear  NECK: Supple, No JVD  CHEST/LUNG: Clear to auscultation bilaterally; No wheezes, rales or rhonchi  HEART: Regular rate and rhythm; No murmurs, rubs, or gallops, S1, S2 present  ABDOMEN: Soft, Nontender, Nondistended; Normal Bowel sounds   EXTREMITIES:  2+ Peripheral Pulses, No clubbing, cyanosis, or edema  PSYCH: normal mood and affect  NEUROLOGY: AAOx3, non-focal  SKIN: No rashes or lesions    LABS:                        14.4   2.84  )-----------( 166      ( 08 Mar 2022 07:15 )             40.3     03-07    125<L>  |  91<L>  |  12  ----------------------------<  105<H>  4.3   |  21<L>  |  1.02    Ca    9.4      07 Mar 2022 16:38  Phos  3.1     03-07  Mg     1.90     03-07    TPro  7.0  /  Alb  4.2  /  TBili  0.8  /  DBili  x   /  AST  24  /  ALT  21  /  AlkPhos  54  03-07    PT/INR - ( 08 Mar 2022 07:15 )   PT: 16.1 sec;   INR: 1.38 ratio               Urinalysis Basic - ( 07 Mar 2022 16:38 )    Color: Light Yellow / Appearance: Clear / S.010 / pH: x  Gluc: x / Ketone: Trace  / Bili: Negative / Urobili: <2 mg/dL   Blood: x / Protein: Negative / Nitrite: Negative   Leuk Esterase: Negative / RBC: 4 /HPF / WBC 4 /HPF   Sq Epi: x / Non Sq Epi: 2 /HPF / Bacteria: Few        RADIOLOGY & ADDITIONAL TESTS:    Imaging Personally Reviewed:    Consultant(s) Notes Reviewed:      Care Discussed with Consultants/Other Providers:   DATE OF SERVICE: 22 @ 08:07    Patient is a 76y old  Male who presents with a chief complaint of Depression (07 Mar 2022 20:37)      SUBJECTIVE / OVERNIGHT EVENTS:  No acute overnight events. Sodium increasing appropriately. The patient states he feels anxious, similar to his baseline anxiety, and he denies any current SI.     MEDICATIONS  (STANDING):  aspirin  chewable 81 milliGRAM(s) Oral daily  atorvastatin 40 milliGRAM(s) Oral at bedtime  budesonide 160 MICROgram(s)/formoterol 4.5 MICROgram(s) Inhaler 2 Puff(s) Inhalation two times a day  carvedilol 12.5 milliGRAM(s) Oral every 12 hours  enoxaparin Injectable 40 milliGRAM(s) SubCutaneous every 24 hours  levothyroxine 50 MICROGram(s) Oral daily  traZODone 50 milliGRAM(s) Oral every 24 hours  valsartan 320 milliGRAM(s) Oral daily    MEDICATIONS  (PRN):  acetaminophen     Tablet .. 650 milliGRAM(s) Oral every 6 hours PRN Temp greater or equal to 38C (100.4F), Mild Pain (1 - 3)  ALBUTerol    90 MICROgram(s) HFA Inhaler 2 Puff(s) Inhalation every 6 hours PRN Wheezing  aluminum hydroxide/magnesium hydroxide/simethicone Suspension 30 milliLiter(s) Oral every 4 hours PRN Dyspepsia  melatonin 3 milliGRAM(s) Oral at bedtime PRN Insomnia  ondansetron Injectable 4 milliGRAM(s) IV Push every 8 hours PRN Nausea and/or Vomiting      Vital Signs Last 24 Hrs  T(C): 36.9 (08 Mar 2022 05:29), Max: 36.9 (08 Mar 2022 05:29)  T(F): 98.5 (08 Mar 2022 05:29), Max: 98.5 (08 Mar 2022 05:29)  HR: 66 (08 Mar 2022 05:29) (62 - 82)  BP: 144/79 (08 Mar 2022 05:29) (141/60 - 178/96)  BP(mean): --  RR: 17 (08 Mar 2022 05:29) (16 - 18)  SpO2: 100% (08 Mar 2022 05:29) (98% - 100%)  CAPILLARY BLOOD GLUCOSE      POCT Blood Glucose.: 100 mg/dL (07 Mar 2022 14:42)    I&O's Summary      PHYSICAL EXAM:  GENERAL: Flat affect, NAD  HEAD:  Atraumatic, Normocephalic  EYES: EOMI, PERRL, conjunctiva and sclera clear  NECK: Supple, No JVD  CHEST/LUNG: Clear to auscultation bilaterally, decreased breath sounds throughout, possibly due to poor effort, No wheezes, rales or rhonchi  HEART: Regular rate and rhythm; No murmurs, rubs, or gallops, S1, S2 present  ABDOMEN: Soft, Nontender, Nondistended; Normal Bowel sounds   EXTREMITIES:  2+ Peripheral Pulses, No clubbing, cyanosis, or edema  PSYCH: normal mood and affect  NEUROLOGY: AAOx3, non-focal  SKIN: No rashes or lesions    LABS:                        14.4   2.84  )-----------( 166      ( 08 Mar 2022 07:15 )             40.3     03-07    125<L>  |  91<L>  |  12  ----------------------------<  105<H>  4.3   |  21<L>  |  1.02    Ca    9.4      07 Mar 2022 16:38  Phos  3.1     03-07  Mg     1.90     03-07    TPro  7.0  /  Alb  4.2  /  TBili  0.8  /  DBili  x   /  AST  24  /  ALT  21  /  AlkPhos  54  03-07    PT/INR - ( 08 Mar 2022 07:15 )   PT: 16.1 sec;   INR: 1.38 ratio               Urinalysis Basic - ( 07 Mar 2022 16:38 )    Color: Light Yellow / Appearance: Clear / S.010 / pH: x  Gluc: x / Ketone: Trace  / Bili: Negative / Urobili: <2 mg/dL   Blood: x / Protein: Negative / Nitrite: Negative   Leuk Esterase: Negative / RBC: 4 /HPF / WBC 4 /HPF   Sq Epi: x / Non Sq Epi: 2 /HPF / Bacteria: Few        RADIOLOGY & ADDITIONAL TESTS:    Imaging Personally Reviewed: No new imaging studies.    Consultant(s) Notes Reviewed:  behavioral health

## 2022-03-08 NOTE — PROGRESS NOTE ADULT - PROBLEM SELECTOR PLAN 6
FENP: No IVF, Lytes PRN, Regular diet, Lovenox ppx  Dispo: Pending psych eval FENP: No IVF, Lytes PRN, Regular diet, Lovenox ppx  Dispo: Pending psych recs

## 2022-03-08 NOTE — BH CONSULTATION LIAISON PROGRESS NOTE - NSBHASSESSMENTFT_PSY_ALL_CORE
Pt is a 76 AA retired [facility] [employer] , domiciled with wife, no formal PPH, no past inpatient hospitalization, saw therapist for the first time today, no past SA, no hx of aggression/ legal issues, PMHx significant for COPD, HTN, HLD, gout, hypothyroidism, CAD (s/p stent in 2011 in [place], and s/p CABG x1 in 2017), brought in by self for worsening anxiety and depression and passive SI.     Pt admits to recent worsening of mood, feeling depressed and anxious, not functioning, uncertain if would be able to remain safe if he goes back home today,  Patient has been having impairment in his ability to function, not sleeping, feeling hopeless. Pt would benefit from inpatient psych hospitalization for safety, symptomatic stabilization and initiation of SSRI's.    3/8: A&Ox3. Pt feeling depressed, difficulties sleeping. Denies SI/HI/AVH.    Plan:  -no 1:1 needed at this as pt denies active SIIP/HIIP, verbalized will seek out staff if symptoms worsen  - ADD: Mirtazapine 7.5mg qhs for sleep, depressed mood  - PRN: Ativan 0.25mg PO q6hr for anxiety  - CL to follow   Pt is a 76 AA retired [employer] [employer] , domiciled with wife, no formal PPH, no past inpatient hospitalization, saw therapist for the first time today, no past SA, no hx of aggression/ legal issues, PMHx significant for COPD, HTN, HLD, gout, hypothyroidism, CAD (s/p stent in 2011 in [place], and s/p CABG x1 in 2017), brought in by self for worsening anxiety and depression and passive SI.     Pt admits to recent worsening of mood, feeling depressed and anxious, not functioning, uncertain if would be able to remain safe if he goes back home today,  Patient has been having impairment in his ability to function, not sleeping, feeling hopeless. Pt would benefit from inpatient psych hospitalization for safety, symptomatic stabilization and initiation of SSRI's.    3/8: A&Ox3. Pt feeling depressed, difficulties sleeping. Denies SI/HI/AVH. Pt does not wish to start any new medication (Remeron) at this time.    Plan:  -no 1:1 needed at this as pt denies active SIIP/HIIP, verbalized will seek out staff if symptoms worsen  - Consider Mirtazapine 7.5mg qhs for sleep, depressed mood in future, pt currently does not wish to take "any further pills"  - PRN: Ativan 0.25mg PO q6hr for anxiety  - PRN: Ativan 0.5mg IM/IV q6hrs for agitation  - CL to follow

## 2022-03-08 NOTE — PROGRESS NOTE ADULT - PROBLEM SELECTOR PLAN 2
No passive/active SI    - TSH normal   - Will consult Psych No passive/active SI    - TSH normal   - Appreciate behavioral health recs: Remeron vs Wellbutrin in view of Pt having hyponatremia, Melatonin 5mg HS, PRN: ativan 0.25mg PO q6Hrs for anxiety; ativan 0.5mg IM/ IV for severe agitation

## 2022-03-08 NOTE — BH CONSULTATION LIAISON PROGRESS NOTE - NSBHFUPINTERVALHXFT_PSY_A_CORE
Chart reviewed. VSS. No PRNs overnight. Pt seen and examined at bedside. He states he has been feeling depressed the last few months and states that right now he is overall feeling lethargic and fatigued. He states that he has a "thyroid problem" and is aware his hypothyroidism may be contributing to his symptoms. He reports he has had difficulties sleeping, and this past week he has not slept much at night. He states he has mood swings that "come and go" but is unsure of any triggers that make him to have an elevated or depressed mood. Pt states he was supposed to meet with a psychiatrist recently regarding his depression, but he was experiencing pain and states he could not wait any longer and wanted to seek emergency medical care. He denies SI/HI/AVH.  Chart reviewed. VSS. No PRNs overnight. Pt seen and examined at bedside. He states he has been feeling depressed the last few months and states that right now he is overall feeling lethargic and fatigued. He states that he has a "thyroid problem" and is aware his hypothyroidism may be contributing to his symptoms. He reports he has had difficulties sleeping, and this past week he has not slept much at night. He states he has mood swings that "come and go" but is unsure of any triggers that make him to have an elevated or depressed mood. Pt states he was supposed to meet with a psychiatrist recently regarding his depression, but he was experiencing pain and states he could not wait any longer and wanted to seek emergency medical care. Pt counseled on starting Remeron at night to help improve sleep and mood, however he does not wish to start this at this time and reports he will reconsider "once his sodium is fixed". He denies SI/HI/AVH.

## 2022-03-08 NOTE — PROGRESS NOTE ADULT - PROBLEM SELECTOR PLAN 1
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     - Urine osm of 408, and Urine Na >20, consistent with SIADH    - Fluid restriction   - TSH and free T4 normal

## 2022-03-09 ENCOUNTER — TRANSCRIPTION ENCOUNTER (OUTPATIENT)
Age: 77
End: 2022-03-09

## 2022-03-09 LAB
ANION GAP SERPL CALC-SCNC: 10 MMOL/L — SIGNIFICANT CHANGE UP (ref 7–14)
BUN SERPL-MCNC: 25 MG/DL — HIGH (ref 7–23)
CALCIUM SERPL-MCNC: 8.7 MG/DL — SIGNIFICANT CHANGE UP (ref 8.4–10.5)
CHLORIDE SERPL-SCNC: 97 MMOL/L — LOW (ref 98–107)
CO2 SERPL-SCNC: 23 MMOL/L — SIGNIFICANT CHANGE UP (ref 22–31)
CREAT ?TM UR-MCNC: 82 MG/DL — SIGNIFICANT CHANGE UP
CREAT SERPL-MCNC: 1.23 MG/DL — SIGNIFICANT CHANGE UP (ref 0.5–1.3)
EGFR: 60 ML/MIN/1.73M2 — SIGNIFICANT CHANGE UP
GLUCOSE SERPL-MCNC: 96 MG/DL — SIGNIFICANT CHANGE UP (ref 70–99)
MAGNESIUM SERPL-MCNC: 2.1 MG/DL — SIGNIFICANT CHANGE UP (ref 1.6–2.6)
OSMOLALITY UR: 342 MOSM/KG — SIGNIFICANT CHANGE UP (ref 50–1200)
PHOSPHATE SERPL-MCNC: 4.1 MG/DL — SIGNIFICANT CHANGE UP (ref 2.5–4.5)
POTASSIUM SERPL-MCNC: 4.1 MMOL/L — SIGNIFICANT CHANGE UP (ref 3.5–5.3)
POTASSIUM SERPL-SCNC: 4.1 MMOL/L — SIGNIFICANT CHANGE UP (ref 3.5–5.3)
SODIUM SERPL-SCNC: 130 MMOL/L — LOW (ref 135–145)
SODIUM UR-SCNC: <20 MMOL/L — SIGNIFICANT CHANGE UP
URATE UR-MCNC: 20.4 MG/DL — SIGNIFICANT CHANGE UP

## 2022-03-09 PROCEDURE — 99233 SBSQ HOSP IP/OBS HIGH 50: CPT

## 2022-03-09 PROCEDURE — 99232 SBSQ HOSP IP/OBS MODERATE 35: CPT

## 2022-03-09 RX ADMIN — Medication 50 MICROGRAM(S): at 05:48

## 2022-03-09 RX ADMIN — Medication 81 MILLIGRAM(S): at 10:20

## 2022-03-09 RX ADMIN — BUDESONIDE AND FORMOTEROL FUMARATE DIHYDRATE 2 PUFF(S): 160; 4.5 AEROSOL RESPIRATORY (INHALATION) at 10:19

## 2022-03-09 RX ADMIN — BUDESONIDE AND FORMOTEROL FUMARATE DIHYDRATE 2 PUFF(S): 160; 4.5 AEROSOL RESPIRATORY (INHALATION) at 21:59

## 2022-03-09 RX ADMIN — Medication 50 MILLIGRAM(S): at 21:59

## 2022-03-09 RX ADMIN — VALSARTAN 320 MILLIGRAM(S): 80 TABLET ORAL at 05:48

## 2022-03-09 RX ADMIN — ENOXAPARIN SODIUM 40 MILLIGRAM(S): 100 INJECTION SUBCUTANEOUS at 05:48

## 2022-03-09 RX ADMIN — ATORVASTATIN CALCIUM 40 MILLIGRAM(S): 80 TABLET, FILM COATED ORAL at 22:09

## 2022-03-09 RX ADMIN — CARVEDILOL PHOSPHATE 12.5 MILLIGRAM(S): 80 CAPSULE, EXTENDED RELEASE ORAL at 22:09

## 2022-03-09 NOTE — DISCHARGE NOTE PROVIDER - CARE PROVIDER_API CALL
Vy Varela)  Family Medicine  [address] [place] [address]  Phone: [phone]  Follow Up Time: 1 week

## 2022-03-09 NOTE — DISCHARGE NOTE PROVIDER - NSDCFUADDAPPT_GEN_ALL_CORE_FT
[facility]   [address] [place], [address]  Phone [phone]  Follow Up Time: 1 week

## 2022-03-09 NOTE — DISCHARGE NOTE PROVIDER - HOSPITAL COURSE
76 yr old M, pmh hypothyroid, CAD, COPD, heart attack 2011, open heart sx 2017 with c/o anxiety, depression and unable to sleep at least for the 3 weeks prior to admission on 3/7/22. He reports he was covid + about 3 month ago and since then he has been having low energy, low mood. About 2 month ago his sister and his nephew passed away in a tragic accident. He went to his pmd who started him on fluoxetine and referred him to a psychiatrist but he can not wait any longer. Took fluoxetine for a few days without improvement. He lives with his wife Yesenia. On admission he denied SI and did not have any passive SI. Does not take his levothyroxine consistently. Denies chest pain, abdominal pain, dyspnea, constipation, hair brittleness. In the ED, VSS. Admitted to medicine because of hyponatremia 125. Throughout hospitalization he endorsed some anxiety but continued to deny any depression or suicidal thoughts or ideation. We stopped his SSRI given his hyponatremia. He ate a regular diet with fluid restriction of 1L and his sodium improved to 130. He was seen by behavioral health and was given mirtazapine for one day, but decided he did not like it. He is currently stable and ready for discharge home. [facility], and he will followup with his PCP in one week with a BMP check. 76 yr old M, pmh hypothyroid, CAD, COPD, heart attack 2011, open heart sx 2017 with c/o anxiety, depression and unable to sleep at least for the 3 weeks prior to admission on 3/7/22. He reports he was covid + about 3 month ago and since then he has been having low energy, low mood. About 2 month ago his sister and his nephew passed away in a tragic accident. He went to his pmd who started him on fluoxetine and referred him to a psychiatrist but he can not wait any longer. Took fluoxetine for a few days without improvement. He lives with his wife Yesenia. On admission he denied SI and did not have any passive SI. Does not take his levothyroxine consistently. Denies chest pain, abdominal pain, dyspnea, constipation, hair brittleness. In the ED, VSS. Admitted to medicine because of hyponatremia 125. Throughout hospitalization he endorsed some anxiety but continued to deny any depression or suicidal thoughts or ideation. We stopped his SSRI given his hyponatremia. He ate a regular diet with fluid restriction of 1L and his sodium improved to 130. Urine sodium was <20 and urine osm >100 suggesting possible hypovolemic hyponatremia. He was seen by behavioral health and was given mirtazapine for one day, but decided he did not like it. He is currently stable and ready for discharge home. He will have psychiatric followup with [facility], and he will followup with his PCP in one week with a BMP check. 76 yr old M, pmh hypothyroid, CAD, COPD, heart attack 2011, open heart sx 2017 with c/o anxiety, depression and unable to sleep at least for the 3 weeks prior to admission on 3/7/22. He reports he was covid + about 3 month ago and since then he has been having low energy, low mood. About 2 month ago his sister and his nephew passed away in a tragic accident. He went to his pmd who started him on fluoxetine and referred him to a psychiatrist but his mood did not improve on fluoxetine for a few days.     On presentation, patient was found to be hyponatremic to 125. His fluoxetine continued to be held and he was fluid restricted with improvement in serum Na. Patient was seen by behavioral health for depression and started on Mirtazapine. Given side effect profile, patient refused Mirtazapine and would like to follow up outpatient. He will have psychiatric followup with [facility] ger[facility] clinic, and he will followup with his PCP in one week with a BMP check.

## 2022-03-09 NOTE — DISCHARGE NOTE PROVIDER - NSDCMRMEDTOKEN_GEN_ALL_CORE_FT
acetaminophen-oxycodone 325 mg-5 mg oral tablet: 1 tab(s) orally every 6 hours, As needed, Moderate Pain (4 - 6) MDD:4  albuterol 90 mcg/inh inhalation aerosol: 2 puff(s) inhaled 4 times a day, As Needed  aspirin 81 mg oral tablet, chewable: 1 tab(s) orally once a day  atorvastatin 40 mg oral tablet: 1 tab(s) orally once a day (at bedtime)  carvedilol 12.5 mg oral tablet: 1 tab(s) orally every 12 hours  docusate sodium 100 mg oral capsule: 1 cap(s) orally 3 times a day  latanoprost 0.005% ophthalmic solution: 1 drop(s) to each affected eye once a day (in the evening)  levothyroxine 50 mcg (0.05 mg) oral tablet: 1 tab(s) orally once a day  Symbicort 160 mcg-4.5 mcg/inh inhalation aerosol: 2 puff(s) inhaled 2 times a day  valsartan 320 mg oral tablet: 1 tab(s) orally once a day   acetaminophen-oxycodone 325 mg-5 mg oral tablet: 1 tab(s) orally every 6 hours, As needed, Moderate Pain (4 - 6) MDD:4  albuterol 90 mcg/inh inhalation aerosol: 2 puff(s) inhaled 4 times a day, As Needed  aspirin 81 mg oral tablet, chewable: 1 tab(s) orally once a day  atorvastatin 40 mg oral tablet: 1 tab(s) orally once a day (at bedtime)  carvedilol 3.125 mg oral tablet: 1 tab(s) orally every 12 hours  docusate sodium 100 mg oral capsule: 1 cap(s) orally 2 times a day  latanoprost 0.005% ophthalmic solution: 1 drop(s) to each affected eye once a day (in the evening)  Symbicort 160 mcg-4.5 mcg/inh inhalation aerosol: 2 puff(s) inhaled 2 times a day  valsartan 320 mg oral tablet: 1 tab(s) orally once a day

## 2022-03-09 NOTE — BH CONSULTATION LIAISON PROGRESS NOTE - CASE SUMMARY
Chart reviewed. Pt seen; AA O x 3, in NAD, though mildly anxious/depressed. Denies SI/HI/psychosis/safety concerns. Is open to outpatient care/counseling. Does not meet criteria for inpatient psych care and lacks psychiatric contraindication to discharge when medically cleared. Discussed with primary team.  Chart reviewed. Pt seen; AA O x 3, in NAD, though mildly anxious/depressed. Denies SI/HI/psychosis/safety concerns. Is open to outpatient care/counseling. Does not meet criteria for inpatient psych care and lacks psychiatric contraindication to discharge when medically cleared. Agree w/ above assessment/recs. Discussed with primary team, who is preparing for discharge. We will sign off. Please call if questions arise.

## 2022-03-09 NOTE — DISCHARGE NOTE PROVIDER - NSDCCPCAREPLAN_GEN_ALL_CORE_FT
PRINCIPAL DISCHARGE DIAGNOSIS  Diagnosis: Hyponatremia  Assessment and Plan of Treatment: When you came to the hospital your blood sodium level was low. In the hospital we restricted the amount of fluid you consumed, and your sodium level improved. Please continue to eat a regular diet when you leave the hospital, and drink water when you are thirsty. Please follow up with your primary care doctor in one week, and make sure your blood sodium level is checked.      SECONDARY DISCHARGE DIAGNOSES  Diagnosis: Current moderate episode of major depressive disorder without prior episode  Assessment and Plan of Treatment: When you came to the hospital you had recently been prescribed a medication called fluoxetine. We stopped this medication, because it can cause your blood sodium level to be low. You were seen by the behavioral health team here, and they suggested a trial of Mirtazapine. You received this medication once, and did not like it, so we will not continue it. Please followup with psychiatry at the [facility].     PRINCIPAL DISCHARGE DIAGNOSIS  Diagnosis: Hyponatremia  Assessment and Plan of Treatment: When you came to the hospital your blood sodium level was low. In the hospital we restricted the amount of fluid you consumed, and your sodium level improved. Please continue to eat a regular diet when you leave the hospital, and drink water when you are thirsty. Please follow up with your primary care doctor in one week, and make sure your blood sodium level is checked.      SECONDARY DISCHARGE DIAGNOSES  Diagnosis: Current moderate episode of major depressive disorder without prior episode  Assessment and Plan of Treatment: When you came to the hospital you had recently been prescribed a medication called fluoxetine. We stopped this medication, because it can cause your blood sodium level to be low. You were seen by the behavioral health team here, and they suggested a trial of Mirtazapine. You received this medication once, and did not like it, so we will not continue it. Please followup with psychiatry at the [facility].    Diagnosis: Hypothyroid  Assessment and Plan of Treatment: In the hospial you mentioned you have not taken medication for hypothyroidism for 10-20 years. The psychiatry team mentioned this could be contributing to your depressed mood. Please followup with primary acre doctor to discuss whether or not you should be taking medication for hypothyroidism.

## 2022-03-09 NOTE — BH CONSULTATION LIAISON PROGRESS NOTE - NSBHASSESSMENTFT_PSY_ALL_CORE
Pt is a 76 AA retired [employer] , domiciled with wife, no formal PPH, no past inpatient hospitalization, saw therapist for the first time today, no past SA, no hx of aggression/ legal issues, PMHx significant for COPD, HTN, HLD, gout, hypothyroidism, CAD (s/p stent in 2011 in [place], and s/p CABG x1 in 2017), brought in by self for worsening anxiety and depression and passive SI.     Pt admits to recent worsening of mood, feeling depressed and anxious, not functioning, uncertain if would be able to remain safe if he goes back home today,  Patient has been having impairment in his ability to function, not sleeping, feeling hopeless.     3/8: A&Ox3. Pt feeling depressed, difficulties sleeping. Denies SI/HI/AVH. Pt does not wish to start any new medication (Remeron) at this time.    3/9: A&Ox3. Pt still feeling depressed, but improved sleep. Reports mild suicidal ideation but no intent to act. Denies HI/AVH.    Plan:  -no 1:1 needed at this time. Pt reports mild SI with but no intent to act on his thoughts, verbalized will seek out staff if symptoms worsen  - Mirtazapine 7.5mg qhs for sleep, depressed mood  - PRN: Ativan 0.25mg PO q6hr for anxiety  - PRN: Ativan 0.5mg IM/IV q6hrs for agitation  - CL to follow  - Pt open to outpatient psychiatry upon d/c Pt is a 76 AA retired [employer] , domiciled with wife, no formal PPH, no past inpatient hospitalization, saw therapist for the first time today, no past SA, no hx of aggression/ legal issues, PMHx significant for COPD, HTN, HLD, gout, hypothyroidism, CAD (s/p stent in 2011 in [place], and s/p CABG x1 in 2017), brought in by self for worsening anxiety and depression and passive SI.     Pt admits to recent worsening of mood, feeling depressed and anxious, not functioning, uncertain if would be able to remain safe if he goes back home today,  Patient has been having impairment in his ability to function, not sleeping, feeling hopeless.     3/8: A&Ox3. Pt feeling depressed, difficulties sleeping. Denies SI/HI/AVH. Pt does not wish to start any new medication (Remeron) at this time.    3/9: A&Ox3. Pt still feeling depressed, but improved sleep. Reports mild suicidal ideation but no intent to act. Denies HI/AVH.    Plan:  -no 1:1 needed at this time. Pt reports mild SI with but no intent to act on his thoughts, verbalized will seek out staff if symptoms worsen  - Mirtazapine 7.5mg qhs for sleep, depressed mood  - PRN: Ativan 0.25mg PO q6hr for anxiety  - PRN: Ativan 0.5mg IM/IV q6hrs for agitation  - CL to follow  - Pt open to outpatient psychiatry upon d/c; no current indication for inpatient psychiatric

## 2022-03-09 NOTE — BH CONSULTATION LIAISON PROGRESS NOTE - NSBHCHARTREVIEWLAB_PSY_A_CORE FT
14.4   2.84  )-----------( 166      ( 08 Mar 2022 07:15 )             40.3   03-09    130<L>  |  97<L>  |  25<H>  ----------------------------<  96  4.1   |  23  |  1.23    Ca    8.7      09 Mar 2022 07:09  Phos  4.1     03-09  Mg     2.10     03-09    TPro  7.1  /  Alb  4.0  /  TBili  1.0  /  DBili  x   /  AST  19  /  ALT  17  /  AlkPhos  54  03-08  
                      14.4   2.84  )-----------( 166      ( 08 Mar 2022 07:15 )             40.3   03-08    130<L>  |  94<L>  |  12  ----------------------------<  83  4.7   |  25  |  1.04    Ca    9.4      08 Mar 2022 07:15  Phos  3.1     03-07  Mg     1.90     03-07    TPro  7.1  /  Alb  4.0  /  TBili  1.0  /  DBili  x   /  AST  19  /  ALT  17  /  AlkPhos  54  03-08

## 2022-03-09 NOTE — DISCHARGE NOTE PROVIDER - NSFOLLOWUPCLINICS_GEN_ALL_ED_FT
[facility] General Internal Medicine  General Internal Medicine  [address] [place] [address]  Phone: [phone]  Fax:     [facility] [place]  Internal Medicine  [address] [place] [address]  Phone: [phone]  Fax: [phone]

## 2022-03-09 NOTE — BH CONSULTATION LIAISON PROGRESS NOTE - NSBHFUPINTERVALHXFT_PSY_A_CORE
Chart reviewed. VSS. No PRNs overnight. Pt seen and examined at bedside. Patient admits to sleeping well overnight but reports feeling extremely depressed. Pt adds that "he cannot live like this anymore." On further discussion, the patient believes that he could benefit from an inpatient psychiatric evaluation. Additionally, when the option of seeing an outpatient psychiatrist, patient was open to the idea. States he wants further treatment bc he feels like a burden to his wife. Patient admits that he feels safe at home where he lives with his wife. Patient got a 7.5mg of Mirtazapine last night before bed, which according to the patient helped him sleep throughout the night, and states he will now continue to take it. Reports mild suicidal ideation, but with no actual intent to hurt himself. Denies HI/AVH.

## 2022-03-09 NOTE — PROGRESS NOTE ADULT - PROBLEM SELECTOR PLAN 2
No passive/active SI    - TSH normal   - Appreciate behavioral health recs: Remeron vs Wellbutrin in view of Pt having hyponatremia, Melatonin 5mg HS, PRN: ativan 0.25mg PO q6Hrs for anxiety; ativan 0.5mg IM/ IV for severe agitation

## 2022-03-09 NOTE — PROGRESS NOTE ADULT - PROBLEM SELECTOR PLAN 1
Possibly 2/2 recent SSRI, although only started one week prior to admission, so the timeline doesn't quite fit.     - Urine osm of 408, and Urine Na >20, consistent with SIADH    - Fluid restriction to <1L  - Monitor I/Os  - TSH and free T4 normal

## 2022-03-09 NOTE — PROGRESS NOTE ADULT - SUBJECTIVE AND OBJECTIVE BOX
DATE OF SERVICE: 22 @ 07:24    Patient is a 76y old  Male who presents with a chief complaint of Depression (08 Mar 2022 08:07)      SUBJECTIVE / OVERNIGHT EVENTS:  The patient continues to feel anxious; denying suicidal ideation or depression.    MEDICATIONS  (STANDING):  aspirin  chewable 81 milliGRAM(s) Oral daily  atorvastatin 40 milliGRAM(s) Oral at bedtime  budesonide 160 MICROgram(s)/formoterol 4.5 MICROgram(s) Inhaler 2 Puff(s) Inhalation two times a day  carvedilol 12.5 milliGRAM(s) Oral every 12 hours  enoxaparin Injectable 40 milliGRAM(s) SubCutaneous every 24 hours  levothyroxine 50 MICROGram(s) Oral daily  mirtazapine 7.5 milliGRAM(s) Oral at bedtime  traZODone 50 milliGRAM(s) Oral every 24 hours  valsartan 320 milliGRAM(s) Oral daily    MEDICATIONS  (PRN):  acetaminophen     Tablet .. 650 milliGRAM(s) Oral every 6 hours PRN Temp greater or equal to 38C (100.4F), Mild Pain (1 - 3)  ALBUTerol    90 MICROgram(s) HFA Inhaler 2 Puff(s) Inhalation every 6 hours PRN Wheezing  aluminum hydroxide/magnesium hydroxide/simethicone Suspension 30 milliLiter(s) Oral every 4 hours PRN Dyspepsia  LORazepam     Tablet 0.25 milliGRAM(s) Oral every 6 hours PRN Anxiety  LORazepam   Injectable 0.5 milliGRAM(s) IV Push every 6 hours PRN Agitation  melatonin 3 milliGRAM(s) Oral at bedtime PRN Insomnia  ondansetron Injectable 4 milliGRAM(s) IV Push every 8 hours PRN Nausea and/or Vomiting      Vital Signs Last 24 Hrs  T(C): 36.6 (09 Mar 2022 05:45), Max: 37.1 (08 Mar 2022 10:26)  T(F): 97.9 (09 Mar 2022 05:45), Max: 98.8 (08 Mar 2022 10:26)  HR: 62 (09 Mar 2022 05:45) (62 - 80)  BP: 107/60 (09 Mar 2022 05:45) (107/60 - 123/60)  BP(mean): --  RR: 17 (09 Mar 2022 05:45) (17 - 18)  SpO2: 100% (09 Mar 2022 05:45) (98% - 100%)  CAPILLARY BLOOD GLUCOSE        I&O's Summary      PHYSICAL EXAM:  GENERAL: NAD sitting up in bed  HEAD:  Atraumatic, Normocephalic  EYES: EOMI, PERRL, conjunctiva and sclera clear  NECK: Supple, No JVD  CHEST/LUNG: Clear to auscultation bilaterally. No wheezes, rales or rhonchi  HEART: Regular rate and rhythm; No murmurs, rubs, or gallops, S1, S2 present  ABDOMEN: Soft, Nontender, Nondistended; Normal Bowel sounds   EXTREMITIES:  2+ Peripheral Pulses, No clubbing, cyanosis, or edema  PSYCH: Normal mood and affect  NEUROLOGY: AAOx3, non-focal  SKIN: No rashes or lesions    LABS:                        14.4   2.84  )-----------( 166      ( 08 Mar 2022 07:15 )             40.3     03-08    130<L>  |  96<L>  |  24<H>  ----------------------------<  114<H>  4.2   |  23  |  1.23    Ca    8.8      08 Mar 2022 23:26  Phos  3.1     03-07  Mg     1.90     03-07    TPro  7.1  /  Alb  4.0  /  TBili  1.0  /  DBili  x   /  AST  19  /  ALT  17  /  AlkPhos  54  03-08    PT/INR - ( 08 Mar 2022 07:15 )   PT: 16.1 sec;   INR: 1.38 ratio               Urinalysis Basic - ( 07 Mar 2022 16:38 )    Color: Light Yellow / Appearance: Clear / S.010 / pH: x  Gluc: x / Ketone: Trace  / Bili: Negative / Urobili: <2 mg/dL   Blood: x / Protein: Negative / Nitrite: Negative   Leuk Esterase: Negative / RBC: 4 /HPF / WBC 4 /HPF   Sq Epi: x / Non Sq Epi: 2 /HPF / Bacteria: Few        RADIOLOGY & ADDITIONAL TESTS:    Imaging Personally Reviewed:    Consultant(s) Notes Reviewed:      Care Discussed with Consultants/Other Providers:

## 2022-03-10 ENCOUNTER — TRANSCRIPTION ENCOUNTER (OUTPATIENT)
Age: 77
End: 2022-03-10

## 2022-03-10 VITALS — SYSTOLIC BLOOD PRESSURE: 134 MMHG | HEART RATE: 70 BPM | DIASTOLIC BLOOD PRESSURE: 70 MMHG

## 2022-03-10 PROCEDURE — 99233 SBSQ HOSP IP/OBS HIGH 50: CPT

## 2022-03-10 PROCEDURE — 99238 HOSP IP/OBS DSCHRG MGMT 30/<: CPT | Mod: GC

## 2022-03-10 RX ORDER — BUDESONIDE AND FORMOTEROL FUMARATE DIHYDRATE 160; 4.5 UG/1; UG/1
2 AEROSOL RESPIRATORY (INHALATION)
Qty: 0 | Refills: 0 | DISCHARGE

## 2022-03-10 RX ORDER — VALSARTAN 80 MG/1
1 TABLET ORAL
Qty: 0 | Refills: 0 | DISCHARGE

## 2022-03-10 RX ORDER — ASPIRIN/CALCIUM CARB/MAGNESIUM 324 MG
1 TABLET ORAL
Qty: 0 | Refills: 0 | DISCHARGE

## 2022-03-10 RX ORDER — CARVEDILOL PHOSPHATE 80 MG/1
1 CAPSULE, EXTENDED RELEASE ORAL
Qty: 60 | Refills: 0
Start: 2022-03-10

## 2022-03-10 RX ORDER — LATANOPROST 0.05 MG/ML
1 SOLUTION/ DROPS OPHTHALMIC; TOPICAL
Qty: 0 | Refills: 0 | DISCHARGE

## 2022-03-10 RX ORDER — CARVEDILOL PHOSPHATE 80 MG/1
3.12 CAPSULE, EXTENDED RELEASE ORAL EVERY 12 HOURS
Refills: 0 | Status: DISCONTINUED | OUTPATIENT
Start: 2022-03-10 | End: 2022-03-10

## 2022-03-10 RX ORDER — ALBUTEROL 90 UG/1
2 AEROSOL, METERED ORAL
Qty: 4 | Refills: 0
Start: 2022-03-10

## 2022-03-10 RX ORDER — ASPIRIN/CALCIUM CARB/MAGNESIUM 324 MG
1 TABLET ORAL
Qty: 30 | Refills: 0
Start: 2022-03-10 | End: 2022-04-08

## 2022-03-10 RX ORDER — ALBUTEROL 90 UG/1
2 AEROSOL, METERED ORAL
Qty: 0 | Refills: 0 | DISCHARGE

## 2022-03-10 RX ORDER — DOCUSATE SODIUM 100 MG
1 CAPSULE ORAL
Qty: 60 | Refills: 0
Start: 2022-03-10 | End: 2022-04-08

## 2022-03-10 RX ORDER — ATORVASTATIN CALCIUM 80 MG/1
1 TABLET, FILM COATED ORAL
Qty: 30 | Refills: 0
Start: 2022-03-10 | End: 2022-04-08

## 2022-03-10 RX ORDER — VALSARTAN 80 MG/1
1 TABLET ORAL
Qty: 40 | Refills: 0
Start: 2022-03-10

## 2022-03-10 RX ORDER — LATANOPROST 0.05 MG/ML
1 SOLUTION/ DROPS OPHTHALMIC; TOPICAL
Qty: 60 | Refills: 0
Start: 2022-03-10

## 2022-03-10 RX ORDER — BUDESONIDE AND FORMOTEROL FUMARATE DIHYDRATE 160; 4.5 UG/1; UG/1
2 AEROSOL RESPIRATORY (INHALATION)
Qty: 40 | Refills: 0
Start: 2022-03-10

## 2022-03-10 RX ADMIN — VALSARTAN 320 MILLIGRAM(S): 80 TABLET ORAL at 05:10

## 2022-03-10 RX ADMIN — Medication 81 MILLIGRAM(S): at 10:30

## 2022-03-10 RX ADMIN — ENOXAPARIN SODIUM 40 MILLIGRAM(S): 100 INJECTION SUBCUTANEOUS at 05:10

## 2022-03-10 RX ADMIN — CARVEDILOL PHOSPHATE 12.5 MILLIGRAM(S): 80 CAPSULE, EXTENDED RELEASE ORAL at 10:32

## 2022-03-10 RX ADMIN — BUDESONIDE AND FORMOTEROL FUMARATE DIHYDRATE 2 PUFF(S): 160; 4.5 AEROSOL RESPIRATORY (INHALATION) at 10:30

## 2022-03-10 RX ADMIN — Medication 50 MICROGRAM(S): at 05:10

## 2022-03-10 NOTE — BH CONSULTATION LIAISON PROGRESS NOTE - NSBHMSEMOOD_PSY_A_CORE
7/16/2019               Lorenza Rodriguez  [address] [address]        Dear Lorenza (MR#[id number])    This letter is sent in regards to your, recent visit to the [facility] Emergency Department on 7/11/2019.  During the visit, tests were performed to assist the physician in a medical diagnosis.  A review of those tests requires that we notify you of the following:    Your urine culture was POSITIVE for a bacteria called Escherichia coli. The antibiotic prescribed for you (cefdinir) should be active to treat this bacteria. IT IS IMPORTANT THAT YOU CONTINUE TAKING YOUR ANTIBIOTIC UNTIL IT IS FINISHED.      Please feel free to contact me at the number below if you have any questions or concerns. Thank you for your cooperation in the matter.    Sincerely,  ED Culture Follow-Up Staff  Lucia Palacios, PharmD    [facility], Emergency Department  [address] [place] [address] [id number] [phone] (ED Culture Line)  [phone]                  
Depressed

## 2022-03-10 NOTE — PROGRESS NOTE ADULT - PROBLEM SELECTOR PLAN 5
TSH normal     -Continue levothyroxine
TSH normal     -Continue levothyoroxine
TSH normal     -Continue levothyroxine

## 2022-03-10 NOTE — BH CONSULTATION LIAISON PROGRESS NOTE - CURRENT MEDICATION
MEDICATIONS  (STANDING):  aspirin  chewable 81 milliGRAM(s) Oral daily  atorvastatin 40 milliGRAM(s) Oral at bedtime  budesonide 160 MICROgram(s)/formoterol 4.5 MICROgram(s) Inhaler 2 Puff(s) Inhalation two times a day  carvedilol 12.5 milliGRAM(s) Oral every 12 hours  enoxaparin Injectable 40 milliGRAM(s) SubCutaneous every 24 hours  traZODone 50 milliGRAM(s) Oral every 24 hours  valsartan 320 milliGRAM(s) Oral daily    MEDICATIONS  (PRN):  acetaminophen     Tablet .. 650 milliGRAM(s) Oral every 6 hours PRN Temp greater or equal to 38C (100.4F), Mild Pain (1 - 3)  ALBUTerol    90 MICROgram(s) HFA Inhaler 2 Puff(s) Inhalation every 6 hours PRN Wheezing  aluminum hydroxide/magnesium hydroxide/simethicone Suspension 30 milliLiter(s) Oral every 4 hours PRN Dyspepsia  LORazepam     Tablet 0.25 milliGRAM(s) Oral every 6 hours PRN Anxiety  LORazepam   Injectable 0.5 milliGRAM(s) IV Push every 6 hours PRN Agitation  melatonin 3 milliGRAM(s) Oral at bedtime PRN Insomnia  ondansetron Injectable 4 milliGRAM(s) IV Push every 8 hours PRN Nausea and/or Vomiting  
MEDICATIONS  (STANDING):  aspirin  chewable 81 milliGRAM(s) Oral daily  atorvastatin 40 milliGRAM(s) Oral at bedtime  budesonide 160 MICROgram(s)/formoterol 4.5 MICROgram(s) Inhaler 2 Puff(s) Inhalation two times a day  carvedilol 12.5 milliGRAM(s) Oral every 12 hours  enoxaparin Injectable 40 milliGRAM(s) SubCutaneous every 24 hours  levothyroxine 50 MICROGram(s) Oral daily  traZODone 50 milliGRAM(s) Oral every 24 hours  valsartan 320 milliGRAM(s) Oral daily    MEDICATIONS  (PRN):  acetaminophen     Tablet .. 650 milliGRAM(s) Oral every 6 hours PRN Temp greater or equal to 38C (100.4F), Mild Pain (1 - 3)  ALBUTerol    90 MICROgram(s) HFA Inhaler 2 Puff(s) Inhalation every 6 hours PRN Wheezing  aluminum hydroxide/magnesium hydroxide/simethicone Suspension 30 milliLiter(s) Oral every 4 hours PRN Dyspepsia  LORazepam     Tablet 0.25 milliGRAM(s) Oral every 6 hours PRN Anxiety  LORazepam   Injectable 0.5 milliGRAM(s) IV Push every 6 hours PRN Agitation  melatonin 3 milliGRAM(s) Oral at bedtime PRN Insomnia  ondansetron Injectable 4 milliGRAM(s) IV Push every 8 hours PRN Nausea and/or Vomiting  
MEDICATIONS  (STANDING):  aspirin  chewable 81 milliGRAM(s) Oral daily  atorvastatin 40 milliGRAM(s) Oral at bedtime  budesonide 160 MICROgram(s)/formoterol 4.5 MICROgram(s) Inhaler 2 Puff(s) Inhalation two times a day  carvedilol 12.5 milliGRAM(s) Oral every 12 hours  enoxaparin Injectable 40 milliGRAM(s) SubCutaneous every 24 hours  levothyroxine 50 MICROGram(s) Oral daily  traZODone 50 milliGRAM(s) Oral every 24 hours  valsartan 320 milliGRAM(s) Oral daily    MEDICATIONS  (PRN):  acetaminophen     Tablet .. 650 milliGRAM(s) Oral every 6 hours PRN Temp greater or equal to 38C (100.4F), Mild Pain (1 - 3)  ALBUTerol    90 MICROgram(s) HFA Inhaler 2 Puff(s) Inhalation every 6 hours PRN Wheezing  aluminum hydroxide/magnesium hydroxide/simethicone Suspension 30 milliLiter(s) Oral every 4 hours PRN Dyspepsia  LORazepam     Tablet 0.25 milliGRAM(s) Oral every 6 hours PRN Anxiety  LORazepam   Injectable 0.5 milliGRAM(s) IV Push every 6 hours PRN Agitation  melatonin 3 milliGRAM(s) Oral at bedtime PRN Insomnia  ondansetron Injectable 4 milliGRAM(s) IV Push every 8 hours PRN Nausea and/or Vomiting

## 2022-03-10 NOTE — DISCHARGE NOTE NURSING/CASE MANAGEMENT/SOCIAL WORK - NSDCFUADDAPPT_GEN_ALL_CORE_FT
[facility]   [address]   [place], [place], [address]  Phone [phone]  Follow Up Time: 1 week

## 2022-03-10 NOTE — BH CONSULTATION LIAISON PROGRESS NOTE - NSBHCHARTREVIEWVS_PSY_A_CORE FT
Vital Signs Last 24 Hrs  T(C): 36.4 (10 Mar 2022 05:08), Max: 36.6 (09 Mar 2022 21:53)  T(F): 97.6 (10 Mar 2022 05:08), Max: 97.8 (09 Mar 2022 21:53)  HR: 70 (10 Mar 2022 10:25) (62 - 70)  BP: 134/70 (10 Mar 2022 10:25) (119/69 - 134/77)  BP(mean): --  RR: 17 (10 Mar 2022 05:08) (16 - 17)  SpO2: 100% (10 Mar 2022 05:08) (99% - 100%)
Vital Signs Last 24 Hrs  T(C): 36.8 (08 Mar 2022 12:06), Max: 37.1 (08 Mar 2022 10:26)  T(F): 98.2 (08 Mar 2022 12:06), Max: 98.8 (08 Mar 2022 10:26)  HR: 80 (08 Mar 2022 12:06) (62 - 82)  BP: 122/84 (08 Mar 2022 12:06) (122/84 - 178/96)  BP(mean): --  RR: 17 (08 Mar 2022 12:06) (16 - 18)  SpO2: 98% (08 Mar 2022 12:06) (98% - 100%)
Vital Signs Last 24 Hrs  T(C): 36.2 (09 Mar 2022 12:29), Max: 36.6 (09 Mar 2022 05:45)  T(F): 97.2 (09 Mar 2022 12:29), Max: 97.9 (09 Mar 2022 05:45)  HR: 72 (09 Mar 2022 12:29) (62 - 73)  BP: 108/60 (09 Mar 2022 12:29) (100/61 - 114/72)  BP(mean): --  RR: 17 (09 Mar 2022 12:29) (17 - 17)  SpO2: 100% (09 Mar 2022 12:29) (99% - 100%)

## 2022-03-10 NOTE — BH CONSULTATION LIAISON PROGRESS NOTE - CASE SUMMARY
Chart reviewed. pt seen with trainee. Oriented x 3 without focal neuro deficits. Cites odd perceptual experience recently but appears to be a delirious/transient outlier 2/2 recent medication effects. He is not distressed by this incident and responded well to psychoeducation about delirium. Denies overt safety concerns, I, HI, AVH, paranoia, or tom. Wants to go home, and not interested in inpatient psych care. Understands he needs to follow-up and call for his own appointment, and says he will. Agree with above assessment/recs. No psych contraindication to discharge when medically cleared. We will sign off at this point.

## 2022-03-10 NOTE — BH CONSULTATION LIAISON PROGRESS NOTE - NSICDXBHSECONDARYDX_PSY_ALL_CORE
Current moderate episode of major depressive disorder without prior episode   F32.1  HTN (hypertension)   I10  CAD (coronary artery disease)   I25.10  Hypothyroid   E03.9  Hyponatremia   E87.1  Need for prophylactic measure   Z29.9  

## 2022-03-10 NOTE — PROGRESS NOTE ADULT - ATTENDING COMMENTS
76 y.o. M w/ a hx of hypothyroidism, CAD, COPD, anxiety, depression hospitalized for insomnia, found to have hyponatremia in setting of recent SSRI use.     Patient is concerned about leaving the hospital but does not know what continued IP hospitalization with be helpful with. He is not currently interested in IP Psychiatric hospitalization or medication management. Reports a man appeared in his mind yesterday, did not speak, he did not see him, he just knew he was there. The man's presence he believes is linked to his hx of alcohol abuse many years ago but he is not sure what the connection is.     # Hyponatremia: Improving. Cont fluid restriction.   # Insomnia, MDD: Re-evaluated by Psychiatry, can still follow up OP.
76 y.o. M w/ a hx of hypothyroidism, CAD, COPD, anxiety, depression hospitalized for insomnia, found to have hyponatremia in setting of recent SSRI use.     Patient slept well last night though his mood is down today. Had extensive discussion with patient regarding risks and benefits of Mirtazapine therapy and patient is refusing. Is concerned he will become addicted to it and is weary of side effects. He believes he has to "do this himself" and help himself. Patient encouraged to pursue therapy + medication in order to aid depression. Patient open to OP Psych follow up, does not want to be on medication therapy currently. Na 130.     # Hyponatremia: In setting of recent SSRI initiation- hold. Cont fluid restriction and monitor BMP. Re-check urine osm urine sodium   # Insomnia, MDD: D/C Remeron. Psych following. OP Psych follow up.
76 y.o. M w/ a hx of hypothyroidism, CAD, COPD, anxiety, depression hospitalized for insomnia, found to have hyponatremia in setting of recent SSRI use.   Patient feels well, has been eating 75% of usual PO intake at home. PE notable for pill rolling tremor and flat affect. No cogwheel rigidity, or bradykinesia noted. Na 130.     # Hyponatremia: In setting of recent SSRI initiation- hold. Cont fluid restriction and monitor BMP.   # Insomnia, MDD: Cont Remeron.

## 2022-03-10 NOTE — PROGRESS NOTE ADULT - SUBJECTIVE AND OBJECTIVE BOX
DATE OF SERVICE: 03-10-22 @ 07:45    Patient is a 76y old  Male who presents with a chief complaint of Depression (09 Mar 2022 16:12)      SUBJECTIVE / OVERNIGHT EVENTS:  No acute overnight events. The patient continues to deny depression.     MEDICATIONS  (STANDING):  aspirin  chewable 81 milliGRAM(s) Oral daily  atorvastatin 40 milliGRAM(s) Oral at bedtime  budesonide 160 MICROgram(s)/formoterol 4.5 MICROgram(s) Inhaler 2 Puff(s) Inhalation two times a day  carvedilol 12.5 milliGRAM(s) Oral every 12 hours  enoxaparin Injectable 40 milliGRAM(s) SubCutaneous every 24 hours  levothyroxine 50 MICROGram(s) Oral daily  traZODone 50 milliGRAM(s) Oral every 24 hours  valsartan 320 milliGRAM(s) Oral daily    MEDICATIONS  (PRN):  acetaminophen     Tablet .. 650 milliGRAM(s) Oral every 6 hours PRN Temp greater or equal to 38C (100.4F), Mild Pain (1 - 3)  ALBUTerol    90 MICROgram(s) HFA Inhaler 2 Puff(s) Inhalation every 6 hours PRN Wheezing  aluminum hydroxide/magnesium hydroxide/simethicone Suspension 30 milliLiter(s) Oral every 4 hours PRN Dyspepsia  LORazepam     Tablet 0.25 milliGRAM(s) Oral every 6 hours PRN Anxiety  LORazepam   Injectable 0.5 milliGRAM(s) IV Push every 6 hours PRN Agitation  melatonin 3 milliGRAM(s) Oral at bedtime PRN Insomnia  ondansetron Injectable 4 milliGRAM(s) IV Push every 8 hours PRN Nausea and/or Vomiting      Vital Signs Last 24 Hrs  T(C): 36.4 (10 Mar 2022 05:08), Max: 36.6 (09 Mar 2022 21:53)  T(F): 97.6 (10 Mar 2022 05:08), Max: 97.8 (09 Mar 2022 21:53)  HR: 64 (10 Mar 2022 05:08) (62 - 73)  BP: 134/77 (10 Mar 2022 05:08) (100/61 - 134/77)  BP(mean): --  RR: 17 (10 Mar 2022 05:08) (16 - 17)  SpO2: 100% (10 Mar 2022 05:08) (99% - 100%)  CAPILLARY BLOOD GLUCOSE        I&O's Summary    09 Mar 2022 07:01  -  10 Mar 2022 07:00  --------------------------------------------------------  IN: 624 mL / OUT: 550 mL / NET: 74 mL        PHYSICAL EXAM:  GENERAL: NAD sitting up in bed  HEAD:  Atraumatic, Normocephalic  EYES: EOMI, PERRL, conjunctiva and sclera clear  NECK: Supple, No JVD  CHEST/LUNG: Clear to auscultation bilaterally. No wheezes, rales or rhonchi  HEART: Regular rate and rhythm; No murmurs, rubs, or gallops, S1, S2 present  ABDOMEN: Soft, Nontender, Nondistended; Normal Bowel sounds   EXTREMITIES:  2+ Peripheral Pulses, No clubbing, cyanosis, or edema  PSYCH: Normal mood and affect  NEUROLOGY: AAOx3, non-focal  SKIN: No rashes or lesions    LABS:    03-09    130<L>  |  97<L>  |  25<H>  ----------------------------<  96  4.1   |  23  |  1.23    Ca    8.7      09 Mar 2022 07:09  Phos  4.1     03-09  Mg     2.10     03-09

## 2022-03-10 NOTE — BH CONSULTATION LIAISON PROGRESS NOTE - NSBHFUPINTERVALHXFT_PSY_A_CORE
Chart reviewed. VSS. No PRNS overnight. Pt seen and examined at bedside, pt dressed in his street clothes ready for discharge. Pt was seen telling hospitalist that "a man appeared in my mind", however pt denied this man from instructing him to hurt himself or others. He reports concerns about making it through the day with this profound tiredness and fatigue he is experiencing. After some conversation, he states he is better off going home, and does not explicitly say the reason for which he would want to stay in the hospital longer. Denies being a burden to his wife. He says he ought to take medication that will help improve his mood, and will f/u with outpt psych. Writer explained to him that not being compliant with his levothyroxine may attribute to depressed mood, in conjunction with recent stressors in his life of his family losses and developing COVID. He verbalized his understanding and was ensured that this "man" he may be seeing is something from a possible dream state, and overtime as he ages his mind may not function as well as it used to when he was younger. Denies SI/HI/AVH.

## 2022-03-10 NOTE — DISCHARGE NOTE NURSING/CASE MANAGEMENT/SOCIAL WORK - PATIENT PORTAL LINK FT
You can access the FollowMyHealth Patient Portal offered by [facility] by registering at the following website: [email or web address]. By joining [employer]’s FollowMyHealth portal, you will also be able to view your health information using other applications (apps) compatible with our system.

## 2022-03-10 NOTE — PROGRESS NOTE ADULT - PROBLEM SELECTOR PLAN 1
Possibly 2/2 recent SSRI, although only started one week prior to admission, so the timeline doesn't quite fit.     - On 3/7, Urine osm of 408, and Urine Na >20, consistent with SIADH. On 3/9, urine osm 342 and urine Na <20, concerning for hypovolemic hyponatremia (the pt did not receive any IV fluids)  - Will encourage adequate PO intake and have pt followup in 1 week with PCP to recheck Na  - Monitor I/Os  - TSH and free T4 normal

## 2022-03-10 NOTE — PROGRESS NOTE ADULT - PROBLEM SELECTOR PROBLEM 2
Current moderate episode of major depressive disorder without prior episode

## 2022-03-10 NOTE — BH CONSULTATION LIAISON PROGRESS NOTE - NSBHASSESSMENTFT_PSY_ALL_CORE
Pt is a 76 AA retired [employer] , domiciled with wife, no formal PPH, no past inpatient hospitalization, saw therapist for the first time today, no past SA, no hx of aggression/ legal issues, PMHx significant for COPD, HTN, HLD, gout, hypothyroidism, CAD (s/p stent in 2011 in [place], and s/p CABG x1 in 2017), brought in by self for worsening anxiety and depression and passive SI.     3/8: A&Ox3. Pt feeling depressed, difficulties sleeping. Denies SI/HI/AVH. Pt does not wish to start any new medication (Remeron) at this time.    3/9: A&Ox3. Pt still feeling depressed, but improved sleep. Reports mild suicidal ideation but no intent to act. Denies HI/AVH.    3/10: A&Ox3. Pt still feeling depressed, but willing to go home and seek outpt tx. Denies SI/HI/AVH.    Plan:  -no 1:1 needed at this time  - Mirtazapine 7.5mg qhs for sleep, depressed mood  - PRN: Ativan 0.25mg PO q6hr for anxiety  - PRN: Ativan 0.5mg IM/IV q6hrs for agitation  - Pt open to outpatient psychiatry upon d/c  - Psych to sign off

## 2022-03-10 NOTE — BH CONSULTATION LIAISON PROGRESS NOTE - NSCDBILL_PSY_A_CORE
32828 - Inpatient, high complexity
[id number] - Inpatient, high complexity
[id number] - Inpatient, high complexity

## 2022-03-24 ENCOUNTER — OUTPATIENT (OUTPATIENT)
Dept: OUTPATIENT SERVICES | Facility: HOSPITAL | Age: 77
LOS: 1 days | Discharge: HOME | End: 2022-03-24
Payer: COMMERCIAL

## 2022-03-24 DIAGNOSIS — Z98.890 OTHER SPECIFIED POSTPROCEDURAL STATES: Chronic | ICD-10-CM

## 2022-03-24 PROCEDURE — 90792 PSYCH DIAG EVAL W/MED SRVCS: CPT | Mod: 95

## 2022-03-29 DIAGNOSIS — F41.9 ANXIETY DISORDER, UNSPECIFIED: ICD-10-CM

## 2022-03-29 DIAGNOSIS — F06.31 MOOD DISORDER DUE TO KNOWN PHYSIOLOGICAL CONDITION WITH DEPRESSIVE FEATURES: ICD-10-CM

## 2022-04-08 PROCEDURE — 99443: CPT

## 2022-04-22 PROCEDURE — 98968 PH1 ASSMT&MGMT NQHP 21-30: CPT

## 2022-04-29 PROCEDURE — 98968 PH1 ASSMT&MGMT NQHP 21-30: CPT

## 2022-05-10 PROCEDURE — 99443: CPT

## 2022-06-21 ENCOUNTER — EMERGENCY (EMERGENCY)
Facility: HOSPITAL | Age: 77
LOS: 1 days | Discharge: HOME | End: 2022-06-21
Attending: EMERGENCY MEDICINE
Payer: COMMERCIAL

## 2022-06-21 VITALS
OXYGEN SATURATION: 100 % | DIASTOLIC BLOOD PRESSURE: 86 MMHG | SYSTOLIC BLOOD PRESSURE: 174 MMHG | HEART RATE: 56 BPM | RESPIRATION RATE: 16 BRPM

## 2022-06-21 VITALS
WEIGHT: 125 LBS | RESPIRATION RATE: 20 BRPM | DIASTOLIC BLOOD PRESSURE: 106 MMHG | OXYGEN SATURATION: 100 % | HEIGHT: 68 IN | SYSTOLIC BLOOD PRESSURE: 186 MMHG | TEMPERATURE: 97 F | HEART RATE: 83 BPM

## 2022-06-21 DIAGNOSIS — Z98.890 OTHER SPECIFIED POSTPROCEDURAL STATES: Chronic | ICD-10-CM

## 2022-06-21 LAB
ALBUMIN SERPL ELPH-MCNC: 4.1 G/DL — SIGNIFICANT CHANGE UP (ref 3.3–5)
ALP SERPL-CCNC: 58 U/L — SIGNIFICANT CHANGE UP (ref 40–120)
ALT FLD-CCNC: 38 U/L — SIGNIFICANT CHANGE UP (ref 10–45)
AMMONIA BLD-MCNC: 22 UMOL/L — SIGNIFICANT CHANGE UP (ref 11–55)
ANION GAP SERPL CALC-SCNC: 10 MMOL/L — SIGNIFICANT CHANGE UP (ref 5–17)
APAP SERPL-MCNC: <15 UG/ML — SIGNIFICANT CHANGE UP (ref 10–30)
APPEARANCE UR: CLEAR — SIGNIFICANT CHANGE UP
AST SERPL-CCNC: 32 U/L — SIGNIFICANT CHANGE UP (ref 10–40)
BASE EXCESS BLDV CALC-SCNC: 2.1 MMOL/L — HIGH (ref -2–2)
BASOPHILS # BLD AUTO: 0.04 K/UL — SIGNIFICANT CHANGE UP (ref 0–0.2)
BASOPHILS NFR BLD AUTO: 1.2 % — SIGNIFICANT CHANGE UP (ref 0–2)
BILIRUB SERPL-MCNC: 0.8 MG/DL — SIGNIFICANT CHANGE UP (ref 0.2–1.2)
BILIRUB UR-MCNC: NEGATIVE — SIGNIFICANT CHANGE UP
BUN SERPL-MCNC: 18 MG/DL — SIGNIFICANT CHANGE UP (ref 7–23)
CA-I SERPL-SCNC: 1.26 MMOL/L — SIGNIFICANT CHANGE UP (ref 1.15–1.33)
CALCIUM SERPL-MCNC: 9.4 MG/DL — SIGNIFICANT CHANGE UP (ref 8.4–10.5)
CHLORIDE BLDV-SCNC: 99 MMOL/L — SIGNIFICANT CHANGE UP (ref 96–108)
CHLORIDE SERPL-SCNC: 101 MMOL/L — SIGNIFICANT CHANGE UP (ref 96–108)
CO2 BLDV-SCNC: 30 MMOL/L — HIGH (ref 22–26)
CO2 SERPL-SCNC: 25 MMOL/L — SIGNIFICANT CHANGE UP (ref 22–31)
COLOR SPEC: SIGNIFICANT CHANGE UP
CREAT SERPL-MCNC: 0.94 MG/DL — SIGNIFICANT CHANGE UP (ref 0.5–1.3)
DIFF PNL FLD: NEGATIVE — SIGNIFICANT CHANGE UP
EGFR: 83 ML/MIN/1.73M2 — SIGNIFICANT CHANGE UP
EOSINOPHIL # BLD AUTO: 0.12 K/UL — SIGNIFICANT CHANGE UP (ref 0–0.5)
EOSINOPHIL NFR BLD AUTO: 3.5 % — SIGNIFICANT CHANGE UP (ref 0–6)
ETHANOL SERPL-MCNC: SIGNIFICANT CHANGE UP MG/DL (ref 0–10)
GAS PNL BLDV: 131 MMOL/L — LOW (ref 136–145)
GAS PNL BLDV: SIGNIFICANT CHANGE UP
GAS PNL BLDV: SIGNIFICANT CHANGE UP
GLUCOSE BLDV-MCNC: 105 MG/DL — HIGH (ref 70–99)
GLUCOSE SERPL-MCNC: 88 MG/DL — SIGNIFICANT CHANGE UP (ref 70–99)
GLUCOSE UR QL: NEGATIVE — SIGNIFICANT CHANGE UP
HCO3 BLDV-SCNC: 28 MMOL/L — SIGNIFICANT CHANGE UP (ref 22–29)
HCT VFR BLD CALC: 46.3 % — SIGNIFICANT CHANGE UP (ref 39–50)
HCT VFR BLDA CALC: 44 % — SIGNIFICANT CHANGE UP (ref 39–51)
HGB BLD CALC-MCNC: 14.5 G/DL — SIGNIFICANT CHANGE UP (ref 12.6–17.4)
HGB BLD-MCNC: 16.3 G/DL — SIGNIFICANT CHANGE UP (ref 13–17)
IMM GRANULOCYTES NFR BLD AUTO: 0.3 % — SIGNIFICANT CHANGE UP (ref 0–1.5)
KETONES UR-MCNC: SIGNIFICANT CHANGE UP
LACTATE BLDV-MCNC: 2.2 MMOL/L — HIGH (ref 0.7–2)
LEUKOCYTE ESTERASE UR-ACNC: NEGATIVE — SIGNIFICANT CHANGE UP
LIDOCAIN IGE QN: 23 U/L — SIGNIFICANT CHANGE UP (ref 7–60)
LYMPHOCYTES # BLD AUTO: 1.18 K/UL — SIGNIFICANT CHANGE UP (ref 1–3.3)
LYMPHOCYTES # BLD AUTO: 34.8 % — SIGNIFICANT CHANGE UP (ref 13–44)
MAGNESIUM SERPL-MCNC: 2.2 MG/DL — SIGNIFICANT CHANGE UP (ref 1.6–2.6)
MCHC RBC-ENTMCNC: 29.1 PG — SIGNIFICANT CHANGE UP (ref 27–34)
MCHC RBC-ENTMCNC: 35.2 GM/DL — SIGNIFICANT CHANGE UP (ref 32–36)
MCV RBC AUTO: 82.7 FL — SIGNIFICANT CHANGE UP (ref 80–100)
MONOCYTES # BLD AUTO: 0.32 K/UL — SIGNIFICANT CHANGE UP (ref 0–0.9)
MONOCYTES NFR BLD AUTO: 9.4 % — SIGNIFICANT CHANGE UP (ref 2–14)
NEUTROPHILS # BLD AUTO: 1.72 K/UL — LOW (ref 1.8–7.4)
NEUTROPHILS NFR BLD AUTO: 50.8 % — SIGNIFICANT CHANGE UP (ref 43–77)
NITRITE UR-MCNC: NEGATIVE — SIGNIFICANT CHANGE UP
NRBC # BLD: 0 /100 WBCS — SIGNIFICANT CHANGE UP (ref 0–0)
NT-PROBNP SERPL-SCNC: 246 PG/ML — SIGNIFICANT CHANGE UP (ref 0–300)
PCO2 BLDV: 49 MMHG — SIGNIFICANT CHANGE UP (ref 42–55)
PH BLDV: 7.37 — SIGNIFICANT CHANGE UP (ref 7.32–7.43)
PH UR: 7 — SIGNIFICANT CHANGE UP (ref 5–8)
PHOSPHATE SERPL-MCNC: 3.1 MG/DL — SIGNIFICANT CHANGE UP (ref 2.5–4.5)
PLATELET # BLD AUTO: 193 K/UL — SIGNIFICANT CHANGE UP (ref 150–400)
PO2 BLDV: 30 MMHG — SIGNIFICANT CHANGE UP (ref 25–45)
POTASSIUM BLDV-SCNC: 4.9 MMOL/L — SIGNIFICANT CHANGE UP (ref 3.5–5.1)
POTASSIUM SERPL-MCNC: 4.5 MMOL/L — SIGNIFICANT CHANGE UP (ref 3.5–5.3)
POTASSIUM SERPL-SCNC: 4.5 MMOL/L — SIGNIFICANT CHANGE UP (ref 3.5–5.3)
PROT SERPL-MCNC: 7.2 G/DL — SIGNIFICANT CHANGE UP (ref 6–8.3)
PROT UR-MCNC: NEGATIVE — SIGNIFICANT CHANGE UP
RBC # BLD: 5.6 M/UL — SIGNIFICANT CHANGE UP (ref 4.2–5.8)
RBC # FLD: 12.6 % — SIGNIFICANT CHANGE UP (ref 10.3–14.5)
SAO2 % BLDV: 41.5 % — LOW (ref 67–88)
SARS-COV-2 RNA SPEC QL NAA+PROBE: SIGNIFICANT CHANGE UP
SODIUM SERPL-SCNC: 136 MMOL/L — SIGNIFICANT CHANGE UP (ref 135–145)
SP GR SPEC: 1.01 — SIGNIFICANT CHANGE UP (ref 1.01–1.02)
TROPONIN T, HIGH SENSITIVITY RESULT: 9 NG/L — SIGNIFICANT CHANGE UP (ref 0–51)
UROBILINOGEN FLD QL: NEGATIVE — SIGNIFICANT CHANGE UP
WBC # BLD: 3.39 K/UL — LOW (ref 3.8–10.5)
WBC # FLD AUTO: 3.39 K/UL — LOW (ref 3.8–10.5)

## 2022-06-21 PROCEDURE — 84100 ASSAY OF PHOSPHORUS: CPT

## 2022-06-21 PROCEDURE — 82330 ASSAY OF CALCIUM: CPT

## 2022-06-21 PROCEDURE — 81003 URINALYSIS AUTO W/O SCOPE: CPT

## 2022-06-21 PROCEDURE — 87086 URINE CULTURE/COLONY COUNT: CPT

## 2022-06-21 PROCEDURE — 93005 ELECTROCARDIOGRAM TRACING: CPT

## 2022-06-21 PROCEDURE — 82962 GLUCOSE BLOOD TEST: CPT

## 2022-06-21 PROCEDURE — 84132 ASSAY OF SERUM POTASSIUM: CPT

## 2022-06-21 PROCEDURE — 82947 ASSAY GLUCOSE BLOOD QUANT: CPT

## 2022-06-21 PROCEDURE — 83605 ASSAY OF LACTIC ACID: CPT

## 2022-06-21 PROCEDURE — 83690 ASSAY OF LIPASE: CPT

## 2022-06-21 PROCEDURE — 71045 X-RAY EXAM CHEST 1 VIEW: CPT | Mod: 26

## 2022-06-21 PROCEDURE — 83735 ASSAY OF MAGNESIUM: CPT

## 2022-06-21 PROCEDURE — 99285 EMERGENCY DEPT VISIT HI MDM: CPT

## 2022-06-21 PROCEDURE — 84295 ASSAY OF SERUM SODIUM: CPT

## 2022-06-21 PROCEDURE — 85014 HEMATOCRIT: CPT

## 2022-06-21 PROCEDURE — U0003: CPT

## 2022-06-21 PROCEDURE — 83880 ASSAY OF NATRIURETIC PEPTIDE: CPT

## 2022-06-21 PROCEDURE — 87040 BLOOD CULTURE FOR BACTERIA: CPT

## 2022-06-21 PROCEDURE — 36415 COLL VENOUS BLD VENIPUNCTURE: CPT

## 2022-06-21 PROCEDURE — 96372 THER/PROPH/DIAG INJ SC/IM: CPT

## 2022-06-21 PROCEDURE — 80307 DRUG TEST PRSMV CHEM ANLYZR: CPT

## 2022-06-21 PROCEDURE — 85018 HEMOGLOBIN: CPT

## 2022-06-21 PROCEDURE — 82140 ASSAY OF AMMONIA: CPT

## 2022-06-21 PROCEDURE — 80053 COMPREHEN METABOLIC PANEL: CPT

## 2022-06-21 PROCEDURE — 71045 X-RAY EXAM CHEST 1 VIEW: CPT

## 2022-06-21 PROCEDURE — U0005: CPT

## 2022-06-21 PROCEDURE — 84484 ASSAY OF TROPONIN QUANT: CPT

## 2022-06-21 PROCEDURE — 99285 EMERGENCY DEPT VISIT HI MDM: CPT | Mod: 25

## 2022-06-21 PROCEDURE — 82435 ASSAY OF BLOOD CHLORIDE: CPT

## 2022-06-21 PROCEDURE — 70450 CT HEAD/BRAIN W/O DYE: CPT | Mod: MA

## 2022-06-21 PROCEDURE — 82803 BLOOD GASES ANY COMBINATION: CPT

## 2022-06-21 PROCEDURE — 70450 CT HEAD/BRAIN W/O DYE: CPT | Mod: 26,MA

## 2022-06-21 PROCEDURE — 85025 COMPLETE CBC W/AUTO DIFF WBC: CPT

## 2022-06-21 PROCEDURE — 84443 ASSAY THYROID STIM HORMONE: CPT

## 2022-06-21 RX ORDER — QUETIAPINE FUMARATE 200 MG/1
1 TABLET, FILM COATED ORAL
Qty: 20 | Refills: 0
Start: 2022-06-21 | End: 2022-07-04

## 2022-06-21 RX ORDER — SODIUM CHLORIDE 9 MG/ML
500 INJECTION INTRAMUSCULAR; INTRAVENOUS; SUBCUTANEOUS ONCE
Refills: 0 | Status: COMPLETED | OUTPATIENT
Start: 2022-06-21 | End: 2022-06-21

## 2022-06-21 RX ORDER — OLANZAPINE 15 MG/1
5 TABLET, FILM COATED ORAL ONCE
Refills: 0 | Status: COMPLETED | OUTPATIENT
Start: 2022-06-21 | End: 2022-06-21

## 2022-06-21 RX ADMIN — SODIUM CHLORIDE 500 MILLILITER(S): 9 INJECTION INTRAMUSCULAR; INTRAVENOUS; SUBCUTANEOUS at 12:19

## 2022-06-21 RX ADMIN — OLANZAPINE 5 MILLIGRAM(S): 15 TABLET, FILM COATED ORAL at 14:33

## 2022-06-21 NOTE — ED PROVIDER NOTE - PATIENT PORTAL LINK FT
You can access the FollowMyHealth Patient Portal offered by [facility] by registering at the following website: [email or web address]. By joining [employer]’s FollowMyHealth portal, you will also be able to view your health information using other applications (apps) compatible with our system.

## 2022-06-21 NOTE — ED PROVIDER NOTE - CLINICAL SUMMARY MEDICAL DECISION MAKING FREE TEXT BOX
Dr. Carty Note: subacute encephalopathy s/p covid 5mo ago now with failure to thrive in elderly along with paranoia, will require admission, labs, ct head r/o brain mass, cxr, ekg, start fluids judiciously given prior h/o hyponatremia (clinically dehydrated currently).

## 2022-06-21 NOTE — ED PROVIDER NOTE - NSFOLLOWUPINSTRUCTIONS_ED_ALL_ED_FT
Please stop the Lorazepam medication.  Please continue the medications Levothyroxine and Escitalopram.    A new prescription for Seroquel was sent to the pharmacy. Please take 1 tablet (25 milligrams) once at night time before bed. You may give an additional 1 tablet (25 milligrams) AS NEEDED for severe agitation. Please do NOT exceed more than 50 milligrams in one day.    Please follow up with your psychiatrist. Please call first thing tomorrow morning to follow up.    Please return to the ED immediately should you have any new and/or concerning symptoms.

## 2022-06-21 NOTE — ED ADULT NURSE NOTE - OBJECTIVE STATEMENT
75 y/o Male pmh cardiac stents, MI, hypothyroidism, anxiety, depression presenting to ED accompanied by his wife c/o hallucinations described by wife as "seeing people that aren't there" associated with paranoia and poor PO intake and wt loss x the passed 3 weeks. denies any fever, chills, cough, sick contacts, travel, cp, or any recent fall or head injury. wife states that after pt had covid on jan 2022- m5 months ago he began getting tremors and became more anxious, restless, depressed, and not acting himself, was prescribed benzos and meds by his pcp and psychiatrist with no improvement. VS stable. labs and line obtained. results pending. safety and fall precautions maintained at all times, call bell within reach. wife remains at the bedside, pt tba.

## 2022-06-21 NOTE — ED PROVIDER NOTE - OBJECTIVE STATEMENT
Dr. Carty Note: 76M here with wife with c/o hallucinations (seeing people not there) along with paranoia (thinks people are trying to kill him with his psych meds), now with poor intake for past 3 weeks with weight loss.  No dyspnea, fever, cough, vomiting, cp, ab pain, urinary sxs.     Pt had covid Jan 2022, no prior psych history, but afterwards, wife states he started getting tremors, pacing, anxious, depressed and not acting right....dx by pcp/psychiatrist as "brain fog" after covid.  Since then, pt had declined despite changing psych meds.

## 2022-06-21 NOTE — ED PROVIDER NOTE - PROGRESS NOTE DETAILS
Dr. Carty Note: reviewed labs and ct scan.  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  Explained this likely dx to pt's wife.  PT has been seen by [facility] in past it seems, poor experience, believe pt was waiting for a bed at that time, d/c after few days.  At this time, believe patient would do worse inpatient medicine vs trialing outpatient psych care. Will stop the benzodiazepine, continue the levothyroxine and escitaopram, but add seroquel 25-50mg QHS and f/u psych. But wife is concerned pt will not take medication or its side effects.  WIll give dose of Zyprexa 5mg IM and observe in ED for effect, which hopefully will improve compliance, then given Seroquel PO here for tonight's dose, then Rx Seroquel 25mg QHS, extra 25mg PRN for agitation/anxiety. If observation results in no improvement, pt may then require inpatient management. S/o to evening team with this plan. Avery Woodward MD. pt reassessed and reevaluated. confirmed w/ wife at bedside that she is comfortable with going home with the patient; they will call a cab. pt was able to ambulate. instructed again, for pt to discontinue the lorazepam. pt is to c/w escitalopram and levothyroxine. explained new rx of the seroquel to patient (I sent it to [facility] so that she can pick it up). Pt's wife states that she has a psychiatrist for the pt to follow up with. ED evaluation and management discussed with the patient. Close PMD follow up encouraged.  Strict ED return instructions discussed in detail and patient given the opportunity to ask any questions about their discharge diagnosis and instructions. Patient verbalized understanding. Patient signed out to me by the prior attending.  The patient's disposition was discussed with the treating team and agreed upon.  Johann Cottrell M.D. (attending) family and patient desire discharge, will follow medication schedule by am Emergency Department team as discussed prior to transition of care.  The patient was serially evaluated throughout emergency department course by the team. There was no acute deterioration up to this time in the emergency department. The patient has demonstrated clinical improvement and/or stability, feels better at this time according to emergency department team. Agree with goals/plan of emergency department care as described in this physician's electronic medical record, including diagnostics, therapeutics and consultation recommendation as clinically warranted. Will discharge home with close outpatient follow up with primary care physician/provider and specialist if necessary. Patient's family educated on concerning signs and features to return to the emergency department, in layman terms, including but not limited to: nausea, vomiting, fever, chills, persistent/worsening symptoms or any concerns at all. There are no acute or immediate life threatening issues present on history, clinical exam, or any diagnostic evaluation. The patient is a safe disposition home, patient and/or family/guardian (with documents sent with patient) has capacity and insight into their condition, and will follow up with their doctor(s) this week. Patient and/or family/guardian (with documents sent with patient) understand anticipatory guidance and was given strict return and follow up precautions and return precautions for worsening of condition. The patient and/or family/guardian (with documents sent with patient) has been informed of all concerning signs and symptoms to return to Emergency Department, the necessity to follow up with the PMD/Clinic/follow up provided within 2-3 days was explained in written detail and left for family/guardian(s). The patient and/or family/guardian were given the opportunity to ask questions and have them answered in full. The patient and/or family/guardian are with capacity and insight into the situation, treatment, risks, benefits, alternative therapies, and understand that they can ask any further questions if needed. Patient and/or family/guardian understand anticipatory guidance were given strict return and follow up precautions.  The patient and/or family/guardian have been informed of all concerning signs and symptoms to return to Emergency Department, the necessity to follow up with the PMD/Clinic/follow up provided within 2-3 days was explained, and the patient and/or family reports understanding of above with capacity and insight. The patient and/or family/guardian were informed of any results of their tests and are were encouraged to follow up on the findings with their doctor as well as the need to inform their doctor of any results. The patient and/or family/guardian are aware of the need to follow up with repeat testing as applicable and report understanding of the above with capacity and insight. The patient and/or family/guardian was made aware of any pending test results at the time of discharge and of the need to call back for the final results a well as the need to inform their doctor of the results.

## 2022-06-22 LAB
CULTURE RESULTS: SIGNIFICANT CHANGE UP
SPECIMEN SOURCE: SIGNIFICANT CHANGE UP
TSH SERPL-MCNC: 4.98 UIU/ML — HIGH (ref 0.27–4.2)

## 2022-06-22 NOTE — ED POST DISCHARGE NOTE - ADDITIONAL DOCUMENTATION
spoke with pt's wife, informed her of TSH level and that his synthroid may need to be adjusted. pt will f/up with his PCP

## 2022-06-26 LAB
CULTURE RESULTS: SIGNIFICANT CHANGE UP
SPECIMEN SOURCE: SIGNIFICANT CHANGE UP

## 2022-07-01 PROCEDURE — 99215 OFFICE O/P EST HI 40 MIN: CPT | Mod: 95

## 2022-07-11 NOTE — ED PROVIDER NOTE - CHILD ABUSE FACILITY
I reviewed the H&P, I examined the patient, and there are no changes in the patient's condition.   
[facility]

## 2022-07-24 ENCOUNTER — EMERGENCY (EMERGENCY)
Facility: HOSPITAL | Age: 77
LOS: 1 days | Discharge: HOME | End: 2022-07-24
Attending: EMERGENCY MEDICINE
Payer: COMMERCIAL

## 2022-07-24 VITALS
SYSTOLIC BLOOD PRESSURE: 179 MMHG | OXYGEN SATURATION: 98 % | TEMPERATURE: 99 F | DIASTOLIC BLOOD PRESSURE: 88 MMHG | HEART RATE: 78 BPM

## 2022-07-24 VITALS
HEART RATE: 76 BPM | SYSTOLIC BLOOD PRESSURE: 184 MMHG | RESPIRATION RATE: 20 BRPM | DIASTOLIC BLOOD PRESSURE: 105 MMHG | OXYGEN SATURATION: 98 % | HEIGHT: 68 IN | WEIGHT: 110.01 LBS

## 2022-07-24 DIAGNOSIS — Z98.890 OTHER SPECIFIED POSTPROCEDURAL STATES: Chronic | ICD-10-CM

## 2022-07-24 LAB
ALBUMIN SERPL ELPH-MCNC: 3.8 G/DL — SIGNIFICANT CHANGE UP (ref 3.3–5)
ALP SERPL-CCNC: 46 U/L — SIGNIFICANT CHANGE UP (ref 40–120)
ALT FLD-CCNC: 31 U/L — SIGNIFICANT CHANGE UP (ref 10–45)
ANION GAP SERPL CALC-SCNC: 10 MMOL/L — SIGNIFICANT CHANGE UP (ref 5–17)
APPEARANCE UR: CLEAR — SIGNIFICANT CHANGE UP
AST SERPL-CCNC: 40 U/L — SIGNIFICANT CHANGE UP (ref 10–40)
BACTERIA # UR AUTO: NEGATIVE — SIGNIFICANT CHANGE UP
BASOPHILS # BLD AUTO: 0.14 K/UL — SIGNIFICANT CHANGE UP (ref 0–0.2)
BASOPHILS NFR BLD AUTO: 4.6 % — HIGH (ref 0–2)
BILIRUB SERPL-MCNC: 0.9 MG/DL — SIGNIFICANT CHANGE UP (ref 0.2–1.2)
BILIRUB UR-MCNC: NEGATIVE — SIGNIFICANT CHANGE UP
BUN SERPL-MCNC: 26 MG/DL — HIGH (ref 7–23)
CALCIUM SERPL-MCNC: 9.1 MG/DL — SIGNIFICANT CHANGE UP (ref 8.4–10.5)
CHLORIDE SERPL-SCNC: 107 MMOL/L — SIGNIFICANT CHANGE UP (ref 96–108)
CO2 SERPL-SCNC: 21 MMOL/L — LOW (ref 22–31)
COLOR SPEC: YELLOW — SIGNIFICANT CHANGE UP
CREAT SERPL-MCNC: 1.25 MG/DL — SIGNIFICANT CHANGE UP (ref 0.5–1.3)
DIFF PNL FLD: ABNORMAL
EGFR: 59 ML/MIN/1.73M2 — LOW
EOSINOPHIL # BLD AUTO: 0.11 K/UL — SIGNIFICANT CHANGE UP (ref 0–0.5)
EOSINOPHIL NFR BLD AUTO: 3.6 % — SIGNIFICANT CHANGE UP (ref 0–6)
EPI CELLS # UR: 1 /HPF — SIGNIFICANT CHANGE UP
GLUCOSE SERPL-MCNC: 112 MG/DL — HIGH (ref 70–99)
GLUCOSE UR QL: NEGATIVE — SIGNIFICANT CHANGE UP
HCT VFR BLD CALC: 36.6 % — LOW (ref 39–50)
HGB BLD-MCNC: 12.9 G/DL — LOW (ref 13–17)
HYALINE CASTS # UR AUTO: 0 /LPF — SIGNIFICANT CHANGE UP (ref 0–7)
KETONES UR-MCNC: NEGATIVE — SIGNIFICANT CHANGE UP
LEUKOCYTE ESTERASE UR-ACNC: NEGATIVE — SIGNIFICANT CHANGE UP
LYMPHOCYTES # BLD AUTO: 0.56 K/UL — LOW (ref 1–3.3)
LYMPHOCYTES # BLD AUTO: 18.2 % — SIGNIFICANT CHANGE UP (ref 13–44)
MAGNESIUM SERPL-MCNC: 2.3 MG/DL — SIGNIFICANT CHANGE UP (ref 1.6–2.6)
MANUAL SMEAR VERIFICATION: SIGNIFICANT CHANGE UP
MCHC RBC-ENTMCNC: 29.5 PG — SIGNIFICANT CHANGE UP (ref 27–34)
MCHC RBC-ENTMCNC: 35.2 GM/DL — SIGNIFICANT CHANGE UP (ref 32–36)
MCV RBC AUTO: 83.6 FL — SIGNIFICANT CHANGE UP (ref 80–100)
MONOCYTES # BLD AUTO: 0.25 K/UL — SIGNIFICANT CHANGE UP (ref 0–0.9)
MONOCYTES NFR BLD AUTO: 8.2 % — SIGNIFICANT CHANGE UP (ref 2–14)
NEUTROPHILS # BLD AUTO: 1.91 K/UL — SIGNIFICANT CHANGE UP (ref 1.8–7.4)
NEUTROPHILS NFR BLD AUTO: 61.8 % — SIGNIFICANT CHANGE UP (ref 43–77)
NITRITE UR-MCNC: NEGATIVE — SIGNIFICANT CHANGE UP
PH UR: 6 — SIGNIFICANT CHANGE UP (ref 5–8)
PLAT MORPH BLD: NORMAL — SIGNIFICANT CHANGE UP
PLATELET # BLD AUTO: 164 K/UL — SIGNIFICANT CHANGE UP (ref 150–400)
POIKILOCYTOSIS BLD QL AUTO: SLIGHT — SIGNIFICANT CHANGE UP
POTASSIUM SERPL-MCNC: 4.3 MMOL/L — SIGNIFICANT CHANGE UP (ref 3.5–5.3)
POTASSIUM SERPL-SCNC: 4.3 MMOL/L — SIGNIFICANT CHANGE UP (ref 3.5–5.3)
PROT SERPL-MCNC: 6.7 G/DL — SIGNIFICANT CHANGE UP (ref 6–8.3)
PROT UR-MCNC: ABNORMAL
RBC # BLD: 4.38 M/UL — SIGNIFICANT CHANGE UP (ref 4.2–5.8)
RBC # FLD: 13.5 % — SIGNIFICANT CHANGE UP (ref 10.3–14.5)
RBC BLD AUTO: ABNORMAL
RBC CASTS # UR COMP ASSIST: 3 /HPF — SIGNIFICANT CHANGE UP (ref 0–4)
SODIUM SERPL-SCNC: 138 MMOL/L — SIGNIFICANT CHANGE UP (ref 135–145)
SP GR SPEC: 1.02 — SIGNIFICANT CHANGE UP (ref 1.01–1.02)
TARGETS BLD QL SMEAR: SLIGHT — SIGNIFICANT CHANGE UP
UROBILINOGEN FLD QL: NEGATIVE — SIGNIFICANT CHANGE UP
VARIANT LYMPHS # BLD: 3.6 % — SIGNIFICANT CHANGE UP (ref 0–6)
WBC # BLD: 3.09 K/UL — LOW (ref 3.8–10.5)
WBC # FLD AUTO: 3.09 K/UL — LOW (ref 3.8–10.5)
WBC UR QL: 2 /HPF — SIGNIFICANT CHANGE UP (ref 0–5)

## 2022-07-24 PROCEDURE — 80053 COMPREHEN METABOLIC PANEL: CPT

## 2022-07-24 PROCEDURE — 36415 COLL VENOUS BLD VENIPUNCTURE: CPT

## 2022-07-24 PROCEDURE — 70450 CT HEAD/BRAIN W/O DYE: CPT | Mod: 26,MA

## 2022-07-24 PROCEDURE — 84443 ASSAY THYROID STIM HORMONE: CPT

## 2022-07-24 PROCEDURE — 84436 ASSAY OF TOTAL THYROXINE: CPT

## 2022-07-24 PROCEDURE — 85025 COMPLETE CBC W/AUTO DIFF WBC: CPT

## 2022-07-24 PROCEDURE — 87086 URINE CULTURE/COLONY COUNT: CPT

## 2022-07-24 PROCEDURE — 84480 ASSAY TRIIODOTHYRONINE (T3): CPT

## 2022-07-24 PROCEDURE — 70450 CT HEAD/BRAIN W/O DYE: CPT | Mod: MA

## 2022-07-24 PROCEDURE — 83735 ASSAY OF MAGNESIUM: CPT

## 2022-07-24 PROCEDURE — 99285 EMERGENCY DEPT VISIT HI MDM: CPT

## 2022-07-24 PROCEDURE — 99285 EMERGENCY DEPT VISIT HI MDM: CPT | Mod: 25

## 2022-07-24 PROCEDURE — 81001 URINALYSIS AUTO W/SCOPE: CPT

## 2022-07-24 RX ORDER — QUETIAPINE FUMARATE 200 MG/1
1 TABLET, FILM COATED ORAL
Qty: 30 | Refills: 0
Start: 2022-07-24 | End: 2022-08-22

## 2022-07-24 RX ORDER — QUETIAPINE FUMARATE 200 MG/1
25 TABLET, FILM COATED ORAL ONCE
Refills: 0 | Status: COMPLETED | OUTPATIENT
Start: 2022-07-24 | End: 2022-07-24

## 2022-07-24 RX ADMIN — QUETIAPINE FUMARATE 25 MILLIGRAM(S): 200 TABLET, FILM COATED ORAL at 13:30

## 2022-07-24 NOTE — ED PROVIDER NOTE - PATIENT PORTAL LINK FT
You can access the FollowMyHealth Patient Portal offered by [facility] by registering at the following website: [email or web address]. By joining [employer]’s FollowMyHealth portal, you will also be able to view your health information using other applications (apps) compatible with our system.

## 2022-07-24 NOTE — ED PROVIDER NOTE - TOBACCO USE
Patient here to discuss MRI and EEG results. She states she was dizzy all week when she stayed home 9/14/17- 9/23/17. When she went back to work on Monday 9/25/17 patient noticed she was jumbling her words.    Patient started feeling better last Thursday, y Unknown if ever smoked

## 2022-07-24 NOTE — ED PROVIDER NOTE - ATTENDING CONTRIBUTION TO CARE
------------ATTENDING NOTE------------  pt w/ wife c/o gradually increasing agitation (yelling, poor communication, decreased following commands, etc) over past month since switching to generic seroquel, no recent fevers/illness/falls, medical w/u overall wnl and pt w/ baseline functional loss since COVID-19/brain insults, ED sign out pending close reassessments/PO and c/w pt's outpt psych team for medication/treatment recommendations and planned dc.  - James Jones MD   ------------------------------------------------

## 2022-07-24 NOTE — ED PROVIDER NOTE - NSFOLLOWUPINSTRUCTIONS_ED_ALL_ED_FT
You were seen in the emergency department for agitation.     You were prescribed seroquel: please take 25mg at bedtime. You may also take an additional seroquel during the day for severe agitation.     Please follow up with your primary care doctor within 48 hours for continuation of care.   Please follow up with Dr. Lopez for further management of agitation.     Return to the emergency department if you experience any new/concerning/worsening symptoms such as but not limited to: fever (>100.3F), intractable nausea, vomiting, chest pain, difficulty breathing.

## 2022-07-24 NOTE — ED PROVIDER NOTE - PROGRESS NOTE DETAILS
Cirilo PGY2  Left a message for Dr. Lopez's answering service. Cirilo PGY2  Psych on-call resident called back however unable to provide recommendation until discussed with Dr. Lopez. The psych resident will reach out to Dr. Lopez. Cirilo PGY2   Left a message for Dr. Lopez's answering service @[phone]. Kerr PGY2   Multiple attempts have been made to reach Dr. Lopez with no avail.  Wife is getting concerned because patient is not in a familiar place and she would rather that the patient go home at this point. She would like to have seroquel refilled. Instructed she to follow up with Dr. Lopez in office.   Sent seroquel to pharmacy.

## 2022-07-24 NOTE — ED PROVIDER NOTE - OBJECTIVE STATEMENT
no
Patient is a 77 year-old-male with history of anoxic brain injury? (due to COVID) presents with increasing agitation. Wife at bedside states that patient was seen about a month ago, was started on seroquel, however he was noted to be more agitated in the last 5 days. Denies fever, vomiting, complaints of pain, shortness of breath. Has been taking seroquel 25mg at bedtime.  Psychiatrist: Dr. Jatin Lopez

## 2022-07-24 NOTE — ED ADULT NURSE NOTE - NSIMPLEMENTINTERV_GEN_ALL_ED
Implemented All Fall Risk Interventions:  [place] to call system. Call bell, personal items and telephone within reach. Instruct patient to call for assistance. Room bathroom lighting operational. Non-slip footwear when patient is off stretcher. Physically safe environment: no spills, clutter or unnecessary equipment. Stretcher in lowest position, wheels locked, appropriate side rails in place. Provide visual cue, wrist band, yellow gown, etc. Monitor gait and stability. Monitor for mental status changes and reorient to person, place, and time. Review medications for side effects contributing to fall risk. Reinforce activity limits and safety measures with patient and family.

## 2022-07-24 NOTE — ED ADULT NURSE NOTE - TEMPLATE LIST FOR HEAD TO TOE ASSESSMENT
Neuro Detail Level: Detailed Quality 226: Preventive Care And Screening: Tobacco Use: Screening And Cessation Intervention: Patient screened for tobacco use and is an ex/non-smoker Quality 431: Preventive Care And Screening: Unhealthy Alcohol Use - Screening: Patient not identified as an unhealthy alcohol user when screened for unhealthy alcohol use using a systematic screening method Quality 111:Pneumonia Vaccination Status For Older Adults: Pneumococcal Vaccination Previously Received Quality 110: Preventive Care And Screening: Influenza Immunization: Influenza Immunization Administered during Influenza season

## 2022-07-24 NOTE — ED ADULT NURSE NOTE - OBJECTIVE STATEMENT
77 yr old male with cardiac history and hypothyroid came in after the wife said he was placed on Seroquel after getting covid. when they went to refill they were given another type of medicine and she said his behavior has became aggressive, walking around, dec po. wants to get a script for Seroquel. on assessment a and o x 1 lungs clear abd soft non tender no swelling in extremities no n/v/d no fevers. just yelling

## 2022-07-24 NOTE — ED PROVIDER NOTE - PHYSICAL EXAMINATION
Vitals: I have reviewed the patients vital signs  General: intermittent agitated and speaking to someone   HEENT: Atraumatic, normocephalic, airway patent  Eyes: EOMI  Neck: no tracheal deviation, no JVD  Chest/Lungs: no trauma, symmetric chest rise, speaking in complete sentences, no WOB  Heart: skin and extremities well perfused, regular rate and rhythm  Neuro: A+agitated, CN grossly intact  MSK: no visible muscle wasting or atrophy  Skin: no cyanosis, no jaundice, no new emergent lesions

## 2022-07-24 NOTE — ED PROVIDER NOTE - CLINICAL SUMMARY MEDICAL DECISION MAKING FREE TEXT BOX
Patient is a 77 year-old-male with history of anoxic brain injury? (due to COVID) presents with increasing agitation. Will evaluate for possible causes of increasing agitation including infectious/metabolic/hematologic causes. Will also obtain CTH to evaluate for acute processes. Will obtain labs, UA/UC, ECG. Will also reach out to his psychiatrist for more recommendation.

## 2022-07-25 LAB
CULTURE RESULTS: SIGNIFICANT CHANGE UP
SPECIMEN SOURCE: SIGNIFICANT CHANGE UP
T3 SERPL-MCNC: 56 NG/DL — LOW (ref 80–200)
T4 AB SER-ACNC: 8.5 UG/DL — SIGNIFICANT CHANGE UP (ref 4.6–12)
TSH SERPL-MCNC: 4.16 UIU/ML — SIGNIFICANT CHANGE UP (ref 0.27–4.2)

## 2022-07-25 RX ORDER — QUETIAPINE FUMARATE 200 MG/1
1 TABLET, FILM COATED ORAL
Qty: 30 | Refills: 0
Start: 2022-07-25 | End: 2022-08-23

## 2022-07-25 NOTE — ED POST DISCHARGE NOTE - DETAILS
7/25: m to call back admin line - Lucila Gonzales PA-C 76/25: spoke with patients spouse regarding the thyroid fcn, she wrote down values to discuss with pmd, she also notes pharmacy has not yet received seroquel, sent on visit, confirmed pharmacy and resent - Lucila Gonazles PA-C 7/25: spoke with patients spouse regarding the thyroid fcn, she wrote down values to discuss with pmd, she also notes pharmacy has not yet received seroquel, sent on visit, confirmed pharmacy and resent - Lucila Gonzales PA-C

## 2022-08-03 PROCEDURE — 99214 OFFICE O/P EST MOD 30 MIN: CPT | Mod: 95

## 2022-08-18 PROCEDURE — 99443: CPT

## 2022-10-05 PROCEDURE — 99443: CPT

## 2022-11-22 PROCEDURE — 99443: CPT

## 2022-12-21 PROCEDURE — 99443: CPT

## 2023-02-21 PROCEDURE — 99443: CPT

## 2023-03-14 PROCEDURE — 99443: CPT | Mod: 95

## 2023-03-14 NOTE — ED ADULT TRIAGE NOTE - MODE OF ARRIVAL
Walk in Ambulatory to ER with c/o R flank pain x 1 days. Patient took Tyleol prior to arrival with no relief.

## 2024-03-22 NOTE — ED ADULT TRIAGE NOTE - HEIGHT IN CM
172.72
General Sunscreen Counseling: I recommended a broad spectrum sunscreen with a SPF of 30 or higher.  I explained that SPF 30 sunscreens block approximately 97 percent of the sun's harmful rays.  Sunscreens should be applied at least 15 minutes prior to expected sun exposure and then every 2 hours after that as long as sun exposure continues. If swimming or exercising sunscreen should be reapplied every 45 minutes to an hour after getting wet or sweating.  One ounce, or the equivalent of a shot glass full of sunscreen, is adequate to protect the skin not covered by a bathing suit. I also recommended a lip balm with a sunscreen as well. Sun protective clothing can be used in lieu of sunscreen but must be worn the entire time you are exposed to the sun's rays.
Detail Level: Zone

## 2025-01-01 ENCOUNTER — INPATIENT (INPATIENT)
Facility: HOSPITAL | Age: 80
LOS: 13 days | End: 2025-02-11
Attending: INTERNAL MEDICINE | Admitting: INTERNAL MEDICINE
Payer: MEDICARE

## 2025-01-01 VITALS
RESPIRATION RATE: 15 BRPM | SYSTOLIC BLOOD PRESSURE: 47 MMHG | HEART RATE: 67 BPM | DIASTOLIC BLOOD PRESSURE: 32 MMHG | OXYGEN SATURATION: 95 %

## 2025-01-01 DIAGNOSIS — R13.10 DYSPHAGIA, UNSPECIFIED: ICD-10-CM

## 2025-01-01 DIAGNOSIS — J96.90 RESPIRATORY FAILURE, UNSPECIFIED, UNSPECIFIED WHETHER WITH HYPOXIA OR HYPERCAPNIA: ICD-10-CM

## 2025-01-01 DIAGNOSIS — I46.9 CARDIAC ARREST, CAUSE UNSPECIFIED: ICD-10-CM

## 2025-01-01 DIAGNOSIS — E43 UNSPECIFIED SEVERE PROTEIN-CALORIE MALNUTRITION: ICD-10-CM

## 2025-01-01 DIAGNOSIS — R53.2 FUNCTIONAL QUADRIPLEGIA: ICD-10-CM

## 2025-01-01 DIAGNOSIS — Z51.5 ENCOUNTER FOR PALLIATIVE CARE: ICD-10-CM

## 2025-01-01 DIAGNOSIS — Z71.89 OTHER SPECIFIED COUNSELING: ICD-10-CM

## 2025-01-01 DIAGNOSIS — N17.9 ACUTE KIDNEY FAILURE, UNSPECIFIED: ICD-10-CM

## 2025-01-01 DIAGNOSIS — Z98.890 OTHER SPECIFIED POSTPROCEDURAL STATES: Chronic | ICD-10-CM

## 2025-01-01 LAB
-  AMOXICILLIN/CLAVULANIC ACID: SIGNIFICANT CHANGE UP
-  AMPICILLIN/SULBACTAM: SIGNIFICANT CHANGE UP
-  AMPICILLIN: SIGNIFICANT CHANGE UP
-  AZTREONAM: SIGNIFICANT CHANGE UP
-  CEFAZOLIN: SIGNIFICANT CHANGE UP
-  CEFEPIME: SIGNIFICANT CHANGE UP
-  CEFOXITIN: SIGNIFICANT CHANGE UP
-  CEFTRIAXONE: SIGNIFICANT CHANGE UP
-  CEFUROXIME: SIGNIFICANT CHANGE UP
-  CIPROFLOXACIN: SIGNIFICANT CHANGE UP
-  ERTAPENEM: SIGNIFICANT CHANGE UP
-  GENTAMICIN: SIGNIFICANT CHANGE UP
-  IMIPENEM: SIGNIFICANT CHANGE UP
-  LEVOFLOXACIN: SIGNIFICANT CHANGE UP
-  MEROPENEM: SIGNIFICANT CHANGE UP
-  NITROFURANTOIN: SIGNIFICANT CHANGE UP
-  PIPERACILLIN/TAZOBACTAM: SIGNIFICANT CHANGE UP
-  TOBRAMYCIN: SIGNIFICANT CHANGE UP
-  TRIMETHOPRIM/SULFAMETHOXAZOLE: SIGNIFICANT CHANGE UP
A1C WITH ESTIMATED AVERAGE GLUCOSE RESULT: 6 % — HIGH (ref 4–5.6)
ADD ON TEST-SPECIMEN IN LAB: SIGNIFICANT CHANGE UP
ADD ON TEST-SPECIMEN IN LAB: SIGNIFICANT CHANGE UP
ALBUMIN SERPL ELPH-MCNC: 1.7 G/DL — LOW (ref 3.3–5)
ALBUMIN SERPL ELPH-MCNC: 1.8 G/DL — LOW (ref 3.3–5)
ALBUMIN SERPL ELPH-MCNC: 1.9 G/DL — LOW (ref 3.3–5)
ALBUMIN SERPL ELPH-MCNC: 1.9 G/DL — LOW (ref 3.3–5)
ALBUMIN SERPL ELPH-MCNC: 2 G/DL — LOW (ref 3.3–5)
ALBUMIN SERPL ELPH-MCNC: 2.1 G/DL — LOW (ref 3.3–5)
ALBUMIN SERPL ELPH-MCNC: 2.2 G/DL — LOW (ref 3.3–5)
ALBUMIN SERPL ELPH-MCNC: 2.4 G/DL — LOW (ref 3.3–5)
ALP SERPL-CCNC: 101 U/L — SIGNIFICANT CHANGE UP (ref 40–120)
ALP SERPL-CCNC: 104 U/L — SIGNIFICANT CHANGE UP (ref 40–120)
ALP SERPL-CCNC: 107 U/L — SIGNIFICANT CHANGE UP (ref 40–120)
ALP SERPL-CCNC: 113 U/L — SIGNIFICANT CHANGE UP (ref 40–120)
ALP SERPL-CCNC: 118 U/L — SIGNIFICANT CHANGE UP (ref 40–120)
ALP SERPL-CCNC: 127 U/L — HIGH (ref 40–120)
ALP SERPL-CCNC: 140 U/L — HIGH (ref 40–120)
ALP SERPL-CCNC: 147 U/L — HIGH (ref 40–120)
ALP SERPL-CCNC: 159 U/L — HIGH (ref 40–120)
ALP SERPL-CCNC: 63 U/L — SIGNIFICANT CHANGE UP (ref 40–120)
ALP SERPL-CCNC: 69 U/L — SIGNIFICANT CHANGE UP (ref 40–120)
ALP SERPL-CCNC: 71 U/L — SIGNIFICANT CHANGE UP (ref 40–120)
ALP SERPL-CCNC: 73 U/L — SIGNIFICANT CHANGE UP (ref 40–120)
ALP SERPL-CCNC: 78 U/L — SIGNIFICANT CHANGE UP (ref 40–120)
ALP SERPL-CCNC: 87 U/L — SIGNIFICANT CHANGE UP (ref 40–120)
ALT FLD-CCNC: 115 U/L — HIGH (ref 4–41)
ALT FLD-CCNC: 187 U/L — HIGH (ref 4–41)
ALT FLD-CCNC: 294 U/L — HIGH (ref 4–41)
ALT FLD-CCNC: 339 U/L — HIGH (ref 4–41)
ALT FLD-CCNC: 380 U/L — HIGH (ref 4–41)
ALT FLD-CCNC: 40 U/L — SIGNIFICANT CHANGE UP (ref 4–41)
ALT FLD-CCNC: 46 U/L — HIGH (ref 4–41)
ALT FLD-CCNC: 467 U/L — HIGH (ref 4–41)
ALT FLD-CCNC: 486 U/L — HIGH (ref 4–41)
ALT FLD-CCNC: 586 U/L — HIGH (ref 4–41)
ALT FLD-CCNC: 59 U/L — HIGH (ref 4–41)
ALT FLD-CCNC: 604 U/L — HIGH (ref 4–41)
ALT FLD-CCNC: 73 U/L — HIGH (ref 4–41)
ALT FLD-CCNC: 90 U/L — HIGH (ref 4–41)
ALT FLD-CCNC: 93 U/L — HIGH (ref 4–41)
ANION GAP SERPL CALC-SCNC: 13 MMOL/L — SIGNIFICANT CHANGE UP (ref 7–14)
ANION GAP SERPL CALC-SCNC: 14 MMOL/L — SIGNIFICANT CHANGE UP (ref 7–14)
ANION GAP SERPL CALC-SCNC: 16 MMOL/L — HIGH (ref 7–14)
ANION GAP SERPL CALC-SCNC: 17 MMOL/L — HIGH (ref 7–14)
ANION GAP SERPL CALC-SCNC: 18 MMOL/L — HIGH (ref 7–14)
ANION GAP SERPL CALC-SCNC: 19 MMOL/L — HIGH (ref 7–14)
ANION GAP SERPL CALC-SCNC: 19 MMOL/L — HIGH (ref 7–14)
ANION GAP SERPL CALC-SCNC: 20 MMOL/L — HIGH (ref 7–14)
ANISOCYTOSIS BLD QL: SIGNIFICANT CHANGE UP
ANISOCYTOSIS BLD QL: SLIGHT — SIGNIFICANT CHANGE UP
ANISOCYTOSIS BLD QL: SLIGHT — SIGNIFICANT CHANGE UP
APPEARANCE UR: ABNORMAL
APTT BLD: 28.2 SEC — SIGNIFICANT CHANGE UP (ref 24.5–35.6)
APTT BLD: 28.4 SEC — SIGNIFICANT CHANGE UP (ref 24.5–35.6)
APTT BLD: 30.2 SEC — SIGNIFICANT CHANGE UP (ref 24.5–35.6)
APTT BLD: 30.3 SEC — SIGNIFICANT CHANGE UP (ref 24.5–35.6)
APTT BLD: 30.6 SEC — SIGNIFICANT CHANGE UP (ref 24.5–35.6)
APTT BLD: 30.8 SEC — SIGNIFICANT CHANGE UP (ref 24.5–35.6)
APTT BLD: 32.5 SEC — SIGNIFICANT CHANGE UP (ref 24.5–35.6)
APTT BLD: 32.6 SEC — SIGNIFICANT CHANGE UP (ref 24.5–35.6)
APTT BLD: 33.8 SEC — SIGNIFICANT CHANGE UP (ref 24.5–35.6)
APTT BLD: 35.4 SEC — SIGNIFICANT CHANGE UP (ref 24.5–35.6)
APTT BLD: 40.5 SEC — HIGH (ref 24.5–35.6)
APTT BLD: 41.8 SEC — HIGH (ref 24.5–35.6)
APTT BLD: 45.3 SEC — HIGH (ref 24.5–35.6)
AST SERPL-CCNC: 104 U/L — HIGH (ref 4–40)
AST SERPL-CCNC: 1120 U/L — HIGH (ref 4–40)
AST SERPL-CCNC: 136 U/L — HIGH (ref 4–40)
AST SERPL-CCNC: 145 U/L — HIGH (ref 4–40)
AST SERPL-CCNC: 1450 U/L — HIGH (ref 4–40)
AST SERPL-CCNC: 1596 U/L — HIGH (ref 4–40)
AST SERPL-CCNC: 1628 U/L — HIGH (ref 4–40)
AST SERPL-CCNC: 164 U/L — HIGH (ref 4–40)
AST SERPL-CCNC: 169 U/L — HIGH (ref 4–40)
AST SERPL-CCNC: 292 U/L — HIGH (ref 4–40)
AST SERPL-CCNC: 585 U/L — HIGH (ref 4–40)
AST SERPL-CCNC: 78 U/L — HIGH (ref 4–40)
AST SERPL-CCNC: 800 U/L — HIGH (ref 4–40)
AST SERPL-CCNC: 88 U/L — HIGH (ref 4–40)
AST SERPL-CCNC: 959 U/L — HIGH (ref 4–40)
B PERT DNA SPEC QL NAA+PROBE: SIGNIFICANT CHANGE UP
B PERT IGG+IGM PNL SER: ABNORMAL
B PERT+PARAPERT DNA PNL SPEC NAA+PROBE: SIGNIFICANT CHANGE UP
BACTERIA # UR AUTO: ABNORMAL /HPF
BASOPHILS # BLD AUTO: 0 K/UL — SIGNIFICANT CHANGE UP (ref 0–0.2)
BASOPHILS # BLD AUTO: 0.01 K/UL — SIGNIFICANT CHANGE UP (ref 0–0.2)
BASOPHILS # BLD AUTO: 0.02 K/UL — SIGNIFICANT CHANGE UP (ref 0–0.2)
BASOPHILS # BLD AUTO: 0.03 K/UL — SIGNIFICANT CHANGE UP (ref 0–0.2)
BASOPHILS # BLD AUTO: 0.05 K/UL — SIGNIFICANT CHANGE UP (ref 0–0.2)
BASOPHILS # BLD AUTO: 0.05 K/UL — SIGNIFICANT CHANGE UP (ref 0–0.2)
BASOPHILS # BLD AUTO: 0.07 K/UL — SIGNIFICANT CHANGE UP (ref 0–0.2)
BASOPHILS # BLD AUTO: 0.11 K/UL — SIGNIFICANT CHANGE UP (ref 0–0.2)
BASOPHILS NFR BLD AUTO: 0 % — SIGNIFICANT CHANGE UP (ref 0–2)
BASOPHILS NFR BLD AUTO: 0.1 % — SIGNIFICANT CHANGE UP (ref 0–2)
BASOPHILS NFR BLD AUTO: 0.2 % — SIGNIFICANT CHANGE UP (ref 0–2)
BASOPHILS NFR BLD AUTO: 0.5 % — SIGNIFICANT CHANGE UP (ref 0–2)
BILIRUB SERPL-MCNC: 0.7 MG/DL — SIGNIFICANT CHANGE UP (ref 0.2–1.2)
BILIRUB SERPL-MCNC: 0.7 MG/DL — SIGNIFICANT CHANGE UP (ref 0.2–1.2)
BILIRUB SERPL-MCNC: 0.8 MG/DL — SIGNIFICANT CHANGE UP (ref 0.2–1.2)
BILIRUB SERPL-MCNC: 0.9 MG/DL — SIGNIFICANT CHANGE UP (ref 0.2–1.2)
BILIRUB SERPL-MCNC: 0.9 MG/DL — SIGNIFICANT CHANGE UP (ref 0.2–1.2)
BILIRUB SERPL-MCNC: 1.1 MG/DL — SIGNIFICANT CHANGE UP (ref 0.2–1.2)
BILIRUB SERPL-MCNC: 1.2 MG/DL — SIGNIFICANT CHANGE UP (ref 0.2–1.2)
BILIRUB SERPL-MCNC: 1.4 MG/DL — HIGH (ref 0.2–1.2)
BILIRUB SERPL-MCNC: 1.4 MG/DL — HIGH (ref 0.2–1.2)
BILIRUB SERPL-MCNC: 1.7 MG/DL — HIGH (ref 0.2–1.2)
BILIRUB SERPL-MCNC: 2.3 MG/DL — HIGH (ref 0.2–1.2)
BILIRUB SERPL-MCNC: 2.6 MG/DL — HIGH (ref 0.2–1.2)
BILIRUB SERPL-MCNC: 2.8 MG/DL — HIGH (ref 0.2–1.2)
BILIRUB UR-MCNC: ABNORMAL
BLD GP AB SCN SERPL QL: NEGATIVE — SIGNIFICANT CHANGE UP
BLOOD GAS ARTERIAL COMPREHENSIVE RESULT: SIGNIFICANT CHANGE UP
BLOOD GAS VENOUS COMPREHENSIVE RESULT: SIGNIFICANT CHANGE UP
BUN SERPL-MCNC: 104 MG/DL — HIGH (ref 7–23)
BUN SERPL-MCNC: 109 MG/DL — HIGH (ref 7–23)
BUN SERPL-MCNC: 111 MG/DL — HIGH (ref 7–23)
BUN SERPL-MCNC: 116 MG/DL — HIGH (ref 7–23)
BUN SERPL-MCNC: 32 MG/DL — HIGH (ref 7–23)
BUN SERPL-MCNC: 34 MG/DL — HIGH (ref 7–23)
BUN SERPL-MCNC: 37 MG/DL — HIGH (ref 7–23)
BUN SERPL-MCNC: 41 MG/DL — HIGH (ref 7–23)
BUN SERPL-MCNC: 45 MG/DL — HIGH (ref 7–23)
BUN SERPL-MCNC: 47 MG/DL — HIGH (ref 7–23)
BUN SERPL-MCNC: 49 MG/DL — HIGH (ref 7–23)
BUN SERPL-MCNC: 61 MG/DL — HIGH (ref 7–23)
BUN SERPL-MCNC: 70 MG/DL — HIGH (ref 7–23)
BUN SERPL-MCNC: 78 MG/DL — HIGH (ref 7–23)
BUN SERPL-MCNC: 88 MG/DL — HIGH (ref 7–23)
C PNEUM DNA SPEC QL NAA+PROBE: SIGNIFICANT CHANGE UP
CA-I BLD-SCNC: 0.93 MMOL/L — LOW (ref 1.15–1.29)
CA-I BLD-SCNC: 0.99 MMOL/L — LOW (ref 1.15–1.29)
CA-I BLD-SCNC: 1 MMOL/L — LOW (ref 1.15–1.29)
CA-I BLD-SCNC: 1.01 MMOL/L — LOW (ref 1.15–1.29)
CA-I BLD-SCNC: 1.03 MMOL/L — LOW (ref 1.15–1.29)
CA-I BLD-SCNC: 1.03 MMOL/L — LOW (ref 1.15–1.29)
CA-I BLD-SCNC: 1.08 MMOL/L — LOW (ref 1.15–1.29)
CA-I BLD-SCNC: 1.08 MMOL/L — LOW (ref 1.15–1.29)
CA-I BLD-SCNC: 1.11 MMOL/L — LOW (ref 1.15–1.29)
CALCIUM SERPL-MCNC: 7.4 MG/DL — LOW (ref 8.4–10.5)
CALCIUM SERPL-MCNC: 7.4 MG/DL — LOW (ref 8.4–10.5)
CALCIUM SERPL-MCNC: 7.5 MG/DL — LOW (ref 8.4–10.5)
CALCIUM SERPL-MCNC: 7.6 MG/DL — LOW (ref 8.4–10.5)
CALCIUM SERPL-MCNC: 7.6 MG/DL — LOW (ref 8.4–10.5)
CALCIUM SERPL-MCNC: 7.8 MG/DL — LOW (ref 8.4–10.5)
CALCIUM SERPL-MCNC: 7.9 MG/DL — LOW (ref 8.4–10.5)
CALCIUM SERPL-MCNC: 7.9 MG/DL — LOW (ref 8.4–10.5)
CALCIUM SERPL-MCNC: 8 MG/DL — LOW (ref 8.4–10.5)
CALCIUM SERPL-MCNC: 8.1 MG/DL — LOW (ref 8.4–10.5)
CALCIUM SERPL-MCNC: 8.3 MG/DL — LOW (ref 8.4–10.5)
CALCIUM SERPL-MCNC: 8.4 MG/DL — SIGNIFICANT CHANGE UP (ref 8.4–10.5)
CALCIUM SERPL-MCNC: 8.5 MG/DL — SIGNIFICANT CHANGE UP (ref 8.4–10.5)
CAST: 2 /LPF — SIGNIFICANT CHANGE UP (ref 0–4)
CHLORIDE SERPL-SCNC: 100 MMOL/L — SIGNIFICANT CHANGE UP (ref 98–107)
CHLORIDE SERPL-SCNC: 104 MMOL/L — SIGNIFICANT CHANGE UP (ref 98–107)
CHLORIDE SERPL-SCNC: 107 MMOL/L — SIGNIFICANT CHANGE UP (ref 98–107)
CHLORIDE SERPL-SCNC: 88 MMOL/L — LOW (ref 98–107)
CHLORIDE SERPL-SCNC: 89 MMOL/L — LOW (ref 98–107)
CHLORIDE SERPL-SCNC: 91 MMOL/L — LOW (ref 98–107)
CHLORIDE SERPL-SCNC: 93 MMOL/L — LOW (ref 98–107)
CHLORIDE SERPL-SCNC: 93 MMOL/L — LOW (ref 98–107)
CHLORIDE SERPL-SCNC: 94 MMOL/L — LOW (ref 98–107)
CHLORIDE SERPL-SCNC: 95 MMOL/L — LOW (ref 98–107)
CHLORIDE SERPL-SCNC: 95 MMOL/L — LOW (ref 98–107)
CHLORIDE SERPL-SCNC: 96 MMOL/L — LOW (ref 98–107)
CHLORIDE SERPL-SCNC: 98 MMOL/L — SIGNIFICANT CHANGE UP (ref 98–107)
CK MB BLD-MCNC: 1.3 % — SIGNIFICANT CHANGE UP (ref 0–2.5)
CK MB CFR SERPL CALC: 2.6 NG/ML — SIGNIFICANT CHANGE UP
CK SERPL-CCNC: 200 U/L — SIGNIFICANT CHANGE UP (ref 30–200)
CO2 SERPL-SCNC: 16 MMOL/L — LOW (ref 22–31)
CO2 SERPL-SCNC: 16 MMOL/L — LOW (ref 22–31)
CO2 SERPL-SCNC: 21 MMOL/L — LOW (ref 22–31)
CO2 SERPL-SCNC: 21 MMOL/L — LOW (ref 22–31)
CO2 SERPL-SCNC: 22 MMOL/L — SIGNIFICANT CHANGE UP (ref 22–31)
CO2 SERPL-SCNC: 23 MMOL/L — SIGNIFICANT CHANGE UP (ref 22–31)
CO2 SERPL-SCNC: 23 MMOL/L — SIGNIFICANT CHANGE UP (ref 22–31)
CO2 SERPL-SCNC: 25 MMOL/L — SIGNIFICANT CHANGE UP (ref 22–31)
CO2 SERPL-SCNC: 29 MMOL/L — SIGNIFICANT CHANGE UP (ref 22–31)
CO2 SERPL-SCNC: 30 MMOL/L — SIGNIFICANT CHANGE UP (ref 22–31)
COLOR FLD: ABNORMAL
COLOR SPEC: ABNORMAL
CORTIS AM PEAK SERPL-MCNC: 18.1 UG/DL — SIGNIFICANT CHANGE UP (ref 6–18.4)
CREAT SERPL-MCNC: 1.29 MG/DL — SIGNIFICANT CHANGE UP (ref 0.5–1.3)
CREAT SERPL-MCNC: 1.36 MG/DL — HIGH (ref 0.5–1.3)
CREAT SERPL-MCNC: 1.54 MG/DL — HIGH (ref 0.5–1.3)
CREAT SERPL-MCNC: 1.67 MG/DL — HIGH (ref 0.5–1.3)
CREAT SERPL-MCNC: 2.09 MG/DL — HIGH (ref 0.5–1.3)
CREAT SERPL-MCNC: 2.39 MG/DL — HIGH (ref 0.5–1.3)
CREAT SERPL-MCNC: 2.62 MG/DL — HIGH (ref 0.5–1.3)
CREAT SERPL-MCNC: 3.55 MG/DL — HIGH (ref 0.5–1.3)
CREAT SERPL-MCNC: 4.41 MG/DL — HIGH (ref 0.5–1.3)
CREAT SERPL-MCNC: 5.12 MG/DL — HIGH (ref 0.5–1.3)
CREAT SERPL-MCNC: 5.79 MG/DL — HIGH (ref 0.5–1.3)
CREAT SERPL-MCNC: 6.42 MG/DL — HIGH (ref 0.5–1.3)
CREAT SERPL-MCNC: 6.56 MG/DL — HIGH (ref 0.5–1.3)
CREAT SERPL-MCNC: 7.14 MG/DL — HIGH (ref 0.5–1.3)
CREAT SERPL-MCNC: 7.73 MG/DL — HIGH (ref 0.5–1.3)
CULTURE RESULTS: ABNORMAL
CULTURE RESULTS: SIGNIFICANT CHANGE UP
DACRYOCYTES BLD QL SMEAR: SLIGHT — SIGNIFICANT CHANGE UP
DACRYOCYTES BLD QL SMEAR: SLIGHT — SIGNIFICANT CHANGE UP
DIFF PNL FLD: ABNORMAL
EGFR: 11 ML/MIN/1.73M2 — LOW
EGFR: 13 ML/MIN/1.73M2 — LOW
EGFR: 17 ML/MIN/1.73M2 — LOW
EGFR: 24 ML/MIN/1.73M2 — LOW
EGFR: 27 ML/MIN/1.73M2 — LOW
EGFR: 31 ML/MIN/1.73M2 — LOW
EGFR: 41 ML/MIN/1.73M2 — LOW
EGFR: 45 ML/MIN/1.73M2 — LOW
EGFR: 52 ML/MIN/1.73M2 — LOW
EGFR: 56 ML/MIN/1.73M2 — LOW
EGFR: 6 ML/MIN/1.73M2 — LOW
EGFR: 7 ML/MIN/1.73M2 — LOW
EGFR: 8 ML/MIN/1.73M2 — LOW
EGFR: 8 ML/MIN/1.73M2 — LOW
EGFR: 9 ML/MIN/1.73M2 — LOW
EOSINOPHIL # BLD AUTO: 0 K/UL — SIGNIFICANT CHANGE UP (ref 0–0.5)
EOSINOPHIL # BLD AUTO: 0.01 K/UL — SIGNIFICANT CHANGE UP (ref 0–0.5)
EOSINOPHIL # BLD AUTO: 0.02 K/UL — SIGNIFICANT CHANGE UP (ref 0–0.5)
EOSINOPHIL # BLD AUTO: 0.02 K/UL — SIGNIFICANT CHANGE UP (ref 0–0.5)
EOSINOPHIL # BLD AUTO: 0.14 K/UL — SIGNIFICANT CHANGE UP (ref 0–0.5)
EOSINOPHIL # BLD AUTO: 0.18 K/UL — SIGNIFICANT CHANGE UP (ref 0–0.5)
EOSINOPHIL # BLD AUTO: 0.21 K/UL — SIGNIFICANT CHANGE UP (ref 0–0.5)
EOSINOPHIL # BLD AUTO: 0.25 K/UL — SIGNIFICANT CHANGE UP (ref 0–0.5)
EOSINOPHIL # BLD AUTO: 0.27 K/UL — SIGNIFICANT CHANGE UP (ref 0–0.5)
EOSINOPHIL # BLD AUTO: 0.37 K/UL — SIGNIFICANT CHANGE UP (ref 0–0.5)
EOSINOPHIL # BLD AUTO: 0.53 K/UL — HIGH (ref 0–0.5)
EOSINOPHIL # FLD: 0 % — SIGNIFICANT CHANGE UP
EOSINOPHIL NFR BLD AUTO: 0 % — SIGNIFICANT CHANGE UP (ref 0–6)
EOSINOPHIL NFR BLD AUTO: 0.1 % — SIGNIFICANT CHANGE UP (ref 0–6)
EOSINOPHIL NFR BLD AUTO: 0.8 % — SIGNIFICANT CHANGE UP (ref 0–6)
EOSINOPHIL NFR BLD AUTO: 0.9 % — SIGNIFICANT CHANGE UP (ref 0–6)
EOSINOPHIL NFR BLD AUTO: 1.1 % — SIGNIFICANT CHANGE UP (ref 0–6)
EOSINOPHIL NFR BLD AUTO: 1.4 % — SIGNIFICANT CHANGE UP (ref 0–6)
EOSINOPHIL NFR BLD AUTO: 1.7 % — SIGNIFICANT CHANGE UP (ref 0–6)
ESTIMATED AVERAGE GLUCOSE: 126 — SIGNIFICANT CHANGE UP
FLUAV AG NPH QL: SIGNIFICANT CHANGE UP
FLUAV SUBTYP SPEC NAA+PROBE: SIGNIFICANT CHANGE UP
FLUBV AG NPH QL: SIGNIFICANT CHANGE UP
FLUBV RNA SPEC QL NAA+PROBE: SIGNIFICANT CHANGE UP
FLUID INTAKE SUBSTANCE CLASS: SIGNIFICANT CHANGE UP
FOLATE+VIT B12 SERBLD-IMP: 0 % — SIGNIFICANT CHANGE UP
FUNGITELL B-D-GLUCAN,  BRONCHIAL LAVAGE: SIGNIFICANT CHANGE UP
FUNGITELL: 70 PG/ML — SIGNIFICANT CHANGE UP
GALACTOMANNAN AG SERPL-ACNC: 0.09 INDEX — SIGNIFICANT CHANGE UP (ref 0–0.49)
GAS PNL BLDV: SIGNIFICANT CHANGE UP
GIANT PLATELETS BLD QL SMEAR: PRESENT — SIGNIFICANT CHANGE UP
GIANT PLATELETS BLD QL SMEAR: PRESENT — SIGNIFICANT CHANGE UP
GLUCOSE BLDC GLUCOMTR-MCNC: 103 MG/DL — HIGH (ref 70–99)
GLUCOSE BLDC GLUCOMTR-MCNC: 112 MG/DL — HIGH (ref 70–99)
GLUCOSE BLDC GLUCOMTR-MCNC: 115 MG/DL — HIGH (ref 70–99)
GLUCOSE BLDC GLUCOMTR-MCNC: 120 MG/DL — HIGH (ref 70–99)
GLUCOSE BLDC GLUCOMTR-MCNC: 121 MG/DL — HIGH (ref 70–99)
GLUCOSE BLDC GLUCOMTR-MCNC: 124 MG/DL — HIGH (ref 70–99)
GLUCOSE BLDC GLUCOMTR-MCNC: 124 MG/DL — HIGH (ref 70–99)
GLUCOSE BLDC GLUCOMTR-MCNC: 127 MG/DL — HIGH (ref 70–99)
GLUCOSE BLDC GLUCOMTR-MCNC: 128 MG/DL — HIGH (ref 70–99)
GLUCOSE BLDC GLUCOMTR-MCNC: 129 MG/DL — HIGH (ref 70–99)
GLUCOSE BLDC GLUCOMTR-MCNC: 133 MG/DL — HIGH (ref 70–99)
GLUCOSE BLDC GLUCOMTR-MCNC: 133 MG/DL — HIGH (ref 70–99)
GLUCOSE BLDC GLUCOMTR-MCNC: 134 MG/DL — HIGH (ref 70–99)
GLUCOSE BLDC GLUCOMTR-MCNC: 135 MG/DL — HIGH (ref 70–99)
GLUCOSE BLDC GLUCOMTR-MCNC: 136 MG/DL — HIGH (ref 70–99)
GLUCOSE BLDC GLUCOMTR-MCNC: 141 MG/DL — HIGH (ref 70–99)
GLUCOSE BLDC GLUCOMTR-MCNC: 143 MG/DL — HIGH (ref 70–99)
GLUCOSE BLDC GLUCOMTR-MCNC: 145 MG/DL — HIGH (ref 70–99)
GLUCOSE BLDC GLUCOMTR-MCNC: 145 MG/DL — HIGH (ref 70–99)
GLUCOSE BLDC GLUCOMTR-MCNC: 146 MG/DL — HIGH (ref 70–99)
GLUCOSE BLDC GLUCOMTR-MCNC: 152 MG/DL — HIGH (ref 70–99)
GLUCOSE BLDC GLUCOMTR-MCNC: 154 MG/DL — HIGH (ref 70–99)
GLUCOSE BLDC GLUCOMTR-MCNC: 155 MG/DL — HIGH (ref 70–99)
GLUCOSE BLDC GLUCOMTR-MCNC: 156 MG/DL — HIGH (ref 70–99)
GLUCOSE BLDC GLUCOMTR-MCNC: 158 MG/DL — HIGH (ref 70–99)
GLUCOSE BLDC GLUCOMTR-MCNC: 161 MG/DL — HIGH (ref 70–99)
GLUCOSE BLDC GLUCOMTR-MCNC: 166 MG/DL — HIGH (ref 70–99)
GLUCOSE BLDC GLUCOMTR-MCNC: 172 MG/DL — HIGH (ref 70–99)
GLUCOSE BLDC GLUCOMTR-MCNC: 179 MG/DL — HIGH (ref 70–99)
GLUCOSE BLDC GLUCOMTR-MCNC: 198 MG/DL — HIGH (ref 70–99)
GLUCOSE BLDC GLUCOMTR-MCNC: 234 MG/DL — HIGH (ref 70–99)
GLUCOSE BLDC GLUCOMTR-MCNC: 239 MG/DL — HIGH (ref 70–99)
GLUCOSE BLDC GLUCOMTR-MCNC: 98 MG/DL — SIGNIFICANT CHANGE UP (ref 70–99)
GLUCOSE SERPL-MCNC: 112 MG/DL — HIGH (ref 70–99)
GLUCOSE SERPL-MCNC: 117 MG/DL — HIGH (ref 70–99)
GLUCOSE SERPL-MCNC: 121 MG/DL — HIGH (ref 70–99)
GLUCOSE SERPL-MCNC: 127 MG/DL — HIGH (ref 70–99)
GLUCOSE SERPL-MCNC: 132 MG/DL — HIGH (ref 70–99)
GLUCOSE SERPL-MCNC: 133 MG/DL — HIGH (ref 70–99)
GLUCOSE SERPL-MCNC: 142 MG/DL — HIGH (ref 70–99)
GLUCOSE SERPL-MCNC: 154 MG/DL — HIGH (ref 70–99)
GLUCOSE SERPL-MCNC: 169 MG/DL — HIGH (ref 70–99)
GLUCOSE SERPL-MCNC: 174 MG/DL — HIGH (ref 70–99)
GLUCOSE SERPL-MCNC: 183 MG/DL — HIGH (ref 70–99)
GLUCOSE SERPL-MCNC: 225 MG/DL — HIGH (ref 70–99)
GLUCOSE SERPL-MCNC: 89 MG/DL — SIGNIFICANT CHANGE UP (ref 70–99)
GLUCOSE SERPL-MCNC: 95 MG/DL — SIGNIFICANT CHANGE UP (ref 70–99)
GLUCOSE SERPL-MCNC: 98 MG/DL — SIGNIFICANT CHANGE UP (ref 70–99)
GLUCOSE UR QL: NEGATIVE MG/DL — SIGNIFICANT CHANGE UP
GRAM STN FLD: SIGNIFICANT CHANGE UP
HADV DNA SPEC QL NAA+PROBE: SIGNIFICANT CHANGE UP
HCOV 229E RNA SPEC QL NAA+PROBE: SIGNIFICANT CHANGE UP
HCOV HKU1 RNA SPEC QL NAA+PROBE: SIGNIFICANT CHANGE UP
HCOV NL63 RNA SPEC QL NAA+PROBE: SIGNIFICANT CHANGE UP
HCOV OC43 RNA SPEC QL NAA+PROBE: SIGNIFICANT CHANGE UP
HCT VFR BLD CALC: 18.7 % — CRITICAL LOW (ref 39–50)
HCT VFR BLD CALC: 19.8 % — CRITICAL LOW (ref 39–50)
HCT VFR BLD CALC: 20 % — CRITICAL LOW (ref 39–50)
HCT VFR BLD CALC: 20.2 % — CRITICAL LOW (ref 39–50)
HCT VFR BLD CALC: 20.6 % — CRITICAL LOW (ref 39–50)
HCT VFR BLD CALC: 21 % — CRITICAL LOW (ref 39–50)
HCT VFR BLD CALC: 21.2 % — LOW (ref 39–50)
HCT VFR BLD CALC: 22 % — LOW (ref 39–50)
HCT VFR BLD CALC: 22.2 % — LOW (ref 39–50)
HCT VFR BLD CALC: 23.5 % — LOW (ref 39–50)
HCT VFR BLD CALC: 23.7 % — LOW (ref 39–50)
HCT VFR BLD CALC: 24.2 % — LOW (ref 39–50)
HCT VFR BLD CALC: 25 % — LOW (ref 39–50)
HGB BLD-MCNC: 6.6 G/DL — CRITICAL LOW (ref 13–17)
HGB BLD-MCNC: 6.6 G/DL — CRITICAL LOW (ref 13–17)
HGB BLD-MCNC: 7.1 G/DL — LOW (ref 13–17)
HGB BLD-MCNC: 7.2 G/DL — LOW (ref 13–17)
HGB BLD-MCNC: 7.3 G/DL — LOW (ref 13–17)
HGB BLD-MCNC: 7.5 G/DL — LOW (ref 13–17)
HGB BLD-MCNC: 7.6 G/DL — LOW (ref 13–17)
HGB BLD-MCNC: 7.7 G/DL — LOW (ref 13–17)
HGB BLD-MCNC: 7.9 G/DL — LOW (ref 13–17)
HGB BLD-MCNC: 8.2 G/DL — LOW (ref 13–17)
HMPV RNA SPEC QL NAA+PROBE: SIGNIFICANT CHANGE UP
HPIV1 RNA SPEC QL NAA+PROBE: SIGNIFICANT CHANGE UP
HPIV2 RNA SPEC QL NAA+PROBE: SIGNIFICANT CHANGE UP
HPIV3 RNA SPEC QL NAA+PROBE: SIGNIFICANT CHANGE UP
HPIV4 RNA SPEC QL NAA+PROBE: SIGNIFICANT CHANGE UP
IANC: 10.92 K/UL — HIGH (ref 1.8–7.4)
IANC: 12.6 K/UL — HIGH (ref 1.8–7.4)
IANC: 13.75 K/UL — HIGH (ref 1.8–7.4)
IANC: 14.31 K/UL — HIGH (ref 1.8–7.4)
IANC: 14.32 K/UL — HIGH (ref 1.8–7.4)
IANC: 14.63 K/UL — HIGH (ref 1.8–7.4)
IANC: 15.53 K/UL — HIGH (ref 1.8–7.4)
IANC: 16.31 K/UL — HIGH (ref 1.8–7.4)
IANC: 18.09 K/UL — HIGH (ref 1.8–7.4)
IANC: 22.44 K/UL — HIGH (ref 1.8–7.4)
IANC: 25.54 K/UL — HIGH (ref 1.8–7.4)
IANC: 25.67 K/UL — HIGH (ref 1.8–7.4)
IANC: 7.13 K/UL — SIGNIFICANT CHANGE UP (ref 1.8–7.4)
IANC: 7.41 K/UL — HIGH (ref 1.8–7.4)
IANC: 7.83 K/UL — HIGH (ref 1.8–7.4)
IMM GRANULOCYTES NFR BLD AUTO: 0.3 % — SIGNIFICANT CHANGE UP (ref 0–0.9)
IMM GRANULOCYTES NFR BLD AUTO: 0.3 % — SIGNIFICANT CHANGE UP (ref 0–0.9)
IMM GRANULOCYTES NFR BLD AUTO: 0.5 % — SIGNIFICANT CHANGE UP (ref 0–0.9)
IMM GRANULOCYTES NFR BLD AUTO: 1.1 % — HIGH (ref 0–0.9)
IMM GRANULOCYTES NFR BLD AUTO: 1.3 % — HIGH (ref 0–0.9)
IMM GRANULOCYTES NFR BLD AUTO: 2.2 % — HIGH (ref 0–0.9)
IMM GRANULOCYTES NFR BLD AUTO: 3 % — HIGH (ref 0–0.9)
IMM GRANULOCYTES NFR BLD AUTO: 3.5 % — HIGH (ref 0–0.9)
IMM GRANULOCYTES NFR BLD AUTO: 4 % — HIGH (ref 0–0.9)
IMM GRANULOCYTES NFR BLD AUTO: 5.7 % — HIGH (ref 0–0.9)
IMM GRANULOCYTES NFR BLD AUTO: 7.5 % — HIGH (ref 0–0.9)
IMM GRANULOCYTES NFR BLD AUTO: 9.7 % — HIGH (ref 0–0.9)
INR BLD: 1.37 RATIO — HIGH (ref 0.85–1.16)
INR BLD: 1.41 RATIO — HIGH (ref 0.85–1.16)
INR BLD: 1.45 RATIO — HIGH (ref 0.85–1.16)
INR BLD: 1.48 RATIO — HIGH (ref 0.85–1.16)
INR BLD: 1.53 RATIO — HIGH (ref 0.85–1.16)
INR BLD: 1.57 RATIO — HIGH (ref 0.85–1.16)
INR BLD: 1.58 RATIO — HIGH (ref 0.85–1.16)
INR BLD: 1.64 RATIO — HIGH (ref 0.85–1.16)
INR BLD: 1.82 RATIO — HIGH (ref 0.85–1.16)
INR BLD: 2.08 RATIO — HIGH (ref 0.85–1.16)
INR BLD: 2.08 RATIO — HIGH (ref 0.85–1.16)
INR BLD: 2.09 RATIO — HIGH (ref 0.85–1.16)
INR BLD: 2.14 RATIO — HIGH (ref 0.85–1.16)
INR BLD: 2.21 RATIO — HIGH (ref 0.85–1.16)
KETONES UR-MCNC: NEGATIVE MG/DL — SIGNIFICANT CHANGE UP
LEGIONELLA AG UR QL: NEGATIVE — SIGNIFICANT CHANGE UP
LEUKOCYTE ESTERASE UR-ACNC: ABNORMAL
LYMPHOCYTES # BLD AUTO: 0.27 K/UL — LOW (ref 1–3.3)
LYMPHOCYTES # BLD AUTO: 0.4 K/UL — LOW (ref 1–3.3)
LYMPHOCYTES # BLD AUTO: 0.46 K/UL — LOW (ref 1–3.3)
LYMPHOCYTES # BLD AUTO: 0.51 K/UL — LOW (ref 1–3.3)
LYMPHOCYTES # BLD AUTO: 0.62 K/UL — LOW (ref 1–3.3)
LYMPHOCYTES # BLD AUTO: 0.65 K/UL — LOW (ref 1–3.3)
LYMPHOCYTES # BLD AUTO: 0.85 K/UL — LOW (ref 1–3.3)
LYMPHOCYTES # BLD AUTO: 0.86 K/UL — LOW (ref 1–3.3)
LYMPHOCYTES # BLD AUTO: 0.87 K/UL — LOW (ref 1–3.3)
LYMPHOCYTES # BLD AUTO: 0.9 % — LOW (ref 13–44)
LYMPHOCYTES # BLD AUTO: 0.96 K/UL — LOW (ref 1–3.3)
LYMPHOCYTES # BLD AUTO: 1.09 K/UL — SIGNIFICANT CHANGE UP (ref 1–3.3)
LYMPHOCYTES # BLD AUTO: 1.12 K/UL — SIGNIFICANT CHANGE UP (ref 1–3.3)
LYMPHOCYTES # BLD AUTO: 1.16 K/UL — SIGNIFICANT CHANGE UP (ref 1–3.3)
LYMPHOCYTES # BLD AUTO: 1.2 K/UL — SIGNIFICANT CHANGE UP (ref 1–3.3)
LYMPHOCYTES # BLD AUTO: 1.7 % — LOW (ref 13–44)
LYMPHOCYTES # BLD AUTO: 1.86 K/UL — SIGNIFICANT CHANGE UP (ref 1–3.3)
LYMPHOCYTES # BLD AUTO: 18.5 % — SIGNIFICANT CHANGE UP (ref 13–44)
LYMPHOCYTES # BLD AUTO: 3.3 % — LOW (ref 13–44)
LYMPHOCYTES # BLD AUTO: 3.7 % — LOW (ref 13–44)
LYMPHOCYTES # BLD AUTO: 3.7 % — LOW (ref 13–44)
LYMPHOCYTES # BLD AUTO: 5.2 % — LOW (ref 13–44)
LYMPHOCYTES # BLD AUTO: 5.3 % — LOW (ref 13–44)
LYMPHOCYTES # BLD AUTO: 5.4 % — LOW (ref 13–44)
LYMPHOCYTES # BLD AUTO: 5.5 % — LOW (ref 13–44)
LYMPHOCYTES # BLD AUTO: 5.8 % — LOW (ref 13–44)
LYMPHOCYTES # BLD AUTO: 6.8 % — LOW (ref 13–44)
LYMPHOCYTES # BLD AUTO: 6.9 % — LOW (ref 13–44)
LYMPHOCYTES # FLD: 1 % — SIGNIFICANT CHANGE UP
M PNEUMO DNA SPEC QL NAA+PROBE: SIGNIFICANT CHANGE UP
MAGNESIUM SERPL-MCNC: 1.9 MG/DL — SIGNIFICANT CHANGE UP (ref 1.6–2.6)
MAGNESIUM SERPL-MCNC: 2.1 MG/DL — SIGNIFICANT CHANGE UP (ref 1.6–2.6)
MAGNESIUM SERPL-MCNC: 2.3 MG/DL — SIGNIFICANT CHANGE UP (ref 1.6–2.6)
MAGNESIUM SERPL-MCNC: 2.4 MG/DL — SIGNIFICANT CHANGE UP (ref 1.6–2.6)
MAGNESIUM SERPL-MCNC: 2.5 MG/DL — SIGNIFICANT CHANGE UP (ref 1.6–2.6)
MAGNESIUM SERPL-MCNC: 2.5 MG/DL — SIGNIFICANT CHANGE UP (ref 1.6–2.6)
MANUAL SMEAR VERIFICATION: SIGNIFICANT CHANGE UP
MANUAL SMEAR VERIFICATION: SIGNIFICANT CHANGE UP
MCHC RBC-ENTMCNC: 25.1 PG — LOW (ref 27–34)
MCHC RBC-ENTMCNC: 25.3 PG — LOW (ref 27–34)
MCHC RBC-ENTMCNC: 25.5 PG — LOW (ref 27–34)
MCHC RBC-ENTMCNC: 25.8 PG — LOW (ref 27–34)
MCHC RBC-ENTMCNC: 25.8 PG — LOW (ref 27–34)
MCHC RBC-ENTMCNC: 26 PG — LOW (ref 27–34)
MCHC RBC-ENTMCNC: 26.4 PG — LOW (ref 27–34)
MCHC RBC-ENTMCNC: 26.4 PG — LOW (ref 27–34)
MCHC RBC-ENTMCNC: 26.5 PG — LOW (ref 27–34)
MCHC RBC-ENTMCNC: 26.5 PG — LOW (ref 27–34)
MCHC RBC-ENTMCNC: 27 PG — SIGNIFICANT CHANGE UP (ref 27–34)
MCHC RBC-ENTMCNC: 27.1 PG — SIGNIFICANT CHANGE UP (ref 27–34)
MCHC RBC-ENTMCNC: 27.7 PG — SIGNIFICANT CHANGE UP (ref 27–34)
MCHC RBC-ENTMCNC: 28.1 PG — SIGNIFICANT CHANGE UP (ref 27–34)
MCHC RBC-ENTMCNC: 28.6 PG — SIGNIFICANT CHANGE UP (ref 27–34)
MCHC RBC-ENTMCNC: 29.6 PG — SIGNIFICANT CHANGE UP (ref 27–34)
MCHC RBC-ENTMCNC: 30 G/DL — LOW (ref 32–36)
MCHC RBC-ENTMCNC: 31.4 G/DL — LOW (ref 32–36)
MCHC RBC-ENTMCNC: 32.2 G/DL — SIGNIFICANT CHANGE UP (ref 32–36)
MCHC RBC-ENTMCNC: 32.5 G/DL — SIGNIFICANT CHANGE UP (ref 32–36)
MCHC RBC-ENTMCNC: 33.6 G/DL — SIGNIFICANT CHANGE UP (ref 32–36)
MCHC RBC-ENTMCNC: 33.8 G/DL — SIGNIFICANT CHANGE UP (ref 32–36)
MCHC RBC-ENTMCNC: 33.9 G/DL — SIGNIFICANT CHANGE UP (ref 32–36)
MCHC RBC-ENTMCNC: 34.5 G/DL — SIGNIFICANT CHANGE UP (ref 32–36)
MCHC RBC-ENTMCNC: 35 G/DL — SIGNIFICANT CHANGE UP (ref 32–36)
MCHC RBC-ENTMCNC: 35.3 G/DL — SIGNIFICANT CHANGE UP (ref 32–36)
MCHC RBC-ENTMCNC: 35.4 G/DL — SIGNIFICANT CHANGE UP (ref 32–36)
MCHC RBC-ENTMCNC: 36 G/DL — SIGNIFICANT CHANGE UP (ref 32–36)
MCHC RBC-ENTMCNC: 36.1 G/DL — HIGH (ref 32–36)
MCHC RBC-ENTMCNC: 36.4 G/DL — HIGH (ref 32–36)
MCV RBC AUTO: 74.2 FL — LOW (ref 80–100)
MCV RBC AUTO: 74.2 FL — LOW (ref 80–100)
MCV RBC AUTO: 74.3 FL — LOW (ref 80–100)
MCV RBC AUTO: 74.6 FL — LOW (ref 80–100)
MCV RBC AUTO: 74.6 FL — LOW (ref 80–100)
MCV RBC AUTO: 75.1 FL — LOW (ref 80–100)
MCV RBC AUTO: 75.6 FL — LOW (ref 80–100)
MCV RBC AUTO: 76.8 FL — LOW (ref 80–100)
MCV RBC AUTO: 76.9 FL — LOW (ref 80–100)
MCV RBC AUTO: 78.3 FL — LOW (ref 80–100)
MCV RBC AUTO: 78.5 FL — LOW (ref 80–100)
MCV RBC AUTO: 78.7 FL — LOW (ref 80–100)
MCV RBC AUTO: 81.7 FL — SIGNIFICANT CHANGE UP (ref 80–100)
MCV RBC AUTO: 83.9 FL — SIGNIFICANT CHANGE UP (ref 80–100)
MCV RBC AUTO: 84 FL — SIGNIFICANT CHANGE UP (ref 80–100)
MCV RBC AUTO: 84.2 FL — SIGNIFICANT CHANGE UP (ref 80–100)
MESOTHL CELL # FLD: 0 % — SIGNIFICANT CHANGE UP
METAMYELOCYTES # FLD: 3.5 % — HIGH (ref 0–1)
METAMYELOCYTES NFR BLD: 3.5 % — HIGH (ref 0–1)
METHOD TYPE: SIGNIFICANT CHANGE UP
MICROCYTES BLD QL: SLIGHT — SIGNIFICANT CHANGE UP
MONOCYTES # BLD AUTO: 0.27 K/UL — SIGNIFICANT CHANGE UP (ref 0–0.9)
MONOCYTES # BLD AUTO: 0.32 K/UL — SIGNIFICANT CHANGE UP (ref 0–0.9)
MONOCYTES # BLD AUTO: 0.38 K/UL — SIGNIFICANT CHANGE UP (ref 0–0.9)
MONOCYTES # BLD AUTO: 0.44 K/UL — SIGNIFICANT CHANGE UP (ref 0–0.9)
MONOCYTES # BLD AUTO: 0.44 K/UL — SIGNIFICANT CHANGE UP (ref 0–0.9)
MONOCYTES # BLD AUTO: 0.59 K/UL — SIGNIFICANT CHANGE UP (ref 0–0.9)
MONOCYTES # BLD AUTO: 0.61 K/UL — SIGNIFICANT CHANGE UP (ref 0–0.9)
MONOCYTES # BLD AUTO: 0.78 K/UL — SIGNIFICANT CHANGE UP (ref 0–0.9)
MONOCYTES # BLD AUTO: 0.82 K/UL — SIGNIFICANT CHANGE UP (ref 0–0.9)
MONOCYTES # BLD AUTO: 1.1 K/UL — HIGH (ref 0–0.9)
MONOCYTES # BLD AUTO: 1.14 K/UL — HIGH (ref 0–0.9)
MONOCYTES # BLD AUTO: 1.36 K/UL — HIGH (ref 0–0.9)
MONOCYTES # BLD AUTO: 1.69 K/UL — HIGH (ref 0–0.9)
MONOCYTES # BLD AUTO: 1.95 K/UL — HIGH (ref 0–0.9)
MONOCYTES # BLD AUTO: 1.96 K/UL — HIGH (ref 0–0.9)
MONOCYTES NFR BLD AUTO: 2.6 % — SIGNIFICANT CHANGE UP (ref 2–14)
MONOCYTES NFR BLD AUTO: 3.1 % — SIGNIFICANT CHANGE UP (ref 2–14)
MONOCYTES NFR BLD AUTO: 3.2 % — SIGNIFICANT CHANGE UP (ref 2–14)
MONOCYTES NFR BLD AUTO: 3.2 % — SIGNIFICANT CHANGE UP (ref 2–14)
MONOCYTES NFR BLD AUTO: 3.5 % — SIGNIFICANT CHANGE UP (ref 2–14)
MONOCYTES NFR BLD AUTO: 3.6 % — SIGNIFICANT CHANGE UP (ref 2–14)
MONOCYTES NFR BLD AUTO: 3.8 % — SIGNIFICANT CHANGE UP (ref 2–14)
MONOCYTES NFR BLD AUTO: 4.4 % — SIGNIFICANT CHANGE UP (ref 2–14)
MONOCYTES NFR BLD AUTO: 4.7 % — SIGNIFICANT CHANGE UP (ref 2–14)
MONOCYTES NFR BLD AUTO: 5.1 % — SIGNIFICANT CHANGE UP (ref 2–14)
MONOCYTES NFR BLD AUTO: 5.2 % — SIGNIFICANT CHANGE UP (ref 2–14)
MONOCYTES NFR BLD AUTO: 6.3 % — SIGNIFICANT CHANGE UP (ref 2–14)
MONOCYTES NFR BLD AUTO: 6.7 % — SIGNIFICANT CHANGE UP (ref 2–14)
MONOCYTES NFR BLD AUTO: 8.4 % — SIGNIFICANT CHANGE UP (ref 2–14)
MONOCYTES NFR BLD AUTO: 8.9 % — SIGNIFICANT CHANGE UP (ref 2–14)
MONOS+MACROS # FLD: 14 % — SIGNIFICANT CHANGE UP
MRSA PCR RESULT.: SIGNIFICANT CHANGE UP
MYELOCYTES NFR BLD: 5.2 % — HIGH (ref 0–0)
NEUTROPHILS # BLD AUTO: 10.92 K/UL — HIGH (ref 1.8–7.4)
NEUTROPHILS # BLD AUTO: 12.6 K/UL — HIGH (ref 1.8–7.4)
NEUTROPHILS # BLD AUTO: 13.75 K/UL — HIGH (ref 1.8–7.4)
NEUTROPHILS # BLD AUTO: 14.31 K/UL — HIGH (ref 1.8–7.4)
NEUTROPHILS # BLD AUTO: 14.32 K/UL — HIGH (ref 1.8–7.4)
NEUTROPHILS # BLD AUTO: 14.63 K/UL — HIGH (ref 1.8–7.4)
NEUTROPHILS # BLD AUTO: 15.53 K/UL — HIGH (ref 1.8–7.4)
NEUTROPHILS # BLD AUTO: 16.31 K/UL — HIGH (ref 1.8–7.4)
NEUTROPHILS # BLD AUTO: 19.96 K/UL — HIGH (ref 1.8–7.4)
NEUTROPHILS # BLD AUTO: 22.44 K/UL — HIGH (ref 1.8–7.4)
NEUTROPHILS # BLD AUTO: 25.54 K/UL — HIGH (ref 1.8–7.4)
NEUTROPHILS # BLD AUTO: 27.16 K/UL — HIGH (ref 1.8–7.4)
NEUTROPHILS # BLD AUTO: 7.41 K/UL — HIGH (ref 1.8–7.4)
NEUTROPHILS # BLD AUTO: 7.83 K/UL — HIGH (ref 1.8–7.4)
NEUTROPHILS # BLD AUTO: 7.95 K/UL — HIGH (ref 1.8–7.4)
NEUTROPHILS NFR BLD AUTO: 73.5 % — SIGNIFICANT CHANGE UP (ref 43–77)
NEUTROPHILS NFR BLD AUTO: 74.3 % — SIGNIFICANT CHANGE UP (ref 43–77)
NEUTROPHILS NFR BLD AUTO: 77.6 % — HIGH (ref 43–77)
NEUTROPHILS NFR BLD AUTO: 79.3 % — HIGH (ref 43–77)
NEUTROPHILS NFR BLD AUTO: 80.9 % — HIGH (ref 43–77)
NEUTROPHILS NFR BLD AUTO: 82.4 % — HIGH (ref 43–77)
NEUTROPHILS NFR BLD AUTO: 83.4 % — HIGH (ref 43–77)
NEUTROPHILS NFR BLD AUTO: 85.9 % — HIGH (ref 43–77)
NEUTROPHILS NFR BLD AUTO: 87.9 % — HIGH (ref 43–77)
NEUTROPHILS NFR BLD AUTO: 88 % — HIGH (ref 43–77)
NEUTROPHILS NFR BLD AUTO: 88.2 % — HIGH (ref 43–77)
NEUTROPHILS NFR BLD AUTO: 90.7 % — HIGH (ref 43–77)
NEUTROPHILS NFR BLD AUTO: 91 % — HIGH (ref 43–77)
NEUTROPHILS NFR BLD AUTO: 91.2 % — HIGH (ref 43–77)
NEUTROPHILS NFR BLD AUTO: 92.5 % — HIGH (ref 43–77)
NEUTROPHILS-BODY FLUID: 85 % — SIGNIFICANT CHANGE UP
NEUTS BAND # BLD: 3.5 % — SIGNIFICANT CHANGE UP (ref 0–6)
NEUTS BAND # BLD: 8.6 % — HIGH (ref 0–6)
NEUTS BAND NFR BLD: 3.5 % — SIGNIFICANT CHANGE UP (ref 0–6)
NEUTS BAND NFR BLD: 8.6 % — HIGH (ref 0–6)
NIGHT BLUE STAIN TISS: SIGNIFICANT CHANGE UP
NITRITE UR-MCNC: POSITIVE
NON-GYNECOLOGICAL CYTOLOGY STUDY: SIGNIFICANT CHANGE UP
NRBC # BLD AUTO: 0 K/UL — SIGNIFICANT CHANGE UP (ref 0–0)
NRBC # BLD AUTO: 0.02 K/UL — HIGH (ref 0–0)
NRBC # BLD AUTO: 0.02 K/UL — HIGH (ref 0–0)
NRBC # BLD AUTO: 0.03 K/UL — HIGH (ref 0–0)
NRBC # BLD AUTO: 0.06 K/UL — HIGH (ref 0–0)
NRBC # BLD AUTO: 0.07 K/UL — HIGH (ref 0–0)
NRBC # BLD AUTO: 0.1 K/UL — HIGH (ref 0–0)
NRBC # BLD: 0 /100 WBCS — SIGNIFICANT CHANGE UP (ref 0–0)
NRBC # BLD: 1 /100 WBCS — HIGH (ref 0–0)
NRBC # BLD: 1 /100 WBCS — HIGH (ref 0–0)
NRBC # FLD: 0 K/UL — SIGNIFICANT CHANGE UP (ref 0–0)
NRBC # FLD: 0.02 K/UL — HIGH (ref 0–0)
NRBC # FLD: 0.02 K/UL — HIGH (ref 0–0)
NRBC # FLD: 0.03 K/UL — HIGH (ref 0–0)
NRBC # FLD: 0.06 K/UL — HIGH (ref 0–0)
NRBC # FLD: 0.07 K/UL — HIGH (ref 0–0)
NRBC # FLD: 0.1 K/UL — HIGH (ref 0–0)
NRBC BLD-RTO: 0 /100 WBCS — SIGNIFICANT CHANGE UP (ref 0–0)
NRBC BLD-RTO: 1 /100 WBCS — HIGH (ref 0–0)
NRBC BLD-RTO: 1 /100 WBCS — HIGH (ref 0–0)
NSE FLD-MCNC: 242.3 NG/ML — HIGH (ref 0–17.6)
NSE FLD-MCNC: 859.7 NG/ML — HIGH (ref 0–17.6)
NT-PROBNP SERPL-SCNC: 1854 PG/ML — HIGH
OB PNL STL: NEGATIVE — SIGNIFICANT CHANGE UP
ORGANISM # SPEC MICROSCOPIC CNT: ABNORMAL
ORGANISM # SPEC MICROSCOPIC CNT: ABNORMAL
OTHER CELLS FLD MANUAL: 0 % — SIGNIFICANT CHANGE UP
OVALOCYTES BLD QL SMEAR: SIGNIFICANT CHANGE UP
OVALOCYTES BLD QL SMEAR: SLIGHT — SIGNIFICANT CHANGE UP
PH UR: 5.5 — SIGNIFICANT CHANGE UP (ref 5–8)
PHOSPHATE SERPL-MCNC: 4.7 MG/DL — HIGH (ref 2.5–4.5)
PHOSPHATE SERPL-MCNC: 4.9 MG/DL — HIGH (ref 2.5–4.5)
PHOSPHATE SERPL-MCNC: 5 MG/DL — HIGH (ref 2.5–4.5)
PHOSPHATE SERPL-MCNC: 5.1 MG/DL — HIGH (ref 2.5–4.5)
PHOSPHATE SERPL-MCNC: 5.2 MG/DL — HIGH (ref 2.5–4.5)
PHOSPHATE SERPL-MCNC: 5.7 MG/DL — HIGH (ref 2.5–4.5)
PHOSPHATE SERPL-MCNC: 6.4 MG/DL — HIGH (ref 2.5–4.5)
PHOSPHATE SERPL-MCNC: 6.5 MG/DL — HIGH (ref 2.5–4.5)
PHOSPHATE SERPL-MCNC: 6.7 MG/DL — HIGH (ref 2.5–4.5)
PHOSPHATE SERPL-MCNC: 6.8 MG/DL — HIGH (ref 2.5–4.5)
PHOSPHATE SERPL-MCNC: 7.9 MG/DL — HIGH (ref 2.5–4.5)
PLAT MORPH BLD: NORMAL — SIGNIFICANT CHANGE UP
PLATELET # BLD AUTO: 104 K/UL — LOW (ref 150–400)
PLATELET # BLD AUTO: 139 K/UL — LOW (ref 150–400)
PLATELET # BLD AUTO: 148 K/UL — LOW (ref 150–400)
PLATELET # BLD AUTO: 174 K/UL — SIGNIFICANT CHANGE UP (ref 150–400)
PLATELET # BLD AUTO: 182 K/UL — SIGNIFICANT CHANGE UP (ref 150–400)
PLATELET # BLD AUTO: 198 K/UL — SIGNIFICANT CHANGE UP (ref 150–400)
PLATELET # BLD AUTO: 209 K/UL — SIGNIFICANT CHANGE UP (ref 150–400)
PLATELET # BLD AUTO: 225 K/UL — SIGNIFICANT CHANGE UP (ref 150–400)
PLATELET # BLD AUTO: 29 K/UL — LOW (ref 150–400)
PLATELET # BLD AUTO: 34 K/UL — LOW (ref 150–400)
PLATELET # BLD AUTO: 38 K/UL — LOW (ref 150–400)
PLATELET # BLD AUTO: 55 K/UL — LOW (ref 150–400)
PLATELET # BLD AUTO: 62 K/UL — LOW (ref 150–400)
PLATELET # BLD AUTO: 67 K/UL — LOW (ref 150–400)
PLATELET # BLD AUTO: 75 K/UL — LOW (ref 150–400)
PLATELET # BLD AUTO: 76 K/UL — LOW (ref 150–400)
PLATELET COUNT - ESTIMATE: ABNORMAL
PLATELET COUNT - ESTIMATE: ABNORMAL
PLATELET COUNT - ESTIMATE: NORMAL — SIGNIFICANT CHANGE UP
POIKILOCYTOSIS BLD QL AUTO: SLIGHT — SIGNIFICANT CHANGE UP
POLYCHROMASIA BLD QL SMEAR: SIGNIFICANT CHANGE UP
POLYCHROMASIA BLD QL SMEAR: SLIGHT — SIGNIFICANT CHANGE UP
POTASSIUM SERPL-MCNC: 3.8 MMOL/L — SIGNIFICANT CHANGE UP (ref 3.5–5.3)
POTASSIUM SERPL-MCNC: 3.9 MMOL/L — SIGNIFICANT CHANGE UP (ref 3.5–5.3)
POTASSIUM SERPL-MCNC: 3.9 MMOL/L — SIGNIFICANT CHANGE UP (ref 3.5–5.3)
POTASSIUM SERPL-MCNC: 4 MMOL/L — SIGNIFICANT CHANGE UP (ref 3.5–5.3)
POTASSIUM SERPL-MCNC: 4 MMOL/L — SIGNIFICANT CHANGE UP (ref 3.5–5.3)
POTASSIUM SERPL-MCNC: 4.2 MMOL/L — SIGNIFICANT CHANGE UP (ref 3.5–5.3)
POTASSIUM SERPL-MCNC: 4.5 MMOL/L — SIGNIFICANT CHANGE UP (ref 3.5–5.3)
POTASSIUM SERPL-MCNC: 4.5 MMOL/L — SIGNIFICANT CHANGE UP (ref 3.5–5.3)
POTASSIUM SERPL-MCNC: 4.7 MMOL/L — SIGNIFICANT CHANGE UP (ref 3.5–5.3)
POTASSIUM SERPL-MCNC: 5.8 MMOL/L — HIGH (ref 3.5–5.3)
POTASSIUM SERPL-MCNC: 5.8 MMOL/L — HIGH (ref 3.5–5.3)
POTASSIUM SERPL-MCNC: 6.2 MMOL/L — CRITICAL HIGH (ref 3.5–5.3)
POTASSIUM SERPL-SCNC: 3.8 MMOL/L — SIGNIFICANT CHANGE UP (ref 3.5–5.3)
POTASSIUM SERPL-SCNC: 3.9 MMOL/L — SIGNIFICANT CHANGE UP (ref 3.5–5.3)
POTASSIUM SERPL-SCNC: 3.9 MMOL/L — SIGNIFICANT CHANGE UP (ref 3.5–5.3)
POTASSIUM SERPL-SCNC: 4 MMOL/L — SIGNIFICANT CHANGE UP (ref 3.5–5.3)
POTASSIUM SERPL-SCNC: 4 MMOL/L — SIGNIFICANT CHANGE UP (ref 3.5–5.3)
POTASSIUM SERPL-SCNC: 4.2 MMOL/L — SIGNIFICANT CHANGE UP (ref 3.5–5.3)
POTASSIUM SERPL-SCNC: 4.5 MMOL/L — SIGNIFICANT CHANGE UP (ref 3.5–5.3)
POTASSIUM SERPL-SCNC: 4.5 MMOL/L — SIGNIFICANT CHANGE UP (ref 3.5–5.3)
POTASSIUM SERPL-SCNC: 4.7 MMOL/L — SIGNIFICANT CHANGE UP (ref 3.5–5.3)
POTASSIUM SERPL-SCNC: 5.8 MMOL/L — HIGH (ref 3.5–5.3)
POTASSIUM SERPL-SCNC: 5.8 MMOL/L — HIGH (ref 3.5–5.3)
POTASSIUM SERPL-SCNC: 6.2 MMOL/L — CRITICAL HIGH (ref 3.5–5.3)
PROT SERPL-MCNC: 6.1 G/DL — SIGNIFICANT CHANGE UP (ref 6–8.3)
PROT SERPL-MCNC: 6.2 G/DL — SIGNIFICANT CHANGE UP (ref 6–8.3)
PROT SERPL-MCNC: 6.5 G/DL — SIGNIFICANT CHANGE UP (ref 6–8.3)
PROT SERPL-MCNC: 6.6 G/DL — SIGNIFICANT CHANGE UP (ref 6–8.3)
PROT SERPL-MCNC: 6.7 G/DL — SIGNIFICANT CHANGE UP (ref 6–8.3)
PROT SERPL-MCNC: 6.8 G/DL — SIGNIFICANT CHANGE UP (ref 6–8.3)
PROT SERPL-MCNC: 6.9 G/DL — SIGNIFICANT CHANGE UP (ref 6–8.3)
PROT SERPL-MCNC: 7.1 G/DL — SIGNIFICANT CHANGE UP (ref 6–8.3)
PROT SERPL-MCNC: 7.1 G/DL — SIGNIFICANT CHANGE UP (ref 6–8.3)
PROT SERPL-MCNC: 7.7 G/DL — SIGNIFICANT CHANGE UP (ref 6–8.3)
PROT UR-MCNC: 300 MG/DL
PROTHROM AB SERPL-ACNC: 15.8 SEC — HIGH (ref 9.9–13.4)
PROTHROM AB SERPL-ACNC: 16.7 SEC — HIGH (ref 9.9–13.4)
PROTHROM AB SERPL-ACNC: 16.8 SEC — HIGH (ref 9.9–13.4)
PROTHROM AB SERPL-ACNC: 17.1 SEC — HIGH (ref 9.9–13.4)
PROTHROM AB SERPL-ACNC: 17.7 SEC — HIGH (ref 9.9–13.4)
PROTHROM AB SERPL-ACNC: 18.2 SEC — HIGH (ref 9.9–13.4)
PROTHROM AB SERPL-ACNC: 18.6 SEC — HIGH (ref 9.9–13.4)
PROTHROM AB SERPL-ACNC: 18.9 SEC — HIGH (ref 9.9–13.4)
PROTHROM AB SERPL-ACNC: 21.5 SEC — HIGH (ref 9.9–13.4)
PROTHROM AB SERPL-ACNC: 23.9 SEC — HIGH (ref 9.9–13.4)
PROTHROM AB SERPL-ACNC: 23.9 SEC — HIGH (ref 9.9–13.4)
PROTHROM AB SERPL-ACNC: 24.7 SEC — HIGH (ref 9.9–13.4)
PROTHROM AB SERPL-ACNC: 25.3 SEC — HIGH (ref 9.9–13.4)
PROTHROM AB SERPL-ACNC: 25.4 SEC — HIGH (ref 9.9–13.4)
RAPID RVP RESULT: SIGNIFICANT CHANGE UP
RBC # BLD: 2.23 M/UL — LOW (ref 4.2–5.8)
RBC # BLD: 2.5 M/UL — LOW (ref 4.2–5.8)
RBC # BLD: 2.52 M/UL — LOW (ref 4.2–5.8)
RBC # BLD: 2.53 M/UL — LOW (ref 4.2–5.8)
RBC # BLD: 2.6 M/UL — LOW (ref 4.2–5.8)
RBC # BLD: 2.67 M/UL — LOW (ref 4.2–5.8)
RBC # BLD: 2.69 M/UL — LOW (ref 4.2–5.8)
RBC # BLD: 2.84 M/UL — LOW (ref 4.2–5.8)
RBC # BLD: 2.91 M/UL — LOW (ref 4.2–5.8)
RBC # BLD: 2.95 M/UL — LOW (ref 4.2–5.8)
RBC # BLD: 2.96 M/UL — LOW (ref 4.2–5.8)
RBC # BLD: 2.97 M/UL — LOW (ref 4.2–5.8)
RBC # BLD: 2.99 M/UL — LOW (ref 4.2–5.8)
RBC # BLD: 3.02 M/UL — LOW (ref 4.2–5.8)
RBC # BLD: 3.06 M/UL — LOW (ref 4.2–5.8)
RBC # BLD: 3.09 M/UL — LOW (ref 4.2–5.8)
RBC # FLD: 17 % — HIGH (ref 10.3–14.5)
RBC # FLD: 17 % — HIGH (ref 10.3–14.5)
RBC # FLD: 17.1 % — HIGH (ref 10.3–14.5)
RBC # FLD: 17.1 % — HIGH (ref 10.3–14.5)
RBC # FLD: 17.2 % — HIGH (ref 10.3–14.5)
RBC # FLD: 17.4 % — HIGH (ref 10.3–14.5)
RBC # FLD: 17.5 % — HIGH (ref 10.3–14.5)
RBC # FLD: 17.6 % — HIGH (ref 10.3–14.5)
RBC # FLD: 17.7 % — HIGH (ref 10.3–14.5)
RBC # FLD: 17.9 % — HIGH (ref 10.3–14.5)
RBC # FLD: 17.9 % — HIGH (ref 10.3–14.5)
RBC # FLD: 18.1 % — HIGH (ref 10.3–14.5)
RBC # FLD: 19.9 % — HIGH (ref 10.3–14.5)
RBC # FLD: 21.7 % — HIGH (ref 10.3–14.5)
RBC # FLD: 23.9 % — HIGH (ref 10.3–14.5)
RBC # FLD: 25.2 % — HIGH (ref 10.3–14.5)
RBC BLD AUTO: ABNORMAL
RBC CASTS # UR COMP ASSIST: 3564 /HPF — HIGH (ref 0–4)
RCV VOL RI: SIGNIFICANT CHANGE UP CELLS/UL (ref 0–5)
REVIEW: SIGNIFICANT CHANGE UP
RH IG SCN BLD-IMP: POSITIVE — SIGNIFICANT CHANGE UP
RSV RNA NPH QL NAA+NON-PROBE: SIGNIFICANT CHANGE UP
RSV RNA SPEC QL NAA+PROBE: SIGNIFICANT CHANGE UP
RV+EV RNA SPEC QL NAA+PROBE: SIGNIFICANT CHANGE UP
S AUREUS DNA NOSE QL NAA+PROBE: DETECTED
SARS-COV-2 RNA SPEC QL NAA+PROBE: SIGNIFICANT CHANGE UP
SARS-COV-2 RNA SPEC QL NAA+PROBE: SIGNIFICANT CHANGE UP
SMUDGE CELLS # BLD: PRESENT — SIGNIFICANT CHANGE UP
SODIUM SERPL-SCNC: 131 MMOL/L — LOW (ref 135–145)
SODIUM SERPL-SCNC: 134 MMOL/L — LOW (ref 135–145)
SODIUM SERPL-SCNC: 135 MMOL/L — SIGNIFICANT CHANGE UP (ref 135–145)
SODIUM SERPL-SCNC: 137 MMOL/L — SIGNIFICANT CHANGE UP (ref 135–145)
SODIUM SERPL-SCNC: 138 MMOL/L — SIGNIFICANT CHANGE UP (ref 135–145)
SODIUM SERPL-SCNC: 139 MMOL/L — SIGNIFICANT CHANGE UP (ref 135–145)
SODIUM SERPL-SCNC: 139 MMOL/L — SIGNIFICANT CHANGE UP (ref 135–145)
SODIUM SERPL-SCNC: 143 MMOL/L — SIGNIFICANT CHANGE UP (ref 135–145)
SP GR SPEC: 1.02 — SIGNIFICANT CHANGE UP (ref 1–1.03)
SPECIMEN SOURCE: SIGNIFICANT CHANGE UP
SQUAMOUS # UR AUTO: 28 /HPF — HIGH (ref 0–5)
TARGETS BLD QL SMEAR: SLIGHT — SIGNIFICANT CHANGE UP
TOTAL CELLS COUNTED, BODY FLUID: 100 CELLS — SIGNIFICANT CHANGE UP
TOTAL NUCLEATED CELL COUNT, BODY FLUID: SIGNIFICANT CHANGE UP CELLS/UL (ref 0–5)
TROPONIN T, HIGH SENSITIVITY RESULT: 22 NG/L — SIGNIFICANT CHANGE UP
TUBE TYPE: SIGNIFICANT CHANGE UP
UROBILINOGEN FLD QL: 1 MG/DL — SIGNIFICANT CHANGE UP (ref 0.2–1)
VANCOMYCIN FLD-MCNC: 15.3 UG/ML — SIGNIFICANT CHANGE UP
VARIANT LYMPHS # BLD: 1.7 % — SIGNIFICANT CHANGE UP (ref 0–6)
VARIANT LYMPHS # BLD: 1.7 % — SIGNIFICANT CHANGE UP (ref 0–6)
VARIANT LYMPHS # BLD: 3.7 % — SIGNIFICANT CHANGE UP (ref 0–6)
VARIANT LYMPHS NFR BLD MANUAL: 1.7 % — SIGNIFICANT CHANGE UP (ref 0–6)
VARIANT LYMPHS NFR BLD MANUAL: 1.7 % — SIGNIFICANT CHANGE UP (ref 0–6)
VARIANT LYMPHS NFR BLD MANUAL: 3.7 % — SIGNIFICANT CHANGE UP (ref 0–6)
WBC # BLD: 10.07 K/UL — SIGNIFICANT CHANGE UP (ref 3.8–10.5)
WBC # BLD: 12 K/UL — HIGH (ref 3.8–10.5)
WBC # BLD: 13.64 K/UL — HIGH (ref 3.8–10.5)
WBC # BLD: 16.22 K/UL — HIGH (ref 3.8–10.5)
WBC # BLD: 16.27 K/UL — HIGH (ref 3.8–10.5)
WBC # BLD: 16.5 K/UL — HIGH (ref 3.8–10.5)
WBC # BLD: 16.63 K/UL — HIGH (ref 3.8–10.5)
WBC # BLD: 20.04 K/UL — HIGH (ref 3.8–10.5)
WBC # BLD: 21.95 K/UL — HIGH (ref 3.8–10.5)
WBC # BLD: 23.65 K/UL — HIGH (ref 3.8–10.5)
WBC # BLD: 26.12 K/UL — HIGH (ref 3.8–10.5)
WBC # BLD: 29.94 K/UL — HIGH (ref 3.8–10.5)
WBC # BLD: 31.01 K/UL — HIGH (ref 3.8–10.5)
WBC # BLD: 8.6 K/UL — SIGNIFICANT CHANGE UP (ref 3.8–10.5)
WBC # BLD: 9.04 K/UL — SIGNIFICANT CHANGE UP (ref 3.8–10.5)
WBC # BLD: 9.68 K/UL — SIGNIFICANT CHANGE UP (ref 3.8–10.5)
WBC # FLD AUTO: 10.07 K/UL — SIGNIFICANT CHANGE UP (ref 3.8–10.5)
WBC # FLD AUTO: 12 K/UL — HIGH (ref 3.8–10.5)
WBC # FLD AUTO: 13.64 K/UL — HIGH (ref 3.8–10.5)
WBC # FLD AUTO: 16.22 K/UL — HIGH (ref 3.8–10.5)
WBC # FLD AUTO: 16.27 K/UL — HIGH (ref 3.8–10.5)
WBC # FLD AUTO: 16.5 K/UL — HIGH (ref 3.8–10.5)
WBC # FLD AUTO: 16.63 K/UL — HIGH (ref 3.8–10.5)
WBC # FLD AUTO: 20.04 K/UL — HIGH (ref 3.8–10.5)
WBC # FLD AUTO: 21.95 K/UL — HIGH (ref 3.8–10.5)
WBC # FLD AUTO: 23.65 K/UL — HIGH (ref 3.8–10.5)
WBC # FLD AUTO: 26.12 K/UL — HIGH (ref 3.8–10.5)
WBC # FLD AUTO: 29.94 K/UL — HIGH (ref 3.8–10.5)
WBC # FLD AUTO: 31.01 K/UL — HIGH (ref 3.8–10.5)
WBC # FLD AUTO: 8.6 K/UL — SIGNIFICANT CHANGE UP (ref 3.8–10.5)
WBC # FLD AUTO: 9.04 K/UL — SIGNIFICANT CHANGE UP (ref 3.8–10.5)
WBC # FLD AUTO: 9.68 K/UL — SIGNIFICANT CHANGE UP (ref 3.8–10.5)
WBC UR QL: 721 /HPF — HIGH (ref 0–5)

## 2025-01-01 PROCEDURE — 99291 CRITICAL CARE FIRST HOUR: CPT | Mod: GC

## 2025-01-01 PROCEDURE — 99291 CRITICAL CARE FIRST HOUR: CPT

## 2025-01-01 PROCEDURE — 88312 SPECIAL STAINS GROUP 1: CPT | Mod: 26

## 2025-01-01 PROCEDURE — 95720 EEG PHY/QHP EA INCR W/VEEG: CPT

## 2025-01-01 PROCEDURE — 31645 BRNCHSC W/THER ASPIR 1ST: CPT | Mod: GC

## 2025-01-01 PROCEDURE — 70450 CT HEAD/BRAIN W/O DYE: CPT | Mod: 26

## 2025-01-01 PROCEDURE — 88112 CYTOPATH CELL ENHANCE TECH: CPT | Mod: 26

## 2025-01-01 PROCEDURE — 93306 TTE W/DOPPLER COMPLETE: CPT | Mod: 26

## 2025-01-01 PROCEDURE — 76604 US EXAM CHEST: CPT | Mod: 26

## 2025-01-01 PROCEDURE — 99291 CRITICAL CARE FIRST HOUR: CPT | Mod: GC,25

## 2025-01-01 PROCEDURE — 71045 X-RAY EXAM CHEST 1 VIEW: CPT | Mod: 26

## 2025-01-01 PROCEDURE — 99497 ADVNCD CARE PLAN 30 MIN: CPT | Mod: 25

## 2025-01-01 PROCEDURE — 99223 1ST HOSP IP/OBS HIGH 75: CPT

## 2025-01-01 PROCEDURE — 74176 CT ABD & PELVIS W/O CONTRAST: CPT | Mod: 26

## 2025-01-01 PROCEDURE — 95718 EEG PHYS/QHP 2-12 HR W/VEEG: CPT

## 2025-01-01 PROCEDURE — 31624 DX BRONCHOSCOPE/LAVAGE: CPT | Mod: GC

## 2025-01-01 PROCEDURE — 88305 TISSUE EXAM BY PATHOLOGIST: CPT | Mod: 26

## 2025-01-01 PROCEDURE — 99233 SBSQ HOSP IP/OBS HIGH 50: CPT

## 2025-01-01 PROCEDURE — 71250 CT THORAX DX C-: CPT | Mod: 26

## 2025-01-01 PROCEDURE — 99292 CRITICAL CARE ADDL 30 MIN: CPT | Mod: GC,25

## 2025-01-01 PROCEDURE — 70551 MRI BRAIN STEM W/O DYE: CPT | Mod: 26

## 2025-01-01 PROCEDURE — 99232 SBSQ HOSP IP/OBS MODERATE 35: CPT

## 2025-01-01 PROCEDURE — 99498 ADVNCD CARE PLAN ADDL 30 MIN: CPT | Mod: 25

## 2025-01-01 PROCEDURE — 93308 TTE F-UP OR LMTD: CPT | Mod: 26

## 2025-01-01 PROCEDURE — 71045 X-RAY EXAM CHEST 1 VIEW: CPT | Mod: 26,76

## 2025-01-01 RX ORDER — SODIUM BICARBONATE 42 MG/ML
0.5 INJECTION, SOLUTION INTRAVENOUS
Qty: 150 | Refills: 0 | Status: DISCONTINUED | OUTPATIENT
Start: 2025-01-01 | End: 2025-01-01

## 2025-01-01 RX ORDER — LEVOTHYROXINE SODIUM 25 UG/1
37 TABLET ORAL AT BEDTIME
Refills: 0 | Status: DISCONTINUED | OUTPATIENT
Start: 2025-01-01 | End: 2025-01-01

## 2025-01-01 RX ORDER — SENNOSIDES 8.6 MG
2 TABLET ORAL AT BEDTIME
Refills: 0 | Status: DISCONTINUED | OUTPATIENT
Start: 2025-01-01 | End: 2025-01-01

## 2025-01-01 RX ORDER — MIDODRINE HYDROCHLORIDE 5 MG/1
10 TABLET ORAL EVERY 8 HOURS
Refills: 0 | Status: DISCONTINUED | OUTPATIENT
Start: 2025-01-01 | End: 2025-01-01

## 2025-01-01 RX ORDER — NOREPINEPHRINE BITARTRATE 1 MG/ML
0.5 INJECTION, SOLUTION, CONCENTRATE INTRAVENOUS
Qty: 8 | Refills: 0 | Status: DISCONTINUED | OUTPATIENT
Start: 2025-01-01 | End: 2025-01-01

## 2025-01-01 RX ORDER — EPINEPHRINE 5 MG/ML
0.05 VIAL (ML) INJECTION
Qty: 4 | Refills: 0 | Status: DISCONTINUED | OUTPATIENT
Start: 2025-01-01 | End: 2025-01-01

## 2025-01-01 RX ORDER — HEPARIN SODIUM,PORCINE 10000/ML
5000 VIAL (ML) INJECTION EVERY 8 HOURS
Refills: 0 | Status: DISCONTINUED | OUTPATIENT
Start: 2025-01-01 | End: 2025-01-01

## 2025-01-01 RX ORDER — LEVETIRACETAM 750 MG/1
250 TABLET, FILM COATED ORAL EVERY 12 HOURS
Refills: 0 | Status: DISCONTINUED | OUTPATIENT
Start: 2025-01-01 | End: 2025-01-01

## 2025-01-01 RX ORDER — SODIUM BICARBONATE 42 MG/ML
0.32 INJECTION, SOLUTION INTRAVENOUS
Qty: 150 | Refills: 0 | Status: DISCONTINUED | OUTPATIENT
Start: 2025-01-01 | End: 2025-01-01

## 2025-01-01 RX ORDER — CEFTRIAXONE 250 MG/1
1000 INJECTION, POWDER, FOR SOLUTION INTRAMUSCULAR; INTRAVENOUS EVERY 24 HOURS
Refills: 0 | Status: DISCONTINUED | OUTPATIENT
Start: 2025-01-01 | End: 2025-01-01

## 2025-01-01 RX ORDER — HYDROMORPHONE HYDROCHLORIDE 4 MG/ML
0.25 INJECTION, SOLUTION INTRAMUSCULAR; INTRAVENOUS; SUBCUTANEOUS
Refills: 0 | Status: DISCONTINUED | OUTPATIENT
Start: 2025-01-01 | End: 2025-01-01

## 2025-01-01 RX ORDER — ANTISEPTIC SURGICAL SCRUB 0.04 MG/ML
15 SOLUTION TOPICAL EVERY 12 HOURS
Refills: 0 | Status: DISCONTINUED | OUTPATIENT
Start: 2025-01-01 | End: 2025-01-01

## 2025-01-01 RX ORDER — ALBUMIN HUMAN 50 G/1000ML
500 SOLUTION INTRAVENOUS ONCE
Refills: 0 | Status: COMPLETED | OUTPATIENT
Start: 2025-01-01 | End: 2025-01-01

## 2025-01-01 RX ORDER — HEPARIN SODIUM,PORCINE 10000/ML
5000 VIAL (ML) INJECTION EVERY 12 HOURS
Refills: 0 | Status: DISCONTINUED | OUTPATIENT
Start: 2025-01-01 | End: 2025-01-01

## 2025-01-01 RX ORDER — VANCOMYCIN HYDROCHLORIDE 50 MG/ML
1000 KIT ORAL ONCE
Refills: 0 | Status: DISCONTINUED | OUTPATIENT
Start: 2025-01-01 | End: 2025-01-01

## 2025-01-01 RX ORDER — AZITHROMYCIN DIHYDRATE 500 MG/1
500 TABLET, FILM COATED ORAL EVERY 24 HOURS
Refills: 0 | Status: DISCONTINUED | OUTPATIENT
Start: 2025-01-01 | End: 2025-01-01

## 2025-01-01 RX ORDER — TRANEXAMIC ACID 100 MG/ML
500 INJECTION, SOLUTION INTRAVENOUS EVERY 8 HOURS
Refills: 0 | Status: COMPLETED | OUTPATIENT
Start: 2025-01-01 | End: 2025-01-01

## 2025-01-01 RX ORDER — FENTANYL CITRATE 50 UG/ML
100 INJECTION INTRAMUSCULAR; INTRAVENOUS ONCE
Refills: 0 | Status: DISCONTINUED | OUTPATIENT
Start: 2025-01-01 | End: 2025-01-01

## 2025-01-01 RX ORDER — BUMETANIDE 2 MG/1
2 TABLET ORAL ONCE
Refills: 0 | Status: COMPLETED | OUTPATIENT
Start: 2025-01-01 | End: 2025-01-01

## 2025-01-01 RX ORDER — SODIUM CHLORIDE 9 G/ML
1000 INJECTION, SOLUTION INTRAVENOUS
Refills: 0 | Status: DISCONTINUED | OUTPATIENT
Start: 2025-01-01 | End: 2025-01-01

## 2025-01-01 RX ORDER — PANTOPRAZOLE 20 MG/1
40 TABLET, DELAYED RELEASE ORAL
Refills: 0 | Status: DISCONTINUED | OUTPATIENT
Start: 2025-01-01 | End: 2025-01-01

## 2025-01-01 RX ORDER — LEVETIRACETAM 750 MG/1
500 TABLET, FILM COATED ORAL EVERY 12 HOURS
Refills: 0 | Status: DISCONTINUED | OUTPATIENT
Start: 2025-01-01 | End: 2025-01-01

## 2025-01-01 RX ORDER — SODIUM CHLORIDE 9 G/ML
500 INJECTION, SOLUTION INTRAVENOUS ONCE
Refills: 0 | Status: COMPLETED | OUTPATIENT
Start: 2025-01-01 | End: 2025-01-01

## 2025-01-01 RX ORDER — LEVOTHYROXINE SODIUM 25 UG/1
1 TABLET ORAL
Refills: 0 | DISCHARGE

## 2025-01-01 RX ORDER — VANCOMYCIN HYDROCHLORIDE 50 MG/ML
1000 KIT ORAL ONCE
Refills: 0 | Status: COMPLETED | OUTPATIENT
Start: 2025-01-01 | End: 2025-01-01

## 2025-01-01 RX ORDER — HYDROMORPHONE HYDROCHLORIDE 4 MG/ML
0.2 INJECTION, SOLUTION INTRAMUSCULAR; INTRAVENOUS; SUBCUTANEOUS ONCE
Refills: 0 | Status: COMPLETED | OUTPATIENT
Start: 2025-01-01 | End: 2025-02-18

## 2025-01-01 RX ORDER — DM/PSEUDOEPHED/ACETAMINOPHEN 10-30-250
25 CAPSULE ORAL ONCE
Refills: 0 | Status: DISCONTINUED | OUTPATIENT
Start: 2025-01-01 | End: 2025-01-01

## 2025-01-01 RX ORDER — PIPERACILLIN SODIUM AND TAZOBACTAM SODIUM 2; 250 G/50ML; MG/50ML
3.38 INJECTION, POWDER, FOR SOLUTION INTRAVENOUS ONCE
Refills: 0 | Status: COMPLETED | OUTPATIENT
Start: 2025-01-01 | End: 2025-01-01

## 2025-01-01 RX ORDER — NOREPINEPHRINE BITARTRATE 1 MG/ML
0.73 INJECTION, SOLUTION, CONCENTRATE INTRAVENOUS
Qty: 8 | Refills: 0 | Status: DISCONTINUED | OUTPATIENT
Start: 2025-01-01 | End: 2025-01-01

## 2025-01-01 RX ORDER — ESCITALOPRAM 10 MG/1
2 TABLET, FILM COATED ORAL
Refills: 0 | DISCHARGE

## 2025-01-01 RX ORDER — DEXMEDETOMIDINE HYDROCHLORIDE 4 UG/ML
0.05 INJECTION, SOLUTION INTRAVENOUS
Qty: 400 | Refills: 0 | Status: DISCONTINUED | OUTPATIENT
Start: 2025-01-01 | End: 2025-01-01

## 2025-01-01 RX ORDER — PROPOFOL 10 MG/ML
10 INJECTION, EMULSION INTRAVENOUS
Qty: 1000 | Refills: 0 | Status: DISCONTINUED | OUTPATIENT
Start: 2025-01-01 | End: 2025-01-01

## 2025-01-01 RX ORDER — AMPICILLIN SODIUM AND SULBACTAM SODIUM 2; 1 G/1; G/1
3 INJECTION, POWDER, FOR SOLUTION INTRAMUSCULAR; INTRAVENOUS ONCE
Refills: 0 | Status: COMPLETED | OUTPATIENT
Start: 2025-01-01 | End: 2025-01-01

## 2025-01-01 RX ORDER — POTASSIUM CHLORIDE 750 MG/1
20 TABLET, EXTENDED RELEASE ORAL ONCE
Refills: 0 | Status: COMPLETED | OUTPATIENT
Start: 2025-01-01 | End: 2025-01-01

## 2025-01-01 RX ORDER — PANTOPRAZOLE 20 MG/1
40 TABLET, DELAYED RELEASE ORAL DAILY
Refills: 0 | Status: DISCONTINUED | OUTPATIENT
Start: 2025-01-01 | End: 2025-01-01

## 2025-01-01 RX ORDER — SODIUM CHLORIDE 9 G/ML
1000 INJECTION, SOLUTION INTRAVENOUS ONCE
Refills: 0 | Status: COMPLETED | OUTPATIENT
Start: 2025-01-01 | End: 2025-01-01

## 2025-01-01 RX ORDER — FENTANYL CITRATE 50 UG/ML
50 INJECTION INTRAMUSCULAR; INTRAVENOUS ONCE
Refills: 0 | Status: DISCONTINUED | OUTPATIENT
Start: 2025-01-01 | End: 2025-01-01

## 2025-01-01 RX ORDER — LEVOTHYROXINE SODIUM 25 UG/1
25 TABLET ORAL DAILY
Refills: 0 | Status: DISCONTINUED | OUTPATIENT
Start: 2025-01-01 | End: 2025-01-01

## 2025-01-01 RX ORDER — PIPERACILLIN SODIUM AND TAZOBACTAM SODIUM 2; 250 G/50ML; MG/50ML
3.38 INJECTION, POWDER, FOR SOLUTION INTRAVENOUS EVERY 12 HOURS
Refills: 0 | Status: DISCONTINUED | OUTPATIENT
Start: 2025-01-01 | End: 2025-01-01

## 2025-01-01 RX ORDER — IPRATROPIUM BROMIDE AND ALBUTEROL SULFATE .5; 2.5 MG/3ML; MG/3ML
3 SOLUTION RESPIRATORY (INHALATION) EVERY 6 HOURS
Refills: 0 | Status: DISCONTINUED | OUTPATIENT
Start: 2025-01-01 | End: 2025-01-01

## 2025-01-01 RX ORDER — HYDROMORPHONE HYDROCHLORIDE 4 MG/ML
0.2 INJECTION, SOLUTION INTRAMUSCULAR; INTRAVENOUS; SUBCUTANEOUS ONCE
Refills: 0 | Status: DISCONTINUED | OUTPATIENT
Start: 2025-01-01 | End: 2025-01-01

## 2025-01-01 RX ORDER — ANTISEPTIC SURGICAL SCRUB 0.04 MG/ML
1 SOLUTION TOPICAL
Refills: 0 | Status: DISCONTINUED | OUTPATIENT
Start: 2025-01-01 | End: 2025-01-01

## 2025-01-01 RX ORDER — MUPIROCIN 2 G/100G
1 CREAM TOPICAL
Refills: 0 | Status: DISCONTINUED | OUTPATIENT
Start: 2025-01-01 | End: 2025-01-01

## 2025-01-01 RX ORDER — LATANOPROST 50 UG/ML
1 SOLUTION OPHTHALMIC AT BEDTIME
Refills: 0 | Status: DISCONTINUED | OUTPATIENT
Start: 2025-01-01 | End: 2025-01-01

## 2025-01-01 RX ORDER — BUMETANIDE 2 MG/1
4 TABLET ORAL
Qty: 20 | Refills: 0 | Status: DISCONTINUED | OUTPATIENT
Start: 2025-01-01 | End: 2025-01-01

## 2025-01-01 RX ORDER — QUETIAPINE FUMARATE 300 MG/1
1 TABLET ORAL
Refills: 0 | DISCHARGE

## 2025-01-01 RX ORDER — VANCOMYCIN HYDROCHLORIDE 50 MG/ML
1000 KIT ORAL EVERY 12 HOURS
Refills: 0 | Status: DISCONTINUED | OUTPATIENT
Start: 2025-01-01 | End: 2025-01-01

## 2025-01-01 RX ORDER — MAGNESIUM SULFATE 0.8 MEQ/ML
2 AMPUL (ML) INJECTION ONCE
Refills: 0 | Status: COMPLETED | OUTPATIENT
Start: 2025-01-01 | End: 2025-01-01

## 2025-01-01 RX ORDER — LEVOTHYROXINE SODIUM 25 UG/1
12.5 TABLET ORAL AT BEDTIME
Refills: 0 | Status: DISCONTINUED | OUTPATIENT
Start: 2025-01-01 | End: 2025-01-01

## 2025-01-01 RX ORDER — ALBUMIN HUMAN 50 G/1000ML
250 SOLUTION INTRAVENOUS ONCE
Refills: 0 | Status: COMPLETED | OUTPATIENT
Start: 2025-01-01 | End: 2025-01-01

## 2025-01-01 RX ORDER — POLYETHYLENE GLYCOL 3350 17 G/17G
17 POWDER, FOR SOLUTION ORAL DAILY
Refills: 0 | Status: DISCONTINUED | OUTPATIENT
Start: 2025-01-01 | End: 2025-01-01

## 2025-01-01 RX ORDER — INSULIN LISPRO 100/ML
VIAL (ML) SUBCUTANEOUS EVERY 6 HOURS
Refills: 0 | Status: DISCONTINUED | OUTPATIENT
Start: 2025-01-01 | End: 2025-01-01

## 2025-01-01 RX ORDER — ATROPINE SULFATE 0.1 MG/ML
1 INJECTION PARENTERAL ONCE
Refills: 0 | Status: COMPLETED | OUTPATIENT
Start: 2025-01-01 | End: 2025-01-01

## 2025-01-01 RX ORDER — DM/PSEUDOEPHED/ACETAMINOPHEN 10-30-250
12.5 CAPSULE ORAL ONCE
Refills: 0 | Status: DISCONTINUED | OUTPATIENT
Start: 2025-01-01 | End: 2025-01-01

## 2025-01-01 RX ORDER — MUPIROCIN 2 G/100G
1 CREAM TOPICAL
Refills: 0 | Status: COMPLETED | OUTPATIENT
Start: 2025-01-01 | End: 2025-01-01

## 2025-01-01 RX ORDER — DM/PSEUDOEPHED/ACETAMINOPHEN 10-30-250
15 CAPSULE ORAL ONCE
Refills: 0 | Status: DISCONTINUED | OUTPATIENT
Start: 2025-01-01 | End: 2025-01-01

## 2025-01-01 RX ORDER — PIPERACILLIN SODIUM AND TAZOBACTAM SODIUM 2; 250 G/50ML; MG/50ML
3.38 INJECTION, POWDER, FOR SOLUTION INTRAVENOUS EVERY 8 HOURS
Refills: 0 | Status: DISCONTINUED | OUTPATIENT
Start: 2025-01-01 | End: 2025-01-01

## 2025-01-01 RX ORDER — LEVETIRACETAM 750 MG/1
1500 TABLET, FILM COATED ORAL EVERY 12 HOURS
Refills: 0 | Status: DISCONTINUED | OUTPATIENT
Start: 2025-01-01 | End: 2025-01-01

## 2025-01-01 RX ORDER — AMPICILLIN SODIUM AND SULBACTAM SODIUM 2; 1 G/1; G/1
INJECTION, POWDER, FOR SOLUTION INTRAMUSCULAR; INTRAVENOUS
Refills: 0 | Status: DISCONTINUED | OUTPATIENT
Start: 2025-01-01 | End: 2025-01-01

## 2025-01-01 RX ORDER — SODIUM BICARBONATE 42 MG/ML
50 INJECTION, SOLUTION INTRAVENOUS ONCE
Refills: 0 | Status: COMPLETED | OUTPATIENT
Start: 2025-01-01 | End: 2025-01-01

## 2025-01-01 RX ORDER — HYDROMORPHONE HYDROCHLORIDE 4 MG/ML
0.2 INJECTION, SOLUTION INTRAMUSCULAR; INTRAVENOUS; SUBCUTANEOUS
Refills: 0 | Status: DISCONTINUED | OUTPATIENT
Start: 2025-01-01 | End: 2025-01-01

## 2025-01-01 RX ORDER — AMPICILLIN SODIUM AND SULBACTAM SODIUM 2; 1 G/1; G/1
3 INJECTION, POWDER, FOR SOLUTION INTRAMUSCULAR; INTRAVENOUS EVERY 24 HOURS
Refills: 0 | Status: DISCONTINUED | OUTPATIENT
Start: 2025-01-01 | End: 2025-01-01

## 2025-01-01 RX ORDER — BACTERIOSTATIC SODIUM CHLORIDE 0.9 %
1000 VIAL (ML) INJECTION
Refills: 0 | Status: COMPLETED | OUTPATIENT
Start: 2025-01-01 | End: 2025-01-01

## 2025-01-01 RX ORDER — GLUCAGON 3 MG/1
1 POWDER NASAL ONCE
Refills: 0 | Status: DISCONTINUED | OUTPATIENT
Start: 2025-01-01 | End: 2025-01-01

## 2025-01-01 RX ORDER — LEVOTHYROXINE SODIUM 25 UG/1
18 TABLET ORAL AT BEDTIME
Refills: 0 | Status: DISCONTINUED | OUTPATIENT
Start: 2025-01-01 | End: 2025-01-01

## 2025-01-01 RX ORDER — PROPOFOL 10 MG/ML
10 INJECTION, EMULSION INTRAVENOUS
Qty: 500 | Refills: 0 | Status: DISCONTINUED | OUTPATIENT
Start: 2025-01-01 | End: 2025-01-01

## 2025-01-01 RX ADMIN — MIDODRINE HYDROCHLORIDE 10 MILLIGRAM(S): 5 TABLET ORAL at 13:43

## 2025-01-01 RX ADMIN — Medication 1 APPLICATION(S): at 17:11

## 2025-01-01 RX ADMIN — PANTOPRAZOLE 40 MILLIGRAM(S): 20 TABLET, DELAYED RELEASE ORAL at 18:03

## 2025-01-01 RX ADMIN — AZITHROMYCIN DIHYDRATE 255 MILLIGRAM(S): 500 TABLET, FILM COATED ORAL at 09:49

## 2025-01-01 RX ADMIN — LEVETIRACETAM 500 MILLIGRAM(S): 750 TABLET, FILM COATED ORAL at 05:06

## 2025-01-01 RX ADMIN — ANTISEPTIC SURGICAL SCRUB 1 APPLICATION(S): 0.04 SOLUTION TOPICAL at 05:29

## 2025-01-01 RX ADMIN — PROPOFOL 2.88 MICROGRAM(S)/KG/MIN: 10 INJECTION, EMULSION INTRAVENOUS at 07:29

## 2025-01-01 RX ADMIN — BUMETANIDE 20 MG/HR: 2 TABLET ORAL at 02:42

## 2025-01-01 RX ADMIN — SODIUM CHLORIDE 1000 MILLILITER(S): 9 INJECTION, SOLUTION INTRAVENOUS at 09:49

## 2025-01-01 RX ADMIN — LEVETIRACETAM 250 MILLIGRAM(S): 750 TABLET, FILM COATED ORAL at 18:37

## 2025-01-01 RX ADMIN — POLYETHYLENE GLYCOL 3350 17 GRAM(S): 17 POWDER, FOR SOLUTION ORAL at 11:28

## 2025-01-01 RX ADMIN — NOREPINEPHRINE BITARTRATE 65.6 MICROGRAM(S)/KG/MIN: 1 INJECTION, SOLUTION, CONCENTRATE INTRAVENOUS at 19:14

## 2025-01-01 RX ADMIN — PROPOFOL 2.88 MICROGRAM(S)/KG/MIN: 10 INJECTION, EMULSION INTRAVENOUS at 17:31

## 2025-01-01 RX ADMIN — MIDODRINE HYDROCHLORIDE 10 MILLIGRAM(S): 5 TABLET ORAL at 13:23

## 2025-01-01 RX ADMIN — ANTISEPTIC SURGICAL SCRUB 15 MILLILITER(S): 0.04 SOLUTION TOPICAL at 05:15

## 2025-01-01 RX ADMIN — LEVETIRACETAM 500 MILLIGRAM(S): 750 TABLET, FILM COATED ORAL at 05:21

## 2025-01-01 RX ADMIN — MUPIROCIN 1 APPLICATION(S): 2 CREAM TOPICAL at 05:32

## 2025-01-01 RX ADMIN — MIDODRINE HYDROCHLORIDE 10 MILLIGRAM(S): 5 TABLET ORAL at 22:39

## 2025-01-01 RX ADMIN — PROPOFOL 2.88 MICROGRAM(S)/KG/MIN: 10 INJECTION, EMULSION INTRAVENOUS at 05:28

## 2025-01-01 RX ADMIN — LEVOTHYROXINE SODIUM 25 MICROGRAM(S): 25 TABLET ORAL at 06:48

## 2025-01-01 RX ADMIN — TRANEXAMIC ACID 500 MILLIGRAM(S): 100 INJECTION, SOLUTION INTRAVENOUS at 07:21

## 2025-01-01 RX ADMIN — Medication 1 APPLICATION(S): at 05:14

## 2025-01-01 RX ADMIN — MIDODRINE HYDROCHLORIDE 10 MILLIGRAM(S): 5 TABLET ORAL at 21:37

## 2025-01-01 RX ADMIN — MUPIROCIN 1 APPLICATION(S): 2 CREAM TOPICAL at 18:02

## 2025-01-01 RX ADMIN — PIPERACILLIN SODIUM AND TAZOBACTAM SODIUM 200 GRAM(S): 2; 250 INJECTION, POWDER, FOR SOLUTION INTRAVENOUS at 18:44

## 2025-01-01 RX ADMIN — Medication 1: at 17:39

## 2025-01-01 RX ADMIN — ANTISEPTIC SURGICAL SCRUB 1 APPLICATION(S): 0.04 SOLUTION TOPICAL at 05:31

## 2025-01-01 RX ADMIN — Medication 5000 UNIT(S): at 06:53

## 2025-01-01 RX ADMIN — Medication 1: at 23:24

## 2025-01-01 RX ADMIN — SODIUM CHLORIDE 500 MILLILITER(S): 9 INJECTION, SOLUTION INTRAVENOUS at 11:46

## 2025-01-01 RX ADMIN — PANTOPRAZOLE 40 MILLIGRAM(S): 20 TABLET, DELAYED RELEASE ORAL at 12:25

## 2025-01-01 RX ADMIN — Medication 2 TABLET(S): at 22:47

## 2025-01-01 RX ADMIN — LEVETIRACETAM 500 MILLIGRAM(S): 750 TABLET, FILM COATED ORAL at 05:10

## 2025-01-01 RX ADMIN — MUPIROCIN 1 APPLICATION(S): 2 CREAM TOPICAL at 05:22

## 2025-01-01 RX ADMIN — MIDODRINE HYDROCHLORIDE 10 MILLIGRAM(S): 5 TABLET ORAL at 16:30

## 2025-01-01 RX ADMIN — Medication 1: at 11:38

## 2025-01-01 RX ADMIN — NOREPINEPHRINE BITARTRATE 65.6 MICROGRAM(S)/KG/MIN: 1 INJECTION, SOLUTION, CONCENTRATE INTRAVENOUS at 19:30

## 2025-01-01 RX ADMIN — ALBUMIN HUMAN 250 MILLILITER(S): 50 SOLUTION INTRAVENOUS at 00:23

## 2025-01-01 RX ADMIN — Medication 5000 UNIT(S): at 17:44

## 2025-01-01 RX ADMIN — PIPERACILLIN SODIUM AND TAZOBACTAM SODIUM 25 GRAM(S): 2; 250 INJECTION, POWDER, FOR SOLUTION INTRAVENOUS at 23:29

## 2025-01-01 RX ADMIN — NOREPINEPHRINE BITARTRATE 65.6 MICROGRAM(S)/KG/MIN: 1 INJECTION, SOLUTION, CONCENTRATE INTRAVENOUS at 05:10

## 2025-01-01 RX ADMIN — Medication 5000 UNIT(S): at 18:23

## 2025-01-01 RX ADMIN — LATANOPROST 1 DROP(S): 50 SOLUTION OPHTHALMIC at 21:37

## 2025-01-01 RX ADMIN — ANTISEPTIC SURGICAL SCRUB 15 MILLILITER(S): 0.04 SOLUTION TOPICAL at 17:04

## 2025-01-01 RX ADMIN — ANTISEPTIC SURGICAL SCRUB 15 MILLILITER(S): 0.04 SOLUTION TOPICAL at 17:15

## 2025-01-01 RX ADMIN — Medication 5000 UNIT(S): at 05:15

## 2025-01-01 RX ADMIN — LATANOPROST 1 DROP(S): 50 SOLUTION OPHTHALMIC at 21:42

## 2025-01-01 RX ADMIN — MUPIROCIN 1 APPLICATION(S): 2 CREAM TOPICAL at 20:47

## 2025-01-01 RX ADMIN — MUPIROCIN 1 APPLICATION(S): 2 CREAM TOPICAL at 05:07

## 2025-01-01 RX ADMIN — ANTISEPTIC SURGICAL SCRUB 15 MILLILITER(S): 0.04 SOLUTION TOPICAL at 18:38

## 2025-01-01 RX ADMIN — Medication 5000 UNIT(S): at 18:44

## 2025-01-01 RX ADMIN — Medication 100 GRAM(S): at 03:21

## 2025-01-01 RX ADMIN — CEFTRIAXONE 100 MILLIGRAM(S): 250 INJECTION, POWDER, FOR SOLUTION INTRAMUSCULAR; INTRAVENOUS at 11:46

## 2025-01-01 RX ADMIN — TRANEXAMIC ACID 500 MILLIGRAM(S): 100 INJECTION, SOLUTION INTRAVENOUS at 07:44

## 2025-01-01 RX ADMIN — Medication 500 MILLILITER(S): at 12:56

## 2025-01-01 RX ADMIN — LEVETIRACETAM 400 MILLIGRAM(S): 750 TABLET, FILM COATED ORAL at 18:06

## 2025-01-01 RX ADMIN — LEVETIRACETAM 500 MILLIGRAM(S): 750 TABLET, FILM COATED ORAL at 05:08

## 2025-01-01 RX ADMIN — Medication 2 TABLET(S): at 22:04

## 2025-01-01 RX ADMIN — ANTISEPTIC SURGICAL SCRUB 1 APPLICATION(S): 0.04 SOLUTION TOPICAL at 06:28

## 2025-01-01 RX ADMIN — Medication 5000 UNIT(S): at 06:36

## 2025-01-01 RX ADMIN — LEVOTHYROXINE SODIUM 18 MICROGRAM(S): 25 TABLET ORAL at 22:04

## 2025-01-01 RX ADMIN — ANTISEPTIC SURGICAL SCRUB 15 MILLILITER(S): 0.04 SOLUTION TOPICAL at 17:10

## 2025-01-01 RX ADMIN — MIDODRINE HYDROCHLORIDE 10 MILLIGRAM(S): 5 TABLET ORAL at 05:15

## 2025-01-01 RX ADMIN — SODIUM BICARBONATE 150 MEQ/KG/HR: 42 INJECTION, SOLUTION INTRAVENOUS at 07:37

## 2025-01-01 RX ADMIN — PANTOPRAZOLE 40 MILLIGRAM(S): 20 TABLET, DELAYED RELEASE ORAL at 05:10

## 2025-01-01 RX ADMIN — Medication 5000 UNIT(S): at 18:38

## 2025-01-01 RX ADMIN — FENTANYL CITRATE 100 MICROGRAM(S): 50 INJECTION INTRAMUSCULAR; INTRAVENOUS at 02:25

## 2025-01-01 RX ADMIN — ANTISEPTIC SURGICAL SCRUB 15 MILLILITER(S): 0.04 SOLUTION TOPICAL at 05:10

## 2025-01-01 RX ADMIN — NOREPINEPHRINE BITARTRATE 65.6 MICROGRAM(S)/KG/MIN: 1 INJECTION, SOLUTION, CONCENTRATE INTRAVENOUS at 18:17

## 2025-01-01 RX ADMIN — SODIUM CHLORIDE 1000 MILLILITER(S): 9 INJECTION, SOLUTION INTRAVENOUS at 01:21

## 2025-01-01 RX ADMIN — SODIUM CHLORIDE 75 MILLILITER(S): 9 INJECTION, SOLUTION INTRAVENOUS at 22:41

## 2025-01-01 RX ADMIN — MIDODRINE HYDROCHLORIDE 10 MILLIGRAM(S): 5 TABLET ORAL at 14:02

## 2025-01-01 RX ADMIN — Medication 1 APPLICATION(S): at 17:07

## 2025-01-01 RX ADMIN — MIDODRINE HYDROCHLORIDE 10 MILLIGRAM(S): 5 TABLET ORAL at 14:00

## 2025-01-01 RX ADMIN — TRANEXAMIC ACID 500 MILLIGRAM(S): 100 INJECTION, SOLUTION INTRAVENOUS at 19:32

## 2025-01-01 RX ADMIN — NOREPINEPHRINE BITARTRATE 65.6 MICROGRAM(S)/KG/MIN: 1 INJECTION, SOLUTION, CONCENTRATE INTRAVENOUS at 07:47

## 2025-01-01 RX ADMIN — PROPOFOL 2.88 MICROGRAM(S)/KG/MIN: 10 INJECTION, EMULSION INTRAVENOUS at 05:10

## 2025-01-01 RX ADMIN — PROPOFOL 2.88 MICROGRAM(S)/KG/MIN: 10 INJECTION, EMULSION INTRAVENOUS at 07:47

## 2025-01-01 RX ADMIN — Medication 1 APPLICATION(S): at 17:04

## 2025-01-01 RX ADMIN — PANTOPRAZOLE 40 MILLIGRAM(S): 20 TABLET, DELAYED RELEASE ORAL at 05:08

## 2025-01-01 RX ADMIN — IPRATROPIUM BROMIDE AND ALBUTEROL SULFATE 3 MILLILITER(S): .5; 2.5 SOLUTION RESPIRATORY (INHALATION) at 09:17

## 2025-01-01 RX ADMIN — PIPERACILLIN SODIUM AND TAZOBACTAM SODIUM 25 GRAM(S): 2; 250 INJECTION, POWDER, FOR SOLUTION INTRAVENOUS at 11:37

## 2025-01-01 RX ADMIN — ANTISEPTIC SURGICAL SCRUB 15 MILLILITER(S): 0.04 SOLUTION TOPICAL at 05:31

## 2025-01-01 RX ADMIN — LEVETIRACETAM 400 MILLIGRAM(S): 750 TABLET, FILM COATED ORAL at 06:46

## 2025-01-01 RX ADMIN — LEVETIRACETAM 400 MILLIGRAM(S): 750 TABLET, FILM COATED ORAL at 18:44

## 2025-01-01 RX ADMIN — MIDODRINE HYDROCHLORIDE 10 MILLIGRAM(S): 5 TABLET ORAL at 05:11

## 2025-01-01 RX ADMIN — LEVOTHYROXINE SODIUM 25 MICROGRAM(S): 25 TABLET ORAL at 05:13

## 2025-01-01 RX ADMIN — SODIUM CHLORIDE 75 MILLILITER(S): 9 INJECTION, SOLUTION INTRAVENOUS at 14:05

## 2025-01-01 RX ADMIN — LEVETIRACETAM 250 MILLIGRAM(S): 750 TABLET, FILM COATED ORAL at 05:13

## 2025-01-01 RX ADMIN — Medication 5000 UNIT(S): at 17:06

## 2025-01-01 RX ADMIN — Medication 2 TABLET(S): at 21:43

## 2025-01-01 RX ADMIN — PANTOPRAZOLE 40 MILLIGRAM(S): 20 TABLET, DELAYED RELEASE ORAL at 05:06

## 2025-01-01 RX ADMIN — PROPOFOL 4.2 MICROGRAM(S)/KG/MIN: 10 INJECTION, EMULSION INTRAVENOUS at 18:40

## 2025-01-01 RX ADMIN — POLYETHYLENE GLYCOL 3350 17 GRAM(S): 17 POWDER, FOR SOLUTION ORAL at 17:04

## 2025-01-01 RX ADMIN — ANTISEPTIC SURGICAL SCRUB 15 MILLILITER(S): 0.04 SOLUTION TOPICAL at 05:08

## 2025-01-01 RX ADMIN — MUPIROCIN 1 APPLICATION(S): 2 CREAM TOPICAL at 17:04

## 2025-01-01 RX ADMIN — NOREPINEPHRINE BITARTRATE 65.6 MICROGRAM(S)/KG/MIN: 1 INJECTION, SOLUTION, CONCENTRATE INTRAVENOUS at 23:41

## 2025-01-01 RX ADMIN — ANTISEPTIC SURGICAL SCRUB 15 MILLILITER(S): 0.04 SOLUTION TOPICAL at 05:14

## 2025-01-01 RX ADMIN — LEVETIRACETAM 1500 MILLIGRAM(S): 750 TABLET, FILM COATED ORAL at 20:15

## 2025-01-01 RX ADMIN — AMPICILLIN SODIUM AND SULBACTAM SODIUM 200 GRAM(S): 2; 1 INJECTION, POWDER, FOR SOLUTION INTRAMUSCULAR; INTRAVENOUS at 10:50

## 2025-01-01 RX ADMIN — POTASSIUM CHLORIDE 20 MILLIEQUIVALENT(S): 750 TABLET, EXTENDED RELEASE ORAL at 02:40

## 2025-01-01 RX ADMIN — LEVETIRACETAM 500 MILLIGRAM(S): 750 TABLET, FILM COATED ORAL at 05:27

## 2025-01-01 RX ADMIN — POLYETHYLENE GLYCOL 3350 17 GRAM(S): 17 POWDER, FOR SOLUTION ORAL at 11:14

## 2025-01-01 RX ADMIN — Medication 1: at 11:43

## 2025-01-01 RX ADMIN — LATANOPROST 1 DROP(S): 50 SOLUTION OPHTHALMIC at 21:31

## 2025-01-01 RX ADMIN — Medication 1 APPLICATION(S): at 17:40

## 2025-01-01 RX ADMIN — Medication 5000 UNIT(S): at 05:27

## 2025-01-01 RX ADMIN — ANTISEPTIC SURGICAL SCRUB 1 APPLICATION(S): 0.04 SOLUTION TOPICAL at 05:11

## 2025-01-01 RX ADMIN — Medication 1 APPLICATION(S): at 05:07

## 2025-01-01 RX ADMIN — Medication 1 APPLICATION(S): at 17:37

## 2025-01-01 RX ADMIN — LEVOTHYROXINE SODIUM 25 MICROGRAM(S): 25 TABLET ORAL at 05:16

## 2025-01-01 RX ADMIN — ANTISEPTIC SURGICAL SCRUB 15 MILLILITER(S): 0.04 SOLUTION TOPICAL at 05:21

## 2025-01-01 RX ADMIN — Medication 1 APPLICATION(S): at 06:51

## 2025-01-01 RX ADMIN — SODIUM CHLORIDE 1000 MILLILITER(S): 9 INJECTION, SOLUTION INTRAVENOUS at 23:29

## 2025-01-01 RX ADMIN — LATANOPROST 1 DROP(S): 50 SOLUTION OPHTHALMIC at 22:39

## 2025-01-01 RX ADMIN — TRANEXAMIC ACID 500 MILLIGRAM(S): 100 INJECTION, SOLUTION INTRAVENOUS at 16:27

## 2025-01-01 RX ADMIN — HYDROMORPHONE HYDROCHLORIDE 0.2 MILLIGRAM(S): 4 INJECTION, SOLUTION INTRAMUSCULAR; INTRAVENOUS; SUBCUTANEOUS at 16:05

## 2025-01-01 RX ADMIN — Medication 1: at 18:09

## 2025-01-01 RX ADMIN — PROPOFOL 2.88 MICROGRAM(S)/KG/MIN: 10 INJECTION, EMULSION INTRAVENOUS at 19:39

## 2025-01-01 RX ADMIN — LATANOPROST 1 DROP(S): 50 SOLUTION OPHTHALMIC at 22:54

## 2025-01-01 RX ADMIN — PANTOPRAZOLE 40 MILLIGRAM(S): 20 TABLET, DELAYED RELEASE ORAL at 17:08

## 2025-01-01 RX ADMIN — PANTOPRAZOLE 40 MILLIGRAM(S): 20 TABLET, DELAYED RELEASE ORAL at 12:17

## 2025-01-01 RX ADMIN — ALBUMIN HUMAN 250 MILLILITER(S): 50 SOLUTION INTRAVENOUS at 07:23

## 2025-01-01 RX ADMIN — ANTISEPTIC SURGICAL SCRUB 15 MILLILITER(S): 0.04 SOLUTION TOPICAL at 06:53

## 2025-01-01 RX ADMIN — PIPERACILLIN SODIUM AND TAZOBACTAM SODIUM 25 GRAM(S): 2; 250 INJECTION, POWDER, FOR SOLUTION INTRAVENOUS at 00:32

## 2025-01-01 RX ADMIN — Medication 1 APPLICATION(S): at 18:24

## 2025-01-01 RX ADMIN — IPRATROPIUM BROMIDE AND ALBUTEROL SULFATE 3 MILLILITER(S): .5; 2.5 SOLUTION RESPIRATORY (INHALATION) at 11:35

## 2025-01-01 RX ADMIN — Medication 1 APPLICATION(S): at 05:32

## 2025-01-01 RX ADMIN — SODIUM CHLORIDE 2000 MILLILITER(S): 9 INJECTION, SOLUTION INTRAVENOUS at 20:33

## 2025-01-01 RX ADMIN — LEVETIRACETAM 1500 MILLIGRAM(S): 750 TABLET, FILM COATED ORAL at 05:09

## 2025-01-01 RX ADMIN — Medication 1 APPLICATION(S): at 18:02

## 2025-01-01 RX ADMIN — LEVOTHYROXINE SODIUM 25 MICROGRAM(S): 25 TABLET ORAL at 06:10

## 2025-01-01 RX ADMIN — ANTISEPTIC SURGICAL SCRUB 1 APPLICATION(S): 0.04 SOLUTION TOPICAL at 05:13

## 2025-01-01 RX ADMIN — AMPICILLIN SODIUM AND SULBACTAM SODIUM 200 GRAM(S): 2; 1 INJECTION, POWDER, FOR SOLUTION INTRAMUSCULAR; INTRAVENOUS at 11:02

## 2025-01-01 RX ADMIN — AMPICILLIN SODIUM AND SULBACTAM SODIUM 200 GRAM(S): 2; 1 INJECTION, POWDER, FOR SOLUTION INTRAMUSCULAR; INTRAVENOUS at 10:39

## 2025-01-01 RX ADMIN — TRANEXAMIC ACID 500 MILLIGRAM(S): 100 INJECTION, SOLUTION INTRAVENOUS at 07:25

## 2025-01-01 RX ADMIN — PROPOFOL 2.88 MICROGRAM(S)/KG/MIN: 10 INJECTION, EMULSION INTRAVENOUS at 21:41

## 2025-01-01 RX ADMIN — LEVETIRACETAM 500 MILLIGRAM(S): 750 TABLET, FILM COATED ORAL at 17:06

## 2025-01-01 RX ADMIN — Medication 5000 UNIT(S): at 17:17

## 2025-01-01 RX ADMIN — LEVETIRACETAM 400 MILLIGRAM(S): 750 TABLET, FILM COATED ORAL at 17:20

## 2025-01-01 RX ADMIN — SODIUM BICARBONATE 50 MILLIEQUIVALENT(S): 42 INJECTION, SOLUTION INTRAVENOUS at 18:43

## 2025-01-01 RX ADMIN — Medication 2 TABLET(S): at 21:08

## 2025-01-01 RX ADMIN — ANTISEPTIC SURGICAL SCRUB 1 APPLICATION(S): 0.04 SOLUTION TOPICAL at 06:22

## 2025-01-01 RX ADMIN — Medication 1 APPLICATION(S): at 05:09

## 2025-01-01 RX ADMIN — MUPIROCIN 1 APPLICATION(S): 2 CREAM TOPICAL at 05:12

## 2025-01-01 RX ADMIN — BUMETANIDE 10 MG/HR: 2 TABLET ORAL at 18:08

## 2025-01-01 RX ADMIN — Medication 2 TABLET(S): at 21:42

## 2025-01-01 RX ADMIN — Medication 1: at 18:02

## 2025-01-01 RX ADMIN — MUPIROCIN 1 APPLICATION(S): 2 CREAM TOPICAL at 17:39

## 2025-01-01 RX ADMIN — BUMETANIDE 10 MG/HR: 2 TABLET ORAL at 19:30

## 2025-01-01 RX ADMIN — SODIUM BICARBONATE 150 MEQ/KG/HR: 42 INJECTION, SOLUTION INTRAVENOUS at 00:27

## 2025-01-01 RX ADMIN — Medication 1 APPLICATION(S): at 05:26

## 2025-01-01 RX ADMIN — PROPOFOL 2.88 MICROGRAM(S)/KG/MIN: 10 INJECTION, EMULSION INTRAVENOUS at 19:14

## 2025-01-01 RX ADMIN — ANTISEPTIC SURGICAL SCRUB 15 MILLILITER(S): 0.04 SOLUTION TOPICAL at 05:07

## 2025-01-01 RX ADMIN — Medication 1: at 05:07

## 2025-01-01 RX ADMIN — LEVETIRACETAM 500 MILLIGRAM(S): 750 TABLET, FILM COATED ORAL at 17:04

## 2025-01-01 RX ADMIN — NOREPINEPHRINE BITARTRATE 65.6 MICROGRAM(S)/KG/MIN: 1 INJECTION, SOLUTION, CONCENTRATE INTRAVENOUS at 08:25

## 2025-01-01 RX ADMIN — PANTOPRAZOLE 40 MILLIGRAM(S): 20 TABLET, DELAYED RELEASE ORAL at 17:36

## 2025-01-01 RX ADMIN — TRANEXAMIC ACID 500 MILLIGRAM(S): 100 INJECTION, SOLUTION INTRAVENOUS at 15:46

## 2025-01-01 RX ADMIN — AMPICILLIN SODIUM AND SULBACTAM SODIUM 200 GRAM(S): 2; 1 INJECTION, POWDER, FOR SOLUTION INTRAMUSCULAR; INTRAVENOUS at 10:19

## 2025-01-01 RX ADMIN — PIPERACILLIN SODIUM AND TAZOBACTAM SODIUM 25 GRAM(S): 2; 250 INJECTION, POWDER, FOR SOLUTION INTRAVENOUS at 11:43

## 2025-01-01 RX ADMIN — LEVETIRACETAM 500 MILLIGRAM(S): 750 TABLET, FILM COATED ORAL at 17:08

## 2025-01-01 RX ADMIN — LEVETIRACETAM 400 MILLIGRAM(S): 750 TABLET, FILM COATED ORAL at 05:17

## 2025-01-01 RX ADMIN — LEVOTHYROXINE SODIUM 18 MICROGRAM(S): 25 TABLET ORAL at 22:43

## 2025-01-01 RX ADMIN — MIDODRINE HYDROCHLORIDE 10 MILLIGRAM(S): 5 TABLET ORAL at 05:46

## 2025-01-01 RX ADMIN — LEVETIRACETAM 250 MILLIGRAM(S): 750 TABLET, FILM COATED ORAL at 06:10

## 2025-01-01 RX ADMIN — PANTOPRAZOLE 40 MILLIGRAM(S): 20 TABLET, DELAYED RELEASE ORAL at 05:21

## 2025-01-01 RX ADMIN — LATANOPROST 1 DROP(S): 50 SOLUTION OPHTHALMIC at 21:43

## 2025-01-01 RX ADMIN — BUMETANIDE 10 MG/HR: 2 TABLET ORAL at 07:49

## 2025-01-01 RX ADMIN — ANTISEPTIC SURGICAL SCRUB 15 MILLILITER(S): 0.04 SOLUTION TOPICAL at 05:27

## 2025-01-01 RX ADMIN — Medication 2 MILLIGRAM(S): at 20:47

## 2025-01-01 RX ADMIN — Medication 1 APPLICATION(S): at 17:17

## 2025-01-01 RX ADMIN — BUMETANIDE 20 MG/HR: 2 TABLET ORAL at 10:38

## 2025-01-01 RX ADMIN — LATANOPROST 1 DROP(S): 50 SOLUTION OPHTHALMIC at 22:46

## 2025-01-01 RX ADMIN — PANTOPRAZOLE 40 MILLIGRAM(S): 20 TABLET, DELAYED RELEASE ORAL at 11:42

## 2025-01-01 RX ADMIN — LEVOTHYROXINE SODIUM 25 MICROGRAM(S): 25 TABLET ORAL at 05:27

## 2025-01-01 RX ADMIN — ANTISEPTIC SURGICAL SCRUB 15 MILLILITER(S): 0.04 SOLUTION TOPICAL at 06:14

## 2025-01-01 RX ADMIN — PANTOPRAZOLE 40 MILLIGRAM(S): 20 TABLET, DELAYED RELEASE ORAL at 05:07

## 2025-01-01 RX ADMIN — Medication 1 APPLICATION(S): at 18:38

## 2025-01-01 RX ADMIN — CEFTRIAXONE 100 MILLIGRAM(S): 250 INJECTION, POWDER, FOR SOLUTION INTRAMUSCULAR; INTRAVENOUS at 11:43

## 2025-01-01 RX ADMIN — Medication 200 GRAM(S): at 03:00

## 2025-01-01 RX ADMIN — ANTISEPTIC SURGICAL SCRUB 15 MILLILITER(S): 0.04 SOLUTION TOPICAL at 18:44

## 2025-01-01 RX ADMIN — PROPOFOL 2.88 MICROGRAM(S)/KG/MIN: 10 INJECTION, EMULSION INTRAVENOUS at 08:24

## 2025-01-01 RX ADMIN — Medication 1: at 05:30

## 2025-01-01 RX ADMIN — LATANOPROST 1 DROP(S): 50 SOLUTION OPHTHALMIC at 21:48

## 2025-01-01 RX ADMIN — ANTISEPTIC SURGICAL SCRUB 15 MILLILITER(S): 0.04 SOLUTION TOPICAL at 18:23

## 2025-01-01 RX ADMIN — DEXMEDETOMIDINE HYDROCHLORIDE 0.6 MICROGRAM(S)/KG/HR: 4 INJECTION, SOLUTION INTRAVENOUS at 09:49

## 2025-01-01 RX ADMIN — Medication 1: at 11:59

## 2025-01-01 RX ADMIN — ANTISEPTIC SURGICAL SCRUB 15 MILLILITER(S): 0.04 SOLUTION TOPICAL at 17:40

## 2025-01-01 RX ADMIN — ANTISEPTIC SURGICAL SCRUB 1 APPLICATION(S): 0.04 SOLUTION TOPICAL at 06:54

## 2025-01-01 RX ADMIN — Medication 200 GRAM(S): at 19:55

## 2025-01-01 RX ADMIN — LEVOTHYROXINE SODIUM 37 MICROGRAM(S): 25 TABLET ORAL at 23:28

## 2025-01-01 RX ADMIN — TRANEXAMIC ACID 500 MILLIGRAM(S): 100 INJECTION, SOLUTION INTRAVENOUS at 15:13

## 2025-01-01 RX ADMIN — Medication 1 APPLICATION(S): at 05:13

## 2025-01-01 RX ADMIN — MUPIROCIN 1 APPLICATION(S): 2 CREAM TOPICAL at 05:09

## 2025-01-01 RX ADMIN — BUMETANIDE 20 MG/HR: 2 TABLET ORAL at 20:14

## 2025-01-01 RX ADMIN — Medication 1 APPLICATION(S): at 06:27

## 2025-01-01 RX ADMIN — PANTOPRAZOLE 40 MILLIGRAM(S): 20 TABLET, DELAYED RELEASE ORAL at 12:59

## 2025-01-01 RX ADMIN — POLYETHYLENE GLYCOL 3350 17 GRAM(S): 17 POWDER, FOR SOLUTION ORAL at 11:43

## 2025-01-01 RX ADMIN — AMPICILLIN SODIUM AND SULBACTAM SODIUM 200 GRAM(S): 2; 1 INJECTION, POWDER, FOR SOLUTION INTRAMUSCULAR; INTRAVENOUS at 11:59

## 2025-01-01 RX ADMIN — PROPOFOL 2.88 MICROGRAM(S)/KG/MIN: 10 INJECTION, EMULSION INTRAVENOUS at 04:17

## 2025-01-01 RX ADMIN — ANTISEPTIC SURGICAL SCRUB 1 APPLICATION(S): 0.04 SOLUTION TOPICAL at 05:08

## 2025-01-01 RX ADMIN — MIDODRINE HYDROCHLORIDE 10 MILLIGRAM(S): 5 TABLET ORAL at 22:47

## 2025-01-01 RX ADMIN — ANTISEPTIC SURGICAL SCRUB 15 MILLILITER(S): 0.04 SOLUTION TOPICAL at 17:08

## 2025-01-01 RX ADMIN — BUMETANIDE 20 MG/HR: 2 TABLET ORAL at 08:07

## 2025-01-01 RX ADMIN — IPRATROPIUM BROMIDE AND ALBUTEROL SULFATE 3 MILLILITER(S): .5; 2.5 SOLUTION RESPIRATORY (INHALATION) at 03:50

## 2025-01-01 RX ADMIN — LEVETIRACETAM 250 MILLIGRAM(S): 750 TABLET, FILM COATED ORAL at 17:36

## 2025-01-01 RX ADMIN — Medication 1 APPLICATION(S): at 05:16

## 2025-01-01 RX ADMIN — Medication 1 APPLICATION(S): at 05:22

## 2025-01-01 RX ADMIN — CEFTRIAXONE 100 MILLIGRAM(S): 250 INJECTION, POWDER, FOR SOLUTION INTRAMUSCULAR; INTRAVENOUS at 11:28

## 2025-01-01 RX ADMIN — PANTOPRAZOLE 40 MILLIGRAM(S): 20 TABLET, DELAYED RELEASE ORAL at 06:17

## 2025-01-01 RX ADMIN — ANTISEPTIC SURGICAL SCRUB 15 MILLILITER(S): 0.04 SOLUTION TOPICAL at 17:37

## 2025-01-01 RX ADMIN — MIDODRINE HYDROCHLORIDE 10 MILLIGRAM(S): 5 TABLET ORAL at 14:16

## 2025-01-01 RX ADMIN — Medication 5000 UNIT(S): at 05:11

## 2025-01-01 RX ADMIN — Medication 1: at 01:00

## 2025-01-01 RX ADMIN — ANTISEPTIC SURGICAL SCRUB 15 MILLILITER(S): 0.04 SOLUTION TOPICAL at 18:03

## 2025-01-01 RX ADMIN — Medication 5000 UNIT(S): at 05:08

## 2025-01-01 RX ADMIN — SODIUM CHLORIDE 75 MILLILITER(S): 9 INJECTION, SOLUTION INTRAVENOUS at 11:16

## 2025-01-01 RX ADMIN — AMPICILLIN SODIUM AND SULBACTAM SODIUM 200 GRAM(S): 2; 1 INJECTION, POWDER, FOR SOLUTION INTRAMUSCULAR; INTRAVENOUS at 10:11

## 2025-01-01 RX ADMIN — PANTOPRAZOLE 40 MILLIGRAM(S): 20 TABLET, DELAYED RELEASE ORAL at 17:41

## 2025-01-01 RX ADMIN — PANTOPRAZOLE 40 MILLIGRAM(S): 20 TABLET, DELAYED RELEASE ORAL at 05:29

## 2025-01-01 RX ADMIN — VANCOMYCIN HYDROCHLORIDE 250 MILLIGRAM(S): KIT at 23:28

## 2025-01-01 RX ADMIN — MIDODRINE HYDROCHLORIDE 10 MILLIGRAM(S): 5 TABLET ORAL at 14:05

## 2025-01-01 RX ADMIN — LEVOTHYROXINE SODIUM 25 MICROGRAM(S): 25 TABLET ORAL at 05:07

## 2025-01-01 RX ADMIN — PANTOPRAZOLE 40 MILLIGRAM(S): 20 TABLET, DELAYED RELEASE ORAL at 17:03

## 2025-01-01 RX ADMIN — AMPICILLIN SODIUM AND SULBACTAM SODIUM 3 GRAM(S): 2; 1 INJECTION, POWDER, FOR SOLUTION INTRAMUSCULAR; INTRAVENOUS at 12:04

## 2025-01-01 RX ADMIN — MIDODRINE HYDROCHLORIDE 10 MILLIGRAM(S): 5 TABLET ORAL at 22:52

## 2025-01-01 RX ADMIN — POLYETHYLENE GLYCOL 3350 17 GRAM(S): 17 POWDER, FOR SOLUTION ORAL at 12:00

## 2025-01-01 RX ADMIN — NOREPINEPHRINE BITARTRATE 65.6 MICROGRAM(S)/KG/MIN: 1 INJECTION, SOLUTION, CONCENTRATE INTRAVENOUS at 07:29

## 2025-01-01 RX ADMIN — Medication 1 APPLICATION(S): at 17:09

## 2025-01-01 RX ADMIN — Medication 5000 UNIT(S): at 05:13

## 2025-01-01 RX ADMIN — Medication 1 APPLICATION(S): at 06:13

## 2025-01-01 RX ADMIN — Medication 1 APPLICATION(S): at 17:39

## 2025-01-01 RX ADMIN — LATANOPROST 1 DROP(S): 50 SOLUTION OPHTHALMIC at 21:41

## 2025-01-01 RX ADMIN — PANTOPRAZOLE 40 MILLIGRAM(S): 20 TABLET, DELAYED RELEASE ORAL at 05:27

## 2025-01-01 RX ADMIN — LATANOPROST 1 DROP(S): 50 SOLUTION OPHTHALMIC at 22:04

## 2025-01-01 RX ADMIN — NOREPINEPHRINE BITARTRATE 65.6 MICROGRAM(S)/KG/MIN: 1 INJECTION, SOLUTION, CONCENTRATE INTRAVENOUS at 04:17

## 2025-01-01 RX ADMIN — AMPICILLIN SODIUM AND SULBACTAM SODIUM 200 GRAM(S): 2; 1 INJECTION, POWDER, FOR SOLUTION INTRAMUSCULAR; INTRAVENOUS at 10:05

## 2025-01-01 RX ADMIN — Medication 1 APPLICATION(S): at 05:06

## 2025-01-01 RX ADMIN — PANTOPRAZOLE 40 MILLIGRAM(S): 20 TABLET, DELAYED RELEASE ORAL at 05:13

## 2025-01-01 RX ADMIN — PANTOPRAZOLE 40 MILLIGRAM(S): 20 TABLET, DELAYED RELEASE ORAL at 05:32

## 2025-01-01 RX ADMIN — Medication 2 MILLIGRAM(S): at 01:54

## 2025-01-01 RX ADMIN — TRANEXAMIC ACID 500 MILLIGRAM(S): 100 INJECTION, SOLUTION INTRAVENOUS at 18:59

## 2025-01-01 RX ADMIN — LEVETIRACETAM 500 MILLIGRAM(S): 750 TABLET, FILM COATED ORAL at 18:02

## 2025-01-01 RX ADMIN — LEVETIRACETAM 500 MILLIGRAM(S): 750 TABLET, FILM COATED ORAL at 05:33

## 2025-01-01 RX ADMIN — FENTANYL CITRATE 50 MICROGRAM(S): 50 INJECTION INTRAMUSCULAR; INTRAVENOUS at 19:53

## 2025-01-01 RX ADMIN — MIDODRINE HYDROCHLORIDE 10 MILLIGRAM(S): 5 TABLET ORAL at 05:13

## 2025-01-01 RX ADMIN — PANTOPRAZOLE 40 MILLIGRAM(S): 20 TABLET, DELAYED RELEASE ORAL at 17:11

## 2025-01-01 RX ADMIN — Medication 1 APPLICATION(S): at 17:16

## 2025-01-01 RX ADMIN — Medication 1: at 05:58

## 2025-01-01 RX ADMIN — ANTISEPTIC SURGICAL SCRUB 1 APPLICATION(S): 0.04 SOLUTION TOPICAL at 05:26

## 2025-01-01 RX ADMIN — ANTISEPTIC SURGICAL SCRUB 15 MILLILITER(S): 0.04 SOLUTION TOPICAL at 17:17

## 2025-01-01 RX ADMIN — ANTISEPTIC SURGICAL SCRUB 1 APPLICATION(S): 0.04 SOLUTION TOPICAL at 05:07

## 2025-01-01 RX ADMIN — LEVETIRACETAM 400 MILLIGRAM(S): 750 TABLET, FILM COATED ORAL at 06:28

## 2025-01-01 RX ADMIN — LEVOTHYROXINE SODIUM 18 MICROGRAM(S): 25 TABLET ORAL at 21:09

## 2025-01-01 RX ADMIN — PROPOFOL 2.88 MICROGRAM(S)/KG/MIN: 10 INJECTION, EMULSION INTRAVENOUS at 07:37

## 2025-01-01 RX ADMIN — MIDODRINE HYDROCHLORIDE 10 MILLIGRAM(S): 5 TABLET ORAL at 21:35

## 2025-01-01 RX ADMIN — LEVETIRACETAM 500 MILLIGRAM(S): 750 TABLET, FILM COATED ORAL at 17:39

## 2025-01-01 RX ADMIN — POLYETHYLENE GLYCOL 3350 17 GRAM(S): 17 POWDER, FOR SOLUTION ORAL at 11:46

## 2025-01-01 RX ADMIN — MUPIROCIN 1 APPLICATION(S): 2 CREAM TOPICAL at 17:09

## 2025-01-01 RX ADMIN — ANTISEPTIC SURGICAL SCRUB 1 APPLICATION(S): 0.04 SOLUTION TOPICAL at 05:06

## 2025-01-01 RX ADMIN — MIDODRINE HYDROCHLORIDE 10 MILLIGRAM(S): 5 TABLET ORAL at 06:48

## 2025-01-01 RX ADMIN — Medication 5000 UNIT(S): at 17:16

## 2025-01-01 RX ADMIN — LATANOPROST 1 DROP(S): 50 SOLUTION OPHTHALMIC at 21:09

## 2025-01-01 RX ADMIN — LEVOTHYROXINE SODIUM 18 MICROGRAM(S): 25 TABLET ORAL at 21:42

## 2025-01-01 RX ADMIN — AZITHROMYCIN DIHYDRATE 255 MILLIGRAM(S): 500 TABLET, FILM COATED ORAL at 09:04

## 2025-01-01 RX ADMIN — PIPERACILLIN SODIUM AND TAZOBACTAM SODIUM 25 GRAM(S): 2; 250 INJECTION, POWDER, FOR SOLUTION INTRAVENOUS at 00:22

## 2025-01-01 RX ADMIN — PANTOPRAZOLE 40 MILLIGRAM(S): 20 TABLET, DELAYED RELEASE ORAL at 11:49

## 2025-01-01 RX ADMIN — ANTISEPTIC SURGICAL SCRUB 15 MILLILITER(S): 0.04 SOLUTION TOPICAL at 17:07

## 2025-01-01 RX ADMIN — PANTOPRAZOLE 40 MILLIGRAM(S): 20 TABLET, DELAYED RELEASE ORAL at 18:37

## 2025-01-01 RX ADMIN — LATANOPROST 1 DROP(S): 50 SOLUTION OPHTHALMIC at 21:35

## 2025-01-01 RX ADMIN — PROPOFOL 4.2 MICROGRAM(S)/KG/MIN: 10 INJECTION, EMULSION INTRAVENOUS at 23:29

## 2025-01-01 RX ADMIN — Medication 0.4 MILLIGRAM(S): at 16:04

## 2025-01-01 RX ADMIN — Medication 1 APPLICATION(S): at 18:44

## 2025-01-01 RX ADMIN — ATROPINE SULFATE 1 MILLIGRAM(S): 0.1 INJECTION PARENTERAL at 17:45

## 2025-01-01 RX ADMIN — ANTISEPTIC SURGICAL SCRUB 1 APPLICATION(S): 0.04 SOLUTION TOPICAL at 05:15

## 2025-01-01 RX ADMIN — IPRATROPIUM BROMIDE AND ALBUTEROL SULFATE 3 MILLILITER(S): .5; 2.5 SOLUTION RESPIRATORY (INHALATION) at 19:42

## 2025-01-01 RX ADMIN — LEVETIRACETAM 400 MILLIGRAM(S): 750 TABLET, FILM COATED ORAL at 18:24

## 2025-01-01 RX ADMIN — Medication 25 GRAM(S): at 03:21

## 2025-01-01 RX ADMIN — Medication 13.1 MICROGRAM(S)/KG/MIN: at 18:38

## 2025-01-01 RX ADMIN — LEVETIRACETAM 400 MILLIGRAM(S): 750 TABLET, FILM COATED ORAL at 05:11

## 2025-01-01 RX ADMIN — LEVOTHYROXINE SODIUM 18 MICROGRAM(S): 25 TABLET ORAL at 21:41

## 2025-01-01 RX ADMIN — IPRATROPIUM BROMIDE AND ALBUTEROL SULFATE 3 MILLILITER(S): .5; 2.5 SOLUTION RESPIRATORY (INHALATION) at 15:25

## 2025-01-01 RX ADMIN — LEVOTHYROXINE SODIUM 25 MICROGRAM(S): 25 TABLET ORAL at 06:31

## 2025-01-01 RX ADMIN — LEVOTHYROXINE SODIUM 25 MICROGRAM(S): 25 TABLET ORAL at 05:11

## 2025-01-01 RX ADMIN — PANTOPRAZOLE 40 MILLIGRAM(S): 20 TABLET, DELAYED RELEASE ORAL at 17:07

## 2025-01-01 RX ADMIN — MIDODRINE HYDROCHLORIDE 10 MILLIGRAM(S): 5 TABLET ORAL at 21:47

## 2025-01-01 RX ADMIN — Medication 1 APPLICATION(S): at 05:12

## 2025-01-01 RX ADMIN — LEVETIRACETAM 500 MILLIGRAM(S): 750 TABLET, FILM COATED ORAL at 17:41

## 2025-01-01 RX ADMIN — PANTOPRAZOLE 40 MILLIGRAM(S): 20 TABLET, DELAYED RELEASE ORAL at 17:39

## 2025-01-01 RX ADMIN — IPRATROPIUM BROMIDE AND ALBUTEROL SULFATE 3 MILLILITER(S): .5; 2.5 SOLUTION RESPIRATORY (INHALATION) at 23:33

## 2025-01-01 RX ADMIN — NOREPINEPHRINE BITARTRATE 65.6 MICROGRAM(S)/KG/MIN: 1 INJECTION, SOLUTION, CONCENTRATE INTRAVENOUS at 11:45

## 2025-01-01 RX ADMIN — Medication 2 TABLET(S): at 21:37

## 2025-01-01 RX ADMIN — ANTISEPTIC SURGICAL SCRUB 15 MILLILITER(S): 0.04 SOLUTION TOPICAL at 17:39

## 2025-01-01 RX ADMIN — Medication 5000 UNIT(S): at 17:36

## 2025-01-01 RX ADMIN — ANTISEPTIC SURGICAL SCRUB 15 MILLILITER(S): 0.04 SOLUTION TOPICAL at 05:11

## 2025-01-01 RX ADMIN — BUMETANIDE 2 MILLIGRAM(S): 2 TABLET ORAL at 10:12

## 2025-01-01 RX ADMIN — ANTISEPTIC SURGICAL SCRUB 15 MILLILITER(S): 0.04 SOLUTION TOPICAL at 05:29

## 2025-01-01 RX ADMIN — ANTISEPTIC SURGICAL SCRUB 15 MILLILITER(S): 0.04 SOLUTION TOPICAL at 06:27

## 2025-01-01 RX ADMIN — POLYETHYLENE GLYCOL 3350 17 GRAM(S): 17 POWDER, FOR SOLUTION ORAL at 12:09

## 2025-01-01 RX ADMIN — Medication 2: at 05:29

## 2025-01-01 RX ADMIN — Medication 2 TABLET(S): at 21:47

## 2025-01-01 RX ADMIN — NOREPINEPHRINE BITARTRATE 65.6 MICROGRAM(S)/KG/MIN: 1 INJECTION, SOLUTION, CONCENTRATE INTRAVENOUS at 19:39

## 2025-01-01 RX ADMIN — ANTISEPTIC SURGICAL SCRUB 1 APPLICATION(S): 0.04 SOLUTION TOPICAL at 06:25

## 2025-01-01 RX ADMIN — Medication 200 GRAM(S): at 03:05

## 2025-01-01 RX ADMIN — Medication 20 MILLIGRAM(S): at 03:30

## 2025-01-01 RX ADMIN — TRANEXAMIC ACID 500 MILLIGRAM(S): 100 INJECTION, SOLUTION INTRAVENOUS at 20:29

## 2025-01-01 RX ADMIN — MIDODRINE HYDROCHLORIDE 10 MILLIGRAM(S): 5 TABLET ORAL at 21:31

## 2025-01-28 NOTE — PROCEDURE NOTE - NSBRONCHFINDINGS_GEN_A_CORE_FT
ETT in good position. [place] sharp. Airways non erythematous, no edema, no lesions noted.   + Large blood clot noted to extend from the distal trachea, occluding the left mainstem bronchus, extending to the left lower lobe bronchus. Thrombus broken up and partially extracted using suction and forceps but unable to be completely evacuated.   No active bleeding noted

## 2025-01-28 NOTE — H&P ADULT - ATTENDING COMMENTS
pt is an 78 yo male with hx COPD, CAD, stent 2011, CABG 2017,   presents from home s/p cardiac arrest, Pt seen by EMS in asystole,  pt had cpr x 25 minutes with ROSC, intubated, in the er was in sinus  bradycardia and hypotensive 60/40.  started on epinephrine drip  .  pt had ct scan showing no ICH,  left sided lung opacity ?cavitary  admitted to icu for shock on pressors.  PE bp 100/60 rr 22 heent no jvd lungs rhonchi b/l heart s1s2 abdomen nontender  ext no edema neuro unresponsive    labs   wbc 10 hgb   7.5  hct 25    bicarb 16 cr 1.29    A/P  78 yo male s/p cardiac arrest on the ventilator, etiology  shock vs hypoxemia,  bronchoscopy showing blood clots in the  left lung , ct scan reviewed, left sided opacity, pt is an 80 yo male with hx COPD, CAD, stent 2011, CABG 2017,   presents from home s/p cardiac arrest, Pt seen by EMS in asystole,  pt had cpr x 25 minutes with ROSC, intubated, in the er was in sinus  bradycardia and hypotensive 60/40.  started on epinephrine drip  .  pt had ct scan showing no ICH,  left sided lung opacity ?cavitary  admitted to icu for shock on pressors.  PE bp 100/60 rr 22 heent no jvd lungs rhonchi b/l heart s1s2 abdomen nontender  ext no edema neuro unresponsive    labs   wbc 10 hgb   7.5  hct 25    bicarb 16 cr 1.29    A/P  80 yo male s/p cardiac arrest on the ventilator, etiology  shock vs hypoxemia,  bronchoscopy showing blood clots in the  left lung , ct scan reviewed, left sided opacity,  -continue norepinephrine for bp support.  -panculture, blood, urine, will continue zosyn for aspiration coverage  -check troponin, ekg,  -check echo to evaluate lv function  -keep sedation off to evaluate mental status,  -monitor urine output  -decrease fio2 as tolerated, f/u abg, lactate  -critically ill post cardiac arrest, hypotension shock

## 2025-01-28 NOTE — ED PROVIDER NOTE - PHYSICAL EXAMINATION
GENERAL: unresponsive, intubated  HEAD: NC/AT   EYES: R pupil irregular, L pupil   LUNGS: intubated, bilateral breath sounds present  CARDIAC: RRR, no m/r/g  ABDOMEN: Soft, nondistended  EXT: No edema, no calf tenderness, no deformities.  NEURO: unresponsive  SKIN: Warm and dry. No rash.

## 2025-01-28 NOTE — CHART NOTE - NSCHARTNOTEFT_GEN_A_CORE
Pt desatting to 30s on full vent support with good waveform, stanley down to 30s, CPR performed with 1x of epinephrine and 2 of bicarb with ROSC achieved, HR back to 110s with 02 sat in high 80s.   Spoke to family in the waiting room while rest of the team was resuscitating, daughter in-law who is an NP verbalizes understanding of what is happening and knows the grave prognosis, however pt's son and wife would like to proceed with full CODE and whatever intervention necessary to resuscitate patient. After the code, daughter in-law was updated in the waiting room, re-iterated the poor prognosis after a 20 minute code earlier today and now the second code. Pt's wife is on the way and expresses understanding. Pt desatting to 30s on full vent support with good waveform, stanley down to 30s, 1 of epinephrine and 2 of bicarb given with  HR back to 110s with 02 sat in high 80s on vent.   Spoke to family in the waiting room while rest of the team was resuscitating, daughter in-law who is an NP verbalizes understanding of what is happening and knows the grave prognosis, however pt's son and wife would like to proceed with full CODE and whatever intervention necessary to resuscitate patient.  After patient was stabilized, daughter in-law was updated in the waiting room, re-iterated the poor prognosis after a 20 minute code earlier today and now with desaturation.    Pt's wife is on the way and expresses understanding.    Update: Pt with continued desaturation events to 70s, difficult to bag mask, bronchoscopy done subsequently with large mucus plug in left bronchus. Pt desatting to 30s on full vent support with good waveform, stanley down to 30s, 1 of epinephrine and 2 of bicarb given with  HR back to 110s with 02 sat in high 80s on vent.   Spoke to family in the waiting room while rest of the team was resuscitating, daughter in-law who is an NP verbalizes understanding of what is happening and knows the grave prognosis, however pt's son and wife would like to proceed with full CODE and whatever intervention necessary to resuscitate patient.  After patient was stabilized, daughter in-law was updated in the waiting room, re-iterated the poor prognosis after a 20 minute code earlier today and now with desaturation.    Pt's wife is on the way and expresses understanding.    Update: Pt with continued desaturation events to 70s, difficult to bag mask, bronchoscopy done subsequently showing large blood clot extending from the distal trachea, occluding the left mainstem bronchus, extending to the left lower lobe bronchus. Thrombus broken up and partially extracted using suction and forceps but unable to be completely evacuated secondary to size and consistency. . Patient peak pressures improved from 50 to 32, SpO2 100% on FiO2 100%.

## 2025-01-28 NOTE — PATIENT PROFILE ADULT - FALL HARM RISK - HARM RISK INTERVENTIONS

## 2025-01-28 NOTE — ED PROVIDER NOTE - CLINICAL SUMMARY MEDICAL DECISION MAKING FREE TEXT BOX
79-year-old male with history of coronary artery disease CABG, 1 stent, hypothyroidism with recent diagnosis of anemia not iron deficient started on folic acid but stopped taking it.  Was found in cardiac arrest, asystolic got 4 epis with ROSC, arrived in the ER hypotensive though with femoral pulses.  Started on Levophed initially then switched to epi.  With good blood pressure and pulse.  Of note was acidotic with hypercapnia, anemic with a hemoglobin of 7.  Blood was ordered and settings were improved ICU was notified and admitted to the ICU.  Patient initially was not responsive to pain and not making respiratory effort.  After the first hour started making some respiratory effort and was breathing above the vent.

## 2025-01-28 NOTE — H&P ADULT - HISTORY OF PRESENT ILLNESS
79-year-old male PMH CAD (s/p stent 2011 and CABG 2017, hypothyroidism, gout, COPD presenting after cardiac arrest.  Per EMS, per family at bedside patient was in normal state of health this morning, may be more tired than normal.  Went down to take a nap around noon, wife went to check on him around 3 to 3:30 PM and he was very difficult to arouse, prompting her to call EMS.  On EMS arrival patient was found to be pulseless, in asystolic rhythm, ACLS was initiated s/p 4 rounds of IV epinephrine, intubated in field, ROSC obtained prior to arrival.    In the ED, pt was hypotensive to 68/50, pt started on Levo switched to Epi. Given 1 dose of vanc+zosyn, atropine after HR 30-40s, sodium bicarb. Labs significantly concerning with lactate 11.3, elevated LFTs and coags concerning for shock liver, DOV with Cr up-trending, metabolic acidosis.

## 2025-01-28 NOTE — PROCEDURE NOTE - NSBRONCHHISTORY_GEN_A_CORE_FT
79-year-old male PMH CAD (s/p stent 2011 and CABG 2017, hypothyroidism, gout, COPD presenting after cardiac arrest at home. ACLS by EMS was initiated s/p 4 rounds of IV epinephrine, intubated in field, ROSC obtained prior to arrival.    In the ED, pt was hypotensive to 68/50, pt started on Levo switched to Epi. Given 1 dose of vanc+zosyn, atropine after HR 30-40s, sodium bicarb. Labs significantly concerning with lactate 11.3, elevated LFTs and coags concerning for shock liver, DOV with Cr up-trending, metabolic acidosis. Admitted to MICU.    In MICU, Patient with episode of desaturation to SpO2 30s, c/b hypotension with HR 30s, improved s/p 2 Amps of Bicarb 1mg Epi IVP, and manual bagging with Ambubag. Placed back on vent with high Peak and Plateau pressures.

## 2025-01-28 NOTE — PROCEDURE NOTE - NSBRONCHPROCDETAILS_GEN_A_CORE_FT
Bronchoscopy Inserted through ETT, advanced to the level of the marium. Bronchial tree evaluated bilaterally to the level of the subsegment. Lavage performed with 200cc aliquots of Sterile Water. Large blood clot noted to extend from the distal trachea, occluding the left mainstem bronchus, extending to the left lower lobe bronchus. Thrombus broken up and partially extracted using suction and forceps but unable to be completely evacuated secondary to size and consistency. At end of procedure, thrombus only partially obstructing 20% of left mainstem bronchus and no longer extending to left lower lobe bronchus or trachea. Trachea and right mainstem bronchus completely patent. Bronchoscope removed from ETT. Patient peak pressures improved from 50 to 32, SpO2 now 100% on FiO2 100%.  Bronchoscopy Inserted through ETT, advanced to the level of the marium. Bronchial tree evaluated bilaterally to the level of the subsegment. Lavage performed with 200cc aliquots of Sterile Water. Large blood clot noted to extend from the distal trachea, occluding the left mainstem bronchus, extending to the left lower lobe bronchus. Thrombus broken up and partially extracted using suction and forceps but unable to be completely evacuated secondary to size and consistency. At end of procedure, thrombus still partially obstructing 20% of left mainstem bronchus with significant clot remaining but no longer extending to left lower lobe bronchus or trachea. Trachea and right mainstem bronchus completely patent. Bronchoscope removed from ETT. Patient peak pressures improved from 50 to 32, SpO2 now 100% on FiO2 100%.

## 2025-01-28 NOTE — H&P ADULT - NSHPLABSRESULTS_GEN_ALL_CORE
LABS:                         7.5    9.68  )-----------( 209      ( 2025 17:35 )             25.0         139  |  107  |  32[H]  ----------------------------<  98  5.8[H]   |  16[L]  |  1.29    Ca    8.1[L]      2025 17:03  Mg     2.40         TPro  6.7  /  Alb  2.1[L]  /  TBili  0.7  /  DBili  x   /  AST  800[H]  /  ALT  339[H]  /  AlkPhos  78      PT/INR - ( 2025 17:03 )   PT: 21.5 sec;   INR: 1.82 ratio         PTT - ( 2025 17:03 )  PTT:45.3 sec  Urinalysis Basic - ( 2025 17:35 )    Color: Orange / Appearance: Turbid / S.018 / pH: x  Gluc: x / Ketone: Negative mg/dL  / Bili: Small / Urobili: 1.0 mg/dL   Blood: x / Protein: 300 mg/dL / Nitrite: Positive   Leuk Esterase: Large / RBC: 3564 /HPF /  /HPF   Sq Epi: x / Non Sq Epi: 28 /HPF / Bacteria: Many /HPF      CARDIAC MARKERS ( 2025 17:03 )  x     / x     / x     / x     / 2.6 ng/mL            RADIOLOGY, EKG & ADDITIONAL TESTS:

## 2025-01-28 NOTE — ED PROVIDER NOTE - ATTENDING CONTRIBUTION TO CARE
Upon my evaluation, this patient had a high probability of imminent or life-threatening deterioration due to cardiopulmonary arrest, which required my direct attention, intervention, and personal management.  The patient has a  medical condition that impairs one or more vital organ systems.  Frequent personal assessment and adjustment of medical interventions was performed.      I have personally provided 60 minutes of critical care time exclusive of time spent on separately billable procedures. Time includes review of laboratory data, radiology results, discussion with consultants, patient and family; monitoring for potential decompensation, as well as time spent retrieving data and reviewing the chart and documenting the visit. Interventions were performed as documented above.      Additional history gotten from wife.  Patient has been noted to have anemia 3 weeks ago had iron studies that were normal.  Patient unresponsive, pupils  left reactive, right unreactive, (?post surgical) neck supple no jvd, heart s1s2, no mgr, lungs clear, abd no masses, extrem no edema,no evidence of trauma, stool brown guiac neg

## 2025-01-28 NOTE — H&P ADULT - ASSESSMENT
79-year-old male PMH CAD (s/p stent 2011 and CABG 2017, hypothyroidism, gout, COPD presenting after cardiac arrest admitted to the MICU for continued management for cardiogenic shock.    =====Neurologic=====  Patient is AOx0  Pt is intubated, not requiring sedation due to obtunded mental status  - f/u CT head    =====Cardiovascular=====  Pt s/p cardiac arrest with ROSC obtained in the field per EMS prior to arrival. Pt hypotensive upon arrival.  - pressor: epi  - f/u EKG, trops  - tele    =====Pulmonary=====  - ventilation status: pt intubated    =====GI=====  Pt with increasing LFTs and coags concerning for shock liver  - f/u CT abd/pelvis   - continue to support blood pressure    =====Renal/=====  #DOV   - pt has yu in    #Metabolic acidosis  - continue to monitor    =====Infectious Disease=====  - on abx: vanc+zosyn empirically  - f/u blood cultures    =====Endocrine=====  pt has hypothyroidism, on levothyroxine 50 mcg at home  - IV synthroid 37 mcg daily    =====Heme/Onc=====  - DVT PPX: Heparin SubQ    =====MICUGeneral=====  Drips:  epi  Intubated 1/28  AB vanc+zosyn  DVT: Heparin SubQ 79-year-old male PMH CAD (s/p stent 2011 and CABG 2017, hypothyroidism, gout, COPD presenting after cardiac arrest admitted to the MICU for continued management for cardiogenic shock.    =====Neurologic=====  Patient is AOx0  Pt is intubated, not requiring sedation due to obtunded mental status  - f/u CT head    =====Cardiovascular=====  Pt s/p cardiac arrest with ROSC obtained in the field per EMS prior to arrival. Pt hypotensive upon arrival.  - pressor: epi  - f/u EKG, trops  - tele    =====Pulmonary=====  - ventilation status: pt intubated    =====GI=====  Pt with increasing LFTs and coags concerning for shock liver  - f/u CT abd/pelvis   - continue to support blood pressure    =====Renal/=====  #DOV   - pt has yu in    #Metabolic acidosis  s/p sodium bicarb  - continue to monitor    =====Infectious Disease=====  - on abx: vanc+zosyn empirically  - f/u blood cultures  - f/u urine cultures, U/A positive possible contamination  - f/u MRSA    =====Endocrine=====  pt has hypothyroidism, on levothyroxine 50 mcg at home  - IV synthroid 37 mcg daily    =====Heme/Onc=====  - DVT PPX: Heparin SubQ    =====MICUGeneral=====  Drips:  epi  Intubated 1/28  AB vanc+zosyn  DVT: Heparin SubQ 79-year-old male PMH CAD (s/p stent 2011 and CABG 2017, hypothyroidism, gout, COPD presenting after cardiac arrest admitted to the MICU for management of cardiogenic shock.    =====Neurologic=====  Patient is AOx0  Pt is intubated, not requiring sedation due to obtunded mental status  - f/u CT head    =====Cardiovascular=====  Pt s/p cardiac arrest with ROSC obtained in the field per EMS prior to arrival. Pt hypotensive upon arrival.  - pressor: epi  - f/u EKG, trops  - tele    =====Pulmonary=====  - ventilation status: pt intubated    =====GI=====  Pt with increasing LFTs and coags concerning for shock liver  - f/u CT abd/pelvis   - continue to support blood pressure    =====Renal/=====  #DOV, Cr uptrending, likely pre-renal iso shock  - pt has yu in  - f/u urine cultures    #Metabolic acidosis  s/p sodium bicarb  - continue to monitor    =====Infectious Disease=====  - on abx: vanc+zosyn empirically  - f/u blood cultures  - f/u urine cultures, U/A positive possible contamination  - f/u MRSA    =====Endocrine=====  pt has hypothyroidism, on levothyroxine 50 mcg at home  - IV synthroid 37 mcg daily    =====Heme/Onc=====  - DVT PPX: Heparin SubQ    =====MICUGeneral=====  Drips:  epi  Intubated 1/28  AB vanc+zosyn  DVT: Heparin SubQ 79-year-old male PMH CAD (s/p stent 2011 and CABG 2017, hypothyroidism, gout, COPD presenting after cardiac arrest admitted to the MICU for management of cardiogenic shock.    =====Neurologic=====  Patient is AOx0  Pt is intubated, not requiring sedation due to obtunded mental status  - f/u CT head    =====Cardiovascular=====  Pt s/p cardiac arrest with ROSC obtained in the field per EMS prior to arrival. Pt hypotensive upon arrival.  - pressor: epi  - f/u EKG, trops  - tele    =====Pulmonary=====  - ventilation status: pt intubated  CXR s/p intubation with left lung base opacity may reflect atelectasis    =====GI=====  Pt with increasing LFTs and coags concerning for shock liver  - f/u CT abd/pelvis   - continue to support blood pressure    =====Renal/=====  #DOV, Cr uptrending, likely pre-renal iso shock  - pt has yu in  - f/u urine cultures    #Metabolic acidosis  s/p sodium bicarb  - continue to monitor    =====Infectious Disease=====  - on abx: vanc+zosyn empirically  - f/u blood cultures  - f/u urine cultures, U/A positive possible contamination  - f/u MRSA    =====Endocrine=====  pt has hypothyroidism, on levothyroxine 50 mcg at home  - IV synthroid 37 mcg daily    =====Heme/Onc=====  - DVT PPX: Heparin SubQ    =====MICUGeneral=====  Drips:  epi  Intubated 1/28  AB vanc+zosyn  DVT: Heparin SubQ 79-year-old male PMH CAD (s/p stent 2011 and CABG 2017, hypothyroidism, gout, COPD presenting after cardiac arrest admitted to the MICU for management of cardiogenic shock.    =====Neurologic=====  #Altered Mental Status  - Patient is AOx0 after being found down for approx 25 minutes   - Pt is intubated, not requiring sedation due to obtunded mental status  - Possible anoxia in setting of prolonged down time prior to ROSC  - f/u CT head    =====Cardiovascular=====  #Cardiac arrest  - Pt s/p cardiac arrest with ROSC obtained in the field per EMS prior to arrival. Pt hypotensive upon arrival.  - Labs with uptrending AST ALT c/f shock liver and possible cardiogenic shock   - f/u ECG  - Trend tropes   - Monitor on telemetry     =====Pulmonary=====  #AHRF  - Patient obtuneded, intubated in setting of cardiac arrest   - CXR s/p intubation with left lung base opacity may reflect atelectasis vs. infection   - Treat infection as below     =====GI=====  #Transaminitis  Pt with increasing LFTs and coags concerning for shock liver  - f/u CT abd/pelvis   - continue to support blood pressure, pressor support    =====Renal/=====  #DOV, Cr uptrending, likely pre-renal iso shock  - pt has uy in  - f/u urine cultures  - Trend CMP q8    #Metabolic acidosis  - in setting of cardiac arrest and prolonged downtime with tissue hypoperfusion causing metabolic acidosis 2/2 lactate  - S/p bicarb push  - continue to trend lactate   - cont fluid resuscitation     =====Infectious Disease=====  #SIRS criteria   - Empiric coverage for now  - cont vanc+zosyn   - f/u blood cultures  - f/u urine cultures, U/A positive possible contamination  - f/u MRSA    =====Endocrine=====  pt has hypothyroidism, on levothyroxine 50 mcg at home  - IV synthroid 37 mcg daily    =====Heme/Onc=====  - DVT PPX: Heparin SubQ    =====MICU General=====  Drips:  epi  Intubated 1/28  AB vanc+zosyn  DVT: Heparin SubQ  Med rec: to be completed after obtaining collateral

## 2025-01-28 NOTE — ED ADULT NURSE REASSESSMENT NOTE - NS ED NURSE REASSESS COMMENT FT1
Mobile Critical Care RN: patient is 79/M PMHx CAD (s/p stent 2011 and CABG 2017, hypothyroidism, gout, COPD, arriving s/p PEA arrest. patient arrives intubated, hypotensive, levophed initiated. patient noted to be increasingly bradycardic, epinephrine gtt initiated and levo titrated down. 1mg atropine administered for bradycardia, with improvement in BP, epi gtt discontinued with adequate BP. H/H noted to be low, additional T&S sent, PRBC ordered and now discontinued as per MICU team. patient remains in TR-A family at bedside, normothermic, sedated on propofol gtt. intuabted with 7.5 ETT 26 @ lip, vent settings changed and tidal volume and RR increased for high PCO2 on VBG. noted to have increased peak pressures on ventilator, MD made aware. sinus rhythm, vasopressors held. PIV x4. pulses palpable. NPO, fecal occult found to be negative. 14F coude tip yu catheter placed in ER. MICU transfer pending, CT scan to be performed.

## 2025-01-28 NOTE — ED ADULT TRIAGE NOTE - CHIEF COMPLAINT QUOTE
CORRINA pre-notified of a post arrest patient with ROSC achieved and intubated in field. Charge made aware. Pt went straight to [facility].

## 2025-01-28 NOTE — ED PROVIDER NOTE - OBJECTIVE STATEMENT
1-year-old male PMH CAD status post stenting, history of open heart surgery, hypothyroidism, presenting after cardiac arrest.  Per EMS, per family at bedside patient was in normal state of health this morning, may be more tired than normal.  Went down to take a nap around noon, wife went to check on him around 3 to 3:30 PM and he was very difficult to arouse, prompting her to call EMS.  On EMS arrival patient was found to be pulseless, in asystolic rhythm, ACLS was initiated status post 4 rounds of IV epinephrine, intubated in field, ROSC obtained prior to arrival. 79-year-old male PMH CAD status post stenting, history of open heart surgery, hypothyroidism, presenting after cardiac arrest.  Per EMS, per family at bedside patient was in normal state of health this morning, may be more tired than normal.  Went down to take a nap around noon, wife went to check on him around 3 to 3:30 PM and he was very difficult to arouse, prompting her to call EMS.  On EMS arrival patient was found to be pulseless, in asystolic rhythm, ACLS was initiated status post 4 rounds of IV epinephrine, intubated in field, ROSC obtained prior to arrival.

## 2025-01-28 NOTE — H&P ADULT - NSHPPHYSICALEXAM_GEN_ALL_CORE
GENERAL: Pt intubated  HEAD:  Atraumatic, Normocephalic  EYES: EOMI, PERRL, conjunctiva and sclera clear  NECK: Supple, No JVD  CHEST/LUNG: Clear to auscultation bilaterally; No wheezes, rales or rhonchi  HEART: Regular rate and rhythm; No murmurs, rubs, or gallops, (+)S1, S2  ABDOMEN: Soft, Nontender, Nondistended; Normal Bowel sounds   EXTREMITIES:  2+ Peripheral Pulses, No clubbing, cyanosis, or edema  SKIN: No rashes or lesions

## 2025-01-29 ENCOUNTER — RESULT REVIEW (OUTPATIENT)
Age: 80
End: 2025-01-29

## 2025-01-29 NOTE — PROGRESS NOTE ADULT - SUBJECTIVE AND OBJECTIVE BOX
****Kassidy Brasher Internal Medicine PGY-1*****    Overnight Events: Pt desatting to 30s on full vent support with good waveform, stanley down to 30s, 1 of epinephrine and 2 of bicarb given with  HR back to 110s with 02 sat in high 80s on vent.     Pt with continued desaturation events to 70s, difficult to bag mask, bronchoscopy done subsequently showing large blood clot extending from the distal trachea, occluding the left mainstem bronchus, extending to the left lower lobe bronchus. Thrombus broken up and partially extracted using suction and forceps but unable to be completely evacuated secondary to size and consistency. Patient peak pressures improved from 50 to 32, SpO2 100% on FiO2 100%. No active bleeding noted    Interval Events: pt seen and examined at bedside.     OBJECTIVE:  ICU Vital Signs Last 24 Hrs  T(C): 37.7 (29 Jan 2025 04:00), Max: 37.7 (29 Jan 2025 04:00)  T(F): 99.9 (29 Jan 2025 04:00), Max: 99.9 (29 Jan 2025 04:00)  HR: 70 (29 Jan 2025 07:34) (41 - 102)  BP: 128/74 (29 Jan 2025 07:00) (47/32 - 175/93)  BP(mean): 91 (29 Jan 2025 07:00) (38 - 117)  ABP: --  ABP(mean): --  RR: 21 (29 Jan 2025 07:00) (15 - 28)  SpO2: 100% (29 Jan 2025 07:34) (61% - 100%)    O2 Parameters below as of 29 Jan 2025 06:00  Patient On (Oxygen Delivery Method): ventilator    O2 Concentration (%): 60      Mode: AC/ CMV (Assist Control/ Continuous Mandatory Ventilation), RR (machine): 16, TV (machine): 350, FiO2: 60, PEEP: 10, ITime: 0.6, MAP: 14, PIP: 25    01-28 @ 07:01  -  01-29 @ 07:00  --------------------------------------------------------  IN: 3396.2 mL / OUT: 5 mL / NET: 3391.2 mL      CAPILLARY BLOOD GLUCOSE      POCT Blood Glucose.: 234 mg/dL (29 Jan 2025 05:19)      PHYSICAL EXAM  GENERAL: Pt intubated  HEAD:  Atraumatic, Normocephalic  EYES: EOMI, PERRL, conjunctiva and sclera clear  NECK: Supple, No JVD  CHEST/LUNG: Clear to auscultation bilaterally; No wheezes, rales or rhonchi  HEART: Regular rate and rhythm; No murmurs, rubs, or gallops, (+)S1, S2  ABDOMEN: Soft, Nontender, Nondistended; Normal Bowel sounds   EXTREMITIES:  2+ Peripheral Pulses, No clubbing, cyanosis, or edema  SKIN: No rashes or lesions        HOSPITAL MEDICATIONS:  MEDICATIONS  (STANDING):  chlorhexidine 0.12% Liquid 15 milliLiter(s) Oral Mucosa every 12 hours  chlorhexidine 2% Cloths 1 Application(s) Topical <User Schedule>  dextrose 5%. 1000 milliLiter(s) (100 mL/Hr) IV Continuous <Continuous>  dextrose 5%. 1000 milliLiter(s) (50 mL/Hr) IV Continuous <Continuous>  dextrose 50% Injectable 25 Gram(s) IV Push once  dextrose 50% Injectable 12.5 Gram(s) IV Push once  dextrose 50% Injectable 25 Gram(s) IV Push once  glucagon  Injectable 1 milliGRAM(s) IntraMuscular once  insulin lispro (ADMELOG) corrective regimen sliding scale   SubCutaneous every 6 hours  levothyroxine Injectable 37 MICROGram(s) IV Push at bedtime  norepinephrine Infusion 0.729 MICROgram(s)/kG/Min (65.6 mL/Hr) IV Continuous <Continuous>  pantoprazole  Injectable 40 milliGRAM(s) IV Push two times a day  piperacillin/tazobactam IVPB.. 3.375 Gram(s) IV Intermittent every 12 hours  propofol Infusion 10 MICROgram(s)/kG/Min (2.88 mL/Hr) IV Continuous <Continuous>  sodium bicarbonate  Infusion 0.321 mEq/kG/Hr (150 mL/Hr) IV Continuous <Continuous>    MEDICATIONS  (PRN):  dextrose Oral Gel 15 Gram(s) Oral once PRN Blood Glucose LESS THAN 70 milliGRAM(s)/deciliter      LABS:                        8.2    8.60  )-----------( 55       ( 29 Jan 2025 01:39 )             24.2     Hgb Trend: 8.2<--, 7.7<--, 7.5<--, 6.6<--  01-29    138  |  98  |  41[H]  ----------------------------<  225[H]  4.7   |  21[L]  |  1.67[H]    Ca    7.6[L]      29 Jan 2025 01:39  Phos  6.5     01-29  Mg     1.90     01-29    TPro  7.1  /  Alb  1.9[L]  /  TBili  1.4[H]  /  DBili  x   /  AST  1596[H]  /  ALT  586[H]  /  AlkPhos  104  01-29    Creatinine Trend: 1.67<--, 1.54<--, 1.36<--, 1.29<--  PT/INR - ( 29 Jan 2025 01:39 )   PT: 25.4 sec;   INR: 2.21 ratio         PTT - ( 29 Jan 2025 01:39 )  PTT:41.8 sec  Urinalysis Basic - ( 29 Jan 2025 01:39 )    Color: x / Appearance: x / SG: x / pH: x  Gluc: 225 mg/dL / Ketone: x  / Bili: x / Urobili: x   Blood: x / Protein: x / Nitrite: x   Leuk Esterase: x / RBC: x / WBC x   Sq Epi: x / Non Sq Epi: x / Bacteria: x      Arterial Blood Gas:  01-28 @ 22:00  7.31/55/101/28/98.8/1.1  ABG lactate: --    Venous Blood Gas:  01-29 @ 01:39  7.27/55/40/25/60.1  VBG Lactate: 7.3  Venous Blood Gas:  01-28 @ 19:41  7.30/42/44/21/69.7  VBG Lactate: 7.3  Venous Blood Gas:  01-28 @ 17:35  6.99/76/48/18/63.6  VBG Lactate: 11.3  Venous Blood Gas:  01-28 @ 17:03  6.95/79/45/17/56.1  VBG Lactate: 10.6      MICROBIOLOGY:       RADIOLOGY:  [ ] Reviewed by me   ****Kassidy Brasher Internal Medicine PGY-1*****    Overnight Events: Pt desatting to 30s on full vent support with good waveform, stanley down to 30s, 1 of epinephrine and 2 of bicarb given with  HR back to 110s with 02 sat in high 80s on vent.     Pt with continued desaturation events to 70s, difficult to bag mask, bronchoscopy done subsequently showing large blood clot extending from the distal trachea, occluding the left mainstem bronchus, extending to the left lower lobe bronchus. Thrombus broken up and partially extracted using suction and forceps but unable to be completely evacuated secondary to size and consistency. Patient peak pressures improved from 50 to 32, SpO2 100% on FiO2 100%. No active bleeding noted    Interval Events: pt seen and examined at bedside.     OBJECTIVE:  ICU Vital Signs Last 24 Hrs  T(C): 37.7 (29 Jan 2025 04:00), Max: 37.7 (29 Jan 2025 04:00)  T(F): 99.9 (29 Jan 2025 04:00), Max: 99.9 (29 Jan 2025 04:00)  HR: 70 (29 Jan 2025 07:34) (41 - 102)  BP: 128/74 (29 Jan 2025 07:00) (47/32 - 175/93)  BP(mean): 91 (29 Jan 2025 07:00) (38 - 117)  ABP: --  ABP(mean): --  RR: 21 (29 Jan 2025 07:00) (15 - 28)  SpO2: 100% (29 Jan 2025 07:34) (61% - 100%)    O2 Parameters below as of 29 Jan 2025 06:00  Patient On (Oxygen Delivery Method): ventilator    O2 Concentration (%): 60      Mode: AC/ CMV (Assist Control/ Continuous Mandatory Ventilation), RR (machine): 16, TV (machine): 350, FiO2: 60, PEEP: 10, ITime: 0.6, MAP: 14, PIP: 25    01-28 @ 07:01  -  01-29 @ 07:00  --------------------------------------------------------  IN: 3396.2 mL / OUT: 5 mL / NET: 3391.2 mL      CAPILLARY BLOOD GLUCOSE      POCT Blood Glucose.: 234 mg/dL (29 Jan 2025 05:19)      PHYSICAL EXAM  GENERAL: Pt intubated  HEAD:  Atraumatic, Normocephalic  EYES: EOMI, PERRL, conjunctiva and sclera clear  NECK: Supple, No JVD  CHEST/LUNG: Clear to auscultation bilaterally; No wheezes, rales or rhonchi  HEART: Regular rate and rhythm; No murmurs, rubs, or gallops, (+)S1, S2  ABDOMEN: Soft, Nontender, Nondistended; Normal Bowel sounds   EXTREMITIES:  2+ Peripheral Pulses, No clubbing, cyanosis, or edema  SKIN: No rashes or lesions        HOSPITAL MEDICATIONS:  MEDICATIONS  (STANDING):  chlorhexidine 0.12% Liquid 15 milliLiter(s) Oral Mucosa every 12 hours  chlorhexidine 2% Cloths 1 Application(s) Topical <User Schedule>  dextrose 5%. 1000 milliLiter(s) (100 mL/Hr) IV Continuous <Continuous>  dextrose 5%. 1000 milliLiter(s) (50 mL/Hr) IV Continuous <Continuous>  dextrose 50% Injectable 25 Gram(s) IV Push once  dextrose 50% Injectable 12.5 Gram(s) IV Push once  dextrose 50% Injectable 25 Gram(s) IV Push once  glucagon  Injectable 1 milliGRAM(s) IntraMuscular once  insulin lispro (ADMELOG) corrective regimen sliding scale   SubCutaneous every 6 hours  levothyroxine Injectable 37 MICROGram(s) IV Push at bedtime  norepinephrine Infusion 0.729 MICROgram(s)/kG/Min (65.6 mL/Hr) IV Continuous <Continuous>  pantoprazole  Injectable 40 milliGRAM(s) IV Push two times a day  piperacillin/tazobactam IVPB.. 3.375 Gram(s) IV Intermittent every 12 hours  propofol Infusion 10 MICROgram(s)/kG/Min (2.88 mL/Hr) IV Continuous <Continuous>  sodium bicarbonate  Infusion 0.321 mEq/kG/Hr (150 mL/Hr) IV Continuous <Continuous>    MEDICATIONS  (PRN):  dextrose Oral Gel 15 Gram(s) Oral once PRN Blood Glucose LESS THAN 70 milliGRAM(s)/deciliter      LABS:                        8.2    8.60  )-----------( 55       ( 29 Jan 2025 01:39 )             24.2     Hgb Trend: 8.2<--, 7.7<--, 7.5<--, 6.6<--  01-29    138  |  98  |  41[H]  ----------------------------<  225[H]  4.7   |  21[L]  |  1.67[H]    Ca    7.6[L]      29 Jan 2025 01:39  Phos  6.5     01-29  Mg     1.90     01-29    TPro  7.1  /  Alb  1.9[L]  /  TBili  1.4[H]  /  DBili  x   /  AST  1596[H]  /  ALT  586[H]  /  AlkPhos  104  01-29    Creatinine Trend: 1.67<--, 1.54<--, 1.36<--, 1.29<--  PT/INR - ( 29 Jan 2025 01:39 )   PT: 25.4 sec;   INR: 2.21 ratio         PTT - ( 29 Jan 2025 01:39 )  PTT:41.8 sec  Urinalysis Basic - ( 29 Jan 2025 01:39 )    Color: x / Appearance: x / SG: x / pH: x  Gluc: 225 mg/dL / Ketone: x  / Bili: x / Urobili: x   Blood: x / Protein: x / Nitrite: x   Leuk Esterase: x / RBC: x / WBC x   Sq Epi: x / Non Sq Epi: x / Bacteria: x      Arterial Blood Gas:  01-28 @ 22:00  7.31/55/101/28/98.8/1.1  ABG lactate: --    Venous Blood Gas:  01-29 @ 01:39  7.27/55/40/25/60.1  VBG Lactate: 7.3  Venous Blood Gas:  01-28 @ 19:41  7.30/42/44/21/69.7  VBG Lactate: 7.3  Venous Blood Gas:  01-28 @ 17:35  6.99/76/48/18/63.6  VBG Lactate: 11.3  Venous Blood Gas:  01-28 @ 17:03  6.95/79/45/17/56.1  VBG Lactate: 10.6      MICROBIOLOGY:       RADIOLOGY:  FINDINGS:  CHEST:  LUNGS AND LARGE AIRWAYS: Endotracheal tube terminates above the marium.   New 7.9 x 4.8 x 6.8 cm (301-76, 601-54) opacity with internal cavitation   in the left lower lobe. There is additional surrounding consolidation and   groundglass. Increased bilateral tree-in-bud and additional nodular   opacities compared to 3/7/2019. Resolved 1.7 cm nodule superior segment   of the right lower lobe.  PLEURA: No pleural effusion.  VESSELS: Aortic and coronary artery calcification.  HEART: Heart size is normal. No pericardial effusion.  MEDIASTINUM AND ROYAL: No lymphadenopathy.  CHEST WALL AND LOWER NECK: Within normal limits.    ABDOMEN AND PELVIS:  LIVER: Within normal limits.  BILE DUCTS: Normalcaliber.  GALLBLADDER: Within normal limits.  SPLEEN: Within normal limits.  PANCREAS: Within normal limits.  ADRENALS: Within normal limits.  KIDNEYS/URETERS: No hydronephrosis. Right renal cyst.    BLADDER: Leal catheter.  REPRODUCTIVE ORGANS: Prostate is enlarged.    BOWEL: No bowel obstruction. Appendix is not visualized.  PERITONEUM/RETROPERITONEUM: Within normal limits.  VESSELS: Atherosclerotic changes.  LYMPH NODES: No lymphadenopathy.  ABDOMINAL WALL: Within normal limits.  BONES: Median sternotomy. Degenerative changes.    IMPRESSION:    Cavitary opacity in the left lower lobe measuring up to 7.9 cm. most   likely represents pulmonary abscess, less likely neoplasm    Increased bilateral tree-in-bud are initial nodular opacities compared to   3/7/2019 which are likely infectious or inflammatory.   ****Kassidy Brasher Internal Medicine PGY-1*****    Overnight Events: Pt desatting to 30s on full vent support with good waveform, stanley down to 30s, 1 of epinephrine and 2 of bicarb given with  HR back to 110s with 02 sat in high 80s on vent.     Pt with continued desaturation events to 70s, difficult to bag mask, bronchoscopy done subsequently showing large blood clot extending from the distal trachea, occluding the left mainstem bronchus, extending to the left lower lobe bronchus. Thrombus broken up and partially extracted using suction and forceps but unable to be completely evacuated secondary to size and consistency. Patient peak pressures improved from 50 to 32, SpO2 100% on FiO2 100%. No active bleeding noted    Interval Events: pt seen and examined at bedside. Son at bedside as well. Pt intubated, appearing comfortable. Vitals stable.    OBJECTIVE:  ICU Vital Signs Last 24 Hrs  T(C): 37.7 (29 Jan 2025 04:00), Max: 37.7 (29 Jan 2025 04:00)  T(F): 99.9 (29 Jan 2025 04:00), Max: 99.9 (29 Jan 2025 04:00)  HR: 70 (29 Jan 2025 07:34) (41 - 102)  BP: 128/74 (29 Jan 2025 07:00) (47/32 - 175/93)  BP(mean): 91 (29 Jan 2025 07:00) (38 - 117)  ABP: --  ABP(mean): --  RR: 21 (29 Jan 2025 07:00) (15 - 28)  SpO2: 100% (29 Jan 2025 07:34) (61% - 100%)    O2 Parameters below as of 29 Jan 2025 06:00  Patient On (Oxygen Delivery Method): ventilator    O2 Concentration (%): 60      Mode: AC/ CMV (Assist Control/ Continuous Mandatory Ventilation), RR (machine): 16, TV (machine): 350, FiO2: 60, PEEP: 10, ITime: 0.6, MAP: 14, PIP: 25    01-28 @ 07:01  -  01-29 @ 07:00  --------------------------------------------------------  IN: 3396.2 mL / OUT: 5 mL / NET: 3391.2 mL      CAPILLARY BLOOD GLUCOSE      POCT Blood Glucose.: 234 mg/dL (29 Jan 2025 05:19)      PHYSICAL EXAM  GENERAL: Pt intubated  HEAD:  Atraumatic, Normocephalic  EYES: EOMI, PERRL, conjunctiva and sclera clear  NECK: Supple, No JVD  CHEST/LUNG: Clear to auscultation bilaterally; No wheezes, rales or rhonchi  HEART: Regular rate and rhythm; No murmurs, rubs, or gallops, (+)S1, S2  ABDOMEN: Soft, Nontender, Nondistended; Normal Bowel sounds   EXTREMITIES:  2+ Peripheral Pulses, No clubbing, cyanosis, or edema  SKIN: No rashes or lesions        HOSPITAL MEDICATIONS:  MEDICATIONS  (STANDING):  chlorhexidine 0.12% Liquid 15 milliLiter(s) Oral Mucosa every 12 hours  chlorhexidine 2% Cloths 1 Application(s) Topical <User Schedule>  dextrose 5%. 1000 milliLiter(s) (100 mL/Hr) IV Continuous <Continuous>  dextrose 5%. 1000 milliLiter(s) (50 mL/Hr) IV Continuous <Continuous>  dextrose 50% Injectable 25 Gram(s) IV Push once  dextrose 50% Injectable 12.5 Gram(s) IV Push once  dextrose 50% Injectable 25 Gram(s) IV Push once  glucagon  Injectable 1 milliGRAM(s) IntraMuscular once  insulin lispro (ADMELOG) corrective regimen sliding scale   SubCutaneous every 6 hours  levothyroxine Injectable 37 MICROGram(s) IV Push at bedtime  norepinephrine Infusion 0.729 MICROgram(s)/kG/Min (65.6 mL/Hr) IV Continuous <Continuous>  pantoprazole  Injectable 40 milliGRAM(s) IV Push two times a day  piperacillin/tazobactam IVPB.. 3.375 Gram(s) IV Intermittent every 12 hours  propofol Infusion 10 MICROgram(s)/kG/Min (2.88 mL/Hr) IV Continuous <Continuous>  sodium bicarbonate  Infusion 0.321 mEq/kG/Hr (150 mL/Hr) IV Continuous <Continuous>    MEDICATIONS  (PRN):  dextrose Oral Gel 15 Gram(s) Oral once PRN Blood Glucose LESS THAN 70 milliGRAM(s)/deciliter      LABS:                        8.2    8.60  )-----------( 55       ( 29 Jan 2025 01:39 )             24.2     Hgb Trend: 8.2<--, 7.7<--, 7.5<--, 6.6<--  01-29    138  |  98  |  41[H]  ----------------------------<  225[H]  4.7   |  21[L]  |  1.67[H]    Ca    7.6[L]      29 Jan 2025 01:39  Phos  6.5     01-29  Mg     1.90     01-29    TPro  7.1  /  Alb  1.9[L]  /  TBili  1.4[H]  /  DBili  x   /  AST  1596[H]  /  ALT  586[H]  /  AlkPhos  104  01-29    Creatinine Trend: 1.67<--, 1.54<--, 1.36<--, 1.29<--  PT/INR - ( 29 Jan 2025 01:39 )   PT: 25.4 sec;   INR: 2.21 ratio         PTT - ( 29 Jan 2025 01:39 )  PTT:41.8 sec  Urinalysis Basic - ( 29 Jan 2025 01:39 )    Color: x / Appearance: x / SG: x / pH: x  Gluc: 225 mg/dL / Ketone: x  / Bili: x / Urobili: x   Blood: x / Protein: x / Nitrite: x   Leuk Esterase: x / RBC: x / WBC x   Sq Epi: x / Non Sq Epi: x / Bacteria: x      Arterial Blood Gas:  01-28 @ 22:00  7.31/55/101/28/98.8/1.1  ABG lactate: --    Venous Blood Gas:  01-29 @ 01:39  7.27/55/40/25/60.1  VBG Lactate: 7.3  Venous Blood Gas:  01-28 @ 19:41  7.30/42/44/21/69.7  VBG Lactate: 7.3  Venous Blood Gas:  01-28 @ 17:35  6.99/76/48/18/63.6  VBG Lactate: 11.3  Venous Blood Gas:  01-28 @ 17:03  6.95/79/45/17/56.1  VBG Lactate: 10.6      MICROBIOLOGY:       RADIOLOGY:  FINDINGS:  CHEST:  LUNGS AND LARGE AIRWAYS: Endotracheal tube terminates above the marium.   New 7.9 x 4.8 x 6.8 cm (301-76, 601-54) opacity with internal cavitation   in the left lower lobe. There is additional surrounding consolidation and   groundglass. Increased bilateral tree-in-bud and additional nodular   opacities compared to 3/7/2019. Resolved 1.7 cm nodule superior segment   of the right lower lobe.  PLEURA: No pleural effusion.  VESSELS: Aortic and coronary artery calcification.  HEART: Heart size is normal. No pericardial effusion.  MEDIASTINUM AND ROYAL: No lymphadenopathy.  CHEST WALL AND LOWER NECK: Within normal limits.    ABDOMEN AND PELVIS:  LIVER: Within normal limits.  BILE DUCTS: Normalcaliber.  GALLBLADDER: Within normal limits.  SPLEEN: Within normal limits.  PANCREAS: Within normal limits.  ADRENALS: Within normal limits.  KIDNEYS/URETERS: No hydronephrosis. Right renal cyst.    BLADDER: Leal catheter.  REPRODUCTIVE ORGANS: Prostate is enlarged.    BOWEL: No bowel obstruction. Appendix is not visualized.  PERITONEUM/RETROPERITONEUM: Within normal limits.  VESSELS: Atherosclerotic changes.  LYMPH NODES: No lymphadenopathy.  ABDOMINAL WALL: Within normal limits.  BONES: Median sternotomy. Degenerative changes.    IMPRESSION:    Cavitary opacity in the left lower lobe measuring up to 7.9 cm. most   likely represents pulmonary abscess, less likely neoplasm    Increased bilateral tree-in-bud are initial nodular opacities compared to   3/7/2019 which are likely infectious or inflammatory.

## 2025-01-29 NOTE — DIETITIAN INITIAL EVALUATION ADULT - REASON FOR ADMISSION
- 78 y/o M presenting s/p cardiac arrest after being found down for ~25 min at home.   Intubated in the field.  Pt. with acute hypoxic respiratory failure.  s/p bronchoscopy (1/28) showing large blood clot extending from the distal trachea, occluding the left mainstem bronchus, extending to the left lower lobe bronchus.  CT chest (1/28) with findings of cavitary opacity in the left lower lobe measuring up to 7.9 cm.

## 2025-01-29 NOTE — CONSULT NOTE ADULT - ASSESSMENT
79-year-old male PMH CAD (s/p stent 2011 and CABG 2017, hypothyroidism, gout, COPD presenting after cardiac arrest admitted to the MICU for management of cardiogenic shock found to have possible pna, airway bleed    IP called for bronchoscopy, possible intervention    will plan for bedside flex bronchoscopy with possible cryotherapy   will send BAL for micro, cyto 79-year-old male PMH CAD (s/p stent 2011 and CABG 2017, hypothyroidism, gout, COPD presenting after cardiac arrest admitted to the MICU for management of cardiogenic shock found to have possible pna, airway bleed    IP called for bronchoscopy, possible intervention    will plan for bedside flex bronchoscopy with possible cryotherapy timing tbd as per MICU team  send BAL for micro, cyto    IP to follow

## 2025-01-29 NOTE — CONSULT NOTE ADULT - SUBJECTIVE AND OBJECTIVE BOX
PATIENT:  DESTINI ENRIQUE  [id number]    CHIEF COMPLAINT:  Patient is a 79y old  Male who presents with a chief complaint of Post cardiac arrest (29 Jan 2025 07:42)      INTERVAL HISTORY/OVERNIGHT EVENTS:  79-year-old male PMH CAD (s/p stent 2011 and CABG 2017, hypothyroidism, gout, COPD presenting after cardiac arrest admitted to the MICU for management of cardiogenic shock found to have possible pna, airway bleed    MEDICATIONS:  MEDICATIONS  (STANDING):  albuterol/ipratropium for Nebulization 3 milliLiter(s) Nebulizer every 6 hours  azithromycin  IVPB 500 milliGRAM(s) IV Intermittent every 24 hours  chlorhexidine 0.12% Liquid 15 milliLiter(s) Oral Mucosa every 12 hours  chlorhexidine 2% Cloths 1 Application(s) Topical <User Schedule>  dexMEDEtomidine Infusion 0.05 MICROgram(s)/kG/Hr (0.6 mL/Hr) IV Continuous <Continuous>  dextrose 5%. 1000 milliLiter(s) (100 mL/Hr) IV Continuous <Continuous>  dextrose 5%. 1000 milliLiter(s) (50 mL/Hr) IV Continuous <Continuous>  dextrose 50% Injectable 25 Gram(s) IV Push once  dextrose 50% Injectable 12.5 Gram(s) IV Push once  dextrose 50% Injectable 25 Gram(s) IV Push once  glucagon  Injectable 1 milliGRAM(s) IntraMuscular once  heparin   Injectable 5000 Unit(s) SubCutaneous every 12 hours  insulin lispro (ADMELOG) corrective regimen sliding scale   SubCutaneous every 6 hours  levothyroxine Injectable 18 MICROGram(s) IV Push at bedtime  norepinephrine Infusion 0.729 MICROgram(s)/kG/Min (65.6 mL/Hr) IV Continuous <Continuous>  pantoprazole  Injectable 40 milliGRAM(s) IV Push two times a day  petrolatum Ophthalmic Ointment 1 Application(s) Both EYES two times a day  piperacillin/tazobactam IVPB.. 3.375 Gram(s) IV Intermittent every 12 hours  propofol Infusion 10 MICROgram(s)/kG/Min (2.88 mL/Hr) IV Continuous <Continuous>  sodium bicarbonate  Infusion 0.321 mEq/kG/Hr (150 mL/Hr) IV Continuous <Continuous>    MEDICATIONS  (PRN):  dextrose Oral Gel 15 Gram(s) Oral once PRN Blood Glucose LESS THAN 70 milliGRAM(s)/deciliter      ALLERGIES:  Allergies    No Known Allergies    Intolerances        OBJECTIVE:  ICU Vital Signs Last 24 Hrs  T(C): 35.8 (29 Jan 2025 12:00), Max: 37.7 (29 Jan 2025 04:00)  T(F): 96.4 (29 Jan 2025 12:00), Max: 99.9 (29 Jan 2025 04:00)  HR: 73 (29 Jan 2025 12:00) (41 - 102)  BP: 144/72 (29 Jan 2025 12:00) (47/32 - 175/93)  BP(mean): 94 (29 Jan 2025 12:00) (38 - 117)  ABP: --  ABP(mean): --  RR: 24 (29 Jan 2025 12:00) (15 - 28)  SpO2: 95% (29 Jan 2025 12:00) (61% - 100%)    O2 Parameters below as of 29 Jan 2025 12:00  Patient On (Oxygen Delivery Method): BiPAP/CPAP, 5/5    O2 Concentration (%): 60      Mode: AC/ CMV (Assist Control/ Continuous Mandatory Ventilation)  RR (machine): 16  TV (machine): 350  FiO2: 60  PEEP: 5  ITime: 0.6  MAP: 7  PIP: 11    Adult Advanced Hemodynamics Last 24 Hrs  CVP(mm Hg): --  CVP(cm H2O): --  CO: --  CI: --  PA: --  PA(mean): --  PCWP: --  SVR: --  SVRI: --  PVR: --  PVRI: --  CAPILLARY BLOOD GLUCOSE      POCT Blood Glucose.: 198 mg/dL (29 Jan 2025 11:42)  POCT Blood Glucose.: 234 mg/dL (29 Jan 2025 05:19)  POCT Blood Glucose.: 239 mg/dL (29 Jan 2025 01:08)    CAPILLARY BLOOD GLUCOSE      POCT Blood Glucose.: 198 mg/dL (29 Jan 2025 11:42)    I&O's Summary    28 Jan 2025 07:01  -  29 Jan 2025 07:00  --------------------------------------------------------  IN: 3396.2 mL / OUT: 5 mL / NET: 3391.2 mL    29 Jan 2025 07:01  -  29 Jan 2025 13:25  --------------------------------------------------------  IN: 2134.6 mL / OUT: 3 mL / NET: 2131.6 mL      Daily Height in cm: 167.64 (28 Jan 2025 21:12)    Daily     PHYSICAL EXAMINATION:  General: WN/WD NAD  HEENT: atraumatic  Neurology: sedated  Respiratory: mechanical sounds diffusely  CV: tachy  Abdominal: nondistended  Extremities: trace edema      LABS:  ABG - ( 28 Jan 2025 22:00 )  pH, Arterial: 7.31  pH, Blood: x     /  pCO2: 55    /  pO2: 101   / HCO3: 28    / Base Excess: 1.1   /  SaO2: 98.8                                    7.9    12.00 )-----------( 29       ( 29 Jan 2025 10:05 )             23.5     01-29    138  |  96[L]  |  45[H]  ----------------------------<  174[H]  4.5   |  29  |  2.09[H]    Ca    7.9[L]      29 Jan 2025 10:05  Phos  5.7     01-29  Mg     2.50     01-29    TPro  6.8  /  Alb  2.0[L]  /  TBili  1.4[H]  /  DBili  x   /  AST  1450[H]  /  ALT  486[H]  /  AlkPhos  87  01-29    LIVER FUNCTIONS - ( 29 Jan 2025 10:05 )  Alb: 2.0 g/dL / Pro: 6.8 g/dL / ALK PHOS: 87 U/L / ALT: 486 U/L / AST: 1450 U/L / GGT: x           PT/INR - ( 29 Jan 2025 10:05 )   PT: 23.9 sec;   INR: 2.08 ratio         PTT - ( 29 Jan 2025 10:05 )  PTT:35.4 sec  CKMB Units: 2.6 ng/mL (01-28 @ 17:03)    CARDIAC MARKERS ( 28 Jan 2025 17:03 )  x     / x     / x     / x     / 2.6 ng/mL      Urinalysis Basic - ( 29 Jan 2025 10:05 )    Color: x / Appearance: x / SG: x / pH: x  Gluc: 174 mg/dL / Ketone: x  / Bili: x / Urobili: x   Blood: x / Protein: x / Nitrite: x   Leuk Esterase: x / RBC: x / WBC x   Sq Epi: x / Non Sq Epi: x / Bacteria: x

## 2025-01-29 NOTE — CONSULT NOTE ADULT - ATTENDING COMMENTS
Per chart review - wife saw him feeling tired on 1/29 at 12:00 when he went for a nap.  She found him unresponsive at 15:00 or 15:30.  EMS - asystole.  ROSC in 25 minutes.  Currently on propofol 50 mcg/kg/min.    MS: No eye opening.  No attempts at verbalizations.  No follow commands. No response to supraorbital pressure.  CN:  No BTT.   No roving eye movements.  OCR - no EOM.  Corneal reflex is absent.  Pupils round,  3mm-->2mm, B, sluggish.  Gag - absent.  Motor/sensory:  Tone: flaccid with no response to nailbed pressure in all 4 ext. Per chart review - wife saw him feeling tired on 1/28 at 12:00 when he went for a nap.  She found him unresponsive at 15:00 or 15:30.  EMS - asystole.  ROSC in 25 minutes.  Currently on propofol 50 mcg/kg/min.    MS: No eye opening.  No attempts at verbalizations.  No follow commands. No response to supraorbital pressure.  CN:  No BTT.   No roving eye movements.  OCR - no EOM.  Corneal reflex is absent.  Pupils round,  3mm-->2mm, B, sluggish.  Gag - absent.  Motor/sensory:  Tone: flaccid with no response to nailbed pressure in all 4 ext.    A/P  Mr. Holman is an 80 yo man with anoxic ischemic encephalopathy.  We are unable to asses his prognosis based on examination at this time since it has been less than 2 days and he is on sedation.  Length of time of arrest and electroclinical seizures are poor prognostic signs.  We will reassess for prognosis after he is off sedation.    Continuous EEG  Continue levetiracetam 1500 Q12H  Thank you    There is a high probability of sudden, clinically significant, or life threatening deterioration in the patient’s condition which requires the highest level of physician preparedness to intervene urgently.  Critical care time:  55 minutes. Per chart review - wife saw him feeling tired on 1/28 at 12:00 when he went for a nap.  She found him unresponsive at 15:00 or 15:30.  EMS - asystole.  ROSC in 25 minutes.  Currently on propofol 50 mcg/kg/min.    MS: No eye opening.  No attempts at verbalizations.  No follow commands. No response to supraorbital pressure.  CN:  No BTT.   No roving eye movements.  OCR - no EOM.  Corneal reflex is absent.  Pupils round,  3mm-->2mm, B, sluggish.  Gag - absent.  Motor/sensory:  Tone: flaccid with no response to nailbed pressure in all 4 ext.    A/P  Mr. Holman is an 78 yo man with anoxic ischemic encephalopathy.  We are unable to asses his prognosis based on examination at this time since it has been less than 2 days and he is on sedation.  Length of time of arrest, evidence of tissue hypoperfusion of other organs (elevated LFTs, DOV) and electroclinical seizures are poor prognostic signs.  We will reassess for prognosis after he is off sedation.    Continuous EEG  Continue levetiracetam 1500 Q12H  Thank you    There is a high probability of sudden, clinically significant, or life threatening deterioration in the patient’s condition which requires the highest level of physician preparedness to intervene urgently.  Critical care time:  55 minutes.

## 2025-01-29 NOTE — CONSULT NOTE ADULT - ATTENDING COMMENTS
Critically ill patient s/p cardiac arrest with a LLL cavitary mass. Noted to have bleeding post CPR. Unclear if bleeding is from cavitary lesion or related to CPR/intubation. Clots noted in airway and IP consult requested for therapeutic bronchoscopy and airway management. Patient currently having seizures and being planned for urgent EEG. Also ongoing goc discussions. Ones those are established, then we will proceed with diagnostic and therapeutic bronchoscopy.

## 2025-01-29 NOTE — DIETITIAN INITIAL EVALUATION ADULT - LAB (SPECIFY)
- Obtain HbA1c to help further assess long term glycemic status - Obtain HbA1c to help further assess long term glycemic status  - Obtain iron studies

## 2025-01-29 NOTE — PROGRESS NOTE ADULT - ATTENDING COMMENTS
79 M with CAD, CABG, not on plavix or a/c, COPD here after cardiac arrest at home of unclear etiology, acute hypoxemic respiratory failure due to aspiration pneumonia +/- cavitary lesion/lung abscess +/- septic shock due to UTI    Unclear why patient had cardiac arrest - may have UTI, may have lung abscess or VOLODYMYR (concern for VOLODYMYR in 2019) or mycetoma that caused worsening hypoxemia.  Overnight, had clotting in airway requiring bronch with improvement in hypoxemia, no bleeding since.    # acute hypoxemic respiratory failure  # cardiac arrest  # septic shock due to UTI  # cvaitary lung lesion  # DOV due to shock  - c/w full vent support for now  - wean sedation, check EEG, neuron specific enolase  - give IVF, monitor UOP  - will ask IP to bronch today to remove clots possibly with cryo      full code per family for now  SCDs

## 2025-01-29 NOTE — DIETITIAN INITIAL EVALUATION ADULT - OTHER INFO
Spoke with RN.  Pt. remains intubated.  Propofol being weaned.  Nutrition plan, as noted and reported, is to obtain OGT access for provision of tube feeding.   Spoke with RN.  Pt. remains intubated.  Propofol being weaned.  Nutrition plan, as noted and reported, is to obtain OGT access for provision of tube feeding.  vEEG monitoring being set up.    Providers to be informed of nutrition recommendations.  Verbalized suggested enteral formula to RN (Arianna Rodríguez's Peptide 1.5)... electrolytes improving.

## 2025-01-29 NOTE — PROGRESS NOTE ADULT - ASSESSMENT
79-year-old male PMH CAD (s/p stent 2011 and CABG 2017, hypothyroidism, gout, COPD presenting after cardiac arrest admitted to the MICU for management of cardiogenic shock.    =====Neurologic=====  #Altered Mental Status  - Patient is AOx0 after being found down for approx 25 minutes   - Pt is intubated, not requiring sedation due to obtunded mental status  - Possible anoxia in setting of prolonged down time prior to ROSC  - f/u CT head    =====Cardiovascular=====  #Cardiac arrest  - Pt s/p cardiac arrest with ROSC obtained in the field per EMS prior to arrival. Pt hypotensive upon arrival.  - Labs with uptrending AST ALT c/f shock liver and possible cardiogenic shock   - f/u ECG  - Trend tropes   - Monitor on telemetry     =====Pulmonary=====  #AHRF  - Patient obtuneded, intubated in setting of cardiac arrest   - CXR s/p intubation with left lung base opacity may reflect atelectasis vs. infection   - Treat infection as below     =====GI=====  #Transaminitis  Pt with increasing LFTs and coags concerning for shock liver  - f/u CT abd/pelvis   - continue to support blood pressure, pressor support    =====Renal/=====  #DOV, Cr uptrending, likely pre-renal iso shock  - pt has yu in  - f/u urine cultures  - Trend CMP q8    #Metabolic acidosis  - in setting of cardiac arrest and prolonged downtime with tissue hypoperfusion causing metabolic acidosis 2/2 lactate  - S/p bicarb push  - continue to trend lactate   - cont fluid resuscitation     =====Infectious Disease=====  #SIRS criteria   - Empiric coverage for now  - cont vanc+zosyn   - f/u blood cultures  - f/u urine cultures, U/A positive possible contamination  - f/u MRSA    =====Endocrine=====  pt has hypothyroidism, on levothyroxine 50 mcg at home  - IV synthroid 37 mcg daily    =====Heme/Onc=====  - DVT PPX: Heparin SubQ    =====MICU General=====  Drips:  epi  Intubated 1/28  AB vanc+zosyn  DVT: Heparin SubQ  Med rec: to be completed after obtaining collateral  79-year-old male PMH CAD (s/p stent 2011 and CABG 2017, hypothyroidism, gout, COPD presenting after cardiac arrest admitted to the MICU for management of cardiogenic shock.    =====Neurologic=====  #Altered Mental Status  - Patient is AOx0 after being found down for approx 25 minutes   - Pt is intubated, weaning Propofol, on Precedex  - Possible anoxia in setting of prolonged down time prior to ROSC  - CT head 1/28 with no acute intracranial hemorrhage, mass effect, midline shift  - spot EEG, neuron specific enolase    =====Cardiovascular=====  #Cardiac arrest  Pt s/p cardiac arrest with ROSC obtained in the field per EMS prior to arrival. Pt hypotensive upon arrival.  Labs with uptrending AST ALT c/f shock liver and possible cardiogenic shock   Pt weaned off pressors    - Trend trops   - Monitor on telemetry   - TTE    =====Pulmonary=====  #AHRF  Patient obtunded, intubated in setting of cardiac arrest   CXR s/p intubation with left lung base opacity may reflect atelectasis vs. infection   Pt s/p bronchoscopy overnight 1/28 showing large blood clot extending from the distal trachea, occluding the left mainstem bronchus, extending to the left lower lobe bronchus  CT chest 1/28: Cavitary opacity in the left lower lobe measuring up to 7.9 cm. most likely represents pulmonary abscess, less likely neoplasm  Pt follows with Dr. Guardado outpatient, concern for VOLODYMYR    - Treat infection empirically with Vanc, Zosyn, Azithro  - f/u full RVP, urine legionella  - duonebs q6hr      =====GI=====  #Transaminitis  Pt with increasing LFTs and coags concerning for shock liver  - continue to support blood pressure and monitor    #Diet  - will put in OG tube    =====Renal/=====  #DOV, Cr uptrending, likely pre-renal iso shock  pt has yu in  - f/u urine cultures and urine studies  - 1L LR bolus  - Trend CMP q8    #Metabolic acidosis  - in setting of cardiac arrest and prolonged downtime with tissue hypoperfusion causing metabolic acidosis 2/2 lactate  - S/p bicarb push  - continue to trend lactate   - cont fluid resuscitation     =====Infectious Disease=====  #SIRS criteria   - Empiric coverage for now  - cont vanc+zosyn+ azithromycin  - f/u blood cultures  - f/u urine cultures, U/A positive possible contamination  - f/u MRSA    =====Endocrine=====  pt has hypothyroidism, on levothyroxine 25 mcg at home  - IV synthroid 12.5 mcg daily    =====Heme/Onc=====  - DVT PPX: Heparin SubQ    =====MICU General=====  Intubated 1/28  AB vanc+zosyn+azithro  DVT: Heparin SubQ

## 2025-01-29 NOTE — DIETITIAN INITIAL EVALUATION ADULT - PERTINENT LABORATORY DATA
01-29    138  |  96[L]  |  45[H]  ----------------------------<  174[H]  4.5   |  29  |  2.09[H]    Ca    7.9[L]      29 Jan 2025 10:05  Phos  5.7     01-29  Mg     2.50     01-29    CAPILLARY BLOOD GLUCOSE    POCT Blood Glucose.: 198 mg/dL (29 Jan 2025 11:42)  POCT Blood Glucose.: 234 mg/dL (29 Jan 2025 05:19)  POCT Blood Glucose.: 239 mg/dL (29 Jan 2025 01:08)     01-29    138  |  96[L]  |  45[H]  ----------------------------<  174[H]  4.5   |  29  |  2.09[H]    Ca    7.9[L]      29 Jan 2025 10:05  Phos  5.7     01-29  Mg     2.50     01-29    CAPILLARY BLOOD GLUCOSE    POCT Blood Glucose.: 198 mg/dL (29 Jan 2025 11:42)  POCT Blood Glucose.: 234 mg/dL (29 Jan 2025 05:19)  POCT Blood Glucose.: 239 mg/dL (29 Jan 2025 01:08)    Total Hemoglobin, Calculated: 8.3 g/dL (01.29.25 @ 10:05)  Hematocrit, Calculated: 25.0 % (01.29.25 @ 10:05)  Mean Cell Volume: 76.8 fL (01.29.25 @ 10:05)

## 2025-01-29 NOTE — CONSULT NOTE ADULT - ASSESSMENT
Assessment:  Patient DESTINI ENRIQEU is a 79y (1945) with a PMHx significant for COPD, CAD< stent 2011, CABG 207, presented from home after cardiac arrest, ROSC achieved in 25min, intubated and sedated on propofol, intermittently hypotensive, bradycardic and hypoxic, left lung opacity, left airway blood clots, got rEEG as a part of anoxic brain injury management, prelim reading of which showed 9 seizures in 20 min. Clinically also he was noted to have intermittent b/l UE stiffening and shaking. He was weaned off propofol around 1200 1/29, he got connected to EEG around 1300, thats when around 9 seizures were noted on EEG in 20min, clinically as well, RN described few episodes of b/l UE stiffening and shaking, unclear how long it lasted. He was put back again on propofol around 1700 and rate was gradually increased to current rate of 20mcg. Currently he os overbreathing the ventilator. On exam, comatose, eye opening but non verbal, not withdrawing to pain, gag reflex present, flaccid, no spontaneous movement, no posturing.     Impression: Anoxic brain injury c/b seizures (semiology unclear, could be GTC vs myoclonic)    Recs:  [] vEEG to continue to evaluate for focal slowing, epileptiform discharges, or seizures   [] continue propofol at current rate, increase if clinical seizures  [] start Keppra 1.5g q12 first dose now  [] MRI brain w/o contrast to evaluate for anoxic brain injury (cortical injury between day 2-7 post-cardiac arrest and subcortical injury day 5-10 post-cardiac arrest and brainstem injury day 7-14)  [] Check neuron specific enolase on 24-72hrs post-cardiac arrest  (<30 better prognosis, 30-60 indeterminate, >60 poor prognosis). Please obtain first NSE 24-48 hrs post-cardiac arrest and 48-72 hrs post-cardiac arrest.   [] Avoid sedating agents that can significantly affect EEG, such as benzodiazepines and propofol. Precedex, fentanyl, and hydromorphone are OK  [] GOC per primary team    Patient with cardiac arrest possibly secondary to unclear etiology with total time until ROSC ~ 25 minutes. Ultimate neurologic prognosis for meaningful recovery at this time is unclear.  Should be off sedation 24 hours for accurate exam. Precedex, fentanyl, hydromorphone are ok (slow EEG but do no suppress it).   Propofol and benzo slow and suppress EEG.   NSE marker of neuronal injury. Takes time for it to pass Blood brain barrier. After 72 hours, cannot draw.     Case discussed with epileptologist. To be seen by attending in AM.      Assessment:  Patient DESTINI ENRIQUE is a 79y (1945) with a PMHx significant for COPD, CAD< stent 2011, CABG 207, presented from home after cardiac arrest, ROSC achieved in 25min, intubated and sedated on propofol, intermittently hypotensive, bradycardic and hypoxic, left lung opacity, left airway blood clots, got rEEG as a part of anoxic brain injury management, prelim reading of which showed 9 seizures in 20 min. Clinically also he was noted to have intermittent b/l UE stiffening and shaking. He was weaned off propofol around 1200 1/29, he got connected to EEG around 1300, thats when around 9 seizures were noted on EEG in 20min, clinically as well, RN described few episodes of b/l UE stiffening and shaking, unclear how long it lasted. He was put back again on propofol around 1700 and rate was gradually increased to current rate of 20mcg. Currently he os overbreathing the ventilator. On exam, comatose, eye opening but non verbal, not withdrawing to pain, gag reflex present, flaccid, no spontaneous movement, no posturing.     Impression: Anoxic brain injury c/b seizures (semiology unclear, could be GTC vs myoclonic)    Recs:  [] vEEG to continue to evaluate for focal slowing, epileptiform discharges, or seizures   [] continue propofol at current rate, increase if clinical seizures  [] start Keppra 1.5g q12h first dose now  [] MRI brain w/o contrast to evaluate for anoxic brain injury (cortical injury between day 2-7 post-cardiac arrest and subcortical injury day 5-10 post-cardiac arrest and brainstem injury day 7-14)  [] Check neuron specific enolase on 24-72hrs post-cardiac arrest  (<30 better prognosis, 30-60 indeterminate, >60 poor prognosis). Please obtain first NSE 24-48 hrs post-cardiac arrest and 48-72 hrs post-cardiac arrest.   [] Avoid sedating agents that can significantly affect EEG, such as benzodiazepines and propofol. Precedex, fentanyl, and hydromorphone are OK  [] GOC per primary team    Patient with cardiac arrest possibly secondary to unclear etiology with total time until ROSC ~ 25 minutes. Ultimate neurologic prognosis for meaningful recovery at this time is unclear.  Should be off sedation 24 hours for accurate exam. Precedex, fentanyl, hydromorphone are ok (slow EEG but do no suppress it).   Propofol and benzo slow and suppress EEG.   NSE marker of neuronal injury. Takes time for it to pass Blood brain barrier. After 72 hours, cannot draw.     Case discussed with epileptologist. To be seen by attending in AM.      Assessment:  Patient DESTINI ENRIQUE is a 79y (1945) with a PMHx significant for COPD, CAD< stent 2011, CABG 207, presented from home after cardiac arrest, ROSC achieved in 25min, intubated and sedated on propofol, intermittently hypotensive, bradycardic and hypoxic, left lung opacity, left airway blood clots, got rEEG as a part of anoxic brain injury management, prelim reading of which showed 9 seizures in 20 min. Clinically also he was noted to have intermittent b/l UE stiffening and shaking. He was weaned off propofol around 1200 1/29, he got connected to EEG around 1300, thats when around 9 seizures were noted on EEG in 20min, clinically as well, RN described few episodes of b/l UE stiffening and shaking, unclear how long it lasted. He was put back again on propofol around 1700 and rate was gradually increased to current rate of 20mcg. Currently he os overbreathing the ventilator. On exam, comatose, eye opening but non verbal, not withdrawing to pain, gag reflex present, flaccid, no spontaneous movement, no posturing.     Impression: Anoxic brain injury c/b seizures (semiology unclear, could be GTC vs myoclonic)    Recs:  [] vEEG to continue to evaluate for focal slowing, epileptiform discharges, or seizures   [] continue propofol at current rate, increase if clinical seizures  [] start Keppra 1.5g q12h first dose now  [] MRI brain w/o contrast to evaluate for anoxic brain injury (cortical injury between day 2-7 post-cardiac arrest and subcortical injury day 5-10 post-cardiac arrest and brainstem injury day 7-14)  [] Check neuron specific enolase on 28-72hrs post-cardiac arrest. Please obtain first NSE 48 hrs post-cardiac arrest and 72 hrs post-cardiac arrest.   [] Avoid sedating agents that can significantly affect EEG, such as benzodiazepines and propofol. Precedex, fentanyl, and hydromorphone are OK  [] GOC per primary team    Patient with cardiac arrest possibly secondary to unclear etiology with total time until ROSC ~ 25 minutes. Ultimate neurologic prognosis for meaningful recovery at this time is unclear.  Should be off sedation 24 hours for accurate exam. Precedex, fentanyl, hydromorphone are ok (slow EEG but do no suppress it).   Propofol and benzo slow and suppress EEG.   NSE marker of neuronal injury. Takes time for it to pass Blood brain barrier.    Case discussed with epileptologist. To be seen by attending in AM.      Assessment:  Patient DESTINI ENRIQUE is a 79y (1945) with a PMHx significant for COPD, CAD< stent 2011, CABG 207, presented from home after cardiac arrest, ROSC achieved in 25min, intubated and sedated on propofol, intermittently hypotensive, bradycardic and hypoxic, left lung opacity, left airway blood clots, got rEEG as a part of anoxic brain injury management, prelim reading of which showed 9 seizures in 20 min. Clinically also he was noted to have intermittent b/l UE stiffening and shaking. He was weaned off propofol around 1200 1/29, he got connected to EEG around 1300, thats when around 9 seizures were noted on EEG in 20min, clinically as well, RN described few episodes of b/l UE stiffening and shaking, unclear how long it lasted. He was put back again on propofol around 1700 and rate was gradually increased to current rate of 20mcg. Currently he os overbreathing the ventilator. On exam, comatose, eye opening but non verbal, not withdrawing to pain, gag reflex present, flaccid, no spontaneous movement, no posturing.     Impression: Anoxic brain injury c/b seizures (semiology unclear, could be GTC vs myoclonic)    Recs:  [] vEEG to continue to evaluate for focal slowing, epileptiform discharges, or seizures   [] continue propofol at current rate, increase if clinical seizures  [] start Keppra 1.5g q12h first dose now  [] MRI brain w/o contrast to evaluate for anoxic brain injury (cortical injury between day 2-7 post-cardiac arrest and subcortical injury day 5-10 post-cardiac arrest and brainstem injury day 7-14)  [] Check neuron specific enolase on 48-72hrs post-cardiac arrest. Please obtain first NSE 48 hrs post-cardiac arrest and 72 hrs post-cardiac arrest.   [] Avoid sedating agents that can significantly affect EEG, such as benzodiazepines and propofol. Precedex, fentanyl, and hydromorphone are OK  [] GOC per primary team    Patient with cardiac arrest possibly secondary to unclear etiology with total time until ROSC ~ 25 minutes. Ultimate neurologic prognosis for meaningful recovery at this time is unclear.  Should be off sedation 24 hours for accurate exam. Precedex, fentanyl, hydromorphone are ok (slow EEG but do no suppress it).   Propofol and benzo slow and suppress EEG.   NSE marker of neuronal injury. Takes time for it to pass Blood brain barrier.    Case discussed with epileptologist. To be seen by attending in AM.

## 2025-01-29 NOTE — DIETITIAN INITIAL EVALUATION ADULT - PERTINENT MEDS FT
MEDICATIONS  (STANDING):  albuterol/ipratropium for Nebulization 3 milliLiter(s) Nebulizer every 6 hours  azithromycin  IVPB 500 milliGRAM(s) IV Intermittent every 24 hours  chlorhexidine 0.12% Liquid 15 milliLiter(s) Oral Mucosa every 12 hours  chlorhexidine 2% Cloths 1 Application(s) Topical <User Schedule>  dexMEDEtomidine Infusion 0.05 MICROgram(s)/kG/Hr (0.6 mL/Hr) IV Continuous <Continuous>  dextrose 5%. 1000 milliLiter(s) (100 mL/Hr) IV Continuous <Continuous>  dextrose 5%. 1000 milliLiter(s) (50 mL/Hr) IV Continuous <Continuous>  dextrose 50% Injectable 25 Gram(s) IV Push once  dextrose 50% Injectable 12.5 Gram(s) IV Push once  dextrose 50% Injectable 25 Gram(s) IV Push once  glucagon  Injectable 1 milliGRAM(s) IntraMuscular once  heparin   Injectable 5000 Unit(s) SubCutaneous every 12 hours  insulin lispro (ADMELOG) corrective regimen sliding scale   SubCutaneous every 6 hours  levothyroxine Injectable 18 MICROGram(s) IV Push at bedtime  norepinephrine Infusion 0.729 MICROgram(s)/kG/Min (65.6 mL/Hr) IV Continuous <Continuous>  pantoprazole  Injectable 40 milliGRAM(s) IV Push two times a day  petrolatum Ophthalmic Ointment 1 Application(s) Both EYES two times a day  piperacillin/tazobactam IVPB.. 3.375 Gram(s) IV Intermittent every 12 hours  propofol Infusion 10 MICROgram(s)/kG/Min (2.88 mL/Hr) IV Continuous <Continuous>  sodium bicarbonate  Infusion 0.321 mEq/kG/Hr (150 mL/Hr) IV Continuous <Continuous>    MEDICATIONS  (PRN):  dextrose Oral Gel 15 Gram(s) Oral once PRN Blood Glucose LESS THAN 70 milliGRAM(s)/deciliter

## 2025-01-29 NOTE — CHART NOTE - NSCHARTNOTEFT_GEN_A_CORE
Indication: Shock    Performed by: Marc Paula    PROCEDURE:  [x] LIMITED ECHO  [x] LIMITED CHEST  [ ] LIMITED RETROPERITONEAL  [ ] LIMITED ABDOMINAL  [ ] LIMITED DVT  [ ] NEEDLE GUIDANCE VASCULAR  [ ] NEEDLE GUIDANCE THORACENTESIS  [ ] NEEDLE GUIDANCE PARACENTESIS  [ ] NEEDLE GUIDANCE PERICARDIOCENTESIS  [ ] OTHER    FINDINGS:  Chest: Small consolidation right basal lung. There is a large consolidation / hepatization of basal lung.     ECHO: Grossly normal RV function. Mildly reduced LV function with EF in the 46-52% range by visual gestalt. There is no wall motion abnormalities, septal bowing/flattening, or gross valvular pathology   No pericardial effusion  IVC: 1.0 cm Indication: Shock    Performed by: Marc Paula    PROCEDURE:  [x] LIMITED ECHO  [x] LIMITED CHEST  [ ] LIMITED RETROPERITONEAL  [ ] LIMITED ABDOMINAL  [ ] LIMITED DVT  [ ] NEEDLE GUIDANCE VASCULAR  [ ] NEEDLE GUIDANCE THORACENTESIS  [ ] NEEDLE GUIDANCE PARACENTESIS  [ ] NEEDLE GUIDANCE PERICARDIOCENTESIS  [ ] OTHER    FINDINGS:  Chest: Small consolidation right basal lung. There is a large consolidation / hepatization of basal left lung.     ECHO: Grossly normal RV function. Mildly reduced LV function.   No pericardial effusion  IVC: 1.0 cm    Attending Attestation:  I was present during the key portions of the procedure and immediately available during the entire procedure.  I have personally reviewed the images and agree with the interpretation above by the resident/fellow/ACP.    Etelvina Odom MD  Attending  Pulmonary & Critical Care Medicine

## 2025-01-29 NOTE — DIETITIAN INITIAL EVALUATION ADULT - ENTERAL
- If/when enteral feeding tube placed and appropriate to use, initiate   - If/when enteral feeding tube placed and appropriate to use, initiate Ariannaeli Rodríguez's Peptide 1.5 @ 10mL/hr then increase by 10mL q4hrs, as tolerated, to goal of 50mL/hr x 18hrs (11a-5a)    will provide:  900mL total volume  1350 Kcals  67g protein  630mL free water    *will give 28 KCals/kg current weight & 1.4g protein/kg current weight

## 2025-01-29 NOTE — DIETITIAN INITIAL EVALUATION ADULT - ADD RECOMMEND
- Monitor GI status, electrolytes, glucose    -  Adjust insulin coverage to aid with glycemic status, particularly if/when to initiate tube feeding

## 2025-01-29 NOTE — EEG REPORT - NS EEG TEXT BOX
KLAUSDESTINI [id number]     Study Date: 01-29-25  Duration: 22 min    --------------------------------------------------------------------------------------------------  History:  CC/ HPI Patient is a 79y old  Male who presents with a chief complaint of Post cardiac arrest (29 Jan 2025 13:24)    MEDICATIONS  (STANDING):  albuterol/ipratropium for Nebulization 3 milliLiter(s) Nebulizer every 6 hours  azithromycin  IVPB 500 milliGRAM(s) IV Intermittent every 24 hours  calcium gluconate IVPB 2 Gram(s) IV Intermittent once  dexMEDEtomidine Infusion 0.05 MICROgram(s)/kG/Hr (0.6 mL/Hr) IV Continuous <Continuous>  fentaNYL    Injectable 50 MICROGram(s) IV Push once  glucagon  Injectable 1 milliGRAM(s) IntraMuscular once  heparin   Injectable 5000 Unit(s) SubCutaneous every 12 hours  insulin lispro (ADMELOG) corrective regimen sliding scale   SubCutaneous every 6 hours  latanoprost 0.005% Ophthalmic Solution 1 Drop(s) Both EYES at bedtime  levothyroxine Injectable 18 MICROGram(s) IV Push at bedtime  norepinephrine Infusion 0.729 MICROgram(s)/kG/Min (65.6 mL/Hr) IV Continuous <Continuous>  pantoprazole  Injectable 40 milliGRAM(s) IV Push two times a day  petrolatum Ophthalmic Ointment 1 Application(s) Both EYES two times a day  piperacillin/tazobactam IVPB.. 3.375 Gram(s) IV Intermittent every 12 hours  propofol Infusion 10 MICROgram(s)/kG/Min (2.88 mL/Hr) IV Continuous <Continuous>    --------------------------------------------------------------------------------------------------  Study Interpretation:    [[[Abbreviation Key:  PDR=alpha rhythm/posterior dominant rhythm. A-P=anterior posterior gradient.  Amplitude: ‘very low’:<20; ‘low’:20-50; ‘medium’:; ‘high’:>200uV.  Persistence for periodic/rhythmic patterns (% of epoch) ‘rare’:<1%; ‘occasional’:1-10%; ‘frequent’:10-50%; ‘abundant’:50-90%; ‘continuous’:>90%.  Persistence for sporadic discharges: ‘rare’:<1/hr; ‘occasional’:1/min-1/hr; ‘frequent’:>1/min; ‘abundant’:>1/10 sec.  GRDA=generalized rhythmic delta activity; FIRDA=frontal intermittent GRDA; LRDA=lateralized rhythmic delta activity; TIRDA=temporal intermittent rhythmic delta activity;  LPD=PLED=lateralized periodic discharges; GPD=generalized periodic discharges; BiPDs=BiPLEDs=bilateral independent periodic epileptiform discharges; SIRPID=stimulus induced rhythmic, periodic, or ictal appearing discharges; BIRDs=brief potentially ictal rhythmic discharges >4 Hz, lasting .5-10s; PFA=paroxysmal bursts of beta/gamma; LVFA=low voltage fast activity.  Modifiers: +F=with fast component; +S=with spike component; +R=with rhythmic component.  S-B=burst suppression pattern.  Max=maximal. N1-drowsy; N2-stage II sleep; N3-slow wave sleep. SSS/BETS=small sharp spikes/benign epileptiform transients of sleep. HV=hyperventilation; PS=photic stimulation]]]    FINDINGS:      Background:  Continuity: Continuous  Symmetry: Symmetric  PDR: No clear PDR  Reactivity: Present  Voltage: Normal (between 20-150uV)  Anterior Posterior Gradient: Present  Other background findings: None  Breach: Absent    Background Slowing:  Generalized slowing: Diffuse delta and theta activity.  Focal slowing: None was present.    State Changes:   -Drowsiness noted with increased slowing, attenuation of fast activity, vertex transients.  -N2 sleep transients were not recorded.    Sporadic Epileptiform Discharges:    None    Rhythmic and Periodic Patterns (RPPs):  None     Electrographic and Electroclinical seizures:  9 electrographic seizures captured with generalized onset, although artifact limits interpretation. Clinically, the patient is stiffening both upper extremities for about 20 - 40 seconds in duration. Last seizure recorded at 14:33.    Other Clinical Events:  None    Activation Procedures:   -Hyperventilation was not performed.    -Photic stimulation was performed and did not elicit any abnormalities.       Artifacts:  Intermittent myogenic and movement artifacts were noted.    ECG:  The heart rate on single channel ECG was predominantly between 70 - 80 BPM, irregularly irregular.    EEG Classification / Summary:  Abnormal EEG study in the awake / drowsy states  -9 electrographic generalized onset seizures, last recorded at 14:33.  -Generalized background slowing, moderate    -----------------------------------------------------------------------------------------------------    Clinical Impression:  Abnormal EEG study  Findings indicate an underlying generalized epilepsy.  Moderate diffuse/multi-focal cerebral dysfunction, not specific as to etiology.    This is a preliminary impression still pending attendings attestation.     -------------------------------------------------------------------------------------------------------  EEG reading room: [phone]    After-hours epilepsy service: [phone]    Manuel Adams DO  Epilepsy Fellow   KLAUSDESTINI [id number]     Study Date: 01-29-25  Duration: 22 min    --------------------------------------------------------------------------------------------------  History:  CC/ HPI Patient is a 79y old  Male who presents with a chief complaint of Post cardiac arrest (29 Jan 2025 13:24)    MEDICATIONS  (STANDING):  albuterol/ipratropium for Nebulization 3 milliLiter(s) Nebulizer every 6 hours  azithromycin  IVPB 500 milliGRAM(s) IV Intermittent every 24 hours  calcium gluconate IVPB 2 Gram(s) IV Intermittent once  dexMEDEtomidine Infusion 0.05 MICROgram(s)/kG/Hr (0.6 mL/Hr) IV Continuous <Continuous>  fentaNYL    Injectable 50 MICROGram(s) IV Push once  glucagon  Injectable 1 milliGRAM(s) IntraMuscular once  heparin   Injectable 5000 Unit(s) SubCutaneous every 12 hours  insulin lispro (ADMELOG) corrective regimen sliding scale   SubCutaneous every 6 hours  latanoprost 0.005% Ophthalmic Solution 1 Drop(s) Both EYES at bedtime  levothyroxine Injectable 18 MICROGram(s) IV Push at bedtime  norepinephrine Infusion 0.729 MICROgram(s)/kG/Min (65.6 mL/Hr) IV Continuous <Continuous>  pantoprazole  Injectable 40 milliGRAM(s) IV Push two times a day  petrolatum Ophthalmic Ointment 1 Application(s) Both EYES two times a day  piperacillin/tazobactam IVPB.. 3.375 Gram(s) IV Intermittent every 12 hours  propofol Infusion 10 MICROgram(s)/kG/Min (2.88 mL/Hr) IV Continuous <Continuous>    --------------------------------------------------------------------------------------------------  Study Interpretation:    [[[Abbreviation Key:  PDR=alpha rhythm/posterior dominant rhythm. A-P=anterior posterior gradient.  Amplitude: ‘very low’:<20; ‘low’:20-50; ‘medium’:; ‘high’:>200uV.  Persistence for periodic/rhythmic patterns (% of epoch) ‘rare’:<1%; ‘occasional’:1-10%; ‘frequent’:10-50%; ‘abundant’:50-90%; ‘continuous’:>90%.  Persistence for sporadic discharges: ‘rare’:<1/hr; ‘occasional’:1/min-1/hr; ‘frequent’:>1/min; ‘abundant’:>1/10 sec.  GRDA=generalized rhythmic delta activity; FIRDA=frontal intermittent GRDA; LRDA=lateralized rhythmic delta activity; TIRDA=temporal intermittent rhythmic delta activity;  LPD=PLED=lateralized periodic discharges; GPD=generalized periodic discharges; BiPDs=BiPLEDs=bilateral independent periodic epileptiform discharges; SIRPID=stimulus induced rhythmic, periodic, or ictal appearing discharges; BIRDs=brief potentially ictal rhythmic discharges >4 Hz, lasting .5-10s; PFA=paroxysmal bursts of beta/gamma; LVFA=low voltage fast activity.  Modifiers: +F=with fast component; +S=with spike component; +R=with rhythmic component.  S-B=burst suppression pattern.  Max=maximal. N1-drowsy; N2-stage II sleep; N3-slow wave sleep. SSS/BETS=small sharp spikes/benign epileptiform transients of sleep. HV=hyperventilation; PS=photic stimulation]]]    FINDINGS:      Background:  Continuity: Continuous  Symmetry: Symmetric  PDR: No clear PDR  Reactivity: Present  Voltage: Normal (between 20-150uV)  Anterior Posterior Gradient: Present  Other background findings: None  Breach: Absent    Background Slowing:  Generalized slowing: Diffuse delta and theta activity.  Focal slowing: None was present.    State Changes:   -Drowsiness noted with increased slowing, attenuation of fast activity, vertex transients.  -N2 sleep transients were not recorded.    Sporadic Epileptiform Discharges:    None    Rhythmic and Periodic Patterns (RPPs):  None     Electrographic and Electroclinical seizures:  9 electrographic seizures captured with generalized onset, although artifact limits interpretation. Clinically, the patient is stiffening both upper extremities for about 20 - 40 seconds in duration. Last seizure recorded at 14:33. Electrographically, seizures begin with a generlalized polyspike discharge followed by a periodic - approximately 1 Hz spike wave discharge.     Other Clinical Events:  None    Activation Procedures:   -Hyperventilation was not performed.    -Photic stimulation was performed and did not elicit any abnormalities.       Artifacts:  Intermittent myogenic and movement artifacts were noted.    ECG:  The heart rate on single channel ECG was predominantly between 70 - 80 BPM, irregularly irregular.    -----------------------------------------------------------------------------------------------------  EEG Classification / Summary:  Abnormal EEG study in the awake / drowsy states  -9 electrographic generalized onset seizures, last recorded at 14:33.  -Generalized background slowing, moderate    -----------------------------------------------------------------------------------------------------  Clinical Impression:  Abnormal EEG study  Findings indicate recurrent seizures that appear to have generalized onset.  Moderate diffuse/multi-focal cerebral dysfunction, not specific as to etiology.    -------------------------------------------------------------------------------------------------------  EEG reading room: [phone]  After-hours epilepsy service: [phone]    Manuel Adams DO  Epilepsy Fellow    Devon Ross MD PhD  Director, Epilepsy Division, [facility]

## 2025-01-29 NOTE — CONSULT NOTE ADULT - SUBJECTIVE AND OBJECTIVE BOX
Neurology - Consult Note    -  Spectra: [phone] ([facility]), [phone] ([facility])  -    HPI: Patient DESTINI ENRIQUE is a 79y (1945) with a PMHx significant for COPD, CAD< stent 2011, CABG 207, presented from home after cardiac arrest, ROSC achieved in 25min, intubated and sedated on propofol, intermittently hypotensive, bradycardic and hypoxic, left lung opacity, left airway blood clots, got rEEG as a part of anoxic brain injury management, prelim reading of which showed 9 seizures in 20 min. Clinically also he was noted to have intermittent b/l UE stiffening and shaking. He was weaned off propofol around 1200 1/29, he got connected to EEG around 1300, thats when around 9 seizures were noted on EEG in 20min, clinically as well, RN described few episodes of b/l UE stiffening and shaking, unclear how long it lasted. He was put back again on propofol around 1700 and rate was gradually increased to current rate of 20mcg. Currently he os overbreathing the ventilator.     All other review of systems is negative unless indicated above.    Allergies:  No Known Allergies      PMHx/PSHx/Family Hx: As above, otherwise see below   COPD (chronic obstructive pulmonary disease)    Former smoker    Gout    HTN (hypertension)    MI (myocardial infarction)    CAD (coronary artery disease)    Stented coronary artery    Glaucoma    Hypothyroid        Social Hx:  No current use of tobacco, alcohol, or illicit drugs    Medications:  MEDICATIONS  (STANDING):  albuterol/ipratropium for Nebulization 3 milliLiter(s) Nebulizer every 6 hours  azithromycin  IVPB 500 milliGRAM(s) IV Intermittent every 24 hours  chlorhexidine 0.12% Liquid 15 milliLiter(s) Oral Mucosa every 12 hours  chlorhexidine 2% Cloths 1 Application(s) Topical <User Schedule>  dexMEDEtomidine Infusion 0.05 MICROgram(s)/kG/Hr (0.6 mL/Hr) IV Continuous <Continuous>  dextrose 5%. 1000 milliLiter(s) (100 mL/Hr) IV Continuous <Continuous>  dextrose 5%. 1000 milliLiter(s) (50 mL/Hr) IV Continuous <Continuous>  dextrose 50% Injectable 25 Gram(s) IV Push once  dextrose 50% Injectable 12.5 Gram(s) IV Push once  dextrose 50% Injectable 25 Gram(s) IV Push once  glucagon  Injectable 1 milliGRAM(s) IntraMuscular once  heparin   Injectable 5000 Unit(s) SubCutaneous every 12 hours  insulin lispro (ADMELOG) corrective regimen sliding scale   SubCutaneous every 6 hours  latanoprost 0.005% Ophthalmic Solution 1 Drop(s) Both EYES at bedtime  levothyroxine Injectable 18 MICROGram(s) IV Push at bedtime  norepinephrine Infusion 0.729 MICROgram(s)/kG/Min (65.6 mL/Hr) IV Continuous <Continuous>  pantoprazole  Injectable 40 milliGRAM(s) IV Push two times a day  petrolatum Ophthalmic Ointment 1 Application(s) Both EYES two times a day  piperacillin/tazobactam IVPB.. 3.375 Gram(s) IV Intermittent every 12 hours  propofol Infusion 10 MICROgram(s)/kG/Min (2.88 mL/Hr) IV Continuous <Continuous>  sodium bicarbonate  Infusion 0.321 mEq/kG/Hr (150 mL/Hr) IV Continuous <Continuous>    MEDICATIONS  (PRN):  dextrose Oral Gel 15 Gram(s) Oral once PRN Blood Glucose LESS THAN 70 milliGRAM(s)/deciliter      --------------------------------------------------------------------------------------------------------------------------------------------------------------------------------------------------------------------    Vitals:  T(C): 37.8 (01-29-25 @ 16:00), Max: 37.8 (01-29-25 @ 16:00)  HR: 84 (01-29-25 @ 18:46) (59 - 92)  BP: 79/54 (01-29-25 @ 18:37) (75/45 - 175/93)  RR: 33 (01-29-25 @ 18:46) (15 - 38)  SpO2: 100% (01-29-25 @ 18:46) (61% - 100%)    PHYSICAL EXAM:     General: Nonresponsive patient, lying in bed and in respiratory distress; intubated, sedated on 20mcg propofol, otherwise on bicarb and levo drip  Eyes: Conjunctiva and sclera with clear secretions  Cardiovascular:   Neurologic:  - Mental Status: Comatose, eyelids open midposition, some mh2ttly movements non verbal, not withdrawing to pain  - Cranial Nerves II-XII:  Pupils are 3 mm, equal, round, and reactive to light.  Vestibular occular reflex and blink reflex absent. Gag reflex present.   - Motor: Flaccid throughout, no spontaneous movement. No decorticate or decerebrate posturing on exam. No Triple flexion to pain present.   - Reflexes: Plantar responses mute. Otherwise 0 throughout  - Sensory: No Grimace to pain in all extremities.  - Coordination & Gait: Unable to obtain.    ---------------------------------------------------------------------------------------------------------------------------------------------------------------------------------------------------------------------------    Labs:                        7.7    13.64 )-----------( 34       ( 29 Jan 2025 16:44 )             22.0     01-29    137  |  93[L]  |  47[H]  ----------------------------<  127[H]  4.2   |  30  |  2.39[H]    Ca    7.4[L]      29 Jan 2025 16:44  Phos  5.2     01-29  Mg     2.30     01-29    TPro  6.5  /  Alb  2.0[L]  /  TBili  1.2  /  DBili  x   /  AST  959[H]  /  ALT  380[H]  /  AlkPhos  73  01-29    CAPILLARY BLOOD GLUCOSE      POCT Blood Glucose.: 133 mg/dL (29 Jan 2025 17:09)    LIVER FUNCTIONS - ( 29 Jan 2025 16:44 )  Alb: 2.0 g/dL / Pro: 6.5 g/dL / ALK PHOS: 73 U/L / ALT: 380 U/L / AST: 959 U/L / GGT: x             PT/INR - ( 29 Jan 2025 16:44 )   PT: 23.9 sec;   INR: 2.08 ratio         PTT - ( 29 Jan 2025 16:44 )  PTT:32.6 sec  CSF:                  Radiology:  CT Head No Cont:  (28 Jan 2025 20:57)     Neurology - Consult Note    -  Spectra: [phone] ([facility]), [phone] ([facility])  -    HPI: Patient DESTINI ENRIQUE is a 79y (1945) with a PMHx significant for COPD, CAD< stent 2011, CABG 207, presented from home after cardiac arrest, ROSC achieved in 25min, intubated and sedated on propofol, intermittently hypotensive, bradycardic and hypoxic, left lung opacity, left airway blood clots, got rEEG as a part of anoxic brain injury management, prelim reading of which showed 9 seizures in 20 min. Clinically also he was noted to have intermittent b/l UE stiffening and shaking. He was weaned off propofol around 1200 1/29, he got connected to EEG around 1300, thats when around 9 seizures were noted on EEG in 20min, clinically as well, RN described few episodes of b/l UE stiffening and shaking, unclear how long it lasted. He was put back again on propofol around 1700 and rate was gradually increased to current rate of 20mcg. Currently he os overbreathing the ventilator.     All other review of systems is negative unless indicated above.    Allergies:  No Known Allergies      PMHx/PSHx/Family Hx: As above, otherwise see below   COPD (chronic obstructive pulmonary disease)    Former smoker    Gout    HTN (hypertension)    MI (myocardial infarction)    CAD (coronary artery disease)    Stented coronary artery    Glaucoma    Hypothyroid        Social Hx:  No current use of tobacco, alcohol, or illicit drugs    Medications:  MEDICATIONS  (STANDING):  albuterol/ipratropium for Nebulization 3 milliLiter(s) Nebulizer every 6 hours  azithromycin  IVPB 500 milliGRAM(s) IV Intermittent every 24 hours  chlorhexidine 0.12% Liquid 15 milliLiter(s) Oral Mucosa every 12 hours  chlorhexidine 2% Cloths 1 Application(s) Topical <User Schedule>  dexMEDEtomidine Infusion 0.05 MICROgram(s)/kG/Hr (0.6 mL/Hr) IV Continuous <Continuous>  dextrose 5%. 1000 milliLiter(s) (100 mL/Hr) IV Continuous <Continuous>  dextrose 5%. 1000 milliLiter(s) (50 mL/Hr) IV Continuous <Continuous>  dextrose 50% Injectable 25 Gram(s) IV Push once  dextrose 50% Injectable 12.5 Gram(s) IV Push once  dextrose 50% Injectable 25 Gram(s) IV Push once  glucagon  Injectable 1 milliGRAM(s) IntraMuscular once  heparin   Injectable 5000 Unit(s) SubCutaneous every 12 hours  insulin lispro (ADMELOG) corrective regimen sliding scale   SubCutaneous every 6 hours  latanoprost 0.005% Ophthalmic Solution 1 Drop(s) Both EYES at bedtime  levothyroxine Injectable 18 MICROGram(s) IV Push at bedtime  norepinephrine Infusion 0.729 MICROgram(s)/kG/Min (65.6 mL/Hr) IV Continuous <Continuous>  pantoprazole  Injectable 40 milliGRAM(s) IV Push two times a day  petrolatum Ophthalmic Ointment 1 Application(s) Both EYES two times a day  piperacillin/tazobactam IVPB.. 3.375 Gram(s) IV Intermittent every 12 hours  propofol Infusion 10 MICROgram(s)/kG/Min (2.88 mL/Hr) IV Continuous <Continuous>  sodium bicarbonate  Infusion 0.321 mEq/kG/Hr (150 mL/Hr) IV Continuous <Continuous>    MEDICATIONS  (PRN):  dextrose Oral Gel 15 Gram(s) Oral once PRN Blood Glucose LESS THAN 70 milliGRAM(s)/deciliter      --------------------------------------------------------------------------------------------------------------------------------------------------------------------------------------------------------------------    Vitals:  T(C): 37.8 (01-29-25 @ 16:00), Max: 37.8 (01-29-25 @ 16:00)  HR: 84 (01-29-25 @ 18:46) (59 - 92)  BP: 79/54 (01-29-25 @ 18:37) (75/45 - 175/93)  RR: 33 (01-29-25 @ 18:46) (15 - 38)  SpO2: 100% (01-29-25 @ 18:46) (61% - 100%)    PHYSICAL EXAM:     General: Nonresponsive patient, lying in bed and in respiratory distress; intubated, sedated on 20mcg propofol, otherwise on bicarb and levo drip  Eyes: Conjunctiva and sclera with clear secretions  Cardiovascular:   Neurologic:  - Mental Status: Comatose, eyelids open midposition, some [id number] movements non verbal, not withdrawing to pain  - Cranial Nerves II-XII:  Pupils are 3 mm, equal, round, and reactive to light.  Vestibular occular reflex and blink reflex absent. Gag reflex present.   - Motor: Flaccid throughout, no spontaneous movement. No decorticate or decerebrate posturing on exam. No Triple flexion to pain present.   - Reflexes: Plantar responses mute. Otherwise 0 throughout  - Sensory: No Grimace to pain in all extremities.  - Coordination & Gait: Unable to obtain.    ---------------------------------------------------------------------------------------------------------------------------------------------------------------------------------------------------------------------------    Labs:                        7.7    13.64 )-----------( 34 ( 29 Jan 2025 16:44 )             22.0     01-29    137  |  93[L]  |  47[H]  ----------------------------<  127[H]  4.2   |  30  |  2.39[H]    Ca    7.4[L]      29 Jan 2025 16:44  Phos  5.2     01-29  Mg     2.30     01-29    TPro  6.5  /  Alb  2.0[L]  /  TBili  1.2  /  DBili  x   /  AST  959[H]  /  ALT  380[H]  /  AlkPhos  73  01-29    CAPILLARY BLOOD GLUCOSE      POCT Blood Glucose.: 133 mg/dL (29 Jan 2025 17:09)    LIVER FUNCTIONS - ( 29 Jan 2025 16:44 )  Alb: 2.0 g/dL / Pro: 6.5 g/dL / ALK PHOS: 73 U/L / ALT: 380 U/L / AST: 959 U/L / GGT: x             PT/INR - ( 29 Jan 2025 16:44 )   PT: 23.9 sec;   INR: 2.08 ratio         PTT - ( 29 Jan 2025 16:44 )  PTT:32.6 sec  CSF:                  Radiology:  CT Head No Cont:  (28 Jan 2025 20:57)    EEG:  EEG Classification / Summary:  Abnormal EEG study in the awake / drowsy states  -9 electrographic generalized onset seizures, last recorded at 14:33.  -Generalized background slowing, moderate    -----------------------------------------------------------------------------------------------------    Clinical Impression:  Abnormal EEG study  Findings indicate an underlying generalized epilepsy.  Moderate diffuse/multi-focal cerebral dysfunction, not specific as to etiology.   Neurology - Consult Note    -  Spectra: [phone] ([facility]), [phone] ([facility])  -    HPI: Patient DESTINI ENRIQUE is a 79y (1945) with a PMHx significant for COPD, CAD< stent 2011, CABG 207, presented from home after cardiac arrest, ROSC achieved in 25min, intubated and sedated on propofol, intermittently hypotensive, bradycardic and hypoxic, left lung opacity, left airway blood clots, got rEEG as a part of anoxic brain injury management, prelim reading of which showed 9 seizures in 20 min. Clinically also he was noted to have intermittent b/l UE stiffening and shaking. He was weaned off propofol around 1200 1/29, he got connected to EEG around 1300, thats when around 9 seizures were noted on EEG in 20min, clinically as well, RN described few episodes of b/l UE stiffening and shaking, unclear how long it lasted. He was put back again on propofol around 1700 and rate was gradually increased to current rate of 20mcg. Currently he os overbreathing the ventilator.     All other review of systems is negative unless indicated above.    Allergies:  No Known Allergies      PMHx/PSHx/Family Hx: As above, otherwise see below   COPD (chronic obstructive pulmonary disease)    Former smoker    Gout    HTN (hypertension)    MI (myocardial infarction)    CAD (coronary artery disease)    Stented coronary artery    Glaucoma    Hypothyroid        Social Hx:  No current use of tobacco, alcohol, or illicit drugs    Medications:  MEDICATIONS  (STANDING):  albuterol/ipratropium for Nebulization 3 milliLiter(s) Nebulizer every 6 hours  azithromycin  IVPB 500 milliGRAM(s) IV Intermittent every 24 hours  chlorhexidine 0.12% Liquid 15 milliLiter(s) Oral Mucosa every 12 hours  chlorhexidine 2% Cloths 1 Application(s) Topical <User Schedule>  dexMEDEtomidine Infusion 0.05 MICROgram(s)/kG/Hr (0.6 mL/Hr) IV Continuous <Continuous>  dextrose 5%. 1000 milliLiter(s) (100 mL/Hr) IV Continuous <Continuous>  dextrose 5%. 1000 milliLiter(s) (50 mL/Hr) IV Continuous <Continuous>  dextrose 50% Injectable 25 Gram(s) IV Push once  dextrose 50% Injectable 12.5 Gram(s) IV Push once  dextrose 50% Injectable 25 Gram(s) IV Push once  glucagon  Injectable 1 milliGRAM(s) IntraMuscular once  heparin   Injectable 5000 Unit(s) SubCutaneous every 12 hours  insulin lispro (ADMELOG) corrective regimen sliding scale   SubCutaneous every 6 hours  latanoprost 0.005% Ophthalmic Solution 1 Drop(s) Both EYES at bedtime  levothyroxine Injectable 18 MICROGram(s) IV Push at bedtime  norepinephrine Infusion 0.729 MICROgram(s)/kG/Min (65.6 mL/Hr) IV Continuous <Continuous>  pantoprazole  Injectable 40 milliGRAM(s) IV Push two times a day  petrolatum Ophthalmic Ointment 1 Application(s) Both EYES two times a day  piperacillin/tazobactam IVPB.. 3.375 Gram(s) IV Intermittent every 12 hours  propofol Infusion 10 MICROgram(s)/kG/Min (2.88 mL/Hr) IV Continuous <Continuous>  sodium bicarbonate  Infusion 0.321 mEq/kG/Hr (150 mL/Hr) IV Continuous <Continuous>    MEDICATIONS  (PRN):  dextrose Oral Gel 15 Gram(s) Oral once PRN Blood Glucose LESS THAN 70 milliGRAM(s)/deciliter      --------------------------------------------------------------------------------------------------------------------------------------------------------------------------------------------------------------------    Vitals:  T(C): 37.8 (01-29-25 @ 16:00), Max: 37.8 (01-29-25 @ 16:00)  HR: 84 (01-29-25 @ 18:46) (59 - 92)  BP: 79/54 (01-29-25 @ 18:37) (75/45 - 175/93)  RR: 33 (01-29-25 @ 18:46) (15 - 38)  SpO2: 100% (01-29-25 @ 18:46) (61% - 100%)    PHYSICAL EXAM:     General: Nonresponsive patient, lying in bed and in respiratory distress; intubated, sedated on 20mcg propofol, otherwise on bicarb and levo drip  Eyes: Conjunctiva and sclera with clear secretions  Cardiovascular:   Neurologic:  - Mental Status: Comatose, eyelids open midposition, some nv2suns movements non verbal, not withdrawing to pain  - Cranial Nerves II-XII:  Pupils are 3 mm, equal, round, and reactive to light.  Vestibular occular reflex and blink reflex absent. Gag reflex present.   - Motor: Flaccid throughout, no spontaneous movement. No decorticate or decerebrate posturing on exam. No Triple flexion to pain present.   - Reflexes: Plantar responses mute. Otherwise 0 throughout  - Sensory: No Grimace to pain in all extremities.  - Coordination & Gait: Unable to obtain.    ---------------------------------------------------------------------------------------------------------------------------------------------------------------------------------------------------------------------------    Labs:                        7.7    13.64 )-----------( 34 ( 29 Jan 2025 16:44 )             22.0     01-29    137  |  93[L]  |  47[H]  ----------------------------<  127[H]  4.2   |  30  |  2.39[H]    Ca    7.4[L]      29 Jan 2025 16:44  Phos  5.2     01-29  Mg     2.30     01-29    TPro  6.5  /  Alb  2.0[L]  /  TBili  1.2  /  DBili  x   /  AST  959[H]  /  ALT  380[H]  /  AlkPhos  73  01-29    CAPILLARY BLOOD GLUCOSE      POCT Blood Glucose.: 133 mg/dL (29 Jan 2025 17:09)    LIVER FUNCTIONS - ( 29 Jan 2025 16:44 )  Alb: 2.0 g/dL / Pro: 6.5 g/dL / ALK PHOS: 73 U/L / ALT: 380 U/L / AST: 959 U/L / GGT: x             PT/INR - ( 29 Jan 2025 16:44 )   PT: 23.9 sec;   INR: 2.08 ratio         PTT - ( 29 Jan 2025 16:44 )  PTT:32.6 sec  CSF:                  Radiology:  < from: CT Head No Cont (01.28.25 @ 20:57) >  INTRA-AXIAL: No mass effect, acute hemorrhage, or midline shift.  There   are periventricular and subcortical white matter hypodensities,   consistent with microvascular type changes. Gray-white matter   differentiation is preserved.    < end of copied text >  CT Head No Cont:  (28 Jan 2025 20:57)    EEG:  EEG Classification / Summary:  Abnormal EEG study in the awake / drowsy states  -9 electrographic generalized onset seizures, last recorded at 14:33.  -Generalized background slowing, moderate    -----------------------------------------------------------------------------------------------------    Clinical Impression:  Abnormal EEG study  Findings indicate an underlying generalized epilepsy.  Moderate diffuse/multi-focal cerebral dysfunction, not specific as to etiology.

## 2025-01-29 NOTE — DIETITIAN INITIAL EVALUATION ADULT - CALCULATED FROM (CAL/KG)
IV dc'd, angio intact. DC inst given, verbs understanding.  DC'd per w/c in no acute distress     Alyssa Stiles RN  01/17/22 [id number]
Spoke to Claudia Chan (grandson), he will come and pick the pt up.        Karin Coleman RN  01/17/22 2051
To ED with c/o diarrhea with blood in her stools. Sts she had been constipated and had drank an entire body of laxative. Hx hemorrhoids. Now had brown diarrhea with bright red blood in the water. States very tender to wipe because of the hemorrhoids. Denies nausea, vomiting, or fever. Denies abd pain.      Sirena Tejada RN  01/17/22 [id number]
1200

## 2025-01-30 ENCOUNTER — RESULT REVIEW (OUTPATIENT)
Age: 80
End: 2025-01-30

## 2025-01-30 NOTE — CONSULT NOTE ADULT - NS ATTEND AMEND GEN_ALL_CORE FT
Agree with above assessment and plan.    79 year old male with sickle trait, etoh liver disease here after cardiac arrest. Shock liver apparent with his cytopenias and coagulopathy. Can send factors and fibrinogen as above. Monitor for DIC and transfuse PRN including cryo if fibrinogen low and bleeding.   Continue ICU supportive care.   Will notify primary hematologist Dr Fountain of patients admission.

## 2025-01-30 NOTE — PROGRESS NOTE ADULT - ATTENDING COMMENTS
Agree with above. Critically ill patient s/p cardiac arrest with hypoxemic respiratory failure. IP consulted for therapeutic bronchoscopy. Planned for today. Further plans based on above. Frequent bedside visits.

## 2025-01-30 NOTE — PROGRESS NOTE ADULT - ASSESSMENT
79-year-old male PMH CAD (s/p stent 2011 and CABG 2017, hypothyroidism, gout, COPD presenting after cardiac arrest admitted to the MICU for management of cardiogenic shock. S/p bronch by IP.    =====Neurologic=====  #Altered Mental Status  - Patient is AOx0 after being found down for approx 25 minutes   - Pt is intubated, continuing Propofol  - Possible anoxia in setting of prolonged down time prior to ROSC  - CT head 1/28 with no acute intracranial hemorrhage, mass effect, midline shift  - spot EEG, neuron specific enolase  Neurology following  - Keppra 1.5g q12hr  - f/u MRI brain w/o contrast    =====Cardiovascular=====  #Cardiac arrest  Pt s/p cardiac arrest with ROSC obtained in the field per EMS prior to arrival. Pt hypotensive upon arrival.  Labs with uptrending AST ALT c/f shock liver and possible cardiogenic shock   Pt weaned off pressors    - Trend trops   - Monitor on telemetry   - TTE    =====Pulmonary=====  #AHRF  Patient obtunded, intubated in setting of cardiac arrest   CXR s/p intubation with left lung base opacity may reflect atelectasis vs. infection   Pt s/p bronchoscopy overnight 1/28 showing large blood clot extending from the distal trachea, occluding the left mainstem bronchus, extending to the left lower lobe bronchus  CT chest 1/28: Cavitary opacity in the left lower lobe measuring up to 7.9 cm. most likely represents pulmonary abscess, less likely neoplasm  Pt follows with Dr. Guardado outpatient, concern for VOLODYMYR  S/p bronch by IP 1/29    - Treat infection empirically with Vanc, Zosyn, Azithro  - f/u full RVP, urine legionella  - duonebs q6hr  - f/u BAL      =====GI=====  #Transaminitis  Pt with increasing LFTs and coags concerning for shock liver  - continue to support blood pressure and monitor    #Diet  - will put in OG tube    =====Renal/=====  #DOV, Cr uptrending, likely pre-renal iso shock  pt has yu in  - f/u urine cultures and urine studies  - 1L LR bolus  - Trend CMP q8    #Metabolic acidosis  - in setting of cardiac arrest and prolonged downtime with tissue hypoperfusion causing metabolic acidosis 2/2 lactate  - S/p bicarb push  - continue to trend lactate   - cont fluid resuscitation     =====Infectious Disease=====  #SIRS criteria   - Empiric coverage for now  - cont vanc+zosyn+ azithromycin  - f/u blood cultures  - f/u urine cultures, U/A positive possible contamination  - f/u MRSA    =====Endocrine=====  pt has hypothyroidism, on levothyroxine 25 mcg at home  - IV synthroid 12.5 mcg daily    =====Heme/Onc=====  - DVT PPX: Heparin SubQ    =====MICU General=====  Intubated 1/28  AB vanc+zosyn+azithro  DVT: Heparin SubQ 79-year-old male PMH CAD (s/p stent 2011 and CABG 2017, hypothyroidism, gout, COPD presenting after cardiac arrest admitted to the MICU for management of cardiogenic shock.     =====Neurologic=====  #Altered Mental Status  Patient is AOx0 after being found down for approx 25 minutes, possible anoxia in setting of prolonged down time prior to ROSC  CT head 1/28 with no acute intracranial hemorrhage, mass effect, midline shift  Neurology following    - Pt is intubated, continuing Propofol  - continuous EEG, neuron specific enolase  - Keppra 1.5g q12hr  - f/u MRI brain w/o contrast    =====Cardiovascular=====  #Cardiac arrest  Pt s/p cardiac arrest with ROSC obtained in the field per EMS prior to arrival. Pt hypotensive upon arrival.  Pt on Levo  TTE 1/29: EF 65%, no abnormalities    - Titrate off pressors  - Monitor on telemetry     =====Pulmonary=====  #AHRF  Patient obtunded, intubated in setting of cardiac arrest   CXR s/p intubation with left lung base opacity may reflect atelectasis vs. infection   Pt s/p bronchoscopy overnight 1/28 showing large blood clot extending from the distal trachea, occluding the left mainstem bronchus, extending to the left lower lobe bronchus  CT chest 1/28: Cavitary opacity in the left lower lobe measuring up to 7.9 cm. most likely represents pulmonary abscess, less likely neoplasm  Pt follows with Dr. Guardado outpatient, concern for VOLODYMYR  S/p vanc+azithromycin    - bronch today  - Treat infection empirically with Zosyn  - f/u urine legionella  - duonebs q6hr PRN    =====GI=====  #Transaminitis  Pt with increasing LFTs and coags concerning for shock liver  - continue to support blood pressure and monitor    #Diet  - pt has OG tube, nutrition consult and start tube feeds    =====Renal/=====  #DOV, Cr uptrending, likely ATN iso shock  pt has yu in  - continue to monitor  - monitor urine output    #Metabolic acidosis  - in setting of cardiac arrest and prolonged downtime with tissue hypoperfusion causing metabolic acidosis 2/2 lactate  - S/p bicarb push  - continue to trend lactate     =====Infectious Disease=====  #SIRS criteria   Urine cultures with gram - rods  - f/u blood cultures  - continue zosyn    =====Endocrine=====  pt has hypothyroidism, on levothyroxine 25 mcg at home  - IV synthroid 12.5 mcg daily    =====Heme/Onc=====  - DVT PPX: holding due to thrombocytopenia    =====MICU General=====  Intubated 1/28  AB vanc+zosyn+azithro  DVT: SCDs

## 2025-01-30 NOTE — PROGRESS NOTE ADULT - SUBJECTIVE AND OBJECTIVE BOX
****Kassidy Brasher Internal Medicine PGY-1*****    Overnight Events: worsening pressor requirements, tube exchange  Interval Events: pt seen and examined at bedside.     OBJECTIVE:  ICU Vital Signs Last 24 Hrs  T(C): 36.5 (30 Jan 2025 04:00), Max: 37.8 (29 Jan 2025 16:00)  T(F): 97.7 (30 Jan 2025 04:00), Max: 100 (29 Jan 2025 16:00)  HR: 71 (30 Jan 2025 06:00) (64 - 92)  BP: 103/62 (29 Jan 2025 23:00) (75/45 - 167/74)  BP(mean): 76 (29 Jan 2025 23:00) (52 - 104)  ABP: 112/52 (30 Jan 2025 06:00) (102/46 - 132/62)  ABP(mean): 73 (30 Jan 2025 06:00) (66 - 89)  RR: 14 (30 Jan 2025 06:00) (11 - 38)  SpO2: 100% (30 Jan 2025 06:00) (88% - 100%)    O2 Parameters below as of 30 Jan 2025 07:00  Patient On (Oxygen Delivery Method): AC 14/350/5    O2 Concentration (%): 40      Mode: AC/ CMV (Assist Control/ Continuous Mandatory Ventilation), RR (machine): 19, TV (machine): 350, FiO2: 40, PEEP: 5, ITime: 0.47, MAP: 9, PIP: 27    01-29 @ 07:01  -  01-30 @ 07:00  --------------------------------------------------------  IN: 5300.7 mL / OUT: 128 mL / NET: 5172.7 mL      CAPILLARY BLOOD GLUCOSE      POCT Blood Glucose.: 128 mg/dL (30 Jan 2025 05:05)      PHYSICAL EXAM  GENERAL: Pt intubated  HEAD:  Atraumatic, Normocephalic  EYES: EOMI, PERRL, conjunctiva and sclera clear  NECK: Supple, No JVD  CHEST/LUNG: Clear to auscultation bilaterally; No wheezes, rales or rhonchi  HEART: Regular rate and rhythm; No murmurs, rubs, or gallops, (+)S1, S2  ABDOMEN: Soft, Nontender, Nondistended; Normal Bowel sounds   EXTREMITIES:  2+ Peripheral Pulses, No clubbing, cyanosis, or edema  SKIN: No rashes or lesions      HOSPITAL MEDICATIONS:  MEDICATIONS  (STANDING):  albuterol/ipratropium for Nebulization 3 milliLiter(s) Nebulizer every 6 hours  azithromycin  IVPB 500 milliGRAM(s) IV Intermittent every 24 hours  chlorhexidine 0.12% Liquid 15 milliLiter(s) Oral Mucosa every 12 hours  chlorhexidine 2% Cloths 1 Application(s) Topical <User Schedule>  dextrose 5%. 1000 milliLiter(s) (100 mL/Hr) IV Continuous <Continuous>  dextrose 5%. 1000 milliLiter(s) (50 mL/Hr) IV Continuous <Continuous>  dextrose 50% Injectable 25 Gram(s) IV Push once  dextrose 50% Injectable 12.5 Gram(s) IV Push once  dextrose 50% Injectable 25 Gram(s) IV Push once  glucagon  Injectable 1 milliGRAM(s) IntraMuscular once  heparin   Injectable 5000 Unit(s) SubCutaneous every 12 hours  insulin lispro (ADMELOG) corrective regimen sliding scale   SubCutaneous every 6 hours  latanoprost 0.005% Ophthalmic Solution 1 Drop(s) Both EYES at bedtime  levETIRAcetam   Injectable 1500 milliGRAM(s) IV Push every 12 hours  levothyroxine Injectable 18 MICROGram(s) IV Push at bedtime  mupirocin 2% Ointment 1 Application(s) Topical two times a day  norepinephrine Infusion 0.729 MICROgram(s)/kG/Min (65.6 mL/Hr) IV Continuous <Continuous>  pantoprazole  Injectable 40 milliGRAM(s) IV Push two times a day  petrolatum Ophthalmic Ointment 1 Application(s) Both EYES two times a day  piperacillin/tazobactam IVPB.. 3.375 Gram(s) IV Intermittent every 12 hours  propofol Infusion 10 MICROgram(s)/kG/Min (2.88 mL/Hr) IV Continuous <Continuous>    MEDICATIONS  (PRN):  dextrose Oral Gel 15 Gram(s) Oral once PRN Blood Glucose LESS THAN 70 milliGRAM(s)/deciliter      LABS:                        7.2    16.27 )-----------( 38       ( 30 Jan 2025 01:00 )             19.8     Hgb Trend: 7.2<--, 7.7<--, 7.9<--, 8.2<--, 7.7<--  01-30    134[L]  |  91[L]  |  49[H]  ----------------------------<  95  4.2   |  29  |  2.62[H]    Ca    7.5[L]      30 Jan 2025 01:00  Phos  4.7     01-30  Mg     2.10     01-30    TPro  6.2  /  Alb  1.8[L]  /  TBili  1.2  /  DBili  x   /  AST  585[H]  /  ALT  294[H]  /  AlkPhos  63  01-30    Creatinine Trend: 2.62<--, 2.39<--, 2.09<--, 1.67<--, 1.54<--, 1.36<--  PT/INR - ( 30 Jan 2025 01:00 )   PT: 24.7 sec;   INR: 2.09 ratio         PTT - ( 30 Jan 2025 01:00 )  PTT:33.8 sec  Urinalysis Basic - ( 30 Jan 2025 01:00 )    Color: x / Appearance: x / SG: x / pH: x  Gluc: 95 mg/dL / Ketone: x  / Bili: x / Urobili: x   Blood: x / Protein: x / Nitrite: x   Leuk Esterase: x / RBC: x / WBC x   Sq Epi: x / Non Sq Epi: x / Bacteria: x      Arterial Blood Gas:  01-30 @ 06:12  7.40/57/86/35/97.9/9.4  ABG lactate: --  Arterial Blood Gas:  01-30 @ 01:00  7.50/43/118/34/99.4/9.4  ABG lactate: --  Arterial Blood Gas:  01-28 @ 22:00  7.31/55/101/28/98.8/1.1  ABG lactate: --    Venous Blood Gas:  01-29 @ 18:10  7.47/50/53/36/86.4  VBG Lactate: 4.5  Venous Blood Gas:  01-29 @ 10:05  7.30/66/34/32/46.4  VBG Lactate: 4.4  Venous Blood Gas:  01-29 @ 01:39  7.27/55/40/25/60.1  VBG Lactate: 7.3  Venous Blood Gas:  01-28 @ 19:41  7.30/42/44/21/69.7  VBG Lactate: 7.3  Venous Blood Gas:  01-28 @ 17:35  6.99/76/48/18/63.6  VBG Lactate: 11.3  Venous Blood Gas:  01-28 @ 17:03  6.95/79/45/17/56.1  VBG Lactate: 10.6      MICROBIOLOGY:     Culture - Urine (collected 28 Jan 2025 19:13)  Source: Catheterized Catheterized  Preliminary Report (29 Jan 2025 23:52):    >100,000 CFU/ml Gram Negative Rods    Culture - Blood (collected 28 Jan 2025 17:35)  Source: .Blood BLOOD  Preliminary Report (29 Jan 2025 22:02):    No growth at 24 hours    Culture - Blood (collected 28 Jan 2025 17:35)  Source: .Blood BLOOD  Preliminary Report (29 Jan 2025 22:02):    No growth at 24 hours        RADIOLOGY:  [ ] Reviewed by me   ****Kassidy Brasher Internal Medicine PGY-1*****    Overnight Events: worsening pressor requirements, s/p tube exchange  Interval Events: pt seen and examined at bedside. Intubated, appears comfortable.    OBJECTIVE:  ICU Vital Signs Last 24 Hrs  T(C): 36.5 (30 Jan 2025 04:00), Max: 37.8 (29 Jan 2025 16:00)  T(F): 97.7 (30 Jan 2025 04:00), Max: 100 (29 Jan 2025 16:00)  HR: 71 (30 Jan 2025 06:00) (64 - 92)  BP: 103/62 (29 Jan 2025 23:00) (75/45 - 167/74)  BP(mean): 76 (29 Jan 2025 23:00) (52 - 104)  ABP: 112/52 (30 Jan 2025 06:00) (102/46 - 132/62)  ABP(mean): 73 (30 Jan 2025 06:00) (66 - 89)  RR: 14 (30 Jan 2025 06:00) (11 - 38)  SpO2: 100% (30 Jan 2025 06:00) (88% - 100%)    O2 Parameters below as of 30 Jan 2025 07:00  Patient On (Oxygen Delivery Method): AC 14/350/5    O2 Concentration (%): 40      Mode: AC/ CMV (Assist Control/ Continuous Mandatory Ventilation), RR (machine): 19, TV (machine): 350, FiO2: 40, PEEP: 5, ITime: 0.47, MAP: 9, PIP: 27    01-29 @ 07:01  -  01-30 @ 07:00  --------------------------------------------------------  IN: 5300.7 mL / OUT: 128 mL / NET: 5172.7 mL      CAPILLARY BLOOD GLUCOSE      POCT Blood Glucose.: 128 mg/dL (30 Jan 2025 05:05)      PHYSICAL EXAM  GENERAL: Pt intubated  HEAD:  Atraumatic, Normocephalic  EYES: EOMI, PERRL, conjunctiva and sclera clear  NECK: Supple, No JVD  CHEST/LUNG: Clear to auscultation bilaterally; No wheezes, rales or rhonchi  HEART: Regular rate and rhythm; No murmurs, rubs, or gallops, (+)S1, S2  ABDOMEN: Soft, Nontender, Nondistended; Normal Bowel sounds   EXTREMITIES:  2+ Peripheral Pulses, No clubbing, cyanosis, or edema  SKIN: No rashes or lesions      HOSPITAL MEDICATIONS:  MEDICATIONS  (STANDING):  albuterol/ipratropium for Nebulization 3 milliLiter(s) Nebulizer every 6 hours  azithromycin  IVPB 500 milliGRAM(s) IV Intermittent every 24 hours  chlorhexidine 0.12% Liquid 15 milliLiter(s) Oral Mucosa every 12 hours  chlorhexidine 2% Cloths 1 Application(s) Topical <User Schedule>  dextrose 5%. 1000 milliLiter(s) (100 mL/Hr) IV Continuous <Continuous>  dextrose 5%. 1000 milliLiter(s) (50 mL/Hr) IV Continuous <Continuous>  dextrose 50% Injectable 25 Gram(s) IV Push once  dextrose 50% Injectable 12.5 Gram(s) IV Push once  dextrose 50% Injectable 25 Gram(s) IV Push once  glucagon  Injectable 1 milliGRAM(s) IntraMuscular once  heparin   Injectable 5000 Unit(s) SubCutaneous every 12 hours  insulin lispro (ADMELOG) corrective regimen sliding scale   SubCutaneous every 6 hours  latanoprost 0.005% Ophthalmic Solution 1 Drop(s) Both EYES at bedtime  levETIRAcetam   Injectable 1500 milliGRAM(s) IV Push every 12 hours  levothyroxine Injectable 18 MICROGram(s) IV Push at bedtime  mupirocin 2% Ointment 1 Application(s) Topical two times a day  norepinephrine Infusion 0.729 MICROgram(s)/kG/Min (65.6 mL/Hr) IV Continuous <Continuous>  pantoprazole  Injectable 40 milliGRAM(s) IV Push two times a day  petrolatum Ophthalmic Ointment 1 Application(s) Both EYES two times a day  piperacillin/tazobactam IVPB.. 3.375 Gram(s) IV Intermittent every 12 hours  propofol Infusion 10 MICROgram(s)/kG/Min (2.88 mL/Hr) IV Continuous <Continuous>    MEDICATIONS  (PRN):  dextrose Oral Gel 15 Gram(s) Oral once PRN Blood Glucose LESS THAN 70 milliGRAM(s)/deciliter      LABS:                        7.2    16.27 )-----------( 38       ( 30 Jan 2025 01:00 )             19.8     Hgb Trend: 7.2<--, 7.7<--, 7.9<--, 8.2<--, 7.7<--  01-30    134[L]  |  91[L]  |  49[H]  ----------------------------<  95  4.2   |  29  |  2.62[H]    Ca    7.5[L]      30 Jan 2025 01:00  Phos  4.7     01-30  Mg     2.10     01-30    TPro  6.2  /  Alb  1.8[L]  /  TBili  1.2  /  DBili  x   /  AST  585[H]  /  ALT  294[H]  /  AlkPhos  63  01-30    Creatinine Trend: 2.62<--, 2.39<--, 2.09<--, 1.67<--, 1.54<--, 1.36<--  PT/INR - ( 30 Jan 2025 01:00 )   PT: 24.7 sec;   INR: 2.09 ratio         PTT - ( 30 Jan 2025 01:00 )  PTT:33.8 sec  Urinalysis Basic - ( 30 Jan 2025 01:00 )    Color: x / Appearance: x / SG: x / pH: x  Gluc: 95 mg/dL / Ketone: x  / Bili: x / Urobili: x   Blood: x / Protein: x / Nitrite: x   Leuk Esterase: x / RBC: x / WBC x   Sq Epi: x / Non Sq Epi: x / Bacteria: x      Arterial Blood Gas:  01-30 @ 06:12  7.40/57/86/35/97.9/9.4  ABG lactate: --  Arterial Blood Gas:  01-30 @ 01:00  7.50/43/118/34/99.4/9.4  ABG lactate: --  Arterial Blood Gas:  01-28 @ 22:00  7.31/55/101/28/98.8/1.1  ABG lactate: --    Venous Blood Gas:  01-29 @ 18:10  7.47/50/53/36/86.4  VBG Lactate: 4.5  Venous Blood Gas:  01-29 @ 10:05  7.30/66/34/32/46.4  VBG Lactate: 4.4  Venous Blood Gas:  01-29 @ 01:39  7.27/55/40/25/60.1  VBG Lactate: 7.3  Venous Blood Gas:  01-28 @ 19:41  7.30/42/44/21/69.7  VBG Lactate: 7.3  Venous Blood Gas:  01-28 @ 17:35  6.99/76/48/18/63.6  VBG Lactate: 11.3  Venous Blood Gas:  01-28 @ 17:03  6.95/79/45/17/56.1  VBG Lactate: 10.6      MICROBIOLOGY:     Culture - Urine (collected 28 Jan 2025 19:13)  Source: Catheterized Catheterized  Preliminary Report (29 Jan 2025 23:52):    >100,000 CFU/ml Gram Negative Rods    Culture - Blood (collected 28 Jan 2025 17:35)  Source: .Blood BLOOD  Preliminary Report (29 Jan 2025 22:02):    No growth at 24 hours    Culture - Blood (collected 28 Jan 2025 17:35)  Source: .Blood BLOOD  Preliminary Report (29 Jan 2025 22:02):    No growth at 24 hours        RADIOLOGY:  [ ] Reviewed by me

## 2025-01-30 NOTE — EEG REPORT - NS EEG TEXT BOX
[facility]   COMPREHENSIVE EPILEPSY CENTER   REPORT OF LONG-TERM VIDEO EEG     [facility]: [address], [place] [address], Ph#: [phone]  [facility]:  [address] [place] [address], Ph#: [phone]  [facility]: [address] [place] [address], Ph#: [phone]    Patient Name: DESTINI ENRIQUE  Age and : 79y ([birth year])  MRN #: [id number]  Location: [place] [address]  Referring Physician: Tyler Dixon    Start Time/Date: 12:15 on 2025  End Time/Date: 08:00 on 2025  Duration: 19 hours 45 minutes    _____________________________________________________________  STUDY INFORMATION    EEG Recording Technique:  The patient underwent continuous Video-EEG monitoring, using Telemetry System hardware on the XLTek Digital System. EEG and video data were stored on a computer hard drive with important events saved in digital archive files. The material was reviewed by a physician (electroencephalographer / epileptologist) on a daily basis. Anoop and seizure detection algorithms were utilized and reviewed. An EEG Technician attended to the patient, and was available throughout daytime work hours.  The epilepsy center neurologist was available in person or on call 24-hours per day.    EEG Placement and Labeling of Electrodes:  The EEG was performed utilizing 20 channel referential EEG connections (coronal over temporal over parasagittal montage) using all standard 10-20 electrode placements with EKG, with additional electrodes placed in the inferior temporal region using the modified 10-10 montage electrode placements for elective admissions, or if deemed necessary. Recording was at a sampling rate of 256 samples per second per channel. Time synchronized digital video recording was done simultaneously with EEG recording. A low light infrared camera was used for low light recording.     _____________________________________________________________  HISTORY    Patient is a 79y old  Male who presents with a chief complaint of Post cardiac arrest (2025 13:54)      PERTINENT MEDICATION:  MEDICATIONS  (STANDING):  chlorhexidine 0.12% Liquid 15 milliLiter(s) Oral Mucosa every 12 hours  chlorhexidine 2% Cloths 1 Application(s) Topical <User Schedule>  dextrose 5%. 1000 milliLiter(s) (100 mL/Hr) IV Continuous <Continuous>  dextrose 5%. 1000 milliLiter(s) (50 mL/Hr) IV Continuous <Continuous>  dextrose 50% Injectable 25 Gram(s) IV Push once  dextrose 50% Injectable 12.5 Gram(s) IV Push once  dextrose 50% Injectable 25 Gram(s) IV Push once  glucagon  Injectable 1 milliGRAM(s) IntraMuscular once  insulin lispro (ADMELOG) corrective regimen sliding scale   SubCutaneous every 6 hours  latanoprost 0.005% Ophthalmic Solution 1 Drop(s) Both EYES at bedtime  levETIRAcetam   Injectable 500 milliGRAM(s) IV Push every 12 hours  levothyroxine Injectable 18 MICROGram(s) IV Push at bedtime  mupirocin 2% Ointment 1 Application(s) Topical two times a day  norepinephrine Infusion 0.729 MICROgram(s)/kG/Min (65.6 mL/Hr) IV Continuous <Continuous>  pantoprazole  Injectable 40 milliGRAM(s) IV Push two times a day  petrolatum Ophthalmic Ointment 1 Application(s) Both EYES two times a day  piperacillin/tazobactam IVPB.. 3.375 Gram(s) IV Intermittent every 12 hours  propofol Infusion 10 MICROgram(s)/kG/Min (2.88 mL/Hr) IV Continuous <Continuous>    _____________________________________________________________  STUDY INTERPRETATION      FINDINGS:      Background:  Continuity: continuous  Symmetry: symmetric  PDR: 9 Hz activity, with amplitude to 40 uV, that attenuated to eye opening.  Low amplitude frontal beta noted in wakefulness.  Reactivity: present  Voltage: normal, mostly 20-150uV  Anterior Posterior Gradient: present  Other background findings: none  Breach: absent    Background Slowing:  Generalized slowing: none was present.  Focal slowing: none was present.    State Changes:   -Drowsiness noted with increased slowing, attenuation of fast activity, vertex transients.  -Present with N2 sleep transients with symmetric spindles and K-complexes.    Sporadic Epileptiform Discharges:    None    Rhythmic and Periodic Patterns (RPPs):  None     Electrographic and Electroclinical seizures:  None    Other Clinical Events:  None    Activation Procedures:   -Hyperventilation was not performed.    -Photic stimulation was not performed.    Artifacts:  Intermittent myogenic and movement artifacts were noted.    ECG:  The heart rate on single channel ECG was predominantly ~80 bpm.    Summary:  Normal  EEG in the awake / drowsy / asleep state(s).    Clinical Impression:  There were no epileptiform abnormalities recorded.          -------------------------------------------------------------------------------------------------------    Amanda Deluna MD  Director, Epilepsy - [facility]    Questions please call [phone]  After hours (5 PM - 8:30 AM) please call the epilepsy answering service at [phone]

## 2025-01-30 NOTE — PROCEDURE NOTE - NSBRONCHPROCDETAILS_GEN_A_CORE_FT
Indication: cardiac arrest, cavitary lesion    Operators: Joni Flores    Pre-op Dx: cavitary lung lesion    Preop medications: propofol    Findings:  Bronchoscope inserted through ETT. ETT noted to be in good position. Airway evaluation revealed hyperemia, boggy mucosa. Normal bronchial anatomy. Mucopulrent secretions. No obstructions noted, all airways up to segmental level patent. Blood secretions noted pooling around ET tube likely oropharyngeal in origin in setting of recent history of endotracheal intubation.  Bronchoscope then withdrawn from ETT.    Specimens: LLL BAL

## 2025-01-30 NOTE — PROCEDURE NOTE - ADDITIONAL PROCEDURE DETAILS
Patient's previous ETT with + audible cuff leak, Volume loss on venilator. Original ETT visualized passing through vocal chords with glidescope. small amount of thin sanguinous secretions around arytenoids. Tube exchanged over cook catheter. Post procedure, Patient without cuffleak and taking full volumes on ventilator. No complications. Examination of original ETT revealed intact balloon.

## 2025-01-30 NOTE — CHART NOTE - NSCHARTNOTEFT_GEN_A_CORE
EEG Prelim Report, first 3 hours    Severe generalized slowing and attenuation.  No epileptiform activity or seizures.    Final report to follow.    Amanda Deluna MD

## 2025-01-30 NOTE — CONSULT NOTE ADULT - SUBJECTIVE AND OBJECTIVE BOX
Reason for consult: Anemia    HPI:  79-year-old male PMH CAD (s/p stent 2011 and CABG 2017, hypothyroidism, gout, COPD presenting after cardiac arrest.  Per EMS, per family at bedside patient was in normal state of health this morning, may be more tired than normal.  Went down to take a nap around noon, wife went to check on him around 3 to 3:30 PM and he was very difficult to arouse, prompting her to call EMS.  On EMS arrival patient was found to be pulseless, in asystolic rhythm, ACLS was initiated s/p 4 rounds of IV epinephrine, intubated in field, ROSC obtained prior to arrival.    In the ED, pt was hypotensive to 68/50, pt started on Levo switched to Epi. Given 1 dose of vanc+zosyn, atropine after HR 30-40s, sodium bicarb. Labs significantly concerning with lactate 11.3, elevated LFTs and coags concerning for shock liver, DOV with Cr up-trending, metabolic acidosis.  (28 Jan 2025 18:30)      Hematology consultation completed for this 79 year old male known to [facility] and follows with Teddy Fountain for the management of anemia with sickle trait , alcoholism and schizophrenia presenting to [facility] s/p cardiac arrest at home.       PAST MEDICAL & SURGICAL HISTORY:  COPD (chronic obstructive pulmonary disease)      Former smoker      Gout      HTN (hypertension)      MI (myocardial infarction)  2011 stent in [place]      CAD (coronary artery disease)      Stented coronary artery      Glaucoma      Hypothyroid      H/O foot surgery          FAMILY HISTORY:      Alochol: Denied  Smoking: Nonsmoker  Drug Use: Denied  Marital Status:         Allergies    No Known Allergies    Intolerances        MEDICATIONS  (STANDING):  chlorhexidine 0.12% Liquid 15 milliLiter(s) Oral Mucosa every 12 hours  chlorhexidine 2% Cloths 1 Application(s) Topical <User Schedule>  dextrose 5%. 1000 milliLiter(s) (100 mL/Hr) IV Continuous <Continuous>  dextrose 5%. 1000 milliLiter(s) (50 mL/Hr) IV Continuous <Continuous>  dextrose 50% Injectable 25 Gram(s) IV Push once  dextrose 50% Injectable 12.5 Gram(s) IV Push once  dextrose 50% Injectable 25 Gram(s) IV Push once  glucagon  Injectable 1 milliGRAM(s) IntraMuscular once  insulin lispro (ADMELOG) corrective regimen sliding scale   SubCutaneous every 6 hours  latanoprost 0.005% Ophthalmic Solution 1 Drop(s) Both EYES at bedtime  levETIRAcetam   Injectable 500 milliGRAM(s) IV Push every 12 hours  levothyroxine Injectable 18 MICROGram(s) IV Push at bedtime  mupirocin 2% Ointment 1 Application(s) Topical two times a day  norepinephrine Infusion 0.729 MICROgram(s)/kG/Min (65.6 mL/Hr) IV Continuous <Continuous>  pantoprazole  Injectable 40 milliGRAM(s) IV Push two times a day  petrolatum Ophthalmic Ointment 1 Application(s) Both EYES two times a day  piperacillin/tazobactam IVPB.. 3.375 Gram(s) IV Intermittent every 12 hours  propofol Infusion 10 MICROgram(s)/kG/Min (2.88 mL/Hr) IV Continuous <Continuous>    MEDICATIONS  (PRN):  albuterol/ipratropium for Nebulization 3 milliLiter(s) Nebulizer every 6 hours PRN Respiratory Distress  dextrose Oral Gel 15 Gram(s) Oral once PRN Blood Glucose LESS THAN 70 milliGRAM(s)/deciliter      ROS  No fever, sweats, chills  No epistaxis, HA, sore throat  No CP, SOB, cough, sputum  No n/v/d, abd pain, melena, hematochezia  No edema  No rash  No anxiety  No back pain, joint pain  No bleeding, bruising  No dysuria, hematuria    T(C): 35.7 (01-30-25 @ 08:00), Max: 37.8 (01-29-25 @ 16:00)  HR: 73 (01-30-25 @ 11:16) (64 - 92)  BP: 103/62 (01-29-25 @ 23:00) (75/45 - 167/74)  RR: 14 (01-30-25 @ 10:00) (11 - 38)  SpO2: 100% (01-30-25 @ 11:16) (95% - 100%)  Wt(kg): --    PE  NAD  Awake, alert  Anicteric, MMM  RRR  CTAB  Abd soft, NT, ND  No c/c/e  No rash grossly  FROM                          7.2    16.27 )-----------( 38       ( 30 Jan 2025 01:00 )             19.8       01-30    134[L]  |  91[L]  |  49[H]  ----------------------------<  95  4.2   |  29  |  2.62[H]    Ca    7.5[L]      30 Jan 2025 01:00  Phos  4.7     01-30  Mg     2.10     01-30    TPro  6.2  /  Alb  1.8[L]  /  TBili  1.2  /  DBili  x   /  AST  585[H]  /  ALT  294[H]  /  AlkPhos  63  01-30

## 2025-01-30 NOTE — PROGRESS NOTE ADULT - ASSESSMENT
79-year-old male PMH CAD (s/p stent 2011 and CABG 2017, hypothyroidism, gout, COPD presenting after cardiac arrest admitted to the MICU for management of cardiogenic shock found to have possible pna, airway bleed    IP called for bronchoscopy  c/f clot in airway    flex bronch 1/30/2025 - no clot or obstruction, no endobronchial lesions  f/u BAL for micro, cyto, galactomannan, fungitell    IP to sign off   please recall as needed 79-year-old male PMH CAD (s/p stent 2011 and CABG 2017, hypothyroidism, gout, COPD presenting after cardiac arrest admitted to the MICU for management of cardiogenic shock found to have possible pna, airway bleed    IP called for bronchoscopy  c/f clot in airway    flex bronch 1/30/2025 - residual clots aspirated, no endobronchial lesions  f/u BAL for micro, cyto, galactomannan, fungitell    IP to sign off   please recall as needed

## 2025-01-30 NOTE — CONSULT NOTE ADULT - ASSESSMENT
79 year old male with sickle cell trait, anemia presenting to [facility] s/p cardiac arrest      Anemia  -undergoing care with Dr Fountain of [facility]  -H&H 7.2/19.8  -multifactorial  -Transfuse for Hgb < 7.0  -coagulopathy in setting of shock liver  -please send off hemolysis labs  -pls send off coags and Factors V, VII, VIII, X  -Supportive care       Septic shock/PEA arrest  -UTI  -acute hypoxemic respiratory failure, intubated, requiring pressors  s/p cardiac arrest  -cavitary lung lesion  -DOV due to shock  -Monitor for DIC, pls check Fibrinogen and would consider cryo if < 150  -appreciate MICU recs    Thrombocytopenia  -plts 38K  -likely secondary to shock liver  -pls transfuse for plts < 10K or < 30K with fever or < 50K with bleeding      will continue to follow    Felicitas Gillette NP  Hematology/Oncology  [facility]  [phone] (Office)  [phone] (Alt office)  Evenings and weekends please call MD on call or office

## 2025-01-30 NOTE — PROGRESS NOTE ADULT - ATTENDING COMMENTS
79 M with CAD, CABG, not on plavix or a/c, COPD here after cardiac arrest at home of unclear etiology, acute hypoxemic respiratory failure due to aspiration pneumonia +/- cavitary lesion/lung abscess +/- septic shock due to UTI    has GNR UTI, septic shock due to the same  had seizures on spot EEG, keppra loaded  ETT exchange overnight due to air leak, improved today    # acute hypoxemic respiratory failure  # cardiac arrest  # septic shock due to UTI  # cavitary lung lesion  # DOV due to shock  - c/w full vent support for now  - c/w keppra propofol, f/u EEG and then wean propofol if EEG shows no seizures as per neuro reccs  - MRI brain at some point, NSE today  - septic shock due to GNR UTI - c/w broad abx, stop vanco  - will bronch today  - DOV still worsening, 5 liters up, may be ATN at this point, watch off IVF or diuretics for now  - shock liver improving 79 M with CAD, CABG, not on plavix or a/c, COPD here after cardiac arrest at home of unclear etiology, acute hypoxemic respiratory failure due to aspiration pneumonia +/- cavitary lesion/lung abscess +/- septic shock due to UTI    has GNR UTI, septic shock due to the same  had seizures on spot EEG, keppra loaded  ETT exchange overnight due to air leak, improved today    # acute hypoxemic respiratory failure  # cardiac arrest  # septic shock due to UTI  # cavitary lung lesion  # DOV due to shock  - c/w full vent support for now  - c/w keppra propofol, f/u EEG and then wean propofol if EEG shows no seizures as per neuro reccs  - MRI brain at some point, NSE today  - septic shock due to GNR UTI - c/w broad abx, stop vanco  - will bronch today  - DOV still worsening, 5 liters up, may be ATN at this point, watch off IVF or diuretics for now  - shock liver improving  - hold dvt ppx given thrombocytopenia, likely shock related

## 2025-01-30 NOTE — PROGRESS NOTE ADULT - SUBJECTIVE AND OBJECTIVE BOX
PATIENT:  DESTINI ENRIQUE  [id number]    CHIEF COMPLAINT:  Patient is a 79y old  Male who presents with a chief complaint of Post cardiac arrest (2025 11:33)    INTERVAL HISTORY/OVERNIGHT EVENTS:  - no acute events    MEDICATIONS:  MEDICATIONS  (STANDING):  chlorhexidine 0.12% Liquid 15 milliLiter(s) Oral Mucosa every 12 hours  chlorhexidine 2% Cloths 1 Application(s) Topical <User Schedule>  dextrose 5%. 1000 milliLiter(s) (100 mL/Hr) IV Continuous <Continuous>  dextrose 5%. 1000 milliLiter(s) (50 mL/Hr) IV Continuous <Continuous>  dextrose 50% Injectable 25 Gram(s) IV Push once  dextrose 50% Injectable 12.5 Gram(s) IV Push once  dextrose 50% Injectable 25 Gram(s) IV Push once  glucagon  Injectable 1 milliGRAM(s) IntraMuscular once  insulin lispro (ADMELOG) corrective regimen sliding scale   SubCutaneous every 6 hours  latanoprost 0.005% Ophthalmic Solution 1 Drop(s) Both EYES at bedtime  levETIRAcetam   Injectable 500 milliGRAM(s) IV Push every 12 hours  levothyroxine Injectable 18 MICROGram(s) IV Push at bedtime  mupirocin 2% Ointment 1 Application(s) Topical two times a day  norepinephrine Infusion 0.729 MICROgram(s)/kG/Min (65.6 mL/Hr) IV Continuous <Continuous>  pantoprazole  Injectable 40 milliGRAM(s) IV Push two times a day  petrolatum Ophthalmic Ointment 1 Application(s) Both EYES two times a day  piperacillin/tazobactam IVPB.. 3.375 Gram(s) IV Intermittent every 12 hours  propofol Infusion 10 MICROgram(s)/kG/Min (2.88 mL/Hr) IV Continuous <Continuous>    MEDICATIONS  (PRN):  albuterol/ipratropium for Nebulization 3 milliLiter(s) Nebulizer every 6 hours PRN Respiratory Distress  dextrose Oral Gel 15 Gram(s) Oral once PRN Blood Glucose LESS THAN 70 milliGRAM(s)/deciliter      ALLERGIES:  Allergies    No Known Allergies    Intolerances        OBJECTIVE:  ICU Vital Signs Last 24 Hrs  T(C): 35.5 (2025 12:00), Max: 37.8 (2025 16:00)  T(F): 95.9 (2025 12:00), Max: 100 (2025 16:00)  HR: 68 (2025 13:30) (64 - 92)  BP: 103/62 (2025 23:00) (75/45 - 167/74)  BP(mean): 76 (2025 23:00) (52 - 104)  ABP: 102/49 (2025 13:30) (94/43 - 132/62)  ABP(mean): 69 (2025 13:30) (62 - 89)  RR: 14 (2025 13:30) (11 - 38)  SpO2: 100% (2025 13:30) (98% - 100%)    O2 Parameters below as of 2025 13:30    O2 Concentration (%): 50      Mode: AC/ CMV (Assist Control/ Continuous Mandatory Ventilation)  RR (machine): 14  TV (machine): 350  FiO2: 40  PEEP: 5  ITime: 0.6  MAP: 8  PIP: 26    POCT Blood Glucose.: 172 mg/dL (2025 11:14)  POCT Blood Glucose.: 128 mg/dL (2025 05:05)  POCT Blood Glucose.: 98 mg/dL (2025 00:21)  POCT Blood Glucose.: 133 mg/dL (2025 17:09)  POCT Blood Glucose.: 172 mg/dL (2025 11:14)    I&O's Summary  2025 07:01  -  2025 07:00  --------------------------------------------------------  IN: 5300.7 mL / OUT: 128 mL / NET: 5172.7 mL    2025 07:01  -  2025 13:55  --------------------------------------------------------  IN: 575 mL / OUT: 15 mL / NET: 560 mL      Daily     Daily Weight in k.8 (2025 05:00)    PHYSICAL EXAMINATION:  General: no distress  ENT: atraumatic  Neuro: comatose  Resp: mechanical  CV: rate  Abdominal: soft, nonprotuberant  Extremities: thin    LABS:  ABG - ( 2025 06:12 )  pH, Arterial: 7.40  pH, Blood: x     /  pCO2: 57    /  pO2: 86    / HCO3: 35    / Base Excess: 9.4   /  SaO2: 97.9                                    7.2    16.27 )-----------( 38       ( 2025 01:00 )             19.8     01-30    134[L]  |  91[L]  |  49[H]  ----------------------------<  95  4.2   |  29  |  2.62[H]    Ca    7.5[L]      2025 01:00  Phos  4.7       Mg     2.10         TPro  6.2  /  Alb  1.8[L]  /  TBili  1.2  /  DBili  x   /  AST  585[H]  /  ALT  294[H]  /  AlkPhos  63  30    LIVER FUNCTIONS - ( 2025 01:00 )  Alb: 1.8 g/dL / Pro: 6.2 g/dL / ALK PHOS: 63 U/L / ALT: 294 U/L / AST: 585 U/L / GGT: x           PT/INR - ( 2025 01:00 )   PT: 24.7 sec;   INR: 2.09 ratio         PTT - ( 2025 01:00 )  PTT:33.8 sec    CARDIAC MARKERS ( 2025 17:03 )  x     / x     / x     / x     / 2.6 ng/mL      Urinalysis Basic - ( 2025 01:00 )    Color: x / Appearance: x / SG: x / pH: x  Gluc: 95 mg/dL / Ketone: x  / Bili: x / Urobili: x   Blood: x / Protein: x / Nitrite: x   Leuk Esterase: x / RBC: x / WBC x   Sq Epi: x / Non Sq Epi: x / Bacteria: x

## 2025-01-31 NOTE — PROGRESS NOTE ADULT - SUBJECTIVE AND OBJECTIVE BOX
Patient is a 79y old  Male who presents with a chief complaint of Post cardiac arrest (31 Jan 2025 07:39)    HPI:  79-year-old male PMH CAD (s/p stent 2011 and CABG 2017, hypothyroidism, gout, COPD presenting after cardiac arrest.  Per EMS, per family at bedside patient was in normal state of health this morning, may be more tired than normal.  Went down to take a nap around noon, wife went to check on him around 3 to 3:30 PM and he was very difficult to arouse, prompting her to call EMS.  On EMS arrival patient was found to be pulseless, in asystolic rhythm, ACLS was initiated s/p 4 rounds of IV epinephrine, intubated in field, ROSC obtained prior to arrival. In the ED, pt was hypotensive to 68/50, pt started on Levo switched to Epi. Given 1 dose of vanc+zosyn, atropine after HR 30-40s, sodium bicarb. Labs significantly concerning with lactate 11.3, elevated LFTs and coags concerning for shock liver, DOV with Cr up-trending, metabolic acidosis.  (28 Jan 2025 18:30)    Interval history: 24 hour EEG with severe generalized slowing    Subjective: Patient evaluated at bedside with neurology attending. Intubated and sedated.       Vital Signs Last 24 Hrs  T(C): 36.4 (31 Jan 2025 12:00), Max: 37 (31 Jan 2025 04:00)  T(F): 97.5 (31 Jan 2025 12:00), Max: 98.6 (31 Jan 2025 04:00)  HR: 70 (31 Jan 2025 14:00) (68 - 83)  BP: --  BP(mean): --  RR: 14 (31 Jan 2025 14:00) (14 - 25)  SpO2: 100% (31 Jan 2025 14:00) (100% - 100%)    Parameters below as of 31 Jan 2025 13:30  Patient On (Oxygen Delivery Method): ventilator    O2 Concentration (%): 40      Physical Exam: Sedated and intubated on propofol 40mcg  General: Lying in bed with eyes closed, not opening eyes to speech or tactile stimulus or noxious stimulus. Not attending to examiner.  Eyes: Conjunctiva and sclera with clear secretions  Cardiovascular:   Neurologic:  - Mental Status: Lying in bed with eyes closed, not opening eyes to speech or tactile stimulus or noxious stimulus. Not attending to examiner.  - Cranial Nerves II-XII:  Pupils are 2 mm, equal, round, and sluggishly reactive to light.  Vestibular occular reflex and blink reflex absent. Gag reflex absent.   - Motor: Flaccid throughout, no spontaneous movement, no withdrawal to pain. No decorticate or decerebrate posturing on exam. No Triple flexion to pain present.   - Reflexes: Plantar responses mute. Otherwise 0 throughout  - Sensory: No Grimace to pain in all extremities.  - Coordination & Gait: Unable to obtain.      LABS:                        7.5    16.22 )-----------( 67       ( 31 Jan 2025 05:36 )             21.2     01-31    134[L]  |  89[L]  |  61[H]  ----------------------------<  112[H]  4.5   |  29  |  3.55[H]    Ca    7.6[L]      31 Jan 2025 00:13  Phos  5.7     01-31  Mg     2.40     01-31    TPro  6.5  /  Alb  2.0[L]  /  TBili  2.3[H]  /  DBili  x   /  AST  292[H]  /  ALT  187[H]  /  AlkPhos  71  01-31    PT/INR - ( 31 Jan 2025 00:13 )   PT: 16.7 sec;   INR: 1.41 ratio         PTT - ( 31 Jan 2025 00:13 )  PTT:30.2 sec  Urinalysis Basic - ( 31 Jan 2025 00:13 )    Color: x / Appearance: x / SG: x / pH: x  Gluc: 112 mg/dL / Ketone: x  / Bili: x / Urobili: x   Blood: x / Protein: x / Nitrite: x   Leuk Esterase: x / RBC: x / WBC x   Sq Epi: x / Non Sq Epi: x / Bacteria: x        CULTURES:  Culture Results:   >100,000 CFU/ml Klebsiella pneumoniae *!* (01-28 @ 19:13)  Culture Results:   No growth at 48 Hours (01-28 @ 17:35)        I&O:       Medications:    RADIOLOGY & ADDITIONAL STUDIES:

## 2025-01-31 NOTE — PROGRESS NOTE ADULT - ASSESSMENT
Assessment:  Patient DESTINI ENRIQUE is a 79y (1945) with a PMHx significant for COPD, CAD< stent 2011, CABG 207, presented from home after cardiac arrest, ROSC achieved in 25min, intubated and sedated on propofol, intermittently hypotensive, bradycardic and hypoxic, left lung opacity, left airway blood clots, got rEEG as a part of anoxic brain injury management, prelim reading of which showed 9 seizures in 20 min. Clinically also he was noted to have intermittent b/l UE stiffening and shaking. He was weaned off propofol around 1200 1/29, he got connected to EEG around 1300, that's when around 9 seizures were noted on EEG in 20min, clinically as well, RN described few episodes of b/l UE stiffening and shaking, unclear how long it lasted. He was put back again on propofol around 1700 and rate was gradually increased to current rate of 20mcg. Currently he is overbreathing the ventilator.     Impression: Anoxic brain injury post asystolic arrest d c/b seizures ( now resolved) s/p sedation with propofol     Recs:  [] Taper and stop propofol when able  [] Continue on vEEG while tapering and stopping propofol  [] Continue Keppra 1.5g q12h   [] MRI brain w/o contrast to evaluate for anoxic brain injury   [] Check neuron specific enolase on 48-72hrs post-cardiac arrest. Please obtain first NSE 48 hrs post-cardiac arrest and 72 hrs post-cardiac arrest.   [] GOC per primary team      Patient seen with neurology attending, Dr. Villela     Assessment:  Patient DESTINI ENRIQUE is a 79y (1945) with a PMHx significant for COPD, CAD< stent 2011, CABG 207, presented from home after cardiac arrest, ROSC achieved in 25min, intubated and sedated on propofol, intermittently hypotensive, bradycardic and hypoxic, left lung opacity, left airway blood clots, got rEEG as a part of anoxic brain injury management, prelim reading of which showed 9 seizures in 20 min. Clinically also he was noted to have intermittent b/l UE stiffening and shaking. He was weaned off propofol around 1200 1/29, he got connected to EEG around 1300, that's when around 9 seizures were noted on EEG in 20min, clinically as well, RN described few episodes of b/l UE stiffening and shaking, unclear how long it lasted. He was put back again on propofol around 1700 and rate was gradually increased to current rate of 20mcg. Currently he is overbreathing the ventilator.     Impression: Anoxic brain injury post asystolic arrest d c/b seizures ( now resolved) s/p sedation with propofol     Recs:  [] Taper and stop propofol when able  [] Continue on vEEG while tapering and stopping propofol  [] Keppra 500 mg q12h   [] MRI brain w/o contrast to evaluate for anoxic brain injury   [] Check neuron specific enolase on 48-72hrs post-cardiac arrest. Please obtain first NSE 48 hrs post-cardiac arrest and 72 hrs post-cardiac arrest.   [] GOC per primary team      Patient seen with neurology attending, Dr. Villela

## 2025-01-31 NOTE — PROGRESS NOTE ADULT - ATTENDING COMMENTS
Creatinine: 3.55 mg/dL (01.31.25 @ 00:13)   Blood Urea Nitrogen: 61 mg/dL (01.31.25 @ 00:13)   eGFR: 17    Start Time/Date: 11:56 on 1/30/2025  End Time/Date: 08:00 on 1/31/2025  Duration: 20 hours 4 minutes  Summary:  Abnormal EEG in the comatose state.  Severe diffuse slowing and attenuation.  Clinical Impression:  Severe diffuse cerebral dysfunction.  There were no epileptiform abnormalities recorded.      A/P  Mr. Holman is an 80 yo man with anoxic ischemic encephalopathy.  We are unable to asses his prognosis based on examination at this time since he is on sedation.  Length of time of arrest, evidence of tissue hypoperfusion of other organs (elevated LFTs, DOV) and electroclinical seizures are poor prognostic signs.  We will reassess for prognosis after he is off sedation.  Prognosis D/W wife and son.   Continue EEG - plan to stop propofol.   Continue levetiracetam 500 mg Q12H  Thank you    There is a high probability of sudden, clinically significant, or life threatening deterioration in the patient’s condition which requires the highest level of physician preparedness to intervene urgently.  Critical care time:  45 minutes.

## 2025-01-31 NOTE — PROGRESS NOTE ADULT - SUBJECTIVE AND OBJECTIVE BOX
****[facility] Anshu [facility] PGY-1****    Overnight Events: no acute overnight events  Interval Events:    OBJECTIVE:  ICU Vital Signs Last 24 Hrs  T(C): 37 (31 Jan 2025 04:00), Max: 37 (31 Jan 2025 04:00)  T(F): 98.6 (31 Jan 2025 04:00), Max: 98.6 (31 Jan 2025 04:00)  HR: 72 (31 Jan 2025 07:31) (67 - 83)  BP: --  BP(mean): --  ABP: 96/45 (31 Jan 2025 07:00) (92/48 - 111/52)  ABP(mean): 62 (31 Jan 2025 07:00) (62 - 71)  RR: 23 (31 Jan 2025 07:00) (14 - 25)  SpO2: 100% (31 Jan 2025 07:31) (100% - 100%)    O2 Parameters below as of 31 Jan 2025 04:00  Patient On (Oxygen Delivery Method): ventilator    O2 Concentration (%): 40      Mode: AC/ CMV (Assist Control/ Continuous Mandatory Ventilation), RR (machine): 14, TV (machine): 350, FiO2: 40, PEEP: 5, ITime: 0.47, MAP: 10, PIP: 30    01-30 @ 07:01  -  01-31 @ 07:00  --------------------------------------------------------  IN: 1461.3 mL / OUT: 430 mL / NET: 1031.3 mL      CAPILLARY BLOOD GLUCOSE      POCT Blood Glucose.: 135 mg/dL (31 Jan 2025 05:16)      PHYSICAL EXAM  GENERAL: Pt intubated  HEAD:  Atraumatic, Normocephalic  EYES: EOMI, PERRL, conjunctiva and sclera clear  NECK: Supple, No JVD  CHEST/LUNG: Clear to auscultation bilaterally; No wheezes, rales or rhonchi  HEART: Regular rate and rhythm; No murmurs, rubs, or gallops, (+)S1, S2  ABDOMEN: Soft, Nontender, Nondistended; Normal Bowel sounds   EXTREMITIES:  2+ Peripheral Pulses, No clubbing, cyanosis, or edema  SKIN: No rashes or lesions      HOSPITAL MEDICATIONS:  MEDICATIONS  (STANDING):  chlorhexidine 0.12% Liquid 15 milliLiter(s) Oral Mucosa every 12 hours  chlorhexidine 2% Cloths 1 Application(s) Topical <User Schedule>  dextrose 5%. 1000 milliLiter(s) (100 mL/Hr) IV Continuous <Continuous>  dextrose 5%. 1000 milliLiter(s) (50 mL/Hr) IV Continuous <Continuous>  dextrose 50% Injectable 25 Gram(s) IV Push once  dextrose 50% Injectable 12.5 Gram(s) IV Push once  dextrose 50% Injectable 25 Gram(s) IV Push once  glucagon  Injectable 1 milliGRAM(s) IntraMuscular once  insulin lispro (ADMELOG) corrective regimen sliding scale   SubCutaneous every 6 hours  latanoprost 0.005% Ophthalmic Solution 1 Drop(s) Both EYES at bedtime  levETIRAcetam   Injectable 500 milliGRAM(s) IV Push every 12 hours  levothyroxine Injectable 18 MICROGram(s) IV Push at bedtime  mupirocin 2% Ointment 1 Application(s) Topical two times a day  norepinephrine Infusion 0.729 MICROgram(s)/kG/Min (65.6 mL/Hr) IV Continuous <Continuous>  pantoprazole  Injectable 40 milliGRAM(s) IV Push two times a day  petrolatum Ophthalmic Ointment 1 Application(s) Both EYES two times a day  piperacillin/tazobactam IVPB.. 3.375 Gram(s) IV Intermittent every 12 hours  propofol Infusion 10 MICROgram(s)/kG/Min (2.88 mL/Hr) IV Continuous <Continuous>    MEDICATIONS  (PRN):  albuterol/ipratropium for Nebulization 3 milliLiter(s) Nebulizer every 6 hours PRN Respiratory Distress  dextrose Oral Gel 15 Gram(s) Oral once PRN Blood Glucose LESS THAN 70 milliGRAM(s)/deciliter      LABS:                        7.5    16.22 )-----------( 67       ( 31 Jan 2025 05:36 )             21.2     Hgb Trend: 7.5<--, 7.7<--, 7.2<--, 7.7<--, 7.9<--  01-31    134[L]  |  89[L]  |  61[H]  ----------------------------<  112[H]  4.5   |  29  |  3.55[H]    Ca    7.6[L]      31 Jan 2025 00:13  Phos  5.7     01-31  Mg     2.40     01-31    TPro  6.5  /  Alb  2.0[L]  /  TBili  2.3[H]  /  DBili  x   /  AST  292[H]  /  ALT  187[H]  /  AlkPhos  71  01-31    Creatinine Trend: 3.55<--, 2.62<--, 2.39<--, 2.09<--, 1.67<--, 1.54<--  PT/INR - ( 31 Jan 2025 00:13 )   PT: 16.7 sec;   INR: 1.41 ratio         PTT - ( 31 Jan 2025 00:13 )  PTT:30.2 sec  Urinalysis Basic - ( 31 Jan 2025 00:13 )    Color: x / Appearance: x / SG: x / pH: x  Gluc: 112 mg/dL / Ketone: x  / Bili: x / Urobili: x   Blood: x / Protein: x / Nitrite: x   Leuk Esterase: x / RBC: x / WBC x   Sq Epi: x / Non Sq Epi: x / Bacteria: x      Arterial Blood Gas:  01-31 @ 05:36  7.49/43/146/33/99.3/8.6  ABG lactate: --  Arterial Blood Gas:  01-31 @ 01:05  7.45/48/138/33/99.9/8.5  ABG lactate: --  Arterial Blood Gas:  01-30 @ 06:12  7.40/57/86/35/97.9/9.4  ABG lactate: --  Arterial Blood Gas:  01-30 @ 01:00  7.50/43/118/34/99.4/9.4  ABG lactate: --    Venous Blood Gas:  01-29 @ 18:10  7.47/50/53/36/86.4  VBG Lactate: 4.5  Venous Blood Gas:  01-29 @ 10:05  7.30/66/34/32/46.4  VBG Lactate: 4.4      MICROBIOLOGY:     Culture - Bronchial (collected 30 Jan 2025 15:15)  Source: Bronchial  Gram Stain (31 Jan 2025 03:08):    Rare polymorphonuclear leukocytes per low power field    No squamous epithelial cells per low power field    No organisms seen per oil power field    Culture - Urine (collected 28 Jan 2025 19:13)  Source: Catheterized Catheterized  Final Report (31 Jan 2025 06:02):    >100,000 CFU/ml Klebsiella pneumoniae  Organism: Klebsiella pneumoniae (31 Jan 2025 06:02)  Organism: Klebsiella pneumoniae (31 Jan 2025 06:02)    Culture - Blood (collected 28 Jan 2025 17:35)  Source: .Blood BLOOD  Preliminary Report (30 Jan 2025 22:01):    No growth at 48 Hours    Culture - Blood (collected 28 Jan 2025 17:35)  Source: .Blood BLOOD  Preliminary Report (30 Jan 2025 22:01):    No growth at 48 Hours        RADIOLOGY:  [ ] Reviewed by me   ****Kassidy Brasher Internal Medicine PGY-1****    Overnight Events: no acute overnight events  Interval Events: pt seen and examined at bedside. Intubated, appears comfortable    OBJECTIVE:  ICU Vital Signs Last 24 Hrs  T(C): 37 (31 Jan 2025 04:00), Max: 37 (31 Jan 2025 04:00)  T(F): 98.6 (31 Jan 2025 04:00), Max: 98.6 (31 Jan 2025 04:00)  HR: 72 (31 Jan 2025 07:31) (67 - 83)  BP: --  BP(mean): --  ABP: 96/45 (31 Jan 2025 07:00) (92/48 - 111/52)  ABP(mean): 62 (31 Jan 2025 07:00) (62 - 71)  RR: 23 (31 Jan 2025 07:00) (14 - 25)  SpO2: 100% (31 Jan 2025 07:31) (100% - 100%)    O2 Parameters below as of 31 Jan 2025 04:00  Patient On (Oxygen Delivery Method): ventilator    O2 Concentration (%): 40      Mode: AC/ CMV (Assist Control/ Continuous Mandatory Ventilation), RR (machine): 14, TV (machine): 350, FiO2: 40, PEEP: 5, ITime: 0.47, MAP: 10, PIP: 30    01-30 @ 07:01  -  01-31 @ 07:00  --------------------------------------------------------  IN: 1461.3 mL / OUT: 430 mL / NET: 1031.3 mL      CAPILLARY BLOOD GLUCOSE      POCT Blood Glucose.: 135 mg/dL (31 Jan 2025 05:16)      PHYSICAL EXAM  GENERAL: Pt intubated  HEAD:  Atraumatic, Normocephalic  EYES: EOMI, PERRL, conjunctiva and sclera clear  NECK: Supple, No JVD  CHEST/LUNG: Clear to auscultation bilaterally; No wheezes, rales or rhonchi  HEART: Regular rate and rhythm; No murmurs, rubs, or gallops, (+)S1, S2  ABDOMEN: Soft, Nontender, Nondistended; Normal Bowel sounds   EXTREMITIES:  2+ Peripheral Pulses, No clubbing, cyanosis, or edema  SKIN: No rashes or lesions      HOSPITAL MEDICATIONS:  MEDICATIONS  (STANDING):  chlorhexidine 0.12% Liquid 15 milliLiter(s) Oral Mucosa every 12 hours  chlorhexidine 2% Cloths 1 Application(s) Topical <User Schedule>  dextrose 5%. 1000 milliLiter(s) (100 mL/Hr) IV Continuous <Continuous>  dextrose 5%. 1000 milliLiter(s) (50 mL/Hr) IV Continuous <Continuous>  dextrose 50% Injectable 25 Gram(s) IV Push once  dextrose 50% Injectable 12.5 Gram(s) IV Push once  dextrose 50% Injectable 25 Gram(s) IV Push once  glucagon  Injectable 1 milliGRAM(s) IntraMuscular once  insulin lispro (ADMELOG) corrective regimen sliding scale   SubCutaneous every 6 hours  latanoprost 0.005% Ophthalmic Solution 1 Drop(s) Both EYES at bedtime  levETIRAcetam   Injectable 500 milliGRAM(s) IV Push every 12 hours  levothyroxine Injectable 18 MICROGram(s) IV Push at bedtime  mupirocin 2% Ointment 1 Application(s) Topical two times a day  norepinephrine Infusion 0.729 MICROgram(s)/kG/Min (65.6 mL/Hr) IV Continuous <Continuous>  pantoprazole  Injectable 40 milliGRAM(s) IV Push two times a day  petrolatum Ophthalmic Ointment 1 Application(s) Both EYES two times a day  piperacillin/tazobactam IVPB.. 3.375 Gram(s) IV Intermittent every 12 hours  propofol Infusion 10 MICROgram(s)/kG/Min (2.88 mL/Hr) IV Continuous <Continuous>    MEDICATIONS  (PRN):  albuterol/ipratropium for Nebulization 3 milliLiter(s) Nebulizer every 6 hours PRN Respiratory Distress  dextrose Oral Gel 15 Gram(s) Oral once PRN Blood Glucose LESS THAN 70 milliGRAM(s)/deciliter      LABS:                        7.5    16.22 )-----------( 67       ( 31 Jan 2025 05:36 )             21.2     Hgb Trend: 7.5<--, 7.7<--, 7.2<--, 7.7<--, 7.9<--  01-31    134[L]  |  89[L]  |  61[H]  ----------------------------<  112[H]  4.5   |  29  |  3.55[H]    Ca    7.6[L]      31 Jan 2025 00:13  Phos  5.7     01-31  Mg     2.40     01-31    TPro  6.5  /  Alb  2.0[L]  /  TBili  2.3[H]  /  DBili  x   /  AST  292[H]  /  ALT  187[H]  /  AlkPhos  71  01-31    Creatinine Trend: 3.55<--, 2.62<--, 2.39<--, 2.09<--, 1.67<--, 1.54<--  PT/INR - ( 31 Jan 2025 00:13 )   PT: 16.7 sec;   INR: 1.41 ratio         PTT - ( 31 Jan 2025 00:13 )  PTT:30.2 sec  Urinalysis Basic - ( 31 Jan 2025 00:13 )    Color: x / Appearance: x / SG: x / pH: x  Gluc: 112 mg/dL / Ketone: x  / Bili: x / Urobili: x   Blood: x / Protein: x / Nitrite: x   Leuk Esterase: x / RBC: x / WBC x   Sq Epi: x / Non Sq Epi: x / Bacteria: x      Arterial Blood Gas:  01-31 @ 05:36  7.49/43/146/33/99.3/8.6  ABG lactate: --  Arterial Blood Gas:  01-31 @ 01:05  7.45/48/138/33/99.9/8.5  ABG lactate: --  Arterial Blood Gas:  01-30 @ 06:12  7.40/57/86/35/97.9/9.4  ABG lactate: --  Arterial Blood Gas:  01-30 @ 01:00  7.50/43/118/34/99.4/9.4  ABG lactate: --    Venous Blood Gas:  01-29 @ 18:10  7.47/50/53/36/86.4  VBG Lactate: 4.5  Venous Blood Gas:  01-29 @ 10:05  7.30/66/34/32/46.4  VBG Lactate: 4.4      MICROBIOLOGY:     Culture - Bronchial (collected 30 Jan 2025 15:15)  Source: Bronchial  Gram Stain (31 Jan 2025 03:08):    Rare polymorphonuclear leukocytes per low power field    No squamous epithelial cells per low power field    No organisms seen per oil power field    Culture - Urine (collected 28 Jan 2025 19:13)  Source: Catheterized Catheterized  Final Report (31 Jan 2025 06:02):    >100,000 CFU/ml Klebsiella pneumoniae  Organism: Klebsiella pneumoniae (31 Jan 2025 06:02)  Organism: Klebsiella pneumoniae (31 Jan 2025 06:02)    Culture - Blood (collected 28 Jan 2025 17:35)  Source: .Blood BLOOD  Preliminary Report (30 Jan 2025 22:01):    No growth at 48 Hours    Culture - Blood (collected 28 Jan 2025 17:35)  Source: .Blood BLOOD  Preliminary Report (30 Jan 2025 22:01):    No growth at 48 Hours        RADIOLOGY:  [ ] Reviewed by me

## 2025-01-31 NOTE — EEG REPORT - NS EEG TEXT BOX
[facility]   COMPREHENSIVE EPILEPSY CENTER   REPORT OF LONG-TERM VIDEO EEG     [facility]: [address], [place] [address], Ph#: [phone]  [facility]:  [address] [place] [address], Ph#: [phone]  [facility]: [address] [place] [address], Ph#: [phone]    Patient Name: DESTINI ENRIQUE  Age and : 79y ([birth year])  MRN #: [id number]  Location: [place] [address]  Referring Physician: Tyler Dixon    Start Time/Date: 11:56 on 2025  End Time/Date: 08:00 on 2025  Duration: 20 hours 4 minutes    _____________________________________________________________  STUDY INFORMATION    EEG Recording Technique:  The patient underwent continuous Video-EEG monitoring, using Telemetry System hardware on the XLTek Digital System. EEG and video data were stored on a computer hard drive with important events saved in digital archive files. The material was reviewed by a physician (electroencephalographer / epileptologist) on a daily basis. Anoop and seizure detection algorithms were utilized and reviewed. An EEG Technician attended to the patient, and was available throughout daytime work hours.  The epilepsy center neurologist was available in person or on call 24-hours per day.    EEG Placement and Labeling of Electrodes:  The EEG was performed utilizing 20 channel referential EEG connections (coronal over temporal over parasagittal montage) using all standard 10-20 electrode placements with EKG, with additional electrodes placed in the inferior temporal region using the modified 10-10 montage electrode placements for elective admissions, or if deemed necessary. Recording was at a sampling rate of 256 samples per second per channel. Time synchronized digital video recording was done simultaneously with EEG recording. A low light infrared camera was used for low light recording.     _____________________________________________________________  HISTORY    Patient is a 79y old  Male who presents with a chief complaint of Post cardiac arrest (2025 07:39)      PERTINENT MEDICATION:  MEDICATIONS  (STANDING):  buMETAnide Injectable 2 milliGRAM(s) IV Push once  buMETAnide Injectable 2 milliGRAM(s) IV Push once  cefTRIAXone   IVPB 1000 milliGRAM(s) IV Intermittent every 24 hours  chlorhexidine 0.12% Liquid 15 milliLiter(s) Oral Mucosa every 12 hours  chlorhexidine 2% Cloths 1 Application(s) Topical <User Schedule>  dextrose 5%. 1000 milliLiter(s) (100 mL/Hr) IV Continuous <Continuous>  dextrose 5%. 1000 milliLiter(s) (50 mL/Hr) IV Continuous <Continuous>  dextrose 50% Injectable 25 Gram(s) IV Push once  dextrose 50% Injectable 12.5 Gram(s) IV Push once  dextrose 50% Injectable 25 Gram(s) IV Push once  glucagon  Injectable 1 milliGRAM(s) IntraMuscular once  insulin lispro (ADMELOG) corrective regimen sliding scale   SubCutaneous every 6 hours  latanoprost 0.005% Ophthalmic Solution 1 Drop(s) Both EYES at bedtime  levETIRAcetam   Injectable 500 milliGRAM(s) IV Push every 12 hours  levothyroxine Injectable 18 MICROGram(s) IV Push at bedtime  mupirocin 2% Ointment 1 Application(s) Topical two times a day  norepinephrine Infusion 0.729 MICROgram(s)/kG/Min (65.6 mL/Hr) IV Continuous <Continuous>  pantoprazole  Injectable 40 milliGRAM(s) IV Push two times a day  petrolatum Ophthalmic Ointment 1 Application(s) Both EYES two times a day  propofol Infusion 10 MICROgram(s)/kG/Min (2.88 mL/Hr) IV Continuous <Continuous>    _____________________________________________________________  STUDY INTERPRETATION      FINDINGS:      Background:  Continuity: discontinuous  Symmetry: symmetric  PDR: none  Reactivity: absent  Voltage: low  Anterior Posterior Gradient: absent  Other background findings: none  Breach: absent    Background Slowing:  Generalized slowing: severe.  Focal slowing: none was present.    State Changes:   Absent    Sporadic Epileptiform Discharges:    None    Rhythmic and Periodic Patterns (RPPs):  None     Electrographic and Electroclinical seizures:  None    Other Clinical Events:  None    Activation Procedures:   Hyperventilation was not performed.    Photic stimulation was not performed.    Artifacts:  Intermittent myogenic and movement artifacts were noted, particularly in the later half of the recording.    ECG:  The heart rate on single channel ECG was obscured by artifact.    Summary:  Abnormal EEG in the comatose state.  Severe diffuse slowing and attenuation.    Clinical Impression:  Severe diffuse cerebral dysfunction.  There were no epileptiform abnormalities recorded.        -------------------------------------------------------------------------------------------------------    Amanda Deluna MD  Director, Epilepsy - [facility]    Questions please call [phone]  After hours (5 PM - 8:30 AM) please call the epilepsy answering service at [phone]       [facility]   COMPREHENSIVE EPILEPSY CENTER   REPORT OF LONG-TERM VIDEO EEG     [facility]: [address], [place] [address], Ph#: [phone]  [facility]:  [address] [place] [address], Ph#: [phone]  [facility]: [address] [place] [address], Ph#: [phone]    Patient Name: DESTINI ENRIQUE  Age and : 79y (41)  MRN #: [id number]  Location: [place] [address]  Referring Physician: Tyler Dixon    Start Time/Date: 11:56 on 2025  End Time/Date: 08:00 on 2025  Duration: 20 hours 4 minutes    _____________________________________________________________  STUDY INFORMATION    EEG Recording Technique:  The patient underwent continuous Video-EEG monitoring, using Telemetry System hardware on the XLTek Digital System. EEG and video data were stored on a computer hard drive with important events saved in digital archive files. The material was reviewed by a physician (electroencephalographer / epileptologist) on a daily basis. Anoop and seizure detection algorithms were utilized and reviewed. An EEG Technician attended to the patient, and was available throughout daytime work hours.  The epilepsy center neurologist was available in person or on call 24-hours per day.    EEG Placement and Labeling of Electrodes:  The EEG was performed utilizing 20 channel referential EEG connections (coronal over temporal over parasagittal montage) using all standard 10-20 electrode placements with EKG, with additional electrodes placed in the inferior temporal region using the modified 10-10 montage electrode placements for elective admissions, or if deemed necessary. Recording was at a sampling rate of 256 samples per second per channel. Time synchronized digital video recording was done simultaneously with EEG recording. A low light infrared camera was used for low light recording.     _____________________________________________________________  HISTORY    Patient is a 79y old  Male who presents with a chief complaint of Post cardiac arrest (2025 07:39)      PERTINENT MEDICATION:  MEDICATIONS  (STANDING):  buMETAnide Injectable 2 milliGRAM(s) IV Push once  buMETAnide Injectable 2 milliGRAM(s) IV Push once  cefTRIAXone   IVPB 1000 milliGRAM(s) IV Intermittent every 24 hours  chlorhexidine 0.12% Liquid 15 milliLiter(s) Oral Mucosa every 12 hours  chlorhexidine 2% Cloths 1 Application(s) Topical <User Schedule>  dextrose 5%. 1000 milliLiter(s) (100 mL/Hr) IV Continuous <Continuous>  dextrose 5%. 1000 milliLiter(s) (50 mL/Hr) IV Continuous <Continuous>  dextrose 50% Injectable 25 Gram(s) IV Push once  dextrose 50% Injectable 12.5 Gram(s) IV Push once  dextrose 50% Injectable 25 Gram(s) IV Push once  glucagon  Injectable 1 milliGRAM(s) IntraMuscular once  insulin lispro (ADMELOG) corrective regimen sliding scale   SubCutaneous every 6 hours  latanoprost 0.005% Ophthalmic Solution 1 Drop(s) Both EYES at bedtime  levETIRAcetam   Injectable 500 milliGRAM(s) IV Push every 12 hours  levothyroxine Injectable 18 MICROGram(s) IV Push at bedtime  mupirocin 2% Ointment 1 Application(s) Topical two times a day  norepinephrine Infusion 0.729 MICROgram(s)/kG/Min (65.6 mL/Hr) IV Continuous <Continuous>  pantoprazole  Injectable 40 milliGRAM(s) IV Push two times a day  petrolatum Ophthalmic Ointment 1 Application(s) Both EYES two times a day  propofol Infusion 10 MICROgram(s)/kG/Min (2.88 mL/Hr) IV Continuous <Continuous>    _____________________________________________________________  STUDY INTERPRETATION      FINDINGS:      Background:  Continuity: discontinuous  Symmetry: symmetric  PDR: none  Reactivity: absent  Voltage: low  Anterior Posterior Gradient: absent  Other background findings: none  Breach: absent    Background Slowing:  Generalized slowing: severe.  Focal slowing: none was present.    State Changes:   Absent    Sporadic Epileptiform Discharges:    None    Rhythmic and Periodic Patterns (RPPs):  None     Electrographic and Electroclinical seizures:  None    Other Clinical Events:  None    Activation Procedures:   Hyperventilation was not performed.    Photic stimulation did not activate abnormalities.    Artifacts:  Intermittent myogenic and movement artifacts were noted, particularly in the later half of the recording.    ECG:  The heart rate on single channel ECG was obscured by artifact.    Summary:  Abnormal EEG in the comatose state.  Severe diffuse slowing and attenuation.    Clinical Impression:  Severe diffuse cerebral dysfunction.  There were no epileptiform abnormalities recorded.        -------------------------------------------------------------------------------------------------------    Amanda Deluna MD  Director, Epilepsy - [facility]    Questions please call [phone]  After hours (5 PM - 8:30 AM) please call the epilepsy answering service at [phone]

## 2025-01-31 NOTE — PROGRESS NOTE ADULT - ATTENDING COMMENTS
79 M with CAD, CABG, not on plavix or a/c, COPD here after cardiac arrest at home of unclear etiology, acute hypoxemic respiratory failure due to aspiration pneumonia +/- cavitary lesion/lung abscess +/- septic shock due to UTI    septic shock due to klebsiella pneumoniae UTI  DOV worsening, likely ATN      # acute hypoxemic respiratory failure  # cardiac arrest  # septic shock due to UTI  # cavitary lung lesion  # DOV due to shock  - c/w full vent support for now  - c/w keppra propofol, f/u EEG report, will talk to neuro about lowering propofol  - MRI brain at some point, NSE should be sent today  - septic shock due to GNR UTI - switch to ceftriaxone  - DOV still worsening, ~9 liters up, will diurese today to manage fluid balance  - shock liver improving  - hold dvt ppx given thrombocytopenia, likely shock related; may restart HSQ tomorrow if platelets continue to improve 79 M with CAD, CABG, not on plavix or a/c, COPD here after cardiac arrest at home of unclear etiology, acute hypoxemic respiratory failure due to aspiration pneumonia +/- cavitary lesion/lung abscess +/- septic shock due to UTI    septic shock due to klebsiella pneumoniae UTI  DOV worsening, likely ATN      # acute hypoxemic respiratory failure  # cardiac arrest  # septic shock due to UTI  # cavitary lung lesion  # DOV due to shock  - c/w full vent support for now  - c/w keppra propofol, f/u EEG report, will talk to neuro about lowering propofol  - MRI brain at some point, NSE should be sent today  - septic shock due to GNR UTI - switch to ceftriaxone  - DOV still worsening, ~9 liters up, will diurese today to manage fluid balance  - shock liver improving  - hold dvt ppx given thrombocytopenia, likely shock related; may restart HSQ tomorrow if platelets continue to improve  - f/u bronch studies for aspergillus/fungitell/cultures/cyto (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

## 2025-01-31 NOTE — CHART NOTE - NSCHARTNOTEFT_GEN_A_CORE
______________ CRITICAL CARE NUTRITION CONSULT /FOLLOW-UP ________________        --Medical Course--  79y  Male s/p cardiac arrest.    Phosphorus levels remains elevated.       Remains intubated/sedated.  ~ 304 KCals to be provided by Propofol over 24hrs @ current rate..        --Nutrition Course--    Spoke with wife @ bedside and obtained nutrition Hx.  Replies that Pt. has had change in appetite which started when he was prescribed and starting taking "folic acid" and then became more of a selective eater.  Usual weight of Pt. was ~160lbs some time ago, and Pt. started taking vitamins to "increase metabolism" and decrease to 130-140lbs.  However, as of recently (~3-4 weeks ago), Pt. weighed 116lbs PTA.  Was consuming Ensure supplement 3x daily at home and eating mostly oatmeal, cornflakes and banana during week.      Pt. has not reached prescribed enteral goal with [employer]. Hence inadequate energy/protein intake.  Spoke with RN earlier this morning.   Pt. with emesis of ~300mL over 24hrs.  Pt. has not had bowel movement, since admission (at least 3d).  Advise bowel regimen.    Would change enteral formula at this time to Nepro with eventual goal of 40mL/hr x 18hrs + Liquid Protein Supplement (LPS) 1x daily.  This will provide;  720mL total volume,   1296 kcals + 100 KCals from LPS) = 1396 KCals (+ Propofol)  58g protein (+ 15 g protein from LPS) = 73g protein  523mL free water    * Tf + LPS will give 29 KCals/kg & 1.5g protein/kg (based on previously estimated energy needs at weight of 48.0kg)        __________Diet Order_________  Diet, NPO with Tube Feed:   Tube Feeding Modality: Orogastric  Arianna Love Peptide 1.5 ([id number])  Total Volume for 24 Hours (mL): 900  Continuous  Starting Tube Feed Rate {mL per Hour}: 10  Increase Tube Feed Rate by (mL): 10     Every 4 hours  Until Goal Tube Feed Rate (mL per Hour): 50  Tube Feed Duration (in Hours): 18  Tube Feed Start Time: 11:00  Tube Feed Stop Time: 05:00 (01-30-25 @ 16:31) [Active]          Weight:      Height:  66"               Ideal Body Weight: 142# /64.5kg   49.1kg (1/31)  48.0kg (1/28)        _____Previously Estimated Energy Needs (based on 48.0kg)_____  25-30 KCals/kg 9080-7921 KCals/d  1.4-1.6g protein/kg = 67-77g protein/d        Edema: None noted    Last bowel movement: None since admit    Skin: No pressure injuries    ________________________Pertinent Medications____________   MEDICATIONS  (STANDING):  cefTRIAXone   IVPB 1000 milliGRAM(s) IV Intermittent every 24 hours  chlorhexidine 0.12% Liquid 15 milliLiter(s) Oral Mucosa every 12 hours  chlorhexidine 2% Cloths 1 Application(s) Topical <User Schedule>  dextrose 5%. 1000 milliLiter(s) (100 mL/Hr) IV Continuous <Continuous>  dextrose 5%. 1000 milliLiter(s) (50 mL/Hr) IV Continuous <Continuous>  dextrose 50% Injectable 25 Gram(s) IV Push once  dextrose 50% Injectable 12.5 Gram(s) IV Push once  dextrose 50% Injectable 25 Gram(s) IV Push once  glucagon  Injectable 1 milliGRAM(s) IntraMuscular once  insulin lispro (ADMELOG) corrective regimen sliding scale   SubCutaneous every 6 hours  latanoprost 0.005% Ophthalmic Solution 1 Drop(s) Both EYES at bedtime  levETIRAcetam   Injectable 500 milliGRAM(s) IV Push every 12 hours  levothyroxine Injectable 18 MICROGram(s) IV Push at bedtime  mupirocin 2% Ointment 1 Application(s) Topical two times a day  norepinephrine Infusion 0.729 MICROgram(s)/kG/Min (65.6 mL/Hr) IV Continuous <Continuous>  pantoprazole  Injectable 40 milliGRAM(s) IV Push two times a day  petrolatum Ophthalmic Ointment 1 Application(s) Both EYES two times a day  propofol Infusion 10 MICROgram(s)/kG/Min (2.88 mL/Hr) IV Continuous <Continuous>    MEDICATIONS  (PRN):  albuterol/ipratropium for Nebulization 3 milliLiter(s) Nebulizer every 6 hours PRN Respiratory Distress  dextrose Oral Gel 15 Gram(s) Oral once PRN Blood Glucose LESS THAN 70 milliGRAM(s)/deciliter          _________________________Pertinent Labs____________________     01-31    134[L]  |  89[L]  |  61[H]  ----------------------------<  112[H]  4.5   |  29  |  3.55[H]    Ca    7.6[L]      31 Jan 2025 00:13  Phos  5.7     01-31  Mg     2.40     01-31    TPro  6.5  /  Alb  2.0[L]  /  TBili  2.3[H]  /  DBili  x   /  AST  292[H]  /  ALT  187[H]  /  AlkPhos  71  01-31                                                                   7.5    16.22 )-----------( 67       ( 31 Jan 2025 05:36 )             21.2         CAPILLARY BLOOD GLUCOSE      POCT Blood Glucose.: 121 mg/dL (01-31-25 @ 11:37)  POCT Blood Glucose.: 135 mg/dL (01-31-25 @ 05:16)  POCT Blood Glucose.: 129 mg/dL (01-30-25 @ 23:10)  POCT Blood Glucose.: 158 mg/dL (01-30-25 @ 17:35)        A1C with Estimated Average Glucose Result: 6.0%        ________NUTRITION Dx_________    Pt. remains severely malnourished     PLAN/RECOMMENDATIONS:    1) D/C Arianna Farm's Peptide 1.5.  Initiate Nepro @ 20mL/hr then increase, as tolerated, by 10mL q 4hrs, to goal of 40mL/hr x 18hrs (11a-5a) + Liquid Protein Supplement (LPS) 1x daily.   2) Obtain daily weights  3) Monitor tolerance to tube feeding, GI status, electrolytes, glucose  4) Order Nephro-natali  5) Implement bowel regimen     RDN remains available and will f/u PRN.          Chioma Rdz, SEBASTIANN, CDN       pager [phone] or MS Teams

## 2025-02-01 NOTE — PROGRESS NOTE ADULT - SUBJECTIVE AND OBJECTIVE BOX
********  Lucinda Varela MD  IM PGY-1  ********    INTERVAL HPI/OVERNIGHT EVENTS:    SUBJECTIVE: Patient seen and examined at bedside.     ROS: All negative except as listed above.    VITAL SIGNS:  ICU Vital Signs Last 24 Hrs  T(C): 35.6 (01 Feb 2025 07:00), Max: 36.7 (31 Jan 2025 08:00)  T(F): 96.1 (01 Feb 2025 07:00), Max: 98.1 (31 Jan 2025 08:00)  HR: 62 (01 Feb 2025 07:08) (62 - 73)  BP: --  BP(mean): --  ABP: 105/50 (01 Feb 2025 06:00) (83/41 - 139/56)  ABP(mean): 69 (01 Feb 2025 06:00) (54 - 87)  RR: 15 (01 Feb 2025 06:00) (10 - 20)  SpO2: 100% (01 Feb 2025 07:08) (100% - 100%)    O2 Parameters below as of 01 Feb 2025 06:00  Patient On (Oxygen Delivery Method): ventilator          Mode: AC/ CMV (Assist Control/ Continuous Mandatory Ventilation), RR (machine): 14, TV (machine): 350, FiO2: 40, PEEP: 5, ITime: 0.6, MAP: 9, PIP: 28  Plateau pressure:   P/F ratio:     01-31 @ 07:01  -  02-01 @ 07:00  --------------------------------------------------------  IN: 1844.1 mL / OUT: 498 mL / NET: 1346.1 mL      CAPILLARY BLOOD GLUCOSE      POCT Blood Glucose.: 152 mg/dL (01 Feb 2025 05:17)      ECG: reviewed.    PHYSICAL EXAM:    GENERAL: NAD, lying in bed comfortably  HEAD:  Atraumatic, normocephalic  EYES: EOMI, PERRLA, conjunctiva and sclera clear  NECK: Supple, trachea midline, no JVD  HEART: Regular rate and rhythm, no murmurs, rubs, or gallops  LUNGS: Unlabored respirations.  Clear to auscultation bilaterally, no crackles, wheezing, or rhonchi  ABDOMEN: Soft, nontender, nondistended, +BS  EXTREMITIES: 2+ peripheral pulses bilaterally, cap refill<2 secs. No clubbing, cyanosis, or edema  NERVOUS SYSTEM:  A&Ox3, following commands, moving all extremities, no focal deficits   SKIN: No rashes or lesions    MEDICATIONS:  MEDICATIONS  (STANDING):  cefTRIAXone   IVPB 1000 milliGRAM(s) IV Intermittent every 24 hours  chlorhexidine 0.12% Liquid 15 milliLiter(s) Oral Mucosa every 12 hours  chlorhexidine 2% Cloths 1 Application(s) Topical <User Schedule>  dextrose 5%. 1000 milliLiter(s) (100 mL/Hr) IV Continuous <Continuous>  dextrose 5%. 1000 milliLiter(s) (50 mL/Hr) IV Continuous <Continuous>  dextrose 50% Injectable 25 Gram(s) IV Push once  dextrose 50% Injectable 12.5 Gram(s) IV Push once  dextrose 50% Injectable 25 Gram(s) IV Push once  glucagon  Injectable 1 milliGRAM(s) IntraMuscular once  insulin lispro (ADMELOG) corrective regimen sliding scale   SubCutaneous every 6 hours  latanoprost 0.005% Ophthalmic Solution 1 Drop(s) Both EYES at bedtime  levETIRAcetam   Injectable 500 milliGRAM(s) IV Push every 12 hours  levothyroxine Injectable 18 MICROGram(s) IV Push at bedtime  mupirocin 2% Ointment 1 Application(s) Topical two times a day  norepinephrine Infusion 0.729 MICROgram(s)/kG/Min (65.6 mL/Hr) IV Continuous <Continuous>  pantoprazole  Injectable 40 milliGRAM(s) IV Push two times a day  petrolatum Ophthalmic Ointment 1 Application(s) Both EYES two times a day  polyethylene glycol 3350 17 Gram(s) Oral daily  propofol Infusion 10 MICROgram(s)/kG/Min (2.88 mL/Hr) IV Continuous <Continuous>  senna 2 Tablet(s) Oral at bedtime    MEDICATIONS  (PRN):  albuterol/ipratropium for Nebulization 3 milliLiter(s) Nebulizer every 6 hours PRN Respiratory Distress  dextrose Oral Gel 15 Gram(s) Oral once PRN Blood Glucose LESS THAN 70 milliGRAM(s)/deciliter      ALLERGIES:  Allergies    No Known Allergies    Intolerances        LABS:                        7.3    16.50 )-----------( 75       ( 01 Feb 2025 00:47 )             20.2     02-01    131[L]  |  88[L]  |  70[H]  ----------------------------<  121[H]  3.8   |  29  |  4.41[H]    Ca    7.4[L]      01 Feb 2025 00:47  Phos  5.1     02-01  Mg     2.50     02-01    TPro  6.1  /  Alb  2.0[L]  /  TBili  2.8[H]  /  DBili  x   /  AST  164[H]  /  ALT  115[H]  /  AlkPhos  69  02-01    PT/INR - ( 01 Feb 2025 00:47 )   PT: 15.8 sec;   INR: 1.37 ratio         PTT - ( 01 Feb 2025 00:47 )  PTT:30.8 sec  Urinalysis Basic - ( 01 Feb 2025 00:47 )    Color: x / Appearance: x / SG: x / pH: x  Gluc: 121 mg/dL / Ketone: x  / Bili: x / Urobili: x   Blood: x / Protein: x / Nitrite: x   Leuk Esterase: x / RBC: x / WBC x   Sq Epi: x / Non Sq Epi: x / Bacteria: x      ABG:  pH, Arterial: 7.43 (02-01-25 @ 00:47)  pCO2, Arterial: 47 mmHg (02-01-25 @ 00:47)  pO2, Arterial: 155 mmHg (02-01-25 @ 00:47)      vBG:    Micro:    Culture - Blood (collected 01-28-25 @ 17:35)  Source: .Blood BLOOD  Preliminary Report (01-31-25 @ 22:01):    No growth at 72 Hours    Culture - Blood (collected 01-28-25 @ 17:35)  Source: .Blood BLOOD  Preliminary Report (01-31-25 @ 22:01):    No growth at 72 Hours          RADIOLOGY & ADDITIONAL TESTS: Reviewed. ********  Lucinda Varela MD  IM PGY-1  ********    INTERVAL HPI/OVERNIGHT EVENTS: Pressors requirements increased - levo 0.43 -> 0.58.     SUBJECTIVE: Patient seen and examined at bedside.     ROS: All negative except as listed above.    VITAL SIGNS:  ICU Vital Signs Last 24 Hrs  T(C): 35.6 (01 Feb 2025 07:00), Max: 36.7 (31 Jan 2025 08:00)  T(F): 96.1 (01 Feb 2025 07:00), Max: 98.1 (31 Jan 2025 08:00)  HR: 62 (01 Feb 2025 07:08) (62 - 73)  BP: --  BP(mean): --  ABP: 105/50 (01 Feb 2025 06:00) (83/41 - 139/56)  ABP(mean): 69 (01 Feb 2025 06:00) (54 - 87)  RR: 15 (01 Feb 2025 06:00) (10 - 20)  SpO2: 100% (01 Feb 2025 07:08) (100% - 100%)    O2 Parameters below as of 01 Feb 2025 06:00  Patient On (Oxygen Delivery Method): ventilator          Mode: AC/ CMV (Assist Control/ Continuous Mandatory Ventilation), RR (machine): 14, TV (machine): 350, FiO2: 40, PEEP: 5, ITime: 0.6, MAP: 9, PIP: 28  Plateau pressure:   P/F ratio:     01-31 @ 07:01  -  02-01 @ 07:00  --------------------------------------------------------  IN: 1844.1 mL / OUT: 498 mL / NET: 1346.1 mL      CAPILLARY BLOOD GLUCOSE      POCT Blood Glucose.: 152 mg/dL (01 Feb 2025 05:17)      ECG: reviewed.    PHYSICAL EXAM:    GENERAL: Pt intubated, sedated  HEAD:  Atraumatic, normocephalic  EYES: EOMI, PERRLA, conjunctiva and sclera clear  NECK: Supple, trachea midline, no JVD  HEART: Regular rate and rhythm, no murmurs, rubs, or gallops  LUNGS: Unlabored respirations.  Clear to auscultation bilaterally, no crackles, wheezing, or rhonchi  ABDOMEN: Soft, nontender, nondistended, +BS  EXTREMITIES: 2+ peripheral pulses bilaterally, cap refill<2 secs. No clubbing, cyanosis, or edema  NERVOUS SYSTEM:  unable to assess d/t pt intubated, sedated  SKIN: No rashes or lesions    MEDICATIONS:  MEDICATIONS  (STANDING):  cefTRIAXone   IVPB 1000 milliGRAM(s) IV Intermittent every 24 hours  chlorhexidine 0.12% Liquid 15 milliLiter(s) Oral Mucosa every 12 hours  chlorhexidine 2% Cloths 1 Application(s) Topical <User Schedule>  dextrose 5%. 1000 milliLiter(s) (100 mL/Hr) IV Continuous <Continuous>  dextrose 5%. 1000 milliLiter(s) (50 mL/Hr) IV Continuous <Continuous>  dextrose 50% Injectable 25 Gram(s) IV Push once  dextrose 50% Injectable 12.5 Gram(s) IV Push once  dextrose 50% Injectable 25 Gram(s) IV Push once  glucagon  Injectable 1 milliGRAM(s) IntraMuscular once  insulin lispro (ADMELOG) corrective regimen sliding scale   SubCutaneous every 6 hours  latanoprost 0.005% Ophthalmic Solution 1 Drop(s) Both EYES at bedtime  levETIRAcetam   Injectable 500 milliGRAM(s) IV Push every 12 hours  levothyroxine Injectable 18 MICROGram(s) IV Push at bedtime  mupirocin 2% Ointment 1 Application(s) Topical two times a day  norepinephrine Infusion 0.729 MICROgram(s)/kG/Min (65.6 mL/Hr) IV Continuous <Continuous>  pantoprazole  Injectable 40 milliGRAM(s) IV Push two times a day  petrolatum Ophthalmic Ointment 1 Application(s) Both EYES two times a day  polyethylene glycol 3350 17 Gram(s) Oral daily  propofol Infusion 10 MICROgram(s)/kG/Min (2.88 mL/Hr) IV Continuous <Continuous>  senna 2 Tablet(s) Oral at bedtime    MEDICATIONS  (PRN):  albuterol/ipratropium for Nebulization 3 milliLiter(s) Nebulizer every 6 hours PRN Respiratory Distress  dextrose Oral Gel 15 Gram(s) Oral once PRN Blood Glucose LESS THAN 70 milliGRAM(s)/deciliter      ALLERGIES:  Allergies    No Known Allergies    Intolerances        LABS:                        7.3    16.50 )-----------( 75       ( 01 Feb 2025 00:47 )             20.2     02-01    131[L]  |  88[L]  |  70[H]  ----------------------------<  121[H]  3.8   |  29  |  4.41[H]    Ca    7.4[L]      01 Feb 2025 00:47  Phos  5.1     02-01  Mg     2.50     02-01    TPro  6.1  /  Alb  2.0[L]  /  TBili  2.8[H]  /  DBili  x   /  AST  164[H]  /  ALT  115[H]  /  AlkPhos  69  02-01    PT/INR - ( 01 Feb 2025 00:47 )   PT: 15.8 sec;   INR: 1.37 ratio         PTT - ( 01 Feb 2025 00:47 )  PTT:30.8 sec  Urinalysis Basic - ( 01 Feb 2025 00:47 )    Color: x / Appearance: x / SG: x / pH: x  Gluc: 121 mg/dL / Ketone: x  / Bili: x / Urobili: x   Blood: x / Protein: x / Nitrite: x   Leuk Esterase: x / RBC: x / WBC x   Sq Epi: x / Non Sq Epi: x / Bacteria: x      ABG:  pH, Arterial: 7.43 (02-01-25 @ 00:47)  pCO2, Arterial: 47 mmHg (02-01-25 @ 00:47)  pO2, Arterial: 155 mmHg (02-01-25 @ 00:47)      vBG:    Micro:    Culture - Blood (collected 01-28-25 @ 17:35)  Source: .Blood BLOOD  Preliminary Report (01-31-25 @ 22:01):    No growth at 72 Hours    Culture - Blood (collected 01-28-25 @ 17:35)  Source: .Blood BLOOD  Preliminary Report (01-31-25 @ 22:01):    No growth at 72 Hours          RADIOLOGY & ADDITIONAL TESTS: Reviewed. ********  Luicnda Varela MD  IM PGY-1  ********    INTERVAL HPI/OVERNIGHT EVENTS: Pressors requirements increased - levo 0.43 -> 0.58.     SUBJECTIVE: Patient seen and examined at bedside.     ROS: All negative except as listed above.    VITAL SIGNS:  ICU Vital Signs Last 24 Hrs  T(C): 35.6 (01 Feb 2025 07:00), Max: 36.7 (31 Jan 2025 08:00)  T(F): 96.1 (01 Feb 2025 07:00), Max: 98.1 (31 Jan 2025 08:00)  HR: 62 (01 Feb 2025 07:08) (62 - 73)  BP: --  BP(mean): --  ABP: 105/50 (01 Feb 2025 06:00) (83/41 - 139/56)  ABP(mean): 69 (01 Feb 2025 06:00) (54 - 87)  RR: 15 (01 Feb 2025 06:00) (10 - 20)  SpO2: 100% (01 Feb 2025 07:08) (100% - 100%)    O2 Parameters below as of 01 Feb 2025 06:00  Patient On (Oxygen Delivery Method): ventilator          Mode: AC/ CMV (Assist Control/ Continuous Mandatory Ventilation), RR (machine): 14, TV (machine): 350, FiO2: 40, PEEP: 5, ITime: 0.6, MAP: 9, PIP: 28  Plateau pressure:   P/F ratio:     01-31 @ 07:01  -  02-01 @ 07:00  --------------------------------------------------------  IN: 1844.1 mL / OUT: 498 mL / NET: 1346.1 mL      CAPILLARY BLOOD GLUCOSE      POCT Blood Glucose.: 152 mg/dL (01 Feb 2025 05:17)      ECG: reviewed.    PHYSICAL EXAM:    GENERAL: Pt intubated, sedated  HEAD:  Atraumatic, normocephalic  EYES: EOMI, PERRLA, conjunctiva and sclera clear  NECK: Supple, trachea midline, no JVD  HEART: Regular rate and rhythm, no murmurs, rubs, or gallops  LUNGS: Unlabored respirations.  Clear to auscultation bilaterally, no crackles, wheezing, or rhonchi  ABDOMEN: Firm, nontender, nondistended, +BS  EXTREMITIES: 2+ peripheral pulses bilaterally, cap refill<2 secs. No clubbing, cyanosis, or edema  NERVOUS SYSTEM:  unable to assess d/t pt intubated, sedated  SKIN: No rashes or lesions    MEDICATIONS:  MEDICATIONS  (STANDING):  cefTRIAXone   IVPB 1000 milliGRAM(s) IV Intermittent every 24 hours  chlorhexidine 0.12% Liquid 15 milliLiter(s) Oral Mucosa every 12 hours  chlorhexidine 2% Cloths 1 Application(s) Topical <User Schedule>  dextrose 5%. 1000 milliLiter(s) (100 mL/Hr) IV Continuous <Continuous>  dextrose 5%. 1000 milliLiter(s) (50 mL/Hr) IV Continuous <Continuous>  dextrose 50% Injectable 25 Gram(s) IV Push once  dextrose 50% Injectable 12.5 Gram(s) IV Push once  dextrose 50% Injectable 25 Gram(s) IV Push once  glucagon  Injectable 1 milliGRAM(s) IntraMuscular once  insulin lispro (ADMELOG) corrective regimen sliding scale   SubCutaneous every 6 hours  latanoprost 0.005% Ophthalmic Solution 1 Drop(s) Both EYES at bedtime  levETIRAcetam   Injectable 500 milliGRAM(s) IV Push every 12 hours  levothyroxine Injectable 18 MICROGram(s) IV Push at bedtime  mupirocin 2% Ointment 1 Application(s) Topical two times a day  norepinephrine Infusion 0.729 MICROgram(s)/kG/Min (65.6 mL/Hr) IV Continuous <Continuous>  pantoprazole  Injectable 40 milliGRAM(s) IV Push two times a day  petrolatum Ophthalmic Ointment 1 Application(s) Both EYES two times a day  polyethylene glycol 3350 17 Gram(s) Oral daily  propofol Infusion 10 MICROgram(s)/kG/Min (2.88 mL/Hr) IV Continuous <Continuous>  senna 2 Tablet(s) Oral at bedtime    MEDICATIONS  (PRN):  albuterol/ipratropium for Nebulization 3 milliLiter(s) Nebulizer every 6 hours PRN Respiratory Distress  dextrose Oral Gel 15 Gram(s) Oral once PRN Blood Glucose LESS THAN 70 milliGRAM(s)/deciliter      ALLERGIES:  Allergies    No Known Allergies    Intolerances        LABS:                        7.3    16.50 )-----------( 75       ( 01 Feb 2025 00:47 )             20.2     02-01    131[L]  |  88[L]  |  70[H]  ----------------------------<  121[H]  3.8   |  29  |  4.41[H]    Ca    7.4[L]      01 Feb 2025 00:47  Phos  5.1     02-01  Mg     2.50     02-01    TPro  6.1  /  Alb  2.0[L]  /  TBili  2.8[H]  /  DBili  x   /  AST  164[H]  /  ALT  115[H]  /  AlkPhos  69  02-01    PT/INR - ( 01 Feb 2025 00:47 )   PT: 15.8 sec;   INR: 1.37 ratio         PTT - ( 01 Feb 2025 00:47 )  PTT:30.8 sec  Urinalysis Basic - ( 01 Feb 2025 00:47 )    Color: x / Appearance: x / SG: x / pH: x  Gluc: 121 mg/dL / Ketone: x  / Bili: x / Urobili: x   Blood: x / Protein: x / Nitrite: x   Leuk Esterase: x / RBC: x / WBC x   Sq Epi: x / Non Sq Epi: x / Bacteria: x      ABG:  pH, Arterial: 7.43 (02-01-25 @ 00:47)  pCO2, Arterial: 47 mmHg (02-01-25 @ 00:47)  pO2, Arterial: 155 mmHg (02-01-25 @ 00:47)      vBG:    Micro:    Culture - Blood (collected 01-28-25 @ 17:35)  Source: .Blood BLOOD  Preliminary Report (01-31-25 @ 22:01):    No growth at 72 Hours    Culture - Blood (collected 01-28-25 @ 17:35)  Source: .Blood BLOOD  Preliminary Report (01-31-25 @ 22:01):    No growth at 72 Hours          RADIOLOGY & ADDITIONAL TESTS: Reviewed. ********  Lucinda Varela MD  IM PGY-1  ********    INTERVAL HPI/OVERNIGHT EVENTS: Pressors requirements increased - levo 0.43 -> 0.58.     SUBJECTIVE: Patient seen and examined at bedside.     ROS: All negative except as listed above.    VITAL SIGNS:  ICU Vital Signs Last 24 Hrs  T(C): 35.6 (01 Feb 2025 07:00), Max: 36.7 (31 Jan 2025 08:00)  T(F): 96.1 (01 Feb 2025 07:00), Max: 98.1 (31 Jan 2025 08:00)  HR: 62 (01 Feb 2025 07:08) (62 - 73)  BP: --  BP(mean): --  ABP: 105/50 (01 Feb 2025 06:00) (83/41 - 139/56)  ABP(mean): 69 (01 Feb 2025 06:00) (54 - 87)  RR: 15 (01 Feb 2025 06:00) (10 - 20)  SpO2: 100% (01 Feb 2025 07:08) (100% - 100%)    O2 Parameters below as of 01 Feb 2025 06:00  Patient On (Oxygen Delivery Method): ventilator          Mode: AC/ CMV (Assist Control/ Continuous Mandatory Ventilation), RR (machine): 14, TV (machine): 350, FiO2: 40, PEEP: 5, ITime: 0.6, MAP: 9, PIP: 28  Plateau pressure:   P/F ratio:     01-31 @ 07:01  -  02-01 @ 07:00  --------------------------------------------------------  IN: 1844.1 mL / OUT: 498 mL / NET: 1346.1 mL      CAPILLARY BLOOD GLUCOSE      POCT Blood Glucose.: 152 mg/dL (01 Feb 2025 05:17)      ECG: reviewed.    PHYSICAL EXAM:    GENERAL: Pt intubated, sedated  HEAD:  Atraumatic, normocephalic  EYES: Conjunctiva and sclera clear  NECK: Supple, trachea midline, no JVD  HEART: Regular rate and rhythm, no murmurs, rubs, or gallops  LUNGS: Unlabored respirations.  Clear to auscultation bilaterally, no crackles, wheezing, or rhonchi  ABDOMEN: Firm, nontender, nondistended, +BS  EXTREMITIES: 2+ peripheral pulses bilaterally, cap refill<2 secs. No clubbing, cyanosis, or edema  NERVOUS SYSTEM:  unable to assess d/t pt intubated, sedated  SKIN: No rashes or lesions    MEDICATIONS:  MEDICATIONS  (STANDING):  cefTRIAXone   IVPB 1000 milliGRAM(s) IV Intermittent every 24 hours  chlorhexidine 0.12% Liquid 15 milliLiter(s) Oral Mucosa every 12 hours  chlorhexidine 2% Cloths 1 Application(s) Topical <User Schedule>  dextrose 5%. 1000 milliLiter(s) (100 mL/Hr) IV Continuous <Continuous>  dextrose 5%. 1000 milliLiter(s) (50 mL/Hr) IV Continuous <Continuous>  dextrose 50% Injectable 25 Gram(s) IV Push once  dextrose 50% Injectable 12.5 Gram(s) IV Push once  dextrose 50% Injectable 25 Gram(s) IV Push once  glucagon  Injectable 1 milliGRAM(s) IntraMuscular once  insulin lispro (ADMELOG) corrective regimen sliding scale   SubCutaneous every 6 hours  latanoprost 0.005% Ophthalmic Solution 1 Drop(s) Both EYES at bedtime  levETIRAcetam   Injectable 500 milliGRAM(s) IV Push every 12 hours  levothyroxine Injectable 18 MICROGram(s) IV Push at bedtime  mupirocin 2% Ointment 1 Application(s) Topical two times a day  norepinephrine Infusion 0.729 MICROgram(s)/kG/Min (65.6 mL/Hr) IV Continuous <Continuous>  pantoprazole  Injectable 40 milliGRAM(s) IV Push two times a day  petrolatum Ophthalmic Ointment 1 Application(s) Both EYES two times a day  polyethylene glycol 3350 17 Gram(s) Oral daily  propofol Infusion 10 MICROgram(s)/kG/Min (2.88 mL/Hr) IV Continuous <Continuous>  senna 2 Tablet(s) Oral at bedtime    MEDICATIONS  (PRN):  albuterol/ipratropium for Nebulization 3 milliLiter(s) Nebulizer every 6 hours PRN Respiratory Distress  dextrose Oral Gel 15 Gram(s) Oral once PRN Blood Glucose LESS THAN 70 milliGRAM(s)/deciliter      ALLERGIES:  Allergies    No Known Allergies    Intolerances        LABS:                        7.3    16.50 )-----------( 75       ( 01 Feb 2025 00:47 )             20.2     02-01    131[L]  |  88[L]  |  70[H]  ----------------------------<  121[H]  3.8   |  29  |  4.41[H]    Ca    7.4[L]      01 Feb 2025 00:47  Phos  5.1     02-01  Mg     2.50     02-01    TPro  6.1  /  Alb  2.0[L]  /  TBili  2.8[H]  /  DBili  x   /  AST  164[H]  /  ALT  115[H]  /  AlkPhos  69  02-01    PT/INR - ( 01 Feb 2025 00:47 )   PT: 15.8 sec;   INR: 1.37 ratio         PTT - ( 01 Feb 2025 00:47 )  PTT:30.8 sec  Urinalysis Basic - ( 01 Feb 2025 00:47 )    Color: x / Appearance: x / SG: x / pH: x  Gluc: 121 mg/dL / Ketone: x  / Bili: x / Urobili: x   Blood: x / Protein: x / Nitrite: x   Leuk Esterase: x / RBC: x / WBC x   Sq Epi: x / Non Sq Epi: x / Bacteria: x      ABG:  pH, Arterial: 7.43 (02-01-25 @ 00:47)  pCO2, Arterial: 47 mmHg (02-01-25 @ 00:47)  pO2, Arterial: 155 mmHg (02-01-25 @ 00:47)      vBG:    Micro:    Culture - Blood (collected 01-28-25 @ 17:35)  Source: .Blood BLOOD  Preliminary Report (01-31-25 @ 22:01):    No growth at 72 Hours    Culture - Blood (collected 01-28-25 @ 17:35)  Source: .Blood BLOOD  Preliminary Report (01-31-25 @ 22:01):    No growth at 72 Hours          RADIOLOGY & ADDITIONAL TESTS: Reviewed. ********  Lucinda Varela MD  IM PGY-1  ********    INTERVAL HPI/OVERNIGHT EVENTS: Pressors requirements increased - levo 0.43 -> 0.58. Placed back on prop d/t overbreathing on vent.    SUBJECTIVE: Patient seen and examined at bedside. Unable to assess for symptoms as pt is intubated/sedated.    ROS: All negative except as listed above.    VITAL SIGNS:  ICU Vital Signs Last 24 Hrs  T(C): 35.6 (01 Feb 2025 07:00), Max: 36.7 (31 Jan 2025 08:00)  T(F): 96.1 (01 Feb 2025 07:00), Max: 98.1 (31 Jan 2025 08:00)  HR: 62 (01 Feb 2025 07:08) (62 - 73)  BP: --  BP(mean): --  ABP: 105/50 (01 Feb 2025 06:00) (83/41 - 139/56)  ABP(mean): 69 (01 Feb 2025 06:00) (54 - 87)  RR: 15 (01 Feb 2025 06:00) (10 - 20)  SpO2: 100% (01 Feb 2025 07:08) (100% - 100%)    O2 Parameters below as of 01 Feb 2025 06:00  Patient On (Oxygen Delivery Method): ventilator          Mode: AC/ CMV (Assist Control/ Continuous Mandatory Ventilation), RR (machine): 14, TV (machine): 350, FiO2: 40, PEEP: 5, ITime: 0.6, MAP: 9, PIP: 28  Plateau pressure:   P/F ratio:     01-31 @ 07:01  -  02-01 @ 07:00  --------------------------------------------------------  IN: 1844.1 mL / OUT: 498 mL / NET: 1346.1 mL      CAPILLARY BLOOD GLUCOSE      POCT Blood Glucose.: 152 mg/dL (01 Feb 2025 05:17)      ECG: reviewed.    PHYSICAL EXAM:    GENERAL: Pt intubated, sedated  HEAD:  Atraumatic, normocephalic  EYES: Conjunctiva and sclera clear  NECK: Supple, trachea midline, no JVD  HEART: Regular rate and rhythm, no murmurs, rubs, or gallops  LUNGS: Unlabored respirations. Coarse breath sounds b/l.  ABDOMEN: Firm, nontender, nondistended, +BS  EXTREMITIES: 2+ peripheral pulses bilaterally, cap refill<2 secs. No clubbing, cyanosis, or edema  NERVOUS SYSTEM:  unable to assess d/t pt intubated, sedated  SKIN: No rashes or lesions    MEDICATIONS:  MEDICATIONS  (STANDING):  cefTRIAXone   IVPB 1000 milliGRAM(s) IV Intermittent every 24 hours  chlorhexidine 0.12% Liquid 15 milliLiter(s) Oral Mucosa every 12 hours  chlorhexidine 2% Cloths 1 Application(s) Topical <User Schedule>  dextrose 5%. 1000 milliLiter(s) (100 mL/Hr) IV Continuous <Continuous>  dextrose 5%. 1000 milliLiter(s) (50 mL/Hr) IV Continuous <Continuous>  dextrose 50% Injectable 25 Gram(s) IV Push once  dextrose 50% Injectable 12.5 Gram(s) IV Push once  dextrose 50% Injectable 25 Gram(s) IV Push once  glucagon  Injectable 1 milliGRAM(s) IntraMuscular once  insulin lispro (ADMELOG) corrective regimen sliding scale   SubCutaneous every 6 hours  latanoprost 0.005% Ophthalmic Solution 1 Drop(s) Both EYES at bedtime  levETIRAcetam   Injectable 500 milliGRAM(s) IV Push every 12 hours  levothyroxine Injectable 18 MICROGram(s) IV Push at bedtime  mupirocin 2% Ointment 1 Application(s) Topical two times a day  norepinephrine Infusion 0.729 MICROgram(s)/kG/Min (65.6 mL/Hr) IV Continuous <Continuous>  pantoprazole  Injectable 40 milliGRAM(s) IV Push two times a day  petrolatum Ophthalmic Ointment 1 Application(s) Both EYES two times a day  polyethylene glycol 3350 17 Gram(s) Oral daily  propofol Infusion 10 MICROgram(s)/kG/Min (2.88 mL/Hr) IV Continuous <Continuous>  senna 2 Tablet(s) Oral at bedtime    MEDICATIONS  (PRN):  albuterol/ipratropium for Nebulization 3 milliLiter(s) Nebulizer every 6 hours PRN Respiratory Distress  dextrose Oral Gel 15 Gram(s) Oral once PRN Blood Glucose LESS THAN 70 milliGRAM(s)/deciliter      ALLERGIES:  Allergies    No Known Allergies    Intolerances        LABS:                        7.3    16.50 )-----------( 75       ( 01 Feb 2025 00:47 )             20.2     02-01    131[L]  |  88[L]  |  70[H]  ----------------------------<  121[H]  3.8   |  29  |  4.41[H]    Ca    7.4[L]      01 Feb 2025 00:47  Phos  5.1     02-01  Mg     2.50     02-01    TPro  6.1  /  Alb  2.0[L]  /  TBili  2.8[H]  /  DBili  x   /  AST  164[H]  /  ALT  115[H]  /  AlkPhos  69  02-01    PT/INR - ( 01 Feb 2025 00:47 )   PT: 15.8 sec;   INR: 1.37 ratio         PTT - ( 01 Feb 2025 00:47 )  PTT:30.8 sec  Urinalysis Basic - ( 01 Feb 2025 00:47 )    Color: x / Appearance: x / SG: x / pH: x  Gluc: 121 mg/dL / Ketone: x  / Bili: x / Urobili: x   Blood: x / Protein: x / Nitrite: x   Leuk Esterase: x / RBC: x / WBC x   Sq Epi: x / Non Sq Epi: x / Bacteria: x      ABG:  pH, Arterial: 7.43 (02-01-25 @ 00:47)  pCO2, Arterial: 47 mmHg (02-01-25 @ 00:47)  pO2, Arterial: 155 mmHg (02-01-25 @ 00:47)      vBG:    Micro:    Culture - Blood (collected 01-28-25 @ 17:35)  Source: .Blood BLOOD  Preliminary Report (01-31-25 @ 22:01):    No growth at 72 Hours    Culture - Blood (collected 01-28-25 @ 17:35)  Source: .Blood BLOOD  Preliminary Report (01-31-25 @ 22:01):    No growth at 72 Hours          RADIOLOGY & ADDITIONAL TESTS: Reviewed.

## 2025-02-01 NOTE — PROGRESS NOTE ADULT - ATTENDING COMMENTS
79 M with CAD, CABG, not on plavix or a/c, COPD here after cardiac arrest at home of unclear etiology, acute hypoxemic respiratory failure due to aspiration pneumonia +/- cavitary lesion/lung abscess +/- septic shock due to UTI    septic shock due to klebsiella pneumoniae UTI  DOV worsening, likely ATN    # acute hypoxemic respiratory failure  # cardiac arrest  # septic shock due to UTI  # cavitary lung lesion  # DOV due to shock    - c/w full vent support for now  - c/w keppra propofol, f/u EEG report, placed off propofol during rounds  - MRI brain at some point, NSE should be sent today  - septic shock due to klebsiella UTI - Cont ceftriaxone  - DOV still worsening, ~9 liters up, will start bumex drip to see if diuresis improves. ATN may be due to cardiac arrest.   - shock liver improving  - hold dvt ppx given thrombocytopenia, likely shock related; may restart HSQ tomorrow if platelets continue to improve  - f/u bronch studies for aspergillus/fungitell/cultures/cyto (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

## 2025-02-01 NOTE — PROGRESS NOTE ADULT - ASSESSMENT
Assessment:  Patient DESTINI ENRIQUE is a 79y (1945) with a PMHx significant for COPD, CAD< stent 2011, CABG 207, presented from home after cardiac arrest, ROSC achieved in 25min, intubated and sedated on propofol, intermittently hypotensive, bradycardic and hypoxic, left lung opacity, left airway blood clots, got rEEG as a part of anoxic brain injury management, prelim reading of which showed 9 seizures in 20 min. Clinically also he was noted to have intermittent b/l UE stiffening and shaking. He was weaned off propofol around 1200 1/29, he got connected to EEG around 1300, that's when around 9 seizures were noted on EEG in 20min, clinically as well, RN described few episodes of b/l UE stiffening and shaking, unclear how long it lasted. Off propofol for 3.5-4 hours at exam, non responsive with positive pupillary response ( no other brainstem reflexes) or spontaneous or meaningful activity. EEG discontinued in AM    Impression: Anoxic brain injury post asystolic arrest d c/b seizures ( now resolved) off propofol for 3.5-4 hours at exam  ,    Recs  [] Continue Keppra 500 mg q12h   [] MRI brain w/o contrast to evaluate for anoxic brain injury   [] Check neuron specific enolase on 48-72hrs post-cardiac arrest. Please obtain first NSE 48 hrs post-cardiac arrest and 72 hrs post-cardiac arrest.   [] GOC per primary team    Neurology will sign off at this time. Please call [phone] for questions. Thank you!  Plan shared with MICU team    Patient seen with neurology attending, Dr. Villela

## 2025-02-01 NOTE — EEG REPORT - NS EEG TEXT BOX
[facility]   COMPREHENSIVE EPILEPSY CENTER   REPORT OF LONG-TERM VIDEO EEG     [facility]: [address], [place] [address], Ph#: [phone]  [facility]:  [address] [place] [address], Ph#: [phone]  [facility]: [address] [place] [address], Ph#: [phone]    Patient Name: DESTINI ENRIQUE  Age and : 79y ([birth year])  MRN #: [id number]  Location: [place] [address]  Referring Physician: Tyler Dixon    Start Time/Date: 08:00 on 2025  End Time/Date: 08:00 on 2025  Duration: 24 hours 0 minutes    _____________________________________________________________  STUDY INFORMATION    EEG Recording Technique:  The patient underwent continuous Video-EEG monitoring, using Telemetry System hardware on the XLTek Digital System. EEG and video data were stored on a computer hard drive with important events saved in digital archive files. The material was reviewed by a physician (electroencephalographer / epileptologist) on a daily basis. Anoop and seizure detection algorithms were utilized and reviewed. An EEG Technician attended to the patient, and was available throughout daytime work hours.  The epilepsy center neurologist was available in person or on call 24-hours per day.    EEG Placement and Labeling of Electrodes:  The EEG was performed utilizing 20 channel referential EEG connections (coronal over temporal over parasagittal montage) using all standard 10-20 electrode placements with EKG, with additional electrodes placed in the inferior temporal region using the modified 10-10 montage electrode placements for elective admissions, or if deemed necessary. Recording was at a sampling rate of 256 samples per second per channel. Time synchronized digital video recording was done simultaneously with EEG recording. A low light infrared camera was used for low light recording.     _____________________________________________________________  HISTORY    Patient is a 79y old  Male who presents with a chief complaint of Post cardiac arrest (2025 07:39)      PERTINENT MEDICATION:  MEDICATIONS  (STANDING):  buMETAnide Injectable 2 milliGRAM(s) IV Push once  buMETAnide Injectable 2 milliGRAM(s) IV Push once  cefTRIAXone   IVPB 1000 milliGRAM(s) IV Intermittent every 24 hours  chlorhexidine 0.12% Liquid 15 milliLiter(s) Oral Mucosa every 12 hours  chlorhexidine 2% Cloths 1 Application(s) Topical <User Schedule>  glucagon  Injectable 1 milliGRAM(s) IntraMuscular once  insulin lispro (ADMELOG) corrective regimen sliding scale   SubCutaneous every 6 hours  latanoprost 0.005% Ophthalmic Solution 1 Drop(s) Both EYES at bedtime  levETIRAcetam   Injectable 500 milliGRAM(s) IV Push every 12 hours  levothyroxine Injectable 18 MICROGram(s) IV Push at bedtime  mupirocin 2% Ointment 1 Application(s) Topical two times a day  norepinephrine Infusion 0.729 MICROgram(s)/kG/Min (65.6 mL/Hr) IV Continuous <Continuous>  pantoprazole  Injectable 40 milliGRAM(s) IV Push two times a day  petrolatum Ophthalmic Ointment 1 Application(s) Both EYES two times a day  propofol Infusion 10 MICROgram(s)/kG/Min (2.88 mL/Hr) IV Continuous <Continuous>    _____________________________________________________________  STUDY INTERPRETATION      FINDINGS:      Background:  Continuity: discontinuous  Symmetry: symmetric  PDR: none  Reactivity: absent  Voltage: low  Anterior Posterior Gradient: absent  Other background findings: none  Breach: absent    Background Slowing:  Generalized slowing: severe.  Focal slowing: none was present.    State Changes:   Absent    Sporadic Epileptiform Discharges:    None    Rhythmic and Periodic Patterns (RPPs):  None     Electrographic and Electroclinical seizures:  None    Other Clinical Events:  None    Activation Procedures:   Hyperventilation was not performed.    Photic stimulation did not activate abnormalities.    Artifacts:  Intermittent myogenic and movement artifacts were noted, particularly in the later half of the recording.    ECG:  The heart rate on single channel ECG was obscured by artifact.    Summary:  Abnormal EEG in the comatose state.  Severe diffuse slowing and attenuation.    Clinical Impression:  Severe diffuse cerebral dysfunction.  There were no epileptiform abnormalities recorded.      This is unchanged from the prior recording.    -------------------------------------------------------------------------------------------------------    Amanda Deluna MD  Director, Epilepsy - [facility]    Questions please call [phone]  After hours (5 PM - 8:30 AM) please call the epilepsy answering service at [phone]       [facility]   COMPREHENSIVE EPILEPSY CENTER   REPORT OF LONG-TERM VIDEO EEG     [facility]: [address], [place] [address], Ph#: [phone]  [facility]:  [address] [place] [address], Ph#: [phone]  [facility]: [address] [place] [address], Ph#: [phone]    Patient Name: DESTINI ENRIQUE  Age and : 79y ([birth year])  MRN #: [id number]  Location: [place] [address]  Referring Physician: Tyler Dixon    Start Time/Date: 08:00 on 2025  End Time/Date: 13:17 on 2025  Duration: 29 hours 17 minutes    _____________________________________________________________  STUDY INFORMATION    EEG Recording Technique:  The patient underwent continuous Video-EEG monitoring, using Telemetry System hardware on the XLTek Digital System. EEG and video data were stored on a computer hard drive with important events saved in digital archive files. The material was reviewed by a physician (electroencephalographer / epileptologist) on a daily basis. Anoop and seizure detection algorithms were utilized and reviewed. An EEG Technician attended to the patient, and was available throughout daytime work hours.  The epilepsy center neurologist was available in person or on call 24-hours per day.    EEG Placement and Labeling of Electrodes:  The EEG was performed utilizing 20 channel referential EEG connections (coronal over temporal over parasagittal montage) using all standard 10-20 electrode placements with EKG, with additional electrodes placed in the inferior temporal region using the modified 10-10 montage electrode placements for elective admissions, or if deemed necessary. Recording was at a sampling rate of 256 samples per second per channel. Time synchronized digital video recording was done simultaneously with EEG recording. A low light infrared camera was used for low light recording.     _____________________________________________________________  HISTORY    Patient is a 79y old  Male who presents with a chief complaint of Post cardiac arrest (2025 07:39)      PERTINENT MEDICATION:  MEDICATIONS  (STANDING):  buMETAnide Injectable 2 milliGRAM(s) IV Push once  buMETAnide Injectable 2 milliGRAM(s) IV Push once  cefTRIAXone   IVPB 1000 milliGRAM(s) IV Intermittent every 24 hours  chlorhexidine 0.12% Liquid 15 milliLiter(s) Oral Mucosa every 12 hours  chlorhexidine 2% Cloths 1 Application(s) Topical <User Schedule>  glucagon  Injectable 1 milliGRAM(s) IntraMuscular once  insulin lispro (ADMELOG) corrective regimen sliding scale   SubCutaneous every 6 hours  latanoprost 0.005% Ophthalmic Solution 1 Drop(s) Both EYES at bedtime  levETIRAcetam   Injectable 500 milliGRAM(s) IV Push every 12 hours  levothyroxine Injectable 18 MICROGram(s) IV Push at bedtime  mupirocin 2% Ointment 1 Application(s) Topical two times a day  norepinephrine Infusion 0.729 MICROgram(s)/kG/Min (65.6 mL/Hr) IV Continuous <Continuous>  pantoprazole  Injectable 40 milliGRAM(s) IV Push two times a day  petrolatum Ophthalmic Ointment 1 Application(s) Both EYES two times a day  propofol Infusion 10 MICROgram(s)/kG/Min (2.88 mL/Hr) IV Continuous <Continuous>    _____________________________________________________________  STUDY INTERPRETATION      FINDINGS:      Background:  Continuity: discontinuous  Symmetry: symmetric  PDR: none  Reactivity: absent  Voltage: low  Anterior Posterior Gradient: absent  Other background findings: none  Breach: absent    Background Slowing:  Generalized slowing: severe, without definite cerebral waveforms.  Focal slowing: none was present.    State Changes:   Absent    Sporadic Epileptiform Discharges:    None    Rhythmic and Periodic Patterns (RPPs):  None     Electrographic and Electroclinical seizures:  None    Other Clinical Events:  None    Activation Procedures:   Hyperventilation was not performed.    Photic stimulation did not activate abnormalities.    Artifacts:  Intermittent myogenic and movement artifacts were noted, particularly in the later half of the recording.    ECG:  The heart rate on single channel ECG was obscured by artifact.    Summary:  Abnormal EEG in the comatose state.  Severe diffuse slowing and attenuation without definite cerebral activity.    Clinical Impression:  Severe diffuse cerebral dysfunction.  There were no epileptiform abnormalities recorded.      This is unchanged from the prior recording.    -------------------------------------------------------------------------------------------------------    Amanda Deluna MD  Director, Epilepsy - [facility]    Questions please call [phone]  After hours (5 PM - 8:30 AM) please call the epilepsy answering service at [phone]

## 2025-02-01 NOTE — PROGRESS NOTE ADULT - SUBJECTIVE AND OBJECTIVE BOX
Patient is a 79y old  Male who presents with a chief complaint of Post cardiac arrest (01 Feb 2025 07:14)    HPI:  79-year-old male PMH CAD (s/p stent 2011 and CABG 2017, hypothyroidism, gout, COPD presenting after cardiac arrest.  Per EMS, per family at bedside patient was in normal state of health this morning, may be more tired than normal.  Went down to take a nap around noon, wife went to check on him around 3 to 3:30 PM and he was very difficult to arouse, prompting her to call EMS.  On EMS arrival patient was found to be pulseless, in asystolic rhythm, ACLS was initiated s/p 4 rounds of IV epinephrine, intubated in field, ROSC obtained prior to arrival.In the ED, pt was hypotensive to 68/50, pt started on Levo switched to Epi. Given 1 dose of vanc+zosyn, atropine after HR 30-40s, sodium bicarb. Labs significantly concerning with lactate 11.3, elevated LFTs and coags concerning for shock liver, DOV with Cr up-trending, metabolic acidosis.  (28 Jan 2025 18:30)      Interval history: Patient evaluated at bedside. No acute events overnight. Propofol was restarted overnight and stopped at 0950 AM. EEG was discontinued in the AM.       Vital Signs Last 24 Hrs  T(C): 38 (01 Feb 2025 13:00), Max: 38 (01 Feb 2025 13:00)  T(F): 100.4 (01 Feb 2025 13:00), Max: 100.4 (01 Feb 2025 13:00)  HR: 79 (01 Feb 2025 13:00) (62 - 79)  BP: --  BP(mean): --  RR: 22 (01 Feb 2025 13:00) (10 - 22)  SpO2: 100% (01 Feb 2025 13:00) (100% - 100%)    Parameters below as of 01 Feb 2025 13:00  Patient On (Oxygen Delivery Method): ventilator    O2 Concentration (%): 40      Physical Exam: Sedated and intubated, 3.5- 4 hours off sedation  General: Lying in bed with eyes closed, not opening eyes to speech or tactile stimulus or noxious stimulus. Not attending to examiner.  Eyes: Conjunctiva and sclera with clear secretions  Cardiovascular:   Neurologic:  - Mental Status: Lying in bed with eyes closed, not opening eyes to speech or tactile stimulus or noxious stimulus. Not attending to examiner.  - Cranial Nerves II-XII:  Pupils are 2 mm, equal, round, and very sluggishly reactive to light.  Vestibular occular reflex and blink reflex absent. Gag reflex absent.   - Motor: Flaccid throughout, no spontaneous movement, no withdrawal to pain. No decorticate or decerebrate posturing on exam. No Triple flexion to pain present.   - Reflexes: Plantar responses mute. Otherwise 0 throughout  - Sensory: No Grimace to pain in all extremities.  - Coordination & Gait: Unable to obtain.        LABS:                        7.3    16.50 )-----------( 75       ( 01 Feb 2025 00:47 )             20.2     02-01    131[L]  |  88[L]  |  70[H]  ----------------------------<  121[H]  3.8   |  29  |  4.41[H]    Ca    7.4[L]      01 Feb 2025 00:47  Phos  5.1     02-01  Mg     2.50     02-01    TPro  6.1  /  Alb  2.0[L]  /  TBili  2.8[H]  /  DBili  x   /  AST  164[H]  /  ALT  115[H]  /  AlkPhos  69  02-01    PT/INR - ( 01 Feb 2025 00:47 )   PT: 15.8 sec;   INR: 1.37 ratio         PTT - ( 01 Feb 2025 00:47 )  PTT:30.8 sec  Urinalysis Basic - ( 01 Feb 2025 00:47 )    Color: x / Appearance: x / SG: x / pH: x  Gluc: 121 mg/dL / Ketone: x  / Bili: x / Urobili: x   Blood: x / Protein: x / Nitrite: x   Leuk Esterase: x / RBC: x / WBC x   Sq Epi: x / Non Sq Epi: x / Bacteria: x        CULTURES:  Culture Results:   Commensal nicolasa consistent with body site (01-30 @ 15:15)  Culture Results:   >100,000 CFU/ml Klebsiella pneumoniae *!* (01-28 @ 19:13)        I&O:       Medications:    RADIOLOGY & ADDITIONAL STUDIES:

## 2025-02-01 NOTE — PROGRESS NOTE ADULT - ATTENDING COMMENTS
Mr. Holman is an 80 yo man with severe anoxic ischemic encephalopathy.  Given his current neurological examination off sedation, the length of time of arrest, evidence of tissue hypoperfusion of other organs (elevated LFTs, DOV) and electroclinical seizures - he has a poor prognosis for meaningful neurological functional recovery.    EEG was discontinued today.    Continue levetiracetam 500 mg Q12H  D/W wife and daughter.   Thank you    There is a high probability of sudden, clinically significant, or life threatening deterioration in the patient’s condition which requires the highest level of physician preparedness to intervene urgently.  Critical care time:  40 minutes.

## 2025-02-01 NOTE — PROGRESS NOTE ADULT - ASSESSMENT
79-year-old male PMH CAD (s/p stent 2011 and CABG 2017, hypothyroidism, gout, COPD presenting after cardiac arrest at home of unclear etiology, acute hypoxemic respiratory failure due to aspiration pneumonia +/- cavitary lesion/lung abscess +/- septic shock due to UTI      =====Neurologic=====  #Altered Mental Status  Patient is AOx0 after being found down for approx 25 minutes, possible anoxia in setting of prolonged down time prior to ROSC  CT head 1/28 with no acute intracranial hemorrhage, mass effect, midline shift  Neurology following    - Pt is intubated, continuing Propofol (wean propofol if EEG shows no seizures as per neuro)  - continuous EEG, neuron specific enolase  - Keppra 1.5g q12hr  - f/u EEG  - f/u MRI brain w/o contrast    =====Cardiovascular=====  #Cardiac arrest  Pt s/p cardiac arrest with ROSC obtained in the field per EMS prior to arrival. Pt hypotensive upon arrival.  Pt on Levo  TTE 1/29: EF 65%, no abnormalities    - Titrate off pressors  - Monitor on telemetry     =====Pulmonary=====  #AHRF  Patient obtunded, intubated in setting of cardiac arrest   CXR s/p intubation with left lung base opacity may reflect atelectasis vs. infection   Pt s/p bronchoscopy overnight 1/28 showing large blood clot extending from the distal trachea, occluding the left mainstem bronchus, extending to the left lower lobe bronchus  CT chest 1/28: Cavitary opacity in the left lower lobe measuring up to 7.9 cm. most likely represents pulmonary abscess, less likely neoplasm  Pt follows with Dr. Guardado outpatient, concern for VOLODYMYR  S/p vanc+azithromycin, urine legionella negative  S/p bronch by IP 1/30    - f/u BAL for micro, cyto, galactomannan, fungitell, AFB  - Treat infection empirically with Zosyn  - duonebs q6hr PRN    =====GI=====  #Transaminitis  Pt with increasing LFTs and coags concerning for shock liver  - continue to support blood pressure and monitor    #Diet  - pt has OG tube, nutrition consult and start tube feeds    =====Renal/=====  #DOV, Cr uptrending, likely ATN iso shock  pt has yu in  - continue to monitor  - monitor urine output  - 4 bumex 1/31    #Metabolic acidosis  - in setting of cardiac arrest and prolonged downtime with tissue hypoperfusion causing metabolic acidosis 2/2 lactate  - S/p bicarb push  - continue to trend lactate     =====Infectious Disease=====  #UTI  s/p Zosyn  - urine cultures growing Klebsiella  - ceftriaxone 5 days    =====Endocrine=====  pt has hypothyroidism, on levothyroxine 25 mcg at home  - IV synthroid 12.5 mcg daily    =====Heme/Onc=====  - DVT PPX: holding due to thrombocytopenia    =====[facility] General=====  Intubated 1/28  AB vanc+zosyn+azithro  DVT: SCDs 79-year-old male PMH CAD (s/p stent 2011 and CABG 2017, hypothyroidism, gout, COPD presenting after cardiac arrest at home of unclear etiology, acute hypoxemic respiratory failure due to aspiration pneumonia +/- cavitary lesion/lung abscess +/- septic shock due to UTI      =====Neurologic=====  #Altered Mental Status  Patient is AOx0 after being found down for approx 25 minutes, possible anoxia in setting of prolonged down time prior to ROSC  CT head 1/28 with no acute intracranial hemorrhage, mass effect, midline shift  Neurology following    - Pt is intubated, continuing Propofol (wean propofol if EEG shows no seizures as per neuro)  - continuous EEG, f/u neuron specific enolase 48 hrs and 72 hrs post-cardiac arrest  - Keppra 500mg q12hr  - f/u EEG  - f/u MRI brain w/o contrast    =====Cardiovascular=====  #Cardiac arrest  Pt s/p cardiac arrest with ROSC obtained in the field per EMS prior to arrival. Pt hypotensive upon arrival.  Pt on Levo  TTE 1/29: EF 65%, no abnormalities    - Titrate off pressors  - Monitor on telemetry     =====Pulmonary=====  #AHRF  Patient obtunded, intubated in setting of cardiac arrest   CXR s/p intubation with left lung base opacity may reflect atelectasis vs. infection   Pt s/p bronchoscopy overnight 1/28 showing large blood clot extending from the distal trachea, occluding the left mainstem bronchus, extending to the left lower lobe bronchus  CT chest 1/28: Cavitary opacity in the left lower lobe measuring up to 7.9 cm. most likely represents pulmonary abscess, less likely neoplasm  Pt follows with Dr. Guardado outpatient, concern for VOLODYMYR  S/p vanc+azithromycin, urine legionella negative  S/p bronch by IP 1/30    - f/u BAL for micro, cyto, galactomannan, fungitell, AFB  - bronchial cx, gram stain (1/30) NGTD  - Treat infection empirically with Zosyn  - duonebs q6hr PRN    =====GI=====  #Transaminitis  Pt with increasing LFTs and coags concerning for shock liver  - continue to support blood pressure and monitor    #Diet  - pt has OG tube, nutrition recs appreciated, on tube feeds    =====Renal/=====  #DOV, Cr uptrending, likely ATN iso shock  pt has yu in  - continue to monitor  - monitor urine output  - 4mg bumex 1/31    #Metabolic acidosis  - in setting of cardiac arrest and prolonged downtime with tissue hypoperfusion causing metabolic acidosis 2/2 lactate  - S/p bicarb push  - continue to trend lactate     =====Infectious Disease=====  #UTI  s/p Zosyn, azithro, and vanc.  - urine cultures (1/28) growing Klebsiella  - BCxs (1/28) (-)  - Legionella Ag (1/30) (-)  - ceftriaxone 5 days (1/31- )    =====Endocrine=====  pt has hypothyroidism, on levothyroxine 25 mcg at home  - IV synthroid 18mcg QHS    =====Heme/Onc=====  - DVT PPX: holding due to thrombocytopenia    =====MIC[facility] General=====  Intubated 1/28  AB: CTX  DVT: SCDs 79-year-old male PMH CAD (s/p stent 2011 and CABG 2017), hypothyroidism, gout, COPD presenting after cardiac arrest at home of unclear etiology, acute hypoxemic respiratory failure due to aspiration pneumonia +/- cavitary lesion/lung abscess +/- septic shock due to UTI      =====Neurologic=====  #Altered Mental Status  Patient is AOx0 after being found down for approx 25 minutes, possible anoxia in setting of prolonged down time prior to ROSC  CT head 1/28 with no acute intracranial hemorrhage, mass effect, midline shift  Neurology following    - Pt is intubated, continuing Propofol (wean propofol if EEG shows no seizures as per neuro)  - continuous EEG, f/u neuron specific enolase 48 hrs and 72 hrs post-cardiac arrest  - Keppra 500mg q12hr  - f/u EEG  - f/u MRI brain w/o contrast    =====Cardiovascular=====  #Cardiac arrest  Pt s/p cardiac arrest with ROSC obtained in the field per EMS prior to arrival. Pt hypotensive upon arrival.  Pt on Levo  TTE 1/29: EF 65%, no abnormalities    - Titrate off pressors  - Monitor on telemetry     =====Pulmonary=====  #AHRF  Patient obtunded, intubated in setting of cardiac arrest   CXR s/p intubation with left lung base opacity may reflect atelectasis vs. infection   Pt s/p bronchoscopy overnight 1/28 showing large blood clot extending from the distal trachea, occluding the left mainstem bronchus, extending to the left lower lobe bronchus  CT chest 1/28: Cavitary opacity in the left lower lobe measuring up to 7.9 cm. most likely represents pulmonary abscess, less likely neoplasm  Pt follows with Dr. Guardado outpatient, concern for VOLODYMYR  S/p vanc+azithromycin, urine legionella negative  S/p bronch by IP 1/30    - f/u BAL for micro, cyto, galactomannan, fungitell, AFB  - bronchial cx, gram stain (1/30) consistent w/ body nicolasa  - Treat infection empirically with Zosyn  - duonebs q6hr PRN    =====GI=====  #Transaminitis  Pt with increasing LFTs and coags concerning for shock liver  - continue to support blood pressure and monitor    #Diet  - pt has OG tube, nutrition recs appreciated, on tube feeds    =====Renal/=====  #DOV, Cr uptrending, likely ATN iso shock  pt has yu in  - continue to monitor  - monitor urine output  - 4mg bumex 1/31    #Metabolic acidosis  - in setting of cardiac arrest and prolonged downtime with tissue hypoperfusion causing metabolic acidosis 2/2 lactate  - S/p bicarb push  - continue to trend lactate     =====Infectious Disease=====  #UTI  s/p Zosyn, azithro, and vanc.  - urine cultures (1/28) growing Klebsiella  - BCxs (1/28) (-)  - Legionella Ag (1/30) (-)  - ceftriaxone 5 days (1/31- )    =====Endocrine=====  pt has hypothyroidism, on levothyroxine 25 mcg at home  - IV synthroid 18mcg QHS    =====Heme/Onc=====  - DVT PPX: holding chemical ppx due to thrombocytopenia, on SCDs    =====MICU General=====  Intubated 1/28  AB: CTX  DVT: SCDs 79-year-old male PMH CAD (s/p stent 2011 and CABG 2017), hypothyroidism, gout, COPD presenting after cardiac arrest at home of unclear etiology, acute hypoxemic respiratory failure due to aspiration pneumonia +/- cavitary lesion/lung abscess +/- septic shock due to UTI      =====Neurologic=====  #Altered Mental Status  Patient is AOx0 after being found down for approx 25 minutes, possible anoxia in setting of prolonged down time prior to ROSC  CT head 1/28 with no acute intracranial hemorrhage, mass effect, midline shift  Neurology following    - Pt is intubated  - stopped prop this AM - will monitor off prop and reassess mental status  - continuous EEG, f/u neuron specific enolase 48 hrs and 72 hrs post-cardiac arrest  - Keppra 500mg q12hr  - f/u EEG  - f/u MRI brain w/o contrast    =====Cardiovascular=====  #Cardiac arrest  Pt s/p cardiac arrest with ROSC obtained in the field per EMS prior to arrival. Pt hypotensive upon arrival.  Pt on Levo  TTE 1/29: EF 65%, no abnormalities    - Titrate off pressors  - Monitor on telemetry     =====Pulmonary=====  #AHRF  Patient obtunded, intubated in setting of cardiac arrest   CXR s/p intubation with left lung base opacity may reflect atelectasis vs. infection   Pt s/p bronchoscopy overnight 1/28 showing large blood clot extending from the distal trachea, occluding the left mainstem bronchus, extending to the left lower lobe bronchus  CT chest 1/28: Cavitary opacity in the left lower lobe measuring up to 7.9 cm. most likely represents pulmonary abscess, less likely neoplasm  Pt follows with Dr. Guardado outpatient, concern for VOLODYMYR  S/p vanc+azithromycin, urine legionella negative  S/p bronch by IP 1/30    - f/u BAL for micro, cyto, galactomannan, fungitell, AFB  - bronchial cx, gram stain (1/30) consistent w/ body nicolasa  - Treat infection empirically with Zosyn  - duonebs q6hr PRN    =====GI=====  #Transaminitis  Pt with increasing LFTs and coags concerning for shock liver  - continue to support blood pressure and monitor    #Diet  - pt has OG tube, nutrition recs appreciated, on tube feeds    =====Renal/=====  #DOV, Cr uptrending, likely ATN iso shock  pt has yu in  - continue to monitor  - monitor urine output  - 4mg bumex 1/31    #Metabolic acidosis  - in setting of cardiac arrest and prolonged downtime with tissue hypoperfusion causing metabolic acidosis 2/2 lactate  - S/p bicarb push  - continue to trend lactate     =====Infectious Disease=====  #UTI  s/p Zosyn, azithro, and vanc.  - urine cultures (1/28) growing Klebsiella  - BCxs (1/28) (-)  - Legionella Ag (1/30) (-)  - ceftriaxone 5 days (1/31- )    =====Endocrine=====  pt has hypothyroidism, on levothyroxine 25 mcg at home  - IV synthroid 18mcg QHS    =====Heme/Onc=====  - DVT PPX: holding chemical ppx due to thrombocytopenia, on SCDs    =====MIC[facility] General=====  Intubated 1/28  AB: CTX  DVT: SCDs  Full code 79-year-old male PMH CAD (s/p stent 2011 and CABG 2017), hypothyroidism, gout, COPD presenting after cardiac arrest at home of unclear etiology, acute hypoxemic respiratory failure due to aspiration pneumonia +/- cavitary lesion/lung abscess +/- septic shock due to UTI      =====Neurologic=====  #Altered Mental Status  Patient is AOx0 after being found down for approx 25 minutes, possible anoxia in setting of prolonged down time prior to ROSC  CT head 1/28 with no acute intracranial hemorrhage, mass effect, midline shift  Neurology following    - Pt is intubated  - stopped prop this AM - will monitor off prop and reassess mental status  - continuous EEG, f/u neuron specific enolase 48 hrs and 72 hrs post-cardiac arrest  - Keppra 500mg q12hr  - f/u EEG  - f/u MRI brain w/o contrast    =====Cardiovascular=====  #Cardiac arrest  Pt s/p cardiac arrest with ROSC obtained in the field per EMS prior to arrival. Pt hypotensive upon arrival.  Pt on Levo  TTE 1/29: EF 65%, no abnormalities    - Titrate off pressors  - Monitor on telemetry     =====Pulmonary=====  #AHRF  Patient obtunded, intubated in setting of cardiac arrest   CXR s/p intubation with left lung base opacity may reflect atelectasis vs. infection   Pt s/p bronchoscopy overnight 1/28 showing large blood clot extending from the distal trachea, occluding the left mainstem bronchus, extending to the left lower lobe bronchus  CT chest 1/28: Cavitary opacity in the left lower lobe measuring up to 7.9 cm. most likely represents pulmonary abscess, less likely neoplasm  Pt follows with Dr. Guardado outpatient, concern for VOLODYMYR  S/p vanc+azithromycin, urine legionella negative  S/p bronch by IP 1/30    - f/u BAL for micro, cyto, galactomannan, fungitell  - BAL AFB (-)  - bronchial cx, gram stain (1/30) consistent w/ body nicolasa  - Treat infection empirically with Zosyn  - duonebs q6hr PRN    =====GI=====  #Transaminitis  Pt with increasing LFTs and coags concerning for shock liver  - continue to support blood pressure and monitor    #Diet  - pt has OG tube, nutrition recs appreciated, on tube feeds    =====Renal/=====  #DOV, Cr uptrending, likely ATN iso shock  pt has yu in  - continue to monitor  - monitor urine output  - 4mg bumex 1/31    #Metabolic acidosis  - in setting of cardiac arrest and prolonged downtime with tissue hypoperfusion causing metabolic acidosis 2/2 lactate  - S/p bicarb push  - continue to trend lactate     =====Infectious Disease=====  #UTI  s/p Zosyn, azithro, and vanc.  - urine cultures (1/28) growing Klebsiella  - BCxs (1/28) (-)  - Legionella Ag (1/30) (-)  - ceftriaxone 5 days (1/31- )    =====Endocrine=====  pt has hypothyroidism, on levothyroxine 25 mcg at home  - IV synthroid 18mcg QHS    =====Heme/Onc=====  - DVT PPX: holding chemical ppx due to thrombocytopenia, on SCDs    =====MIC[facility] General=====  Intubated 1/28  AB: CTX  DVT: SCDs  Full code 79-year-old male PMH CAD (s/p stent 2011 and CABG 2017), hypothyroidism, gout, COPD presenting after cardiac arrest at home of unclear etiology, acute hypoxemic respiratory failure due to aspiration pneumonia +/- cavitary lesion/lung abscess +/- septic shock due to UTI      =====Neurologic=====  #Altered Mental Status  Patient is AOx0 after being found down for approx 25 minutes, possible anoxia in setting of prolonged down time prior to ROSC  CT head 1/28 with no acute intracranial hemorrhage, mass effect, midline shift  Neurology following    - Pt is intubated  - stopped prop this AM - will monitor off prop and reassess mental status  - continuous EEG, f/u neuron specific enolase 48 hrs and 72 hrs post-cardiac arrest  - Keppra 500mg q12hr  - f/u EEG  - f/u MRI brain w/o contrast    =====Cardiovascular=====  #Cardiac arrest  Pt s/p cardiac arrest with ROSC obtained in the field per EMS prior to arrival. Pt hypotensive upon arrival.  Pt on Levo  TTE 1/29: EF 65%, no abnormalities    - Titrate off pressors  - Monitor on telemetry     =====Pulmonary=====  #AHRF  Patient obtunded, intubated in setting of cardiac arrest   CXR s/p intubation with left lung base opacity may reflect atelectasis vs. infection   Pt s/p bronchoscopy overnight 1/28 showing large blood clot extending from the distal trachea, occluding the left mainstem bronchus, extending to the left lower lobe bronchus  CT chest 1/28: Cavitary opacity in the left lower lobe measuring up to 7.9 cm. most likely represents pulmonary abscess, less likely neoplasm  Pt follows with Dr. Guardado outpatient, concern for VOLODYMYR  S/p vanc+azithromycin, urine legionella negative  S/p bronch by IP 1/30    - f/u BAL for micro, cyto, galactomannan, fungitell  - bronchial cx, gram stain (1/30) consistent w/ body nicolasa  - Treat infection empirically with Zosyn  - duonebs q6hr PRN    =====GI=====  #Transaminitis  Pt with increasing LFTs and coags concerning for shock liver  - continue to support blood pressure and monitor    #Diet  - pt has OG tube, nutrition recs appreciated, on tube feeds    =====Renal/=====  #DOV, Cr uptrending, likely ATN iso shock  pt has yu in  - continue to monitor  - monitor urine output  - 4mg bumex 1/31    #Metabolic acidosis  - in setting of cardiac arrest and prolonged downtime with tissue hypoperfusion causing metabolic acidosis 2/2 lactate  - S/p bicarb push  - continue to trend lactate     =====Infectious Disease=====  #UTI  s/p Zosyn, azithro, and vanc.  - urine cultures (1/28) growing Klebsiella  - BCxs (1/28) (-)  - Legionella Ag (1/30) (-)  - ceftriaxone 5 days (1/31- )    =====Endocrine=====  pt has hypothyroidism, on levothyroxine 25 mcg at home  - IV synthroid 18mcg QHS    =====Heme/Onc=====  - DVT PPX: holding chemical ppx due to thrombocytopenia, on SCDs    =====MIC[facility] General=====  Intubated 1/28  AB: CTX  DVT: SCDs  Full code

## 2025-02-02 NOTE — PROGRESS NOTE ADULT - SUBJECTIVE AND OBJECTIVE BOX
****[facility] PGY-1*****    Overnight Events: no acute overnight events  Interval Events:    OBJECTIVE:  ICU Vital Signs Last 24 Hrs  T(C): 37.3 (02 Feb 2025 04:00), Max: 38 (01 Feb 2025 13:00)  T(F): 99.1 (02 Feb 2025 04:00), Max: 100.4 (01 Feb 2025 13:00)  HR: 76 (02 Feb 2025 06:00) (65 - 83)  BP: --  BP(mean): --  ABP: 125/50 (02 Feb 2025 06:00) (95/47 - 158/65)  ABP(mean): 71 (02 Feb 2025 06:00) (63 - 94)  RR: 18 (02 Feb 2025 06:00) (15 - 29)  SpO2: 100% (02 Feb 2025 06:00) (97% - 100%)    O2 Parameters below as of 01 Feb 2025 19:00  Patient On (Oxygen Delivery Method): ventilator    O2 Concentration (%): 40      Mode: AC/ CMV (Assist Control/ Continuous Mandatory Ventilation), RR (machine): 14, TV (machine): 350, FiO2: 40, PEEP: 5, ITime: 0.53, MAP: 11, PIP: 35    02-01 @ 07:01  -  02-02 @ 07:00  --------------------------------------------------------  IN: 1954 mL / OUT: 890 mL / NET: 1064 mL      CAPILLARY BLOOD GLUCOSE      POCT Blood Glucose.: 154 mg/dL (02 Feb 2025 05:04)      PHYSICAL EXAM  GENERAL: Pt intubated, sedated  HEAD:  Atraumatic, normocephalic  EYES: Conjunctiva and sclera clear  NECK: Supple, trachea midline, no JVD  HEART: Regular rate and rhythm, no murmurs, rubs, or gallops  LUNGS: Unlabored respirations. Coarse breath sounds b/l.  ABDOMEN: Firm, nontender, nondistended, +BS  EXTREMITIES: 2+ peripheral pulses bilaterally, cap refill<2 secs. No clubbing, cyanosis, or edema  NERVOUS SYSTEM:  unable to assess d/t pt intubated, sedated  SKIN: No rashes or lesions      HOSPITAL MEDICATIONS:  MEDICATIONS  (STANDING):  buMETAnide Infusion 2 mG/Hr (10 mL/Hr) IV Continuous <Continuous>  cefTRIAXone   IVPB 1000 milliGRAM(s) IV Intermittent every 24 hours  chlorhexidine 0.12% Liquid 15 milliLiter(s) Oral Mucosa every 12 hours  chlorhexidine 2% Cloths 1 Application(s) Topical <User Schedule>  dextrose 5%. 1000 milliLiter(s) (100 mL/Hr) IV Continuous <Continuous>  dextrose 5%. 1000 milliLiter(s) (50 mL/Hr) IV Continuous <Continuous>  dextrose 50% Injectable 25 Gram(s) IV Push once  dextrose 50% Injectable 12.5 Gram(s) IV Push once  dextrose 50% Injectable 25 Gram(s) IV Push once  glucagon  Injectable 1 milliGRAM(s) IntraMuscular once  insulin lispro (ADMELOG) corrective regimen sliding scale   SubCutaneous every 6 hours  latanoprost 0.005% Ophthalmic Solution 1 Drop(s) Both EYES at bedtime  levETIRAcetam   Injectable 500 milliGRAM(s) IV Push every 12 hours  levothyroxine Injectable 18 MICROGram(s) IV Push at bedtime  mupirocin 2% Ointment 1 Application(s) Topical two times a day  norepinephrine Infusion 0.729 MICROgram(s)/kG/Min (65.6 mL/Hr) IV Continuous <Continuous>  pantoprazole  Injectable 40 milliGRAM(s) IV Push two times a day  petrolatum Ophthalmic Ointment 1 Application(s) Both EYES two times a day  polyethylene glycol 3350 17 Gram(s) Oral daily  senna 2 Tablet(s) Oral at bedtime    MEDICATIONS  (PRN):  albuterol/ipratropium for Nebulization 3 milliLiter(s) Nebulizer every 6 hours PRN Respiratory Distress  dextrose Oral Gel 15 Gram(s) Oral once PRN Blood Glucose LESS THAN 70 milliGRAM(s)/deciliter      LABS:                        7.5    20.04 )-----------( 104      ( 02 Feb 2025 04:25 )             22.2     Hgb Trend: 7.5<--, 7.3<--, 7.5<--, 7.7<--, 7.2<--  02-02    135  |  94[L]  |  78[H]  ----------------------------<  169[H]  3.9   |  25  |  5.12[H]    Ca    7.8[L]      02 Feb 2025 04:25  Phos  4.9     02-02  Mg     2.30     02-02    TPro  6.6  /  Alb  2.2[L]  /  TBili  2.6[H]  /  DBili  x   /  AST  145[H]  /  ALT  93[H]  /  AlkPhos  107  02-02    Creatinine Trend: 5.12<--, 4.41<--, 3.55<--, 2.62<--, 2.39<--, 2.09<--  PT/INR - ( 02 Feb 2025 04:25 )   PT: 16.8 sec;   INR: 1.45 ratio         PTT - ( 02 Feb 2025 04:25 )  PTT:28.4 sec  Urinalysis Basic - ( 02 Feb 2025 04:25 )    Color: x / Appearance: x / SG: x / pH: x  Gluc: 169 mg/dL / Ketone: x  / Bili: x / Urobili: x   Blood: x / Protein: x / Nitrite: x   Leuk Esterase: x / RBC: x / WBC x   Sq Epi: x / Non Sq Epi: x / Bacteria: x      Arterial Blood Gas:  02-02 @ 04:25  7.46/39/187/28/99.5/3.6  ABG lactate: --  Arterial Blood Gas:  02-01 @ 00:47  7.43/47/155/31/99.9/6.3  ABG lactate: --        MICROBIOLOGY:     Culture - Bronchial (collected 30 Jan 2025 15:15)  Source: Bronchial  Gram Stain (31 Jan 2025 03:08):    Rare polymorphonuclear leukocytes per low power field    No squamous epithelial cells per low power field    No organisms seen per oil power field  Final Report (01 Feb 2025 08:04):    Commensal nicolasa consistent with body site        RADIOLOGY:  [ ] Reviewed by me   ****[facility] PGY-1*****    Overnight Events: no acute overnight events  Interval Events: pt seen and examined at bedside.     OBJECTIVE:  ICU Vital Signs Last 24 Hrs  T(C): 37.3 (02 Feb 2025 04:00), Max: 38 (01 Feb 2025 13:00)  T(F): 99.1 (02 Feb 2025 04:00), Max: 100.4 (01 Feb 2025 13:00)  HR: 76 (02 Feb 2025 06:00) (65 - 83)  BP: --  BP(mean): --  ABP: 125/50 (02 Feb 2025 06:00) (95/47 - 158/65)  ABP(mean): 71 (02 Feb 2025 06:00) (63 - 94)  RR: 18 (02 Feb 2025 06:00) (15 - 29)  SpO2: 100% (02 Feb 2025 06:00) (97% - 100%)    O2 Parameters below as of 01 Feb 2025 19:00  Patient On (Oxygen Delivery Method): ventilator    O2 Concentration (%): 40      Mode: AC/ CMV (Assist Control/ Continuous Mandatory Ventilation), RR (machine): 14, TV (machine): 350, FiO2: 40, PEEP: 5, ITime: 0.53, MAP: 11, PIP: 35    02-01 @ 07:01  -  02-02 @ 07:00  --------------------------------------------------------  IN: 1954 mL / OUT: 890 mL / NET: 1064 mL      CAPILLARY BLOOD GLUCOSE      POCT Blood Glucose.: 154 mg/dL (02 Feb 2025 05:04)      PHYSICAL EXAM  GENERAL: Pt intubated, sedated  HEAD:  Atraumatic, normocephalic  EYES: Conjunctiva and sclera clear  NECK: Supple, trachea midline, no JVD  HEART: Regular rate and rhythm, no murmurs, rubs, or gallops  LUNGS: Unlabored respirations. Coarse breath sounds b/l.  ABDOMEN: Firm, nontender, nondistended, +BS  EXTREMITIES: 2+ peripheral pulses bilaterally, cap refill<2 secs. No clubbing, cyanosis, or edema  NERVOUS SYSTEM:  unable to assess d/t pt intubated, sedated  SKIN: No rashes or lesions      HOSPITAL MEDICATIONS:  MEDICATIONS  (STANDING):  buMETAnide Infusion 2 mG/Hr (10 mL/Hr) IV Continuous <Continuous>  cefTRIAXone   IVPB 1000 milliGRAM(s) IV Intermittent every 24 hours  chlorhexidine 0.12% Liquid 15 milliLiter(s) Oral Mucosa every 12 hours  chlorhexidine 2% Cloths 1 Application(s) Topical <User Schedule>  dextrose 5%. 1000 milliLiter(s) (100 mL/Hr) IV Continuous <Continuous>  dextrose 5%. 1000 milliLiter(s) (50 mL/Hr) IV Continuous <Continuous>  dextrose 50% Injectable 25 Gram(s) IV Push once  dextrose 50% Injectable 12.5 Gram(s) IV Push once  dextrose 50% Injectable 25 Gram(s) IV Push once  glucagon  Injectable 1 milliGRAM(s) IntraMuscular once  insulin lispro (ADMELOG) corrective regimen sliding scale   SubCutaneous every 6 hours  latanoprost 0.005% Ophthalmic Solution 1 Drop(s) Both EYES at bedtime  levETIRAcetam   Injectable 500 milliGRAM(s) IV Push every 12 hours  levothyroxine Injectable 18 MICROGram(s) IV Push at bedtime  mupirocin 2% Ointment 1 Application(s) Topical two times a day  norepinephrine Infusion 0.729 MICROgram(s)/kG/Min (65.6 mL/Hr) IV Continuous <Continuous>  pantoprazole  Injectable 40 milliGRAM(s) IV Push two times a day  petrolatum Ophthalmic Ointment 1 Application(s) Both EYES two times a day  polyethylene glycol 3350 17 Gram(s) Oral daily  senna 2 Tablet(s) Oral at bedtime    MEDICATIONS  (PRN):  albuterol/ipratropium for Nebulization 3 milliLiter(s) Nebulizer every 6 hours PRN Respiratory Distress  dextrose Oral Gel 15 Gram(s) Oral once PRN Blood Glucose LESS THAN 70 milliGRAM(s)/deciliter      LABS:                        7.5    20.04 )-----------( 104      ( 02 Feb 2025 04:25 )             22.2     Hgb Trend: 7.5<--, 7.3<--, 7.5<--, 7.7<--, 7.2<--  02-02    135  |  94[L]  |  78[H]  ----------------------------<  169[H]  3.9   |  25  |  5.12[H]    Ca    7.8[L]      02 Feb 2025 04:25  Phos  4.9     02-02  Mg     2.30     02-02    TPro  6.6  /  Alb  2.2[L]  /  TBili  2.6[H]  /  DBili  x   /  AST  145[H]  /  ALT  93[H]  /  AlkPhos  107  02-02    Creatinine Trend: 5.12<--, 4.41<--, 3.55<--, 2.62<--, 2.39<--, 2.09<--  PT/INR - ( 02 Feb 2025 04:25 )   PT: 16.8 sec;   INR: 1.45 ratio         PTT - ( 02 Feb 2025 04:25 )  PTT:28.4 sec  Urinalysis Basic - ( 02 Feb 2025 04:25 )    Color: x / Appearance: x / SG: x / pH: x  Gluc: 169 mg/dL / Ketone: x  / Bili: x / Urobili: x   Blood: x / Protein: x / Nitrite: x   Leuk Esterase: x / RBC: x / WBC x   Sq Epi: x / Non Sq Epi: x / Bacteria: x      Arterial Blood Gas:  02-02 @ 04:25  7.46/39/187/28/99.5/3.6  ABG lactate: --  Arterial Blood Gas:  02-01 @ 00:47  7.43/47/155/31/99.9/6.3  ABG lactate: --        MICROBIOLOGY:     Culture - Bronchial (collected 30 Jan 2025 15:15)  Source: Bronchial  Gram Stain (31 Jan 2025 03:08):    Rare polymorphonuclear leukocytes per low power field    No squamous epithelial cells per low power field    No organisms seen per oil power field  Final Report (01 Feb 2025 08:04):    Commensal nicolasa consistent with body site        RADIOLOGY:  [ ] Reviewed by me

## 2025-02-02 NOTE — PROGRESS NOTE ADULT - ATTENDING COMMENTS
79 M with CAD, CABG, not on plavix or a/c, COPD here after cardiac arrest at home of unclear etiology, acute hypoxemic respiratory failure due to aspiration pneumonia +/- cavitary lesion/lung abscess +/- septic shock due to UTI    septic shock due to klebsiella pneumoniae UTI  DOV worsening, likely ATN but urine output improving on bumex drip    # acute hypoxemic respiratory failure  # cardiac arrest  # septic shock due to UTI  # cavitary lung lesion  # DOV due to shock    - c/w full vent support for now  - c/w keppra propofol, f/u EEG report, placed off propofol during rounds  - Needs MRI brain   - septic shock due to klebsiella UTI - Cont ceftriaxone  - DOV still worsening,  bumex drip showing improved diuresis but worsening creatinine. ATN may be due to cardiac arrest.   - shock liver improving  - Start dvt ppx given improvement of thrombocytopenia  - f/u bronch studies for aspergillus/fungitell/cultures/cyto (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  - AFB from bronch never sent, will send ET tube culture for AFB.

## 2025-02-02 NOTE — PROGRESS NOTE ADULT - ASSESSMENT
79-year-old male PMH CAD (s/p stent 2011 and CABG 2017), hypothyroidism, gout, COPD presenting after cardiac arrest at home of unclear etiology, acute hypoxemic respiratory failure due to aspiration pneumonia +/- cavitary lesion/lung abscess +/- septic shock due to UTI      =====Neurologic=====  #Altered Mental Status  Patient is AOx0 after being found down for approx 25 minutes, possible anoxia in setting of prolonged down time prior to ROSC  CT head 1/28 with no acute intracranial hemorrhage, mass effect, midline shift  Neurology following    - Pt is intubated  - stopped prop this AM - will monitor off prop and reassess mental status  - continuous EEG, f/u neuron specific enolase 48 hrs and 72 hrs post-cardiac arrest  - Keppra 500mg q12hr  - f/u EEG  - f/u MRI brain w/o contrast    =====Cardiovascular=====  #Cardiac arrest  Pt s/p cardiac arrest with ROSC obtained in the field per EMS prior to arrival. Pt hypotensive upon arrival.  Pt on Levo  TTE 1/29: EF 65%, no abnormalities    - Titrate off pressors  - Monitor on telemetry     =====Pulmonary=====  #AHRF  Patient obtunded, intubated in setting of cardiac arrest   CXR s/p intubation with left lung base opacity may reflect atelectasis vs. infection   Pt s/p bronchoscopy overnight 1/28 showing large blood clot extending from the distal trachea, occluding the left mainstem bronchus, extending to the left lower lobe bronchus  CT chest 1/28: Cavitary opacity in the left lower lobe measuring up to 7.9 cm. most likely represents pulmonary abscess, less likely neoplasm  Pt follows with Dr. Guardado outpatient, concern for VOLODYMYR  S/p vanc+azithromycin, urine legionella negative  S/p bronch by IP 1/30    - f/u BAL for micro, cyto, galactomannan, fungitell  - bronchial cx, gram stain (1/30) consistent w/ body nicolasa  - Treat infection empirically with Zosyn  - duonebs q6hr PRN    =====GI=====  #Transaminitis  Pt with increasing LFTs and coags concerning for shock liver  - continue to support blood pressure and monitor    #Diet  - pt has OG tube, nutrition recs appreciated, on tube feeds    =====Renal/=====  #DOV, Cr uptrending, likely ATN iso shock  pt has yu in  - continue to monitor  - monitor urine output  - 4mg bumex 1/31    #Metabolic acidosis  - in setting of cardiac arrest and prolonged downtime with tissue hypoperfusion causing metabolic acidosis 2/2 lactate  - S/p bicarb push  - continue to trend lactate     =====Infectious Disease=====  #UTI  s/p Zosyn, azithro, and vanc.  - urine cultures (1/28) growing Klebsiella  - BCxs (1/28) (-)  - Legionella Ag (1/30) (-)  - ceftriaxone 5 days (1/31- )    =====Endocrine=====  pt has hypothyroidism, on levothyroxine 25 mcg at home  - IV synthroid 18mcg QHS    =====Heme/Onc=====  - DVT PPX: holding chemical ppx due to thrombocytopenia, on SCDs    =====MICU General=====  Intubated 1/28  AB: CTX  DVT: SCDs  Full code 79-year-old male PMH CAD (s/p stent 2011 and CABG 2017), hypothyroidism, gout, COPD presenting after cardiac arrest at home of unclear etiology, acute hypoxemic respiratory failure due to aspiration pneumonia +/- cavitary lesion/lung abscess +/- septic shock due to UTI      =====Neurologic=====  #Altered Mental Status  Patient is AOx0 after being found down for approx 25 minutes, possible anoxia in setting of prolonged down time prior to ROSC  CT head 1/28 with no acute intracranial hemorrhage, mass effect, midline shift  Neurology following  vEEG stopped 2/1, prop weaned off    - Pt is intubated  - Keppra 500mg q12hr  - f/u MRI brain w/o contrast    =====Cardiovascular=====  #Cardiac arrest  Pt s/p cardiac arrest with ROSC obtained in the field per EMS prior to arrival. Pt hypotensive upon arrival.  Pt on Levo  TTE 1/29: EF 65%, no abnormalities    - Titrate off pressors  - Monitor on telemetry     =====Pulmonary=====  #AHRF  Patient obtunded, intubated in setting of cardiac arrest   CXR s/p intubation with left lung base opacity may reflect atelectasis vs. infection   Pt s/p bronchoscopy overnight 1/28 showing large blood clot extending from the distal trachea, occluding the left mainstem bronchus, extending to the left lower lobe bronchus  CT chest 1/28: Cavitary opacity in the left lower lobe measuring up to 7.9 cm. most likely represents pulmonary abscess, less likely neoplasm  Pt follows with Dr. Guardado outpatient, concern for VOLODYMYR  S/p vanc+azithromycin, urine legionella negative  S/p bronch by IP 1/30    - f/u BAL for micro, cyto, galactomannan, fungitell  - bronchial cx, gram stain (1/30) consistent w/ body nicolasa  - Treat infection empirically with Zosyn  - duonebs q6hr PRN  - sputum culture for AFB    =====GI=====  #Transaminitis  Pt with increasing LFTs and coags concerning for shock liver  - continue to support blood pressure and monitor    #Diet  - pt has OG tube, nutrition recs appreciated, on tube feeds    =====Renal/=====  #DOV, Cr uptrending, likely ATN iso shock  pt has yu in  - continue to monitor  - monitor urine output  - 4mg bumex 1/31    #Metabolic acidosis  - in setting of cardiac arrest and prolonged downtime with tissue hypoperfusion causing metabolic acidosis 2/2 lactate  - S/p bicarb push  - continue to trend lactate     =====Infectious Disease=====  #UTI  s/p Zosyn, azithro, and vanc.  - urine cultures (1/28) growing Klebsiella  - BCxs (1/28) (-)  - Legionella Ag (1/30) (-)  - ceftriaxone 5 days (1/31- )    =====Endocrine=====  pt has hypothyroidism, on levothyroxine 25 mcg at home  - IV synthroid 18mcg QHS    =====Heme/Onc=====  - DVT PPX: held lovenox due to thrombocytopenia, restarted 2/2    =====MICU General=====  Intubated 1/28  AB: CTX  DVT: restarted lovenox 2/2  Full code

## 2025-02-03 NOTE — CHART NOTE - NSCHARTNOTEFT_GEN_A_CORE
Indication:  SHOCK    Performed by: Lucinda Wilcox PGY1    PROCEDURE:  [x] LIMITED ECHO  [x] LIMITED CHEST  [ ] LIMITED RETROPERITONEAL  [ ] LIMITED ABDOMINAL  [ ] LIMITED DVT  [ ] NEEDLE GUIDANCE VASCULAR  [ ] NEEDLE GUIDANCE THORACENTESIS  [ ] NEEDLE GUIDANCE PARACENTESIS  [ ] NEEDLE GUIDANCE PERICARDIOCENTESIS  [ ] OTHER    FINDINGS:  Chest: A-lines predominant, left lower lung with increased echogenicity. No effusions bilat.    ECHO: Grossly normal RV and LV function with no wall motion abnormalities, septal bowing/flattening, or gross valvular pathology   No pericardial effusion  IVC: Collapsed IVC    INTERPRETATION:  Left lung findings consistent with known cavitary disease   Collapsed IVC, low volume state Indication:  SHOCK    Performed by: Lucinda Wilcox PGY1    PROCEDURE:  [x] LIMITED ECHO  [x] LIMITED CHEST  [ ] LIMITED RETROPERITONEAL  [ ] LIMITED ABDOMINAL  [ ] LIMITED DVT  [ ] NEEDLE GUIDANCE VASCULAR  [ ] NEEDLE GUIDANCE THORACENTESIS  [ ] NEEDLE GUIDANCE PARACENTESIS  [ ] NEEDLE GUIDANCE PERICARDIOCENTESIS  [ ] OTHER    FINDINGS:  Chest: A-lines predominant, left lower lung with increased echogenicity. No effusions bilat.    ECHO: Grossly normal RV and LV function with no wall motion abnormalities, septal bowing/flattening, or gross valvular pathology   No pericardial effusion  IVC: Collapsed IVC    INTERPRETATION:  Left lung findings consistent with known cavitary disease   Collapsed IVC, low volume state    Attending Attestation:  I was present during the key portions of the procedure and immediately available during the entire procedure.  I have personally reviewed the images and agree with the interpretation above by the resident/fellow/ACP.    Charu Stanley MD

## 2025-02-03 NOTE — PROGRESS NOTE ADULT - ATTENDING COMMENTS
79M with CAD, CABG, not on plavix or a/c, COPD here after cardiac arrest at home of unclear etiology, acute hypoxemic respiratory failure due to aspiration pneumonia +/- cavitary lesion/lung abscess +/- septic shock due to UTI.    #Neuro: concern for anoxic brain injury in setting of prolonged down time during cardiac arrest   #CV: s/p cardiac arrest of unclear etiology; TTE WNL   #Pulm: CT chest showing cavitary lesion in LLL->? abscess; pending cultures and AFB; resend sputum AFB and quantiferon; low suspicion;   #ID: will change abx to unasyn to cover anaerobe sin the setting of cavitary PNA;  #Renal/Fluid: DOV/ ATN in setting of shock; 24 hour urine output 620. c/w bumex of 4   #Heme: H/H stable; mild increase in WBC count  #Endo: on synthroid  #GI:  tube feeds at goal;  # Skin/Lines: +yu; [place]- will remove  #DVT ppx: restart hep subq for dvt ppx  # Dispo/Ethics 79M with CAD, CABG, not on plavix or a/c, COPD here after cardiac arrest at home of unclear etiology, acute hypoxemic respiratory failure due to aspiration pneumonia +/- cavitary lesion/lung abscess +/- septic shock due to UTI.    #Neuro: concern for anoxic brain injury in setting of prolonged down time during cardiac arrest   #CV: s/p cardiac arrest of unclear etiology; TTE WNL   #Pulm: CT chest showing cavitary lesion in LLL->? abscess; pending cultures and AFB; resend sputum AFB and quantiferon; low suspicion;   #ID: will change abx to unasyn to cover anaerobe sin the setting of cavitary PNA;  #Renal/Fluid: DOV/ ATN in setting of shock; 24 hour urine output 620. POCUS with collapsed IVC < 1 cm- will bolus IVF and monitor output. Consider increased diuresis  #Heme: H/H stable; mild increase in WBC count  #Endo: on synthroid  #GI:  tube feeds at goal;  # Skin/Lines: +yu; [place]- will remove  #DVT ppx: restart hep subq for dvt ppx  # Dispo/Ethics: Full Code. Spoke with wife at bedside and she remains hopeful for recovery. Would like MRI for more prognostication

## 2025-02-03 NOTE — PROGRESS NOTE ADULT - SUBJECTIVE AND OBJECTIVE BOX
****[facility] PGY-1*****    Overnight Events: no acute overnight events   Interval Events:    OBJECTIVE:  ICU Vital Signs Last 24 Hrs  T(C): 36.9 (03 Feb 2025 04:00), Max: 37.4 (02 Feb 2025 16:00)  T(F): 98.4 (03 Feb 2025 04:00), Max: 99.3 (02 Feb 2025 16:00)  HR: 75 (03 Feb 2025 07:00) (74 - 85)  BP: --  BP(mean): --  ABP: 120/47 (03 Feb 2025 07:00) (120/47 - 163/70)  ABP(mean): 68 (03 Feb 2025 07:00) (68 - 99)  RR: 25 (03 Feb 2025 07:00) (16 - 32)  SpO2: 100% (03 Feb 2025 06:00) (98% - 100%)    O2 Parameters below as of 03 Feb 2025 07:00  Patient On (Oxygen Delivery Method): ventilator    O2 Concentration (%): 40      Mode: AC/ CMV (Assist Control/ Continuous Mandatory Ventilation), RR (machine): 14, TV (machine): 350, FiO2: 40, PEEP: 5, ITime: 0.7, MAP: 11, PIP: 32    02-02 @ 07:01  -  02-03 @ 07:00  --------------------------------------------------------  IN: 1070 mL / OUT: 1140 mL / NET: -70 mL      CAPILLARY BLOOD GLUCOSE      POCT Blood Glucose.: 161 mg/dL (03 Feb 2025 05:45)      PHYSICAL EXAM  GENERAL: Pt intubated, sedated  HEAD:  Atraumatic, normocephalic  EYES: Conjunctiva and sclera clear  NECK: Supple, trachea midline, no JVD  HEART: Regular rate and rhythm, no murmurs, rubs, or gallops  LUNGS: Unlabored respirations. Coarse breath sounds b/l.  ABDOMEN: Firm, nontender, nondistended, +BS  EXTREMITIES: 2+ peripheral pulses bilaterally, cap refill<2 secs. No clubbing, cyanosis, or edema  NERVOUS SYSTEM:  unable to assess d/t pt intubated, sedated  SKIN: No rashes or lesions      HOSPITAL MEDICATIONS:  MEDICATIONS  (STANDING):  buMETAnide Infusion 4 mG/Hr (20 mL/Hr) IV Continuous <Continuous>  cefTRIAXone   IVPB 1000 milliGRAM(s) IV Intermittent every 24 hours  chlorhexidine 0.12% Liquid 15 milliLiter(s) Oral Mucosa every 12 hours  chlorhexidine 2% Cloths 1 Application(s) Topical <User Schedule>  dextrose 5%. 1000 milliLiter(s) (100 mL/Hr) IV Continuous <Continuous>  dextrose 5%. 1000 milliLiter(s) (50 mL/Hr) IV Continuous <Continuous>  dextrose 50% Injectable 25 Gram(s) IV Push once  dextrose 50% Injectable 12.5 Gram(s) IV Push once  dextrose 50% Injectable 25 Gram(s) IV Push once  glucagon  Injectable 1 milliGRAM(s) IntraMuscular once  insulin lispro (ADMELOG) corrective regimen sliding scale   SubCutaneous every 6 hours  latanoprost 0.005% Ophthalmic Solution 1 Drop(s) Both EYES at bedtime  levETIRAcetam   Injectable 500 milliGRAM(s) IV Push every 12 hours  levothyroxine Injectable 18 MICROGram(s) IV Push at bedtime  mupirocin 2% Ointment 1 Application(s) Topical two times a day  norepinephrine Infusion 0.729 MICROgram(s)/kG/Min (65.6 mL/Hr) IV Continuous <Continuous>  pantoprazole  Injectable 40 milliGRAM(s) IV Push two times a day  petrolatum Ophthalmic Ointment 1 Application(s) Both EYES two times a day  polyethylene glycol 3350 17 Gram(s) Oral daily  senna 2 Tablet(s) Oral at bedtime    MEDICATIONS  (PRN):  albuterol/ipratropium for Nebulization 3 milliLiter(s) Nebulizer every 6 hours PRN Respiratory Distress  dextrose Oral Gel 15 Gram(s) Oral once PRN Blood Glucose LESS THAN 70 milliGRAM(s)/deciliter      LABS:                        7.6    21.95 )-----------( 139      ( 03 Feb 2025 00:47 )             22.0     Hgb Trend: 7.6<--, 7.5<--, 7.3<--, 7.5<--, 7.7<--  02-03    135  |  93[L]  |  88[H]  ----------------------------<  117[H]  4.0   |  25  |  5.79[H]    Ca    8.1[L]      03 Feb 2025 00:47  Phos  5.0     02-03  Mg     2.30     02-03    TPro  6.9  /  Alb  2.1[L]  /  TBili  1.7[H]  /  DBili  x   /  AST  169[H]  /  ALT  90[H]  /  AlkPhos  118  02-03    Creatinine Trend: 5.79<--, 5.12<--, 4.41<--, 3.55<--, 2.62<--, 2.39<--  PT/INR - ( 03 Feb 2025 00:47 )   PT: 17.1 sec;   INR: 1.48 ratio         PTT - ( 03 Feb 2025 00:47 )  PTT:28.2 sec  Urinalysis Basic - ( 03 Feb 2025 00:47 )    Color: x / Appearance: x / SG: x / pH: x  Gluc: 117 mg/dL / Ketone: x  / Bili: x / Urobili: x   Blood: x / Protein: x / Nitrite: x   Leuk Esterase: x / RBC: x / WBC x   Sq Epi: x / Non Sq Epi: x / Bacteria: x      Arterial Blood Gas:  02-03 @ 00:47  7.49/35/157/27/99.9/3.3  ABG lactate: --  Arterial Blood Gas:  02-02 @ 04:25  7.46/39/187/28/99.5/3.6  ABG lactate: --        MICROBIOLOGY:       RADIOLOGY:  [ ] Reviewed by me   ****[facility] PGY-1*****    Overnight Events: no acute overnight events   Interval Events: pt seen and examined at bedside, no significant changes    OBJECTIVE:  ICU Vital Signs Last 24 Hrs  T(C): 36.9 (03 Feb 2025 04:00), Max: 37.4 (02 Feb 2025 16:00)  T(F): 98.4 (03 Feb 2025 04:00), Max: 99.3 (02 Feb 2025 16:00)  HR: 75 (03 Feb 2025 07:00) (74 - 85)  BP: --  BP(mean): --  ABP: 120/47 (03 Feb 2025 07:00) (120/47 - 163/70)  ABP(mean): 68 (03 Feb 2025 07:00) (68 - 99)  RR: 25 (03 Feb 2025 07:00) (16 - 32)  SpO2: 100% (03 Feb 2025 06:00) (98% - 100%)    O2 Parameters below as of 03 Feb 2025 07:00  Patient On (Oxygen Delivery Method): ventilator    O2 Concentration (%): 40      Mode: AC/ CMV (Assist Control/ Continuous Mandatory Ventilation), RR (machine): 14, TV (machine): 350, FiO2: 40, PEEP: 5, ITime: 0.7, MAP: 11, PIP: 32    02-02 @ 07:01  -  02-03 @ 07:00  --------------------------------------------------------  IN: 1070 mL / OUT: 1140 mL / NET: -70 mL      CAPILLARY BLOOD GLUCOSE      POCT Blood Glucose.: 161 mg/dL (03 Feb 2025 05:45)      PHYSICAL EXAM  GENERAL: Pt intubated, sedated  HEAD:  Atraumatic, normocephalic  EYES: Conjunctiva and sclera clear  NECK: Supple, trachea midline, no JVD  HEART: Regular rate and rhythm, no murmurs, rubs, or gallops  LUNGS: Unlabored respirations. Coarse breath sounds b/l.  ABDOMEN: Firm, nontender, nondistended, +BS  EXTREMITIES: 2+ peripheral pulses bilaterally, cap refill<2 secs. No clubbing, cyanosis, or edema  NERVOUS SYSTEM:  unable to assess d/t pt intubated, sedated  SKIN: No rashes or lesions      HOSPITAL MEDICATIONS:  MEDICATIONS  (STANDING):  buMETAnide Infusion 4 mG/Hr (20 mL/Hr) IV Continuous <Continuous>  cefTRIAXone   IVPB 1000 milliGRAM(s) IV Intermittent every 24 hours  chlorhexidine 0.12% Liquid 15 milliLiter(s) Oral Mucosa every 12 hours  chlorhexidine 2% Cloths 1 Application(s) Topical <User Schedule>  dextrose 5%. 1000 milliLiter(s) (100 mL/Hr) IV Continuous <Continuous>  dextrose 5%. 1000 milliLiter(s) (50 mL/Hr) IV Continuous <Continuous>  dextrose 50% Injectable 25 Gram(s) IV Push once  dextrose 50% Injectable 12.5 Gram(s) IV Push once  dextrose 50% Injectable 25 Gram(s) IV Push once  glucagon  Injectable 1 milliGRAM(s) IntraMuscular once  insulin lispro (ADMELOG) corrective regimen sliding scale   SubCutaneous every 6 hours  latanoprost 0.005% Ophthalmic Solution 1 Drop(s) Both EYES at bedtime  levETIRAcetam   Injectable 500 milliGRAM(s) IV Push every 12 hours  levothyroxine Injectable 18 MICROGram(s) IV Push at bedtime  mupirocin 2% Ointment 1 Application(s) Topical two times a day  norepinephrine Infusion 0.729 MICROgram(s)/kG/Min (65.6 mL/Hr) IV Continuous <Continuous>  pantoprazole  Injectable 40 milliGRAM(s) IV Push two times a day  petrolatum Ophthalmic Ointment 1 Application(s) Both EYES two times a day  polyethylene glycol 3350 17 Gram(s) Oral daily  senna 2 Tablet(s) Oral at bedtime    MEDICATIONS  (PRN):  albuterol/ipratropium for Nebulization 3 milliLiter(s) Nebulizer every 6 hours PRN Respiratory Distress  dextrose Oral Gel 15 Gram(s) Oral once PRN Blood Glucose LESS THAN 70 milliGRAM(s)/deciliter      LABS:                        7.6    21.95 )-----------( 139      ( 03 Feb 2025 00:47 )             22.0     Hgb Trend: 7.6<--, 7.5<--, 7.3<--, 7.5<--, 7.7<--  02-03    135  |  93[L]  |  88[H]  ----------------------------<  117[H]  4.0   |  25  |  5.79[H]    Ca    8.1[L]      03 Feb 2025 00:47  Phos  5.0     02-03  Mg     2.30     02-03    TPro  6.9  /  Alb  2.1[L]  /  TBili  1.7[H]  /  DBili  x   /  AST  169[H]  /  ALT  90[H]  /  AlkPhos  118  02-03    Creatinine Trend: 5.79<--, 5.12<--, 4.41<--, 3.55<--, 2.62<--, 2.39<--  PT/INR - ( 03 Feb 2025 00:47 )   PT: 17.1 sec;   INR: 1.48 ratio         PTT - ( 03 Feb 2025 00:47 )  PTT:28.2 sec  Urinalysis Basic - ( 03 Feb 2025 00:47 )    Color: x / Appearance: x / SG: x / pH: x  Gluc: 117 mg/dL / Ketone: x  / Bili: x / Urobili: x   Blood: x / Protein: x / Nitrite: x   Leuk Esterase: x / RBC: x / WBC x   Sq Epi: x / Non Sq Epi: x / Bacteria: x      Arterial Blood Gas:  02-03 @ 00:47  7.49/35/157/27/99.9/3.3  ABG lactate: --  Arterial Blood Gas:  02-02 @ 04:25  7.46/39/187/28/99.5/3.6  ABG lactate: --        MICROBIOLOGY:       RADIOLOGY:  [ ] Reviewed by me

## 2025-02-03 NOTE — PROGRESS NOTE ADULT - ASSESSMENT
79-year-old male PMH CAD (s/p stent 2011 and CABG 2017), hypothyroidism, gout, COPD presenting after cardiac arrest at home of unclear etiology, acute hypoxemic respiratory failure due to aspiration pneumonia +/- cavitary lesion/lung abscess +/- septic shock due to UTI      =====Neurologic=====  #Altered Mental Status  Patient is AOx0 after being found down for approx 25 minutes, possible anoxia in setting of prolonged down time prior to ROSC  CT head 1/28 with no acute intracranial hemorrhage, mass effect, midline shift  Neurology following  vEEG stopped 2/1, prop weaned off  poor prognosis for meaningful neurological functional recovery    - Pt is intubated  - Keppra 500mg q12hr  - f/u MRI brain w/o contrast    =====Cardiovascular=====  #Cardiac arrest  Pt s/p cardiac arrest with ROSC obtained in the field per EMS prior to arrival. Pt hypotensive upon arrival.  Pt on Levo  TTE 1/29: EF 65%, no abnormalities    - Titrate off pressors  - Monitor on telemetry     =====Pulmonary=====  #AHRF  Patient obtunded, intubated in setting of cardiac arrest   CXR s/p intubation with left lung base opacity may reflect atelectasis vs. infection   Pt s/p bronchoscopy overnight 1/28 showing large blood clot extending from the distal trachea, occluding the left mainstem bronchus, extending to the left lower lobe bronchus  CT chest 1/28: Cavitary opacity in the left lower lobe measuring up to 7.9 cm. most likely represents pulmonary abscess, less likely neoplasm  Pt follows with Dr. Guardado outpatient, concern for VOLODYMYR  S/p vanc+azithromycin, urine legionella negative  S/p bronch by IP 1/30    - f/u BAL for micro, cyto, galactomannan, fungitell  - bronchial cx, gram stain (1/30) consistent w/ body nicolasa  - Treat infection empirically with Zosyn  - duonebs q6hr PRN  - sputum culture for AFB    =====GI=====  #Transaminitis  Pt with increasing LFTs and coags concerning for shock liver  - continue to support blood pressure and monitor    #Diet  - pt has OG tube, nutrition recs appreciated, on tube feeds    =====Renal/=====  #DOV, Cr uptrending, likely ATN iso shock  pt has yu in  - continue to monitor  - monitor urine output  - 4mg bumex 1/31    #Metabolic acidosis  - in setting of cardiac arrest and prolonged downtime with tissue hypoperfusion causing metabolic acidosis 2/2 lactate  - S/p bicarb push  - continue to trend lactate     =====Infectious Disease=====  #UTI  s/p Zosyn, azithro, and vanc.  - urine cultures (1/28) growing Klebsiella  - BCxs (1/28) (-)  - Legionella Ag (1/30) (-)  - ceftriaxone 5 days (1/31- )    =====Endocrine=====  pt has hypothyroidism, on levothyroxine 25 mcg at home  - IV synthroid 18mcg QHS    =====Heme/Onc=====  - DVT PPX: held lovenox due to thrombocytopenia, restarted 2/2    =====MICU General=====  Intubated 1/28  AB: CTX  DVT: restarted lovenox 2/2  Full code 79-year-old male PMH CAD (s/p stent 2011 and CABG 2017), hypothyroidism, gout, COPD presenting after cardiac arrest at home of unclear etiology, acute hypoxemic respiratory failure due to aspiration pneumonia +/- cavitary lesion/lung abscess +/- septic shock due to UTI      =====Neurologic=====  #Altered Mental Status  Patient is AOx0 after being found down for approx 25 minutes, possible anoxia in setting of prolonged down time prior to ROSC  CT head 1/28 with no acute intracranial hemorrhage, mass effect, midline shift  Neurology following  vEEG stopped 2/1, prop weaned off  poor prognosis for meaningful neurological functional recovery    - Pt is intubated  - Keppra 500mg q12hr  - f/u MRI brain w/o contrast    =====Cardiovascular=====  #Cardiac arrest  Pt s/p cardiac arrest with ROSC obtained in the field per EMS prior to arrival. Pt hypotensive upon arrival.  Pt on Levo  TTE 1/29: EF 65%, no abnormalities    - Titrate off pressors  - Monitor on telemetry     =====Pulmonary=====  #AHRF  Patient obtunded, intubated in setting of cardiac arrest   CXR s/p intubation with left lung base opacity may reflect atelectasis vs. infection   Pt s/p bronchoscopy overnight 1/28 showing large blood clot extending from the distal trachea, occluding the left mainstem bronchus, extending to the left lower lobe bronchus  CT chest 1/28: Cavitary opacity in the left lower lobe measuring up to 7.9 cm. most likely represents pulmonary abscess, less likely neoplasm  Pt follows with Dr. Guardado outpatient, concern for VOLODYMYR  S/p vanc+azithromycin, urine legionella negative  S/p bronch by IP 1/30    - f/u BAL for micro, cyto, galactomannan, fungitell  - bronchial cx, gram stain (1/30) consistent w/ body nicolasa  - Treat infection empirically with Unasyn for 5 days (2/3- )  - duonebs q6hr PRN  - tracheal aspirate of AFB and quant    =====GI=====  #Transaminitis  Pt with increasing LFTs and coags concerning for shock liver  - continue to support blood pressure and monitor    #Diet  - pt has OG tube, nutrition recs appreciated, on tube feeds    =====Renal/=====  #DOV, Cr uptrending, likely ATN iso shock  pt has yu in  - 4mg bumex 1/31  - will bolus IVF and monitor output. Consider increased diuresis    #Metabolic acidosis  - in setting of cardiac arrest and prolonged downtime with tissue hypoperfusion causing metabolic acidosis 2/2 lactate  - S/p bicarb push  - continue to trend lactate     =====Infectious Disease=====  #UTI  s/p Zosyn, azithro, and vanc.  - urine cultures (1/28) growing Klebsiella  - BCxs (1/28) (-)  - Legionella Ag (1/30) (-)  - unasyn 5 days (2/3- )    =====Endocrine=====  pt has hypothyroidism, on levothyroxine 25 mcg at home  - IV synthroid 18mcg QHS    =====Heme/Onc=====  - DVT PPX: held due to thrombocytopenia, restarted Heparin SubQ 2/3    =====MICU General=====  Intubated 1/28  AB: CTX  DVT: restarted heparin 2/3  Full code

## 2025-02-04 NOTE — PROGRESS NOTE ADULT - ASSESSMENT
79-year-old male PMH CAD (s/p stent 2011 and CABG 2017), hypothyroidism, gout, COPD presenting after cardiac arrest at home of unclear etiology, acute hypoxemic respiratory failure due to aspiration pneumonia +/- cavitary lesion/lung abscess +/- septic shock due to UTI      =====Neurologic=====  #Altered Mental Status  Patient is AOx0 after being found down for approx 25 minutes, possible anoxia in setting of prolonged down time prior to ROSC  CT head 1/28 with no acute intracranial hemorrhage, mass effect, midline shift  Neurology following  vEEG stopped 2/1, prop weaned off  poor prognosis for meaningful neurological functional recovery    - Pt is intubated  - Keppra 500mg q12hr  - f/u MRI brain w/o contrast    =====Cardiovascular=====  #Cardiac arrest  Pt s/p cardiac arrest with ROSC obtained in the field per EMS prior to arrival. Pt hypotensive upon arrival.  Pt on midodrine  TTE 1/29: EF 65%, no abnormalities    - Titrate off pressors  - Monitor on telemetry     =====Pulmonary=====  #AHRF  Patient obtunded, intubated in setting of cardiac arrest   CXR s/p intubation with left lung base opacity may reflect atelectasis vs. infection   Pt s/p bronchoscopy overnight 1/28 showing large blood clot extending from the distal trachea, occluding the left mainstem bronchus, extending to the left lower lobe bronchus  CT chest 1/28: Cavitary opacity in the left lower lobe measuring up to 7.9 cm. most likely represents pulmonary abscess, less likely neoplasm  Pt follows with Dr. Guardado outpatient, concern for VOLODYMYR  S/p vanc+azithromycin, urine legionella negative  S/p bronch by IP 1/30    - f/u BAL for micro, cyto, galactomannan, fungitell  - bronchial cx, gram stain (1/30) consistent w/ body nicolasa  - Treat infection empirically with Unasyn for 5 days (2/3- )  - duonebs q6hr PRN  - tracheal aspirate of AFB and quant    =====GI=====  #Transaminitis  Pt with increasing LFTs and coags concerning for shock liver  - continue to support blood pressure and monitor    #Diet  - pt has OG tube, nutrition recs appreciated, on tube feeds    =====Renal/=====  #DOV, Cr uptrending, likely ATN iso shock  pt has yu in  - 4mg bumex 1/31  - will bolus IVF and monitor output. Consider increased diuresis    #Metabolic acidosis  - in setting of cardiac arrest and prolonged downtime with tissue hypoperfusion causing metabolic acidosis 2/2 lactate  - S/p bicarb push  - continue to trend lactate     =====Infectious Disease=====  #UTI  s/p Zosyn, azithro, and vanc.  - urine cultures (1/28) growing Klebsiella  - BCxs (1/28) (-)  - Legionella Ag (1/30) (-)  - unasyn 5 days (2/3- )    =====Endocrine=====  pt has hypothyroidism, on levothyroxine 25 mcg at home  - IV synthroid 18mcg QHS    =====Heme/Onc=====  - DVT PPX: held due to thrombocytopenia, restarted Heparin SubQ 2/3    =====MICU General=====  Intubated 1/28  AB: CTX  DVT: restarted heparin 2/3  Full code 79-year-old male PMH CAD (s/p stent 2011 and CABG 2017), hypothyroidism, gout, COPD presenting after cardiac arrest at home of unclear etiology, acute hypoxemic respiratory failure due to aspiration pneumonia +/- cavitary lesion/lung abscess +/- septic shock due to UTI      =====Neurologic=====  #Altered Mental Status  Patient is AOx0 after being found down for approx 25 minutes, possible anoxia in setting of prolonged down time prior to ROSC  CT head 1/28 with no acute intracranial hemorrhage, mass effect, midline shift  Neurology following  vEEG stopped 2/1, prop weaned off  poor prognosis for meaningful neurological functional recovery    - Pt is intubated  - Keppra 500mg q12hr  - f/u MRI brain w/o contrast    =====Cardiovascular=====  #Cardiac arrest  Pt s/p cardiac arrest with ROSC obtained in the field per EMS prior to arrival. Pt hypotensive upon arrival.  Pt on midodrine  TTE 1/29: EF 65%, no abnormalities    - Titrate off pressors, pt off levo  - Monitor on telemetry     =====Pulmonary=====  #AHRF  Patient obtunded, intubated in setting of cardiac arrest   CXR s/p intubation with left lung base opacity may reflect atelectasis vs. infection   Pt s/p bronchoscopy overnight 1/28 showing large blood clot extending from the distal trachea, occluding the left mainstem bronchus, extending to the left lower lobe bronchus  CT chest 1/28: Cavitary opacity in the left lower lobe measuring up to 7.9 cm. most likely represents pulmonary abscess, less likely neoplasm  Pt follows with Dr. Guardado outpatient, concern for VOLODYMYR  S/p vanc+azithromycin, urine legionella negative  S/p bronch by IP 1/30  Bronchial cx, gram stain (1/30) consistent w/ body nicolasa  Check serum fungitel    - Treat infection empirically with Unasyn for 5 days (2/3- )  - duonebs q6hr PRN  - tracheal aspirate of AFB and quant    =====GI=====  #Transaminitis  Pt with increasing LFTs and coags concerning for shock liver  - continue to support blood pressure and monitor    #Diet  - pt has OG tube, nutrition recs appreciated, on tube feeds    =====Renal/=====  #DOV, Cr uptrending, likely ATN iso shock  pt has yu in  4mg bumex 1/31, s/p bumex drip  - will bolus IVF and monitor output  - pt on maintenance fluids    #Metabolic acidosis  - in setting of cardiac arrest and prolonged downtime with tissue hypoperfusion causing metabolic acidosis 2/2 lactate  - S/p bicarb push  - continue to trend lactate     =====Infectious Disease=====  #UTI  s/p Zosyn, azithro, and vanc.  - urine cultures (1/28) growing Klebsiella  - BCxs (1/28) (-)  - Legionella Ag (1/30) (-)  - Unasyn 5 days (2/3- )    =====Endocrine=====  pt has hypothyroidism, on levothyroxine 25 mcg at home  - IV synthroid 18mcg QHS    =====Heme/Onc=====  - DVT PPX: held due to thrombocytopenia, restarted Heparin SubQ 2/3    =====MICU General=====  Intubated 1/28  AB: CTX  DVT: restarted heparin 2/3  Full code

## 2025-02-04 NOTE — PROGRESS NOTE ADULT - SUBJECTIVE AND OBJECTIVE BOX
****[facility]assidy Ansh[facility] PGY-1*****    Overnight Events: added midodrine overnight  Interval Events:    OBJECTIVE:  ICU Vital Signs Last 24 Hrs  T(C): 37.7 (04 Feb 2025 04:00), Max: 37.8 (04 Feb 2025 00:00)  T(F): 99.9 (04 Feb 2025 04:00), Max: 100 (04 Feb 2025 00:00)  HR: 77 (04 Feb 2025 06:56) (71 - 88)  BP: --  BP(mean): --  ABP: 116/40 (04 Feb 2025 05:00) (110/44 - 137/54)  ABP(mean): 57 (04 Feb 2025 05:00) (57 - 80)  RR: 33 (04 Feb 2025 05:00) (19 - 33)  SpO2: 100% (04 Feb 2025 06:56) (91% - 100%)    O2 Parameters below as of 04 Feb 2025 06:00  Patient On (Oxygen Delivery Method): AC14//P+5          Mode: AC/ CMV (Assist Control/ Continuous Mandatory Ventilation), RR (machine): 14, TV (machine): 350, FiO2: 40, PEEP: 5, ITime: 0.62, MAP: 11, PIP: 32    02-03 @ 07:01  -  02-04 @ 07:00  --------------------------------------------------------  IN: 1620 mL / OUT: 1010 mL / NET: 610 mL      CAPILLARY BLOOD GLUCOSE      POCT Blood Glucose.: 127 mg/dL (04 Feb 2025 05:44)      PHYSICAL EXAM  GENERAL: Pt intubated, sedated  HEAD:  Atraumatic, normocephalic  EYES: Conjunctiva and sclera clear  NECK: Supple, trachea midline, no JVD  HEART: Regular rate and rhythm, no murmurs, rubs, or gallops  LUNGS: Unlabored respirations. Coarse breath sounds b/l.  ABDOMEN: Firm, nontender, nondistended, +BS  EXTREMITIES: 2+ peripheral pulses bilaterally, cap refill<2 secs. No clubbing, cyanosis, or edema  NERVOUS SYSTEM:  unable to assess d/t pt intubated, sedated  SKIN: No rashes or lesions      HOSPITAL MEDICATIONS:  MEDICATIONS  (STANDING):  ampicillin/sulbactam  IVPB      ampicillin/sulbactam  IVPB 3 Gram(s) IV Intermittent every 24 hours  buMETAnide Infusion 4 mG/Hr (20 mL/Hr) IV Continuous <Continuous>  chlorhexidine 0.12% Liquid 15 milliLiter(s) Oral Mucosa every 12 hours  chlorhexidine 2% Cloths 1 Application(s) Topical <User Schedule>  dextrose 5%. 1000 milliLiter(s) (100 mL/Hr) IV Continuous <Continuous>  dextrose 5%. 1000 milliLiter(s) (50 mL/Hr) IV Continuous <Continuous>  dextrose 50% Injectable 25 Gram(s) IV Push once  dextrose 50% Injectable 12.5 Gram(s) IV Push once  dextrose 50% Injectable 25 Gram(s) IV Push once  glucagon  Injectable 1 milliGRAM(s) IntraMuscular once  heparin   Injectable 5000 Unit(s) SubCutaneous every 12 hours  insulin lispro (ADMELOG) corrective regimen sliding scale   SubCutaneous every 6 hours  latanoprost 0.005% Ophthalmic Solution 1 Drop(s) Both EYES at bedtime  levETIRAcetam   Injectable 500 milliGRAM(s) IV Push every 12 hours  levothyroxine 25 MICROGram(s) Oral daily  midodrine 10 milliGRAM(s) Oral every 8 hours  norepinephrine Infusion 0.729 MICROgram(s)/kG/Min (65.6 mL/Hr) IV Continuous <Continuous>  pantoprazole  Injectable 40 milliGRAM(s) IV Push two times a day  petrolatum Ophthalmic Ointment 1 Application(s) Both EYES two times a day  polyethylene glycol 3350 17 Gram(s) Oral daily  senna 2 Tablet(s) Oral at bedtime    MEDICATIONS  (PRN):  albuterol/ipratropium for Nebulization 3 milliLiter(s) Nebulizer every 6 hours PRN Respiratory Distress  dextrose Oral Gel 15 Gram(s) Oral once PRN Blood Glucose LESS THAN 70 milliGRAM(s)/deciliter      LABS:                        7.2    23.65 )-----------( 148      ( 04 Feb 2025 02:45 )             20.0     Hgb Trend: 7.2<--, 7.6<--, 7.5<--, 7.3<--, 7.5<--  02-04    134[L]  |  95[L]  |  104[H]  ----------------------------<  154[H]  4.0   |  21[L]  |  6.42[H]    Ca    7.9[L]      04 Feb 2025 02:45  Phos  5.7     02-04  Mg     2.30     02-04    TPro  6.5  /  Alb  1.8[L]  /  TBili  1.1  /  DBili  x   /  AST  136[H]  /  ALT  73[H]  /  AlkPhos  159[H]  02-04    Creatinine Trend: 6.42<--, 5.79<--, 5.12<--, 4.41<--, 3.55<--, 2.62<--  PT/INR - ( 04 Feb 2025 02:45 )   PT: 18.2 sec;   INR: 1.58 ratio         PTT - ( 03 Feb 2025 00:47 )  PTT:28.2 sec  Urinalysis Basic - ( 04 Feb 2025 02:45 )    Color: x / Appearance: x / SG: x / pH: x  Gluc: 154 mg/dL / Ketone: x  / Bili: x / Urobili: x   Blood: x / Protein: x / Nitrite: x   Leuk Esterase: x / RBC: x / WBC x   Sq Epi: x / Non Sq Epi: x / Bacteria: x      Arterial Blood Gas:  02-04 @ 02:45  7.42/37/150/24/99.4/-0.4  ABG lactate: --  Arterial Blood Gas:  02-03 @ 00:47  7.49/35/157/27/99.9/3.3  ABG lactate: --        MICROBIOLOGY:       RADIOLOGY:  [ ] Reviewed by me   ****Kassidy Brasher [facility] PGY-1*****    Overnight Events: added midodrine overnight  Interval Events: pt seen and examined at bedside. No significant changes.     OBJECTIVE:  ICU Vital Signs Last 24 Hrs  T(C): 37.7 (04 Feb 2025 04:00), Max: 37.8 (04 Feb 2025 00:00)  T(F): 99.9 (04 Feb 2025 04:00), Max: 100 (04 Feb 2025 00:00)  HR: 77 (04 Feb 2025 06:56) (71 - 88)  BP: --  BP(mean): --  ABP: 116/40 (04 Feb 2025 05:00) (110/44 - 137/54)  ABP(mean): 57 (04 Feb 2025 05:00) (57 - 80)  RR: 33 (04 Feb 2025 05:00) (19 - 33)  SpO2: 100% (04 Feb 2025 06:56) (91% - 100%)    O2 Parameters below as of 04 Feb 2025 06:00  Patient On (Oxygen Delivery Method): AC14//P+5          Mode: AC/ CMV (Assist Control/ Continuous Mandatory Ventilation), RR (machine): 14, TV (machine): 350, FiO2: 40, PEEP: 5, ITime: 0.62, MAP: 11, PIP: 32    02-03 @ 07:01  -  02-04 @ 07:00  --------------------------------------------------------  IN: 1620 mL / OUT: 1010 mL / NET: 610 mL      CAPILLARY BLOOD GLUCOSE      POCT Blood Glucose.: 127 mg/dL (04 Feb 2025 05:44)      PHYSICAL EXAM  GENERAL: Pt intubated  HEAD:  Atraumatic, normocephalic  EYES: Conjunctiva and sclera clear  NECK: Supple, trachea midline, no JVD  HEART: Regular rate and rhythm, no murmurs, rubs, or gallops  LUNGS: Unlabored respirations. Coarse breath sounds b/l.  ABDOMEN: Firm, nontender, nondistended, +BS  EXTREMITIES: 2+ peripheral pulses bilaterally, cap refill<2 secs. No clubbing, cyanosis, or edema  NERVOUS SYSTEM:  unable to assess d/t pt intubated   SKIN: No rashes or lesions      HOSPITAL MEDICATIONS:  MEDICATIONS  (STANDING):  ampicillin/sulbactam  IVPB      ampicillin/sulbactam  IVPB 3 Gram(s) IV Intermittent every 24 hours  buMETAnide Infusion 4 mG/Hr (20 mL/Hr) IV Continuous <Continuous>  chlorhexidine 0.12% Liquid 15 milliLiter(s) Oral Mucosa every 12 hours  chlorhexidine 2% Cloths 1 Application(s) Topical <User Schedule>  dextrose 5%. 1000 milliLiter(s) (100 mL/Hr) IV Continuous <Continuous>  dextrose 5%. 1000 milliLiter(s) (50 mL/Hr) IV Continuous <Continuous>  dextrose 50% Injectable 25 Gram(s) IV Push once  dextrose 50% Injectable 12.5 Gram(s) IV Push once  dextrose 50% Injectable 25 Gram(s) IV Push once  glucagon  Injectable 1 milliGRAM(s) IntraMuscular once  heparin   Injectable 5000 Unit(s) SubCutaneous every 12 hours  insulin lispro (ADMELOG) corrective regimen sliding scale   SubCutaneous every 6 hours  latanoprost 0.005% Ophthalmic Solution 1 Drop(s) Both EYES at bedtime  levETIRAcetam   Injectable 500 milliGRAM(s) IV Push every 12 hours  levothyroxine 25 MICROGram(s) Oral daily  midodrine 10 milliGRAM(s) Oral every 8 hours  norepinephrine Infusion 0.729 MICROgram(s)/kG/Min (65.6 mL/Hr) IV Continuous <Continuous>  pantoprazole  Injectable 40 milliGRAM(s) IV Push two times a day  petrolatum Ophthalmic Ointment 1 Application(s) Both EYES two times a day  polyethylene glycol 3350 17 Gram(s) Oral daily  senna 2 Tablet(s) Oral at bedtime    MEDICATIONS  (PRN):  albuterol/ipratropium for Nebulization 3 milliLiter(s) Nebulizer every 6 hours PRN Respiratory Distress  dextrose Oral Gel 15 Gram(s) Oral once PRN Blood Glucose LESS THAN 70 milliGRAM(s)/deciliter      LABS:                        7.2    23.65 )-----------( 148      ( 04 Feb 2025 02:45 )             20.0     Hgb Trend: 7.2<--, 7.6<--, 7.5<--, 7.3<--, 7.5<--  02-04    134[L]  |  95[L]  |  104[H]  ----------------------------<  154[H]  4.0   |  21[L]  |  6.42[H]    Ca    7.9[L]      04 Feb 2025 02:45  Phos  5.7     02-04  Mg     2.30     02-04    TPro  6.5  /  Alb  1.8[L]  /  TBili  1.1  /  DBili  x   /  AST  136[H]  /  ALT  73[H]  /  AlkPhos  159[H]  02-04    Creatinine Trend: 6.42<--, 5.79<--, 5.12<--, 4.41<--, 3.55<--, 2.62<--  PT/INR - ( 04 Feb 2025 02:45 )   PT: 18.2 sec;   INR: 1.58 ratio         PTT - ( 03 Feb 2025 00:47 )  PTT:28.2 sec  Urinalysis Basic - ( 04 Feb 2025 02:45 )    Color: x / Appearance: x / SG: x / pH: x  Gluc: 154 mg/dL / Ketone: x  / Bili: x / Urobili: x   Blood: x / Protein: x / Nitrite: x   Leuk Esterase: x / RBC: x / WBC x   Sq Epi: x / Non Sq Epi: x / Bacteria: x      Arterial Blood Gas:  02-04 @ 02:45  7.42/37/150/24/99.4/-0.4  ABG lactate: --  Arterial Blood Gas:  02-03 @ 00:47  7.49/35/157/27/99.9/3.3  ABG lactate: --        MICROBIOLOGY:       RADIOLOGY:  [ ] Reviewed by me

## 2025-02-04 NOTE — PROGRESS NOTE ADULT - ATTENDING COMMENTS
79M with CAD, CABG, not on plavix or a/c, COPD here after cardiac arrest at home of unclear etiology, acute hypoxemic respiratory failure due to aspiration pneumonia +/- cavitary lesion/lung abscess +/- septic shock due to UTI.    #Neuro: concern for anoxic brain injury in setting of prolonged down time during cardiac arrest. Pending MR head  #CV: s/p cardiac arrest of unclear etiology; TTE WNL.   #Pulm: CT chest showing cavitary lesion in LLL->? abscess; pending cultures and AFB; resend sputum AFB and quantiferon; low suspicion. c/w duonebs  #ID: will change abx to unasyn to cover anaerobes in the setting of cavitary PNA. Pending serum fungitell (sputum 200)  #Renal/Fluid: DOV/ ATN in setting of shock; 24 hour urine output 620. POCUS with collapsed IVC < 1 cm- will bolus IVF and monitor output. Will start maintenance fluids as well.   #Heme: H/H stable; continued rise in leukocytosis.   #Endo: on synthroid  #GI:  tube feeds at goal. transaminitis continues to improve  # Skin/Lines: +yu; [place]- will remove  #DVT ppx: restart hep subq for dvt ppx  # Dispo/Ethics: Full Code. Spoke with wife at bedside and she remains hopeful for recovery. Would like MRI for more prognostication. 79M with CAD, CABG, not on plavix or a/c, COPD here after cardiac arrest at home of unclear etiology, acute hypoxemic respiratory failure due to aspiration pneumonia +/- cavitary lesion/lung abscess +/- septic shock due to UTI.    #Neuro: concern for anoxic brain injury in setting of prolonged down time during cardiac arrest. Pending MR head. Off sedation for > 72 hours without any meaningful metnal status  #CV: s/p cardiac arrest of unclear etiology, possible hypoxemic arrest given respiratory symptoms on day of presenation.  TTE WNL.   #Pulm: CT chest showing cavitary lesion in LLL->? abscess; pending cultures and AFB; resend sputum AFB and quantiferon; low suspicion. c/w unasyn for now.  #ID: will change abx to unasyn to cover anaerobes in the setting of cavitary PNA. Pending serum fungitell (sputum 200)  #Renal/Fluid: DOV/ ATN in setting of shock; 24 hour urine output 620. POCUS with collapsed IVC < 1 cm- will bolus IVF and monitor output. Will start maintenance fluids as well.   #Heme: H/H stable; continued rise in leukocytosis. Cultures negative to date.   #Endo: on synthroid  #GI:  tube feeds at goal. transaminitis continues to improve  # Skin/Lines: +yu; [place]- will remove  #DVT ppx: restart hep subq for dvt ppx  # Dispo/Ethics: Full Code. Spoke with wife at bedside and she remains hopeful for recovery. Would like MRI for more prognostication.

## 2025-02-04 NOTE — CHART NOTE - NSCHARTNOTEFT_GEN_A_CORE
Indication:  SHOCK    Performed by: Lucinda Wilcox PGY1    PROCEDURE:  [x] LIMITED ECHO  [x] LIMITED CHEST  [ ] LIMITED RETROPERITONEAL  [ ] LIMITED ABDOMINAL  [ ] LIMITED DVT  [ ] NEEDLE GUIDANCE VASCULAR  [ ] NEEDLE GUIDANCE THORACENTESIS  [ ] NEEDLE GUIDANCE PARACENTESIS  [ ] NEEDLE GUIDANCE PERICARDIOCENTESIS  [ ] OTHER    FINDINGS:  Chest: A-line predominant, normal aeration pattern bilat. No effusions bilat.    ECHO: Grossly normal RV and LV function, septal wall bouncing, or gross valvular pathology   No pericardial effusion  IVC: 0.7cm    INTERPRETATION:  Septal wall bouncing consistent with prior cardiac surgery   Small IVC, low volume status Indication:  SHOCK    Performed by: Lucinda Wilcox PGY1    PROCEDURE:  [x] LIMITED ECHO  [x] LIMITED CHEST  [ ] LIMITED RETROPERITONEAL  [ ] LIMITED ABDOMINAL  [ ] LIMITED DVT  [ ] NEEDLE GUIDANCE VASCULAR  [ ] NEEDLE GUIDANCE THORACENTESIS  [ ] NEEDLE GUIDANCE PARACENTESIS  [ ] NEEDLE GUIDANCE PERICARDIOCENTESIS  [ ] OTHER    FINDINGS:  Chest: A-line predominant, normal aeration pattern bilat. No effusions bilat.    ECHO: Grossly normal RV and LV function, or gross valvular pathology   No pericardial effusion  IVC: 0.7cm    INTERPRETATION:  Small IVC.    Attending Attestation:  I was present during the key portions of the procedure and immediately available during the entire procedure.  I have personally reviewed the images and agree with the interpretation above by the resident/fellow/ACP.    Charu Stanley MD

## 2025-02-05 NOTE — PROGRESS NOTE ADULT - ASSESSMENT
79-year-old male PMH CAD (s/p stent 2011 and CABG 2017), hypothyroidism, gout, COPD presenting after cardiac arrest at home of unclear etiology, acute hypoxemic respiratory failure due to aspiration pneumonia +/- cavitary lesion/lung abscess +/- septic shock due to UTI      =====Neurologic=====  #Altered Mental Status  Patient is AOx0 after being found down for approx 25 minutes, possible anoxia in setting of prolonged down time prior to ROSC  CT head 1/28 with no acute intracranial hemorrhage, mass effect, midline shift  Neurology following  vEEG stopped 2/1, prop weaned off  poor prognosis for meaningful neurological functional recovery    - Pt is intubated  - Keppra 500mg q12hr  - f/u MRI brain w/o contrast    =====Cardiovascular=====  #Cardiac arrest  Pt s/p cardiac arrest with ROSC obtained in the field per EMS prior to arrival. Pt hypotensive upon arrival.  Pt on midodrine  TTE 1/29: EF 65%, no abnormalities    - Titrate off pressors, pt off levo  - Monitor on telemetry     =====Pulmonary=====  #AHRF  Patient obtunded, intubated in setting of cardiac arrest   CXR s/p intubation with left lung base opacity may reflect atelectasis vs. infection   Pt s/p bronchoscopy overnight 1/28 showing large blood clot extending from the distal trachea, occluding the left mainstem bronchus, extending to the left lower lobe bronchus  CT chest 1/28: Cavitary opacity in the left lower lobe measuring up to 7.9 cm. most likely represents pulmonary abscess, less likely neoplasm  Pt follows with Dr. Guardado outpatient, concern for VOLODYMYR  S/p vanc+azithromycin, urine legionella negative  S/p bronch by IP 1/30  Bronchial cx, gram stain (1/30) consistent w/ body nicolasa  Check serum fungitel    - Treat infection empirically with Unasyn for 5 days (2/3- )  - duonebs q6hr PRN  - tracheal aspirate of AFB and quant    =====GI=====  #Transaminitis  Pt with increasing LFTs and coags concerning for shock liver  - continue to support blood pressure and monitor    #Diet  - pt has OG tube, nutrition recs appreciated, on tube feeds    =====Renal/=====  #DOV, Cr uptrending, likely ATN iso shock  pt has yu in  4mg bumex 1/31, s/p bumex drip  - will bolus IVF and monitor output  - pt on maintenance fluids    #Metabolic acidosis  - in setting of cardiac arrest and prolonged downtime with tissue hypoperfusion causing metabolic acidosis 2/2 lactate  - S/p bicarb push  - continue to trend lactate     =====Infectious Disease=====  #UTI  s/p Zosyn, azithro, and vanc.  - urine cultures (1/28) growing Klebsiella  - BCxs (1/28) (-)  - Legionella Ag (1/30) (-)  - Unasyn 5 days (2/3- )    =====Endocrine=====  pt has hypothyroidism, on levothyroxine 25 mcg at home  - IV synthroid 18mcg QHS    =====Heme/Onc=====  - DVT PPX: held due to thrombocytopenia, restarted Heparin SubQ 2/3    =====MICU General=====  Intubated 1/28  AB: CTX  DVT: restarted heparin 2/3  Full code 79-year-old male PMH CAD (s/p stent 2011 and CABG 2017), hypothyroidism, gout, COPD presenting after cardiac arrest at home of unclear etiology, acute hypoxemic respiratory failure due to aspiration pneumonia +/- cavitary lesion/lung abscess +/- septic shock due to UTI      =====Neurologic=====  #Altered Mental Status  Patient is AOx0 after being found down for approx 25 minutes, possible anoxia in setting of prolonged down time prior to ROSC  CT head 1/28 with no acute intracranial hemorrhage, mass effect, midline shift  Neurology following  vEEG stopped 2/1, prop weaned off  poor prognosis for meaningful neurological functional recovery, discussed prognosis with wife today 2/5  MRI with diffuse anoxic brain injury    - Pt is intubated  - Keppra 500mg q12hr, decrease levetiracetam to 250 mg Q12H 2/5    =====Cardiovascular=====  #Cardiac arrest  Pt s/p cardiac arrest with ROSC obtained in the field per EMS prior to arrival. Pt hypotensive upon arrival.  Pt on midodrine  TTE 1/29: EF 65%, no abnormalities    - Titrate off pressors, pt off levo  - Monitor on telemetry     =====Pulmonary=====  #AHRF  Patient obtunded, intubated in setting of cardiac arrest   CXR s/p intubation with left lung base opacity may reflect atelectasis vs. infection   Pt s/p bronchoscopy overnight 1/28 showing large blood clot extending from the distal trachea, occluding the left mainstem bronchus, extending to the left lower lobe bronchus  CT chest 1/28: Cavitary opacity in the left lower lobe measuring up to 7.9 cm. most likely represents pulmonary abscess, less likely neoplasm  Pt follows with Dr. Guardado outpatient, concern for VOLODYMYR  S/p vanc+azithromycin, urine legionella negative  S/p bronch by IP 1/30  Bronchial cx, gram stain (1/30) consistent w/ body nicolasa  Check serum fungitel    - Treat infection empirically with Unasyn for 5 days (2/3- )  - duonebs q6hr PRN  - tracheal aspirate of AFB and quant    =====GI=====  #Transaminitis  Pt with increasing LFTs and coags concerning for shock liver  - continue to support blood pressure and monitor    #Diet  - pt has OG tube, nutrition recs appreciated, on tube feeds    =====Renal/=====  #DOV, Cr uptrending, likely ATN iso shock  pt has yu in  4mg bumex 1/31, s/p bumex drip  - will bolus IVF and monitor output  - pt on maintenance fluids    #Metabolic acidosis  - in setting of cardiac arrest and prolonged downtime with tissue hypoperfusion causing metabolic acidosis 2/2 lactate  - S/p bicarb push  - continue to trend lactate     =====Infectious Disease=====  #UTI  s/p Zosyn, azithro, and vanc.  - urine cultures (1/28) growing Klebsiella  - BCxs (1/28) (-)  - Legionella Ag (1/30) (-)  - Unasyn 5 days (2/3- )    =====Endocrine=====  pt has hypothyroidism, on levothyroxine 25 mcg at home  - IV synthroid 18mcg QHS      =====Heme/Onc=====  - DVT PPX: held due to thrombocytopenia, restarted Heparin SubQ 2/3    =====MICU General=====  Intubated 1/28  AB: Unasyn  DVT: restarted heparin 2/3    Extensive goals of care conversation with wife at bedside. Discussed patient's MRI findings with diffuse anoxic brain injury, EEG without significant cerebral activity and lack of meaningful mental status. He has severe anoxic encephalopathy and it has been >1 week since admission without mental status recovery. She states that he would not want to live like this, but she feels she cannot make the decision to extubate.   She reports she wants to consider him going to a facility with possibility that he would come home with improvement of neurologic function. She also inquires about home hospice. Discussed with wife that transfer to a facility involves trach and peg and with lack of neurological outcome and inability to protect his airway he would not be able to come home. Plan for family discussion on Friday.

## 2025-02-05 NOTE — PROGRESS NOTE ADULT - ASSESSMENT
Mr. Holman is an 80 yo man with severe anoxic ischemic encephalopathy.  Given his current neurological examination off sedation, the length of time of arrest, evidence of tissue hypoperfusion of other organs (elevated LFTs, DOV), MRI brain findings and electroclinical seizures - he has an extremely poor prognosis for meaningful neurological functional recovery.    Decrease levetiracetam to 250 mg Q12H  D/W wife.   Thank you    There is a high probability of sudden, clinically significant, or life threatening deterioration in the patient’s condition which requires the highest level of physician preparedness to intervene urgently.  Critical care time:  35 minutes.

## 2025-02-05 NOTE — PROGRESS NOTE ADULT - SUBJECTIVE AND OBJECTIVE BOX
No changes clinically per nurse.  Wife says he moves his head slightly at times.   Vital Signs Last 24 Hrs  T(C): 36.9 (05 Feb 2025 08:00), Max: 37.2 (04 Feb 2025 12:00)  T(F): 98.4 (05 Feb 2025 08:00), Max: 99 (04 Feb 2025 12:00)  HR: 69 (05 Feb 2025 11:00) (68 - 75)  BP: --  BP(mean): --  RR: 38 (05 Feb 2025 11:00) (14 - 38)  SpO2: 100% (05 Feb 2025 11:00) (94% - 100%)    Parameters below as of 05 Feb 2025 08:00  Patient On (Oxygen Delivery Method): ventilator    O2 Concentration (%): 40    MS: No eye opening.  No attempts at verbalizations.  No follow commands. No response to supraorbital and sternal pressure.  CN:  No BTT.  Eyes in primary gaze.  No roving eye movements.  OCR - no EOM.  Corneal reflex is absent.  Pupils round,  4mm-NR, B.  Gag - absent.    Motor/sensory:  Tone: flaccid with no response to nailbed pressure in all 4 ext.   Overbreathing the vent settings.       < from: MR Head No Cont (02.04.25 @ 15:26) >  1. Imaging findings compatible with diffuse hypoxic ischemic injury   compatible with patient's history of cardiac arrest.    2. Tiny layering foci of diffusion restriction within the occipital horns   for which a tiny amount of hemorrhage cannot be excluded. Ventriculitis   cannot be excluded.    < end of copied text >

## 2025-02-05 NOTE — PROGRESS NOTE ADULT - SUBJECTIVE AND OBJECTIVE BOX
****Kassidy Laureanoluis Internal [facility] PGY-1*****    Overnight Events: no acute overnight events  Interval Events:    OBJECTIVE:  ICU Vital Signs Last 24 Hrs  T(C): 37 (05 Feb 2025 04:00), Max: 37.4 (04 Feb 2025 08:00)  T(F): 98.6 (05 Feb 2025 04:00), Max: 99.3 (04 Feb 2025 08:00)  HR: 70 (05 Feb 2025 07:50) (68 - 81)  BP: --  BP(mean): --  ABP: 130/51 (05 Feb 2025 07:00) (90/49 - 143/50)  ABP(mean): 77 (05 Feb 2025 07:00) (51 - 82)  RR: 27 (05 Feb 2025 07:00) (14 - 32)  SpO2: 100% (05 Feb 2025 07:50) (84% - 100%)    O2 Parameters below as of 05 Feb 2025 07:00  Patient On (Oxygen Delivery Method): ventilator    O2 Concentration (%): 40      Mode: CPAP with PS, FiO2: 40, PEEP: 5, PS: 10, MAP: 9    02-04 @ 07:01  -  02-05 @ 07:00  --------------------------------------------------------  IN: 3005 mL / OUT: 960 mL / NET: 2045 mL      CAPILLARY BLOOD GLUCOSE      POCT Blood Glucose.: 143 mg/dL (05 Feb 2025 05:11)      PHYSICAL EXAM  GENERAL: Pt intubated  HEAD:  Atraumatic, normocephalic  EYES: Conjunctiva and sclera clear  NECK: Supple, trachea midline, no JVD  HEART: Regular rate and rhythm, no murmurs, rubs, or gallops  LUNGS: Unlabored respirations. Coarse breath sounds b/l.  ABDOMEN: Firm, nontender, nondistended, +BS  EXTREMITIES: 2+ peripheral pulses bilaterally, cap refill<2 secs. No clubbing, cyanosis, or edema  NERVOUS SYSTEM:  unable to assess d/t pt intubated   SKIN: No rashes or lesions      HOSPITAL MEDICATIONS:  MEDICATIONS  (STANDING):  ampicillin/sulbactam  IVPB      ampicillin/sulbactam  IVPB 3 Gram(s) IV Intermittent every 24 hours  chlorhexidine 0.12% Liquid 15 milliLiter(s) Oral Mucosa every 12 hours  chlorhexidine 2% Cloths 1 Application(s) Topical <User Schedule>  dextrose 5%. 1000 milliLiter(s) (100 mL/Hr) IV Continuous <Continuous>  dextrose 5%. 1000 milliLiter(s) (50 mL/Hr) IV Continuous <Continuous>  dextrose 50% Injectable 25 Gram(s) IV Push once  dextrose 50% Injectable 12.5 Gram(s) IV Push once  dextrose 50% Injectable 25 Gram(s) IV Push once  glucagon  Injectable 1 milliGRAM(s) IntraMuscular once  heparin   Injectable 5000 Unit(s) SubCutaneous every 12 hours  insulin lispro (ADMELOG) corrective regimen sliding scale   SubCutaneous every 6 hours  lactated ringers. 1000 milliLiter(s) (75 mL/Hr) IV Continuous <Continuous>  latanoprost 0.005% Ophthalmic Solution 1 Drop(s) Both EYES at bedtime  levETIRAcetam   Injectable 500 milliGRAM(s) IV Push every 12 hours  levothyroxine 25 MICROGram(s) Oral daily  midodrine 10 milliGRAM(s) Oral every 8 hours  pantoprazole  Injectable 40 milliGRAM(s) IV Push two times a day  petrolatum Ophthalmic Ointment 1 Application(s) Both EYES two times a day  polyethylene glycol 3350 17 Gram(s) Oral daily  senna 2 Tablet(s) Oral at bedtime    MEDICATIONS  (PRN):  albuterol/ipratropium for Nebulization 3 milliLiter(s) Nebulizer every 6 hours PRN Respiratory Distress  dextrose Oral Gel 15 Gram(s) Oral once PRN Blood Glucose LESS THAN 70 milliGRAM(s)/deciliter      LABS:                        7.1    31.01 )-----------( 174      ( 05 Feb 2025 00:15 )             22.0     Hgb Trend: 7.1<--, 7.2<--, 7.6<--, 7.5<--, 7.3<--  02-05    135  |  95[L]  |  109[H]  ----------------------------<  132[H]  3.9   |  22  |  6.56[H]    Ca    8.1[L]      05 Feb 2025 00:15  Phos  6.4     02-05  Mg     2.30     02-05    TPro  6.5  /  Alb  1.8[L]  /  TBili  0.9  /  DBili  x   /  AST  104[H]  /  ALT  59[H]  /  AlkPhos  147[H]  02-05    Creatinine Trend: 6.56<--, 6.42<--, 5.79<--, 5.12<--, 4.41<--, 3.55<--  PT/INR - ( 05 Feb 2025 00:15 )   PT: 18.9 sec;   INR: 1.64 ratio         PTT - ( 05 Feb 2025 00:15 )  PTT:32.5 sec  Urinalysis Basic - ( 05 Feb 2025 00:15 )    Color: x / Appearance: x / SG: x / pH: x  Gluc: 132 mg/dL / Ketone: x  / Bili: x / Urobili: x   Blood: x / Protein: x / Nitrite: x   Leuk Esterase: x / RBC: x / WBC x   Sq Epi: x / Non Sq Epi: x / Bacteria: x      Arterial Blood Gas:  02-05 @ 00:15  7.38/42/98/25/98.8/-0.3  ABG lactate: --  Arterial Blood Gas:  02-04 @ 02:45  7.42/37/150/24/99.4/-0.4  ABG lactate: --        MICROBIOLOGY:     Culture - Acid Fast - Sputum w/Smear (collected 04 Feb 2025 02:05)  Source: Trach Asp Tracheal Aspirate        RADIOLOGY:  [ ] Reviewed by me   ****Kassidy Anshu Internal Medicine PGY-1*****    Overnight Events: no acute overnight events  Interval Events: pt seen and examined at bedside. The team spoke with wife at length about prognosis. Family wants to have a meeting on Friday at 3 pm.    OBJECTIVE:  ICU Vital Signs Last 24 Hrs  T(C): 37 (05 Feb 2025 04:00), Max: 37.4 (04 Feb 2025 08:00)  T(F): 98.6 (05 Feb 2025 04:00), Max: 99.3 (04 Feb 2025 08:00)  HR: 70 (05 Feb 2025 07:50) (68 - 81)  BP: --  BP(mean): --  ABP: 130/51 (05 Feb 2025 07:00) (90/49 - 143/50)  ABP(mean): 77 (05 Feb 2025 07:00) (51 - 82)  RR: 27 (05 Feb 2025 07:00) (14 - 32)  SpO2: 100% (05 Feb 2025 07:50) (84% - 100%)    O2 Parameters below as of 05 Feb 2025 07:00  Patient On (Oxygen Delivery Method): ventilator    O2 Concentration (%): 40      Mode: CPAP with PS, FiO2: 40, PEEP: 5, PS: 10, MAP: 9    02-04 @ 07:01  -  02-05 @ 07:00  --------------------------------------------------------  IN: 3005 mL / OUT: 960 mL / NET: 2045 mL      CAPILLARY BLOOD GLUCOSE      POCT Blood Glucose.: 143 mg/dL (05 Feb 2025 05:11)      PHYSICAL EXAM  GENERAL: Pt intubated  HEAD:  Atraumatic, normocephalic  EYES: Conjunctiva and sclera clear  NECK: Supple, trachea midline, no JVD  HEART: Regular rate and rhythm, no murmurs, rubs, or gallops  LUNGS: Unlabored respirations. Coarse breath sounds b/l.  ABDOMEN: Firm, nontender, nondistended, +BS  EXTREMITIES: 2+ peripheral pulses bilaterally, cap refill<2 secs. No clubbing, cyanosis, or edema  NERVOUS SYSTEM:  unable to assess d/t pt intubated   SKIN: No rashes or lesions      HOSPITAL MEDICATIONS:  MEDICATIONS  (STANDING):  ampicillin/sulbactam  IVPB      ampicillin/sulbactam  IVPB 3 Gram(s) IV Intermittent every 24 hours  chlorhexidine 0.12% Liquid 15 milliLiter(s) Oral Mucosa every 12 hours  chlorhexidine 2% Cloths 1 Application(s) Topical <User Schedule>  dextrose 5%. 1000 milliLiter(s) (100 mL/Hr) IV Continuous <Continuous>  dextrose 5%. 1000 milliLiter(s) (50 mL/Hr) IV Continuous <Continuous>  dextrose 50% Injectable 25 Gram(s) IV Push once  dextrose 50% Injectable 12.5 Gram(s) IV Push once  dextrose 50% Injectable 25 Gram(s) IV Push once  glucagon  Injectable 1 milliGRAM(s) IntraMuscular once  heparin   Injectable 5000 Unit(s) SubCutaneous every 12 hours  insulin lispro (ADMELOG) corrective regimen sliding scale   SubCutaneous every 6 hours  lactated ringers. 1000 milliLiter(s) (75 mL/Hr) IV Continuous <Continuous>  latanoprost 0.005% Ophthalmic Solution 1 Drop(s) Both EYES at bedtime  levETIRAcetam   Injectable 500 milliGRAM(s) IV Push every 12 hours  levothyroxine 25 MICROGram(s) Oral daily  midodrine 10 milliGRAM(s) Oral every 8 hours  pantoprazole  Injectable 40 milliGRAM(s) IV Push two times a day  petrolatum Ophthalmic Ointment 1 Application(s) Both EYES two times a day  polyethylene glycol 3350 17 Gram(s) Oral daily  senna 2 Tablet(s) Oral at bedtime    MEDICATIONS  (PRN):  albuterol/ipratropium for Nebulization 3 milliLiter(s) Nebulizer every 6 hours PRN Respiratory Distress  dextrose Oral Gel 15 Gram(s) Oral once PRN Blood Glucose LESS THAN 70 milliGRAM(s)/deciliter      LABS:                        7.1    31.01 )-----------( 174      ( 05 Feb 2025 00:15 )             22.0     Hgb Trend: 7.1<--, 7.2<--, 7.6<--, 7.5<--, 7.3<--  02-05    135  |  95[L]  |  109[H]  ----------------------------<  132[H]  3.9   |  22  |  6.56[H]    Ca    8.1[L]      05 Feb 2025 00:15  Phos  6.4     02-05  Mg     2.30     02-05    TPro  6.5  /  Alb  1.8[L]  /  TBili  0.9  /  DBili  x   /  AST  104[H]  /  ALT  59[H]  /  AlkPhos  147[H]  02-05    Creatinine Trend: 6.56<--, 6.42<--, 5.79<--, 5.12<--, 4.41<--, 3.55<--  PT/INR - ( 05 Feb 2025 00:15 )   PT: 18.9 sec;   INR: 1.64 ratio         PTT - ( 05 Feb 2025 00:15 )  PTT:32.5 sec  Urinalysis Basic - ( 05 Feb 2025 00:15 )    Color: x / Appearance: x / SG: x / pH: x  Gluc: 132 mg/dL / Ketone: x  / Bili: x / Urobili: x   Blood: x / Protein: x / Nitrite: x   Leuk Esterase: x / RBC: x / WBC x   Sq Epi: x / Non Sq Epi: x / Bacteria: x      Arterial Blood Gas:  02-05 @ 00:15  7.38/42/98/25/98.8/-0.3  ABG lactate: --  Arterial Blood Gas:  02-04 @ 02:45  7.42/37/150/24/99.4/-0.4  ABG lactate: --        MICROBIOLOGY:     Culture - Acid Fast - Sputum w/Smear (collected 04 Feb 2025 02:05)  Source: Trach Asp Tracheal Aspirate        RADIOLOGY:  [ ] Reviewed by me

## 2025-02-05 NOTE — PROGRESS NOTE ADULT - ATTENDING COMMENTS
79M with CAD, CABG, not on plavix or a/c, COPD here after cardiac arrest at home of unclear etiology, acute hypoxemic respiratory failure due to aspiration pneumonia +/- cavitary lesion/lung abscess +/- septic shock due to UTI.    #Neuro: concern for anoxic brain injury in setting of prolonged down time during cardiac arrest. No meaningful mental status. MRI with diffuse anoxic brain injury  #CV: s/p cardiac arrest of unclear etiology, possible hypoxemic arrest given respiratory symptoms on day of presenation.  TTE WNL.   #Pulm: CT chest showing cavitary lesion in LLL->? abscess; cultures negative and AFB negative (prior hx of ?VOLODYMYR) c/w unasyn for now.  #ID: will change abx to unasyn to cover anaerobes in the setting of cavitary PNA. Pending serum fungitell (sputum 200)  #Renal/Fluid: DOV/ ATN in setting of shock; 24 hour urine output 620. POCUS with collapsed IVC < 1 cm- will bolus IVF and monitor output. Will start maintenance fluids as well.   #Heme: H/H stable; continued rise in leukocytosis. Cultures negative to date.   #Endo: on synthroid  #GI:  tube feeds at goal. transaminitis continues to improve  # Skin/Lines: +yu  #DVT ppx: restart hep subq for dvt ppx  # Dispo/Ethics: Full Code. Spoke with wife at bedside and she remains hopeful for recovery. Would like MRI for more prognostication. 79M with CAD, CABG, not on plavix or a/c, COPD here after cardiac arrest at home of unclear etiology, acute hypoxemic respiratory failure due to aspiration pneumonia +/- cavitary lesion/lung abscess +/- septic shock due to UTI.    #Neuro: concern for anoxic brain injury in setting of prolonged down time during cardiac arrest. No meaningful mental status. MRI with diffuse anoxic brain injury  #CV: s/p cardiac arrest of unclear etiology, possible hypoxemic arrest given respiratory symptoms on day of presenation.  TTE WNL.   #Pulm: CT chest showing cavitary lesion in LLL->? abscess; cultures negative and AFB negative (prior hx of ?VOLODYMYR) c/w unasyn for now.  #ID: will change abx to unasyn to cover anaerobes in the setting of cavitary PNA. Pending serum fungitell (sputum 200)  #Renal/Fluid: DOV/ ATN in setting of shock; 24 hour urine output 620. POCUS with collapsed IVC < 1 cm- will bolus IVF and monitor output. Will start maintenance fluids as well.   #Heme: H/H stable; continued rise in leukocytosis. Cultures negative to date.   #Endo: on synthroid  #GI:  tube feeds at goal. transaminitis continues to improve  # Skin/Lines: +yu  #DVT ppx: restart hep subq for dvt ppx  # Dispo/Ethics: Extensive goals of care conversation with wife at bedside. Discussed patient's MRI findings with diffuse anoxic brain injury, EEG without significant cerebral activity and lack of meaningful mental status. He has severe anoxic encephalopathy and it has been >1 week since admission without mental status recovery. She states that he would not want to live like this, but she feels she cannot make the decision to extubate.   She reports she wants to consider him going to a facility with possibility that he would come home with improvement of neurologic function. She also inquires about home hospice. Discussed with wife that transfer to a facility involves trach and peg and with lack of neurological outcome and inability to protect his airway he would not be able to come home. Plan for family discussion on Friday. 79M with CAD, CABG, not on plavix or a/c, COPD here after cardiac arrest at home of unclear etiology, acute hypoxemic respiratory failure due to aspiration pneumonia +/- cavitary lesion/lung abscess +/- septic shock due to UTI.    #Neuro: concern for anoxic brain injury in setting of prolonged down time during cardiac arrest. No meaningful mental status. MRI with diffuse anoxic brain injury  #CV: s/p cardiac arrest of unclear etiology, possible hypoxemic arrest given respiratory symptoms on day of presenation.  TTE WNL.   #Pulm: CT chest showing cavitary lesion in LLL->? abscess; cultures negative and AFB negative (prior hx of ?VOLODYMYR) c/w unasyn for now.  #ID: will change abx to unasyn to cover anaerobes in the setting of cavitary PNA. Pending serum fungitell (sputum 200)  #Renal/Fluid: DOV/ ATN in setting of shock; 24 hour urine output 620. POCUS with collapsed IVC < 1 cm- will bolus IVF and monitor output. Will start maintenance fluids as well.   #Heme: H/H stable; continued rise in leukocytosis. Cultures negative to date.   #Endo: on synthroid  #GI:  tube feeds at goal. transaminitis continues to improve  # Skin/Lines: +yu  #DVT ppx: restart hep subq for dvt ppx  # Dispo/Ethics: Extensive goals of care conversation with wife at bedside. Discussed patient's MRI findings with diffuse anoxic brain injury, EEG without significant cerebral activity and lack of meaningful mental status. He has severe anoxic encephalopathy and it has been >1 week since admission without mental status recovery. She states that he would not want to live like this, but she feels she cannot make the decision to extubate.   She reports she wants to consider him going to a facility with possibility that he would come home with improvement of neurologic function. She also inquires about home hospice. Discussed with wife that transfer to a facility involves trach and peg and with lack of neurological outcome and inability to protect his airway he would not be able to come home. Plan for family discussion on Friday

## 2025-02-06 NOTE — CHART NOTE - NSCHARTNOTEFT_GEN_A_CORE
______________ CRITICAL CARE NUTRITION FOLLOW-UP ________________        --Medical Course--  79y  Male s/p cardiac arrest.  MRI findings with diffuse anoxic brain injury, as per chart.  Remains intubated on mechanical ventilatory support.     Also with DOV/ATN.  Phosphorus remains elevated.      Spoke with RN.      --Nutrition Course--  Pt. without any noted/reported intolerance to TF @ this time (no vomiting/diarrhea/constipation or abdominal distention).   Loose bowel movement (2/6).  On bowel regimen.    Has been receiving tube feeding @ prescribed and previously recommended goal rate, per chart review.  RDN observed Nepro @ 40mL/hr at time encounter.     TF formula + LPS will give 29 KCals/kg & 1.5g protein/kg (based on previously estimated energy needs at weight of 48.0kg).  This regimen remains appropriate and can be continued.    Spoke with wife @ bedside and reviewed enteral nutrition care plan... No questions voiced at this time.              __________Diet Order_________  Diet, NPO with Tube Feed:   Tube Feeding Modality: Orogastric  Nepro with Carb Steady (NEPRORTH)  Total Volume for 24 Hours (mL): 720  Continuous  Starting Tube Feed Rate {mL per Hour}: 20  Increase Tube Feed Rate by (mL): 10     Every 4 hours  Until Goal Tube Feed Rate (mL per Hour): 40  Tube Feed Duration (in Hours): 18  Tube Feed Start Time: 11:00  Tube Feed Stop Time: 05:00  Liquid Protein Supplement     Qty per Day:  1 (01-31-25 @ 14:55) [Active]      TF provides:  720mL total volume,   1296 kcals + 100 KCals from LPS) = 1396 KCals (+ Propofol)  58g protein (+ 15 g protein from LPS) = 73g protein  523mL free water        Weight:      Height:  66"               Ideal Body Weight: 142# /64.5kg   49.1kg (2/6)  49.1kg (1/31)  48.0kg (1/28)        _____Previously Estimated Energy Needs (based on 48.0kg)_____  25-30 KCals/kg 1964-5440 KCals/d  1.4-1.6g protein/kg = 67-77g protein/d        Edema: 2+ generalized    Skin: No noted pressure injuries    ________________________Pertinent Medications____________   MEDICATIONS  (STANDING):  ampicillin/sulbactam  IVPB      ampicillin/sulbactam  IVPB 3 Gram(s) IV Intermittent every 24 hours  chlorhexidine 0.12% Liquid 15 milliLiter(s) Oral Mucosa every 12 hours  chlorhexidine 2% Cloths 1 Application(s) Topical <User Schedule>  dextrose 5%. 1000 milliLiter(s) (100 mL/Hr) IV Continuous <Continuous>  dextrose 5%. 1000 milliLiter(s) (50 mL/Hr) IV Continuous <Continuous>  dextrose 50% Injectable 25 Gram(s) IV Push once  dextrose 50% Injectable 12.5 Gram(s) IV Push once  dextrose 50% Injectable 25 Gram(s) IV Push once  glucagon  Injectable 1 milliGRAM(s) IntraMuscular once  heparin   Injectable 5000 Unit(s) SubCutaneous every 12 hours  insulin lispro (ADMELOG) corrective regimen sliding scale   SubCutaneous every 6 hours  lactated ringers. 1000 milliLiter(s) (75 mL/Hr) IV Continuous <Continuous>  lactated ringers. 1000 milliLiter(s) (75 mL/Hr) IV Continuous <Continuous>  latanoprost 0.005% Ophthalmic Solution 1 Drop(s) Both EYES at bedtime  levETIRAcetam   Injectable 250 milliGRAM(s) IV Push every 12 hours  levothyroxine 25 MICROGram(s) Oral daily  midodrine 10 milliGRAM(s) Oral every 8 hours  pantoprazole  Injectable 40 milliGRAM(s) IV Push two times a day  petrolatum Ophthalmic Ointment 1 Application(s) Both EYES two times a day  polyethylene glycol 3350 17 Gram(s) Oral daily  senna 2 Tablet(s) Oral at bedtime    MEDICATIONS  (PRN):  albuterol/ipratropium for Nebulization 3 milliLiter(s) Nebulizer every 6 hours PRN Respiratory Distress  dextrose Oral Gel 15 Gram(s) Oral once PRN Blood Glucose LESS THAN 70 milliGRAM(s)/deciliter          _________________________Pertinent Labs____________________     02-06    138  |  96[L]  |  111[H]  ----------------------------<  133[H]  4.2   |  23  |  7.14[H]    Ca    8.3[L]      06 Feb 2025 00:24  Phos  6.8     02-06  Mg     2.40     02-06        CAPILLARY BLOOD GLUCOSE      POCT Blood Glucose.: 145 mg/dL (02-06-25 @ 05:11)  POCT Blood Glucose.: 145 mg/dL (02-05-25 @ 23:07)  POCT Blood Glucose.: 112 mg/dL (02-05-25 @ 18:36)        ________NUTRITION Dx_________    Pt. remains severely malnourished       PLAN/RECOMMENDATIONS:    1) Continue current enteral regimen.  2) Obtain daily weights  3) Monitor tolerance to tube feeding, GI status, electrolytes, glucose    RDN remains available and will f/u PRN.          Chioma Rdz, ANA MARIA, CDN       pager [phone] or MS Teams

## 2025-02-06 NOTE — CONSULT NOTE ADULT - PROBLEM SELECTOR RECOMMENDATION 3
- CXRAY 1/28 - Left lung base opacity may represent atelectasis.  - CT 1/28 - Cavitary opacity in the left lower lobe measuring up to 7.9 cm. most likely represents pulmonary abscess, less likely neoplasm. Increased bilateral tree-in-bud are initial nodular opacities compared to 3/7/2019 which are likely infectious or inflammatory.  - Patient on mechanical ventilator   - on antibiotics as per primary team   - management as per primary team

## 2025-02-06 NOTE — CONSULT NOTE ADULT - ASSESSMENT
79M with CAD, CABG, not on plavix or a/c, COPD here after cardiac arrest at home of unclear etiology, acute hypoxemic respiratory failure due to aspiration pneumonia +/- cavitary lesion/lung abscess +/- septic shock due to UTI.  Palliative Care consulted for complex decision making  related to goals of care discussions

## 2025-02-06 NOTE — PROGRESS NOTE ADULT - ASSESSMENT
79-year-old male PMH CAD (s/p stent 2011 and CABG 2017), hypothyroidism, gout, COPD presenting after cardiac arrest at home of unclear etiology, acute hypoxemic respiratory failure due to aspiration pneumonia +/- cavitary lesion/lung abscess +/- septic shock due to UTI      =====Neurologic=====  #Altered Mental Status  Patient is AOx0 after being found down for approx 25 minutes, possible anoxia in setting of prolonged down time prior to ROSC  CT head 1/28 with no acute intracranial hemorrhage, mass effect, midline shift  Neurology following  vEEG stopped 2/1, prop weaned off  poor prognosis for meaningful neurological functional recovery, discussed prognosis with wife today 2/5  MRI with diffuse anoxic brain injury    - Pt is intubated  - Keppra 500mg q12hr, decrease levetiracetam to 250 mg Q12H 2/5    =====Cardiovascular=====  #Cardiac arrest  Pt s/p cardiac arrest with ROSC obtained in the field per EMS prior to arrival. Pt hypotensive upon arrival.  Pt on midodrine  TTE 1/29: EF 65%, no abnormalities    - Titrate off pressors, pt off levo  - Monitor on telemetry     =====Pulmonary=====  #AHRF  Patient obtunded, intubated in setting of cardiac arrest   CXR s/p intubation with left lung base opacity may reflect atelectasis vs. infection   Pt s/p bronchoscopy overnight 1/28 showing large blood clot extending from the distal trachea, occluding the left mainstem bronchus, extending to the left lower lobe bronchus  CT chest 1/28: Cavitary opacity in the left lower lobe measuring up to 7.9 cm. most likely represents pulmonary abscess, less likely neoplasm  Pt follows with Dr. Guardado outpatient, concern for VOLODYMYR  S/p vanc+azithromycin, urine legionella negative  S/p bronch by IP 1/30  Bronchial cx, gram stain (1/30) consistent w/ body nicolasa  Check serum fungitel    - Treat infection empirically with Unasyn for 5 days (2/3- )  - duonebs q6hr PRN  - tracheal aspirate of AFB and quant    =====GI=====  #Transaminitis  Pt with increasing LFTs and coags concerning for shock liver  - continue to support blood pressure and monitor    #Diet  - pt has OG tube, nutrition recs appreciated, on tube feeds    =====Renal/=====  #DOV, Cr uptrending, likely ATN iso shock  pt has yu in  4mg bumex 1/31, s/p bumex drip  - will bolus IVF and monitor output  - pt on maintenance fluids    #Metabolic acidosis  - in setting of cardiac arrest and prolonged downtime with tissue hypoperfusion causing metabolic acidosis 2/2 lactate  - S/p bicarb push  - continue to trend lactate     =====Infectious Disease=====  #UTI  s/p Zosyn, azithro, and vanc.  - urine cultures (1/28) growing Klebsiella  - BCxs (1/28) (-)  - Legionella Ag (1/30) (-)  - Unasyn 5 days (2/3- )    =====Endocrine=====  pt has hypothyroidism, on levothyroxine 25 mcg at home  - IV synthroid 18mcg QHS      =====Heme/Onc=====  - DVT PPX: held due to thrombocytopenia, restarted Heparin SubQ 2/3    =====MICU General=====  Intubated 1/28  AB: Unasyn  DVT: restarted heparin 2/3    Extensive goals of care conversation with wife at bedside. Discussed patient's MRI findings with diffuse anoxic brain injury, EEG without significant cerebral activity and lack of meaningful mental status. He has severe anoxic encephalopathy and it has been >1 week since admission without mental status recovery. She states that he would not want to live like this, but she feels she cannot make the decision to extubate.   She reports she wants to consider him going to a facility with possibility that he would come home with improvement of neurologic function. She also inquires about home hospice. Discussed with wife that transfer to a facility involves trach and peg and with lack of neurological outcome and inability to protect his airway he would not be able to come home. Plan for family discussion on Friday. 79-year-old male PMH CAD (s/p stent 2011 and CABG 2017), hypothyroidism, gout, COPD presenting after cardiac arrest at home of unclear etiology, acute hypoxemic respiratory failure due to aspiration pneumonia +/- cavitary lesion/lung abscess +/- septic shock due to UTI      =====Neurologic=====  #Altered Mental Status  Patient is AOx0 after being found down for approx 25 minutes, possible anoxia in setting of prolonged down time prior to ROSC  CT head 1/28 with no acute intracranial hemorrhage, mass effect, midline shift  Neurology following  vEEG stopped 2/1, prop weaned off  poor prognosis for meaningful neurological functional recovery, discussed prognosis with wife today 2/5  MRI with diffuse anoxic brain injury    - Pt is intubated  - Keppra 500mg q12hr, decrease levetiracetam to 250 mg Q12H 2/5    =====Cardiovascular=====  #Cardiac arrest  Pt s/p cardiac arrest with ROSC obtained in the field per EMS prior to arrival. Pt hypotensive upon arrival.  Pt on midodrine  TTE 1/29: EF 65%, no abnormalities    - Titrate off pressors, pt off levo  - Monitor on telemetry     =====Pulmonary=====  #AHRF  Patient obtunded, intubated in setting of cardiac arrest   CXR s/p intubation with left lung base opacity may reflect atelectasis vs. infection   Pt s/p bronchoscopy overnight 1/28 showing large blood clot extending from the distal trachea, occluding the left mainstem bronchus, extending to the left lower lobe bronchus  CT chest 1/28: Cavitary opacity in the left lower lobe measuring up to 7.9 cm. most likely represents pulmonary abscess, less likely neoplasm  Pt follows with Dr. Guardado outpatient, concern for VOLODYMYR  S/p vanc+azithromycin, urine legionella negative  S/p bronch by IP 1/30  Bronchial cx, gram stain (1/30) consistent w/ body nicolasa  Check serum fungitel    - Treat infection empirically with Unasyn for 5 days (2/3- )  - duonebs q6hr PRN  - tracheal aspirate of AFB and quant    =====GI=====  #Transaminitis  Pt with increasing LFTs and coags concerning for shock liver  - continue to support blood pressure and monitor    #Diet  - pt has OG tube, nutrition recs appreciated, on tube feeds    =====Renal/=====  #DOV, Cr uptrending, likely ATN iso shock  pt has yu in  4mg bumex 1/31, s/p bumex drip  - will bolus IVF and monitor output  - maintenance fluids with LR    #Metabolic acidosis  - in setting of cardiac arrest and prolonged downtime with tissue hypoperfusion causing metabolic acidosis 2/2 lactate  - S/p bicarb push  - continue to trend lactate     =====Infectious Disease=====  #UTI  s/p Zosyn, azithro, and vanc.  - urine cultures (1/28) growing Klebsiella  - BCxs (1/28) (-)  - Legionella Ag (1/30) (-)  - Unasyn 5 days (2/3- )    =====Endocrine=====  pt has hypothyroidism, on levothyroxine 25 mcg at home  - IV synthroid 18mcg QHS      =====Heme/Onc=====  - DVT PPX: held due to thrombocytopenia, restarted Heparin SubQ 2/3    =====MICU General=====  Intubated 1/28  AB: Unasyn  DVT: restarted heparin 2/3    Extensive goals of care conversation with wife at bedside. Discussed patient's MRI findings with diffuse anoxic brain injury, EEG without significant cerebral activity and lack of meaningful mental status. He has severe anoxic encephalopathy and it has been >1 week since admission without mental status recovery. She states that he would not want to live like this, but she feels she cannot make the decision to extubate.   She reports she wants to consider him going to a facility with possibility that he would come home with improvement of neurologic function. She also inquires about home hospice. Discussed with wife that transfer to a facility involves trach and peg and with lack of neurological outcome and inability to protect his airway he would not be able to come home. Plan for family discussion on Friday.

## 2025-02-06 NOTE — GOALS OF CARE CONVERSATION - ADVANCED CARE PLANNING - CONVERSATION DETAILS
Patient is  and has 3 children (two who live locally and one who lives [facility]). 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 Prior to hospitalization, patient was independent. She shares they have had wonderful life together and she describes him as being a great man    Yesenia verbalized understanding of current hospitalization course. She understands that patient has a guarded prognosis and unlikely to have improvement in his mental status.     Counseled/educated wife Yesenia regarding next steps. She states that she is considering not to pursue trach/peg as it will not change his underlying condition and prognosis and does note feel that would be aligned with quality of life. Also counseled about advanced care planning as patient can sustain cardiac arrest despite current medical interventions; counseled on option of allowing for natural death.     Yesenia states two of her children and daughter-in-law will be here in person along with other son via telephone at 3PM tomorrow to further discuss next steps together as a family with the medical team    Emotional support provided

## 2025-02-06 NOTE — CONSULT NOTE ADULT - PROBLEM SELECTOR RECOMMENDATION 8
Case reviewed with primary team  Thank you for allowing us to participate in your patient's care. Please page [phone] for any questions/concerns.
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
## 2025-02-06 NOTE — CONSULT NOTE ADULT - SUBJECTIVE AND OBJECTIVE BOX
Date of Service 02-06-25 @ 17:23    HPI:  79-year-old male PMH CAD (s/p stent 2011 and CABG 2017, hypothyroidism, gout, COPD presenting after cardiac arrest.  Per EMS, per family at bedside patient was in normal state of health this morning, may be more tired than normal.  Went down to take a nap around noon, wife went to check on him around 3 to 3:30 PM and he was very difficult to arouse, prompting her to call EMS.  On EMS arrival patient was found to be pulseless, in asystolic rhythm, ACLS was initiated s/p 4 rounds of IV epinephrine, intubated in field, ROSC obtained prior to arrival.    In the ED, pt was hypotensive to 68/50, pt started on Levo switched to Epi. Given 1 dose of vanc+zosyn, atropine after HR 30-40s, sodium bicarb. Labs significantly concerning with lactate 11.3, elevated LFTs and coags concerning for shock liver, DOV with Cr up-trending, metabolic acidosis.  (28 Jan 2025 18:30)      PERTINENT PM/SXH:   COPD (chronic obstructive pulmonary disease)  Former smoker  Gout  HTN (hypertension)  MI (myocardial infarction)  CAD (coronary artery disease)  Stented coronary artery  Glaucoma  Hypothyroid  H/O foot surgery    FAMILY HISTORY:    ITEMS NOT CHECKED ARE NOT PRESENT    SOCIAL HISTORY:   Significant other/partner[x ]  Children[x ]  Adventism/Spirituality:  Substance hx:  [ ]   Tobacco hx:  [ ]   Alcohol hx: [ ]   Home Opioid hx:  [ ] I-Stop Reference No: [id number]. no medications found on istop   Living Situation: [ x]Home  [ ]Long term care  [ ]Rehab [ ]Other      ADVANCE DIRECTIVES:    MOLST  [ ]  Living Will  [ ]   DECISION MAKER(s):  [ x] Health Care Proxy(s)  [ ] Surrogate(s)  [ ] Guardian           Name(s): Phone Number(s):  Primary HCP: Yesenia Holman: [phone]  Alternate HCP: Michael Holman: [phone]    BASELINE (I)ADL(s) (prior to admission):  [place]: [ x]Total  [ ] Moderate [ ]Dependent    Allergies    No Known Allergies    Intolerances    MEDICATIONS  (STANDING):  ampicillin/sulbactam  IVPB      ampicillin/sulbactam  IVPB 3 Gram(s) IV Intermittent every 24 hours  chlorhexidine 0.12% Liquid 15 milliLiter(s) Oral Mucosa every 12 hours  chlorhexidine 2% Cloths 1 Application(s) Topical <User Schedule>  dextrose 5%. 1000 milliLiter(s) (100 mL/Hr) IV Continuous <Continuous>  dextrose 5%. 1000 milliLiter(s) (50 mL/Hr) IV Continuous <Continuous>  dextrose 50% Injectable 25 Gram(s) IV Push once  dextrose 50% Injectable 12.5 Gram(s) IV Push once  dextrose 50% Injectable 25 Gram(s) IV Push once  glucagon  Injectable 1 milliGRAM(s) IntraMuscular once  heparin   Injectable 5000 Unit(s) SubCutaneous every 12 hours  insulin lispro (ADMELOG) corrective regimen sliding scale   SubCutaneous every 6 hours  lactated ringers. 1000 milliLiter(s) (75 mL/Hr) IV Continuous <Continuous>  lactated ringers. 1000 milliLiter(s) (75 mL/Hr) IV Continuous <Continuous>  latanoprost 0.005% Ophthalmic Solution 1 Drop(s) Both EYES at bedtime  levETIRAcetam   Injectable 250 milliGRAM(s) IV Push every 12 hours  levothyroxine 25 MICROGram(s) Oral daily  midodrine 10 milliGRAM(s) Oral every 8 hours  pantoprazole  Injectable 40 milliGRAM(s) IV Push two times a day  petrolatum Ophthalmic Ointment 1 Application(s) Both EYES two times a day  polyethylene glycol 3350 17 Gram(s) Oral daily  senna 2 Tablet(s) Oral at bedtime    MEDICATIONS  (PRN):  albuterol/ipratropium for Nebulization 3 milliLiter(s) Nebulizer every 6 hours PRN Respiratory Distress  dextrose Oral Gel 15 Gram(s) Oral once PRN Blood Glucose LESS THAN 70 milliGRAM(s)/deciliter        ITEMS UNCHECKED ARE NOT PRESENT     PRESENT SYMPTOMS: [x ]Unable to self-report due to altered mental status  [ ] CPOT [ x] PAINADs [ x] RDOS  Source if other than patient:  [ ]Family   [ ]Team     Pain: [ ]yes [ ]no  QOL impact -   Location -                    Aggravating factors -  Quality -  Radiation -  Timing-  Severity (0-10 scale):  Minimal acceptable level / Pain goal (0-10 scale):     CPOT:    https://www.[email or web address]/[email or web address]/Critical-Care-Pain-Observation-Tool-(CPOT)    Dyspnea:                           [ ]Mild [ ]Moderate [ ]Severe  Anxiety:                             [ ]Mild [ ]Moderate [ ]Severe  Agitation:                          [ ]Mild [ ]Moderate [ ]Severe  Fatigue:                             [ ]Mild [ ]Moderate [ ]Severe  Nausea:                             [ ]Mild [ ]Moderate [ ]Severe  Loss of appetite:              [ ]Mild [ ]Moderate [ ]Severe  Constipation:                   [ ]Mild [ ]Moderate [ ]Severe  Diarrhea:                          [ ]Mild [ ]Moderate [ ]Severe      PCSSQ[Palliative Care Spiritual Screening Question]   Severity (0-10):  Score of 4 or > indicate consideration of Chaplaincy referral.  Chaplaincy Referral: [ ] yes [ ] refused [ x] following [ ] deferred    Caregiver [place]? : [ ] yes [ ] no [ ] Declined   [x ] Deferred            Social work referral [ ] Patient & Family Centered Care Referral [ ]     Anticipatory Grief present?:  [ ] yes [ ] no  [x ] Deferred                  Social work referral [ ] Chaplaincy Referral[ ]    Other Symptoms:  [ ]All other review of systems negative   [x ] Unable to obtain due to poor mentation     PHYSICAL EXAM:  Vital Signs Last 24 Hrs  T(C): 36.6 (06 Feb 2025 16:00), Max: 37.6 (06 Feb 2025 00:00)  T(F): 97.9 (06 Feb 2025 16:00), Max: 99.7 (06 Feb 2025 00:00)  HR: 66 (06 Feb 2025 17:00) (62 - 78)  BP: 119/55 (06 Feb 2025 17:00) (98/47 - 119/55)  BP(mean): 72 (06 Feb 2025 17:00) (63 - 76)  RR: 29 (06 Feb 2025 17:00) (20 - 30)  SpO2: 100% (06 Feb 2025 17:00) (90% - 100%)    Parameters below as of 06 Feb 2025 17:00  Patient On (Oxygen Delivery Method): cpap    O2 Concentration (%): 40   Mode: CPAP with PS  FiO2: 40  PEEP: 5  PS: 10  ITime:   MAP: 11    I&O's Summary    05 Feb 2025 07:01  -  06 Feb 2025 07:00  --------------------------------------------------------  IN: 640 mL / OUT: 1260 mL / NET: -620 mL    06 Feb 2025 07:01  -  06 Feb 2025 17:23  --------------------------------------------------------  IN: 945 mL / OUT: 400 mL / NET: 545 mL        GENERAL:  [ ]Alert  [ ]Oriented x   [ ]Lethargic  [ ]Cachexia  [x ]Unarousable  [ ]Verbal  [ x]Non-Verbal  [x ] No Distress  Behavioral:   [ ] Anxiety  [ ] Delirium [ ] Agitation [ ] Calm  [ x] Other-encephalopathy   HEENT:  [ ]Normal  [ ] Temporal Wasting  [ ]Dry mouth   [ x]ET Tube  [ ]Oral lesions  [ ] Mucositis  PULMONARY: no accessory muscle use, on ventilator   [ ]Clear [ ]Tachypnea  [ ]Audible excessive secretions   [ ]Rhonchi        [ ]Right [ ]Left [ ]Bilateral  [ ]Crackles        [ ]Right [ ]Left [ ]Bilateral  [ ]Wheezing     [ ]Right [ ]Left [ ]Bilateral  [ ]Diminished breath sounds [ ]right [ ]left [ ]bilateral  CARDIOVASCULAR:    [ x]Regular [ ]Irregular [ ]Tachy  [ ]Candelario [ ]Murmur [ ]Other  GASTROINTESTINAL:  [ x]Soft  [ ]Distended   [ ]+BS  [ x]Non tender [ ]Tender  [ ]PEG [x ]OGT/ NGT  Last BM: 2/6  GENITOURINARY:  [ ]Normal [ ] Incontinent   [ ]Oliguria/Anuria   [ x]Leal  MUSCULOSKELETAL:   [ ]Normal   [ ]Weakness  [x ]Bed/Wheelchair bound [ ]Edema  [  ] amputation  [  ] contraction  NEUROLOGIC:   [ ]No focal deficits  [x ]Cognitive impairment  [x ]Dysphagia [ ]Dysarthria [ ]Paresis [ ]Other   SKIN: See Nursing Skin Assessment for further details  [ ]Normal    [ ]Rash  [ ]Pressure ulcer(s)       Present on admission [ ]y [ ]n   [  ]  Wound    [  ] hyperpigmentation    CRITICAL CARE:  [ ] Shock Present  [ ]Septic [ ]Cardiogenic [ ]Neurologic [ ]Hypovolemic  [ ]  Vasopressors [ ]  Inotropes   [ x]Respiratory failure present [x ]Mechanical ventilation [ ]Non-invasive ventilatory support [ ]High flow  Mode: CPAP with PS, FiO2: 40, PEEP: 5, PS: 10, ITime: , MAP: 11  [ x]Acute  [ ]Chronic [ ]Hypoxic  [ ]Hypercarbic [ ]Other  [ ]Other organ failure     LABS:  reviewed                         7.2    29.94 )-----------( 198      ( 06 Feb 2025 00:24 )             20.6   02-06    138  |  96[L]  |  111[H]  ----------------------------<  133[H]  4.2   |  23  |  7.14[H]    Ca    8.3[L]      06 Feb 2025 00:24  Phos  6.8     02-06  Mg     2.40     02-06    TPro  6.7  /  Alb  1.9[L]  /  TBili  0.8  /  DBili  x   /  AST  88[H]  /  ALT  46[H]  /  AlkPhos  140[H]  02-06  PT/INR - ( 06 Feb 2025 00:24 )   PT: 18.6 sec;   INR: 1.57 ratio         PTT - ( 06 Feb 2025 00:24 )  PTT:30.3 sec  Urinalysis Basic - ( 06 Feb 2025 00:24 )    Color: x / Appearance: x / SG: x / pH: x  Gluc: 133 mg/dL / Ketone: x  / Bili: x / Urobili: x   Blood: x / Protein: x / Nitrite: x   Leuk Esterase: x / RBC: x / WBC x   Sq Epi: x / Non Sq Epi: x / Bacteria: x      CAPILLARY BLOOD GLUCOSE      POCT Blood Glucose.: 145 mg/dL (06 Feb 2025 05:11)  POCT Blood Glucose.: 145 mg/dL (05 Feb 2025 23:07)  POCT Blood Glucose.: 112 mg/dL (05 Feb 2025 18:36)      RADIOLOGY & ADDITIONAL STUDIES: reviewed     PROTEIN CALORIE MALNUTRITION PRESENT: [ ]mild [ ]moderate [ x]severe [ ]underweight [ ]morbid obesity  https://www.andeal.org/vault/2440/web/files/ONC/Table_Clinical%20Characteristics%20to%20Document%20Malnutrition-White%20JV%20et%20al%202012.pdf    Height (cm): 167.6 (01-28-25 @ 21:12)  Weight (kg): 48 (01-28-25 @ 21:12)  BMI (kg/m2): 17.1 (01-28-25 @ 21:12)    [ x]PPSV2 < or = to 30% [ ]significant weight loss  [ ]poor nutritional intake  [ ]anasarca [ ]Artificial Nutrition      REFERRALS:   [ ]Chaplaincy  [ ]Hospice  [ ]Child Life  [ ]Social Work  [ x]Case management [ ]Holistic Therapy

## 2025-02-06 NOTE — CONSULT NOTE ADULT - PROBLEM SELECTOR RECOMMENDATION 2
- CT Head 1/28 - No acute intracranial hemorrhage, mass effect, or midline shift. Severe sinusitis.  - MRI Head 2/4 - 1. Imaging findings compatible with diffuse hypoxic ischemic injury compatible with patient's history of cardiac arrest. 2. Tiny layering foci of diffusion restriction within the occipital horns for which a tiny amount of hemorrhage cannot be excluded. Ventriculitis cannot be excluded.  - per discussion with primary team, patient has not had improvement in mental status despite being off sedatives since last Saturday   - Neurology recommendations appreciated   - management as per primary team

## 2025-02-06 NOTE — CHART NOTE - NSCHARTNOTEFT_GEN_A_CORE
Indication:  SHOCK    Performed by: Lucinda Wilcox PGY1    PROCEDURE:  [x] LIMITED ECHO  [ ] LIMITED CHEST  [ ] LIMITED RETROPERITONEAL  [ ] LIMITED ABDOMINAL  [ ] LIMITED DVT  [ ] NEEDLE GUIDANCE VASCULAR  [ ] NEEDLE GUIDANCE THORACENTESIS  [ ] NEEDLE GUIDANCE PARACENTESIS  [ ] NEEDLE GUIDANCE PERICARDIOCENTESIS  [ ] OTHER    FINDINGS:    ECHO: Grossly normal RV and LV function with no wall motion abnormalities, septal bowing/flattening, or gross valvular pathology   No pericardial effusion  IVC: 1.01cm, collapsable   LVOT/VTi: 15cm    INTERPRETATION:  Small, collapsable IVC with LVOT 15cm, overall low volume state. Indication:  SHOCK    Performed by: Lucinda Wilcox PGY1    PROCEDURE:  [x] LIMITED ECHO  [ ] LIMITED CHEST  [ ] LIMITED RETROPERITONEAL  [ ] LIMITED ABDOMINAL  [ ] LIMITED DVT  [ ] NEEDLE GUIDANCE VASCULAR  [ ] NEEDLE GUIDANCE THORACENTESIS  [ ] NEEDLE GUIDANCE PARACENTESIS  [ ] NEEDLE GUIDANCE PERICARDIOCENTESIS  [ ] OTHER    FINDINGS:    ECHO: Grossly normal RV and LV function with no wall motion abnormalities, septal bowing/flattening, or gross valvular pathology   No pericardial effusion  IVC: 1.01cm, collapsable   LVOT/VTi: 15cm    INTERPRETATION:  Small, collapsable IVC.    Attending Attestation:  I was present during the key portions of the procedure and immediately available during the entire procedure.  I have personally reviewed the images and agree with the interpretation above by the resident/fellow/ACP.    Charu Stanley MD

## 2025-02-06 NOTE — CONSULT NOTE ADULT - PROBLEM SELECTOR RECOMMENDATION 7
- HCP paperwork in chart completed in 6/2022 appointing the following: Primary HCP: Yesenia River: [phone]  Alternate HCP: Michael Holman: [phone]  - 2/6 - Counseled/educated wife Yesenia regarding next steps. She understands that patient has a guarded prognosis and unlikely to have improvement in his mental status. She states that she is considering not to pursue trach/peg as it will not change his underlying condition and prognosis and does note feel that would be aligned with quality of life. Also counseled about advanced care planning as patient can sustain cardiac arrest despite current medical interventions; counseled on option of allowing for natural death. She states two of her children and daughter-in-law will be here in person along with other son via telephone at 3PM tomorrow to further discuss next steps  See [facility] note  16 minutes spent on advanced care planning/ goals of care

## 2025-02-06 NOTE — PROGRESS NOTE ADULT - ATTENDING COMMENTS
79M with CAD, CABG, not on plavix or a/c, COPD here after cardiac arrest at home of unclear etiology, acute hypoxemic respiratory failure due to aspiration pneumonia +/- cavitary lesion/lung abscess +/- septic shock due to UTI.    #Neuro: concern for anoxic brain injury in setting of prolonged down time during cardiac arrest. No meaningful mental status. MRI with diffuse anoxic brain injury. Keppra decreased per neuro recs.   #CV: s/p cardiac arrest of unclear etiology, possible hypoxemic arrest given respiratory symptoms on day of presentation.  TTE WNL.   #Pulm: CT chest showing cavitary lesion in LLL->? abscess; cultures negative and AFB negative (prior hx of ?VOLODYMYR) c/w unasyn for now (plan for 10 days of abx through 2/7)  #ID: will change abx to unasyn to cover anaerobes in the setting of cavitary PNA. Pending serum fungitell (sputum 200)  #Renal/Fluid: DOV/ ATN in setting of shock; Bun/ Cr continues to rise, however non oliguric. Electrolytes WNL.   #Heme: H/H stable; continued rise in leukocytosis. Cultures negative to date.   #Endo: on synthroid  #GI:  tube feeds at goal. transaminitis continues to improve  # Skin/Lines: +yu  #DVT ppx: restart hep subq for dvt ppx  # Dispo/Ethics: Extensive goals of care conversation with wife at bedside. Discussed patient's MRI findings with diffuse anoxic brain injury, EEG without significant cerebral activity and lack of meaningful mental status. He has severe anoxic encephalopathy and it has been >1 week since admission without mental status recovery. She states that he would not want to live like this, but she feels she cannot make the decision to extubate.   She reports she wants to consider him going to a facility with possibility that he would come home with improvement of neurologic function. She also inquires about home hospice. Discussed with wife that transfer to a facility involves trach and peg and with lack of neurological outcome and inability to protect his airway he would not be able to come home. Plan for family discussion on Friday.

## 2025-02-06 NOTE — PROGRESS NOTE ADULT - SUBJECTIVE AND OBJECTIVE BOX
****Kassidy Anshu Internal Medicine PGY-1*****    Overnight Events: no acute overnight events  Interval Events:    OBJECTIVE:  ICU Vital Signs Last 24 Hrs  T(C): 37.5 (06 Feb 2025 04:00), Max: 37.6 (06 Feb 2025 00:00)  T(F): 99.5 (06 Feb 2025 04:00), Max: 99.7 (06 Feb 2025 00:00)  HR: 69 (06 Feb 2025 07:00) (68 - 78)  BP: 111/50 (06 Feb 2025 07:00) (102/48 - 119/54)  BP(mean): 68 (06 Feb 2025 07:00) (62 - 73)  ABP: 115/42 (05 Feb 2025 13:00) (115/42 - 127/49)  ABP(mean): 65 (05 Feb 2025 13:00) (65 - 73)  RR: 25 (06 Feb 2025 07:00) (22 - 38)  SpO2: 97% (06 Feb 2025 07:00) (90% - 100%)    O2 Parameters below as of 06 Feb 2025 06:00  Patient On (Oxygen Delivery Method): ventilator    O2 Concentration (%): 40      Mode: CPAP with PS, FiO2: 40, PEEP: 5, PS: 12, ITime: 0.6, MAP: 11    02-05 @ 07:01  -  02-06 @ 07:00  --------------------------------------------------------  IN: 640 mL / OUT: 1260 mL / NET: -620 mL      CAPILLARY BLOOD GLUCOSE      POCT Blood Glucose.: 145 mg/dL (06 Feb 2025 05:11)      PHYSICAL EXAM  GENERAL: Pt intubated  HEAD:  Atraumatic, normocephalic  EYES: Conjunctiva and sclera clear  NECK: Supple, trachea midline, no JVD  HEART: Regular rate and rhythm, no murmurs, rubs, or gallops  LUNGS: Unlabored respirations. Coarse breath sounds b/l.  ABDOMEN: Firm, nontender, nondistended, +BS  EXTREMITIES: 2+ peripheral pulses bilaterally, cap refill<2 secs. No clubbing, cyanosis, or edema  NERVOUS SYSTEM:  unable to assess d/t pt intubated   SKIN: No rashes or lesions      HOSPITAL MEDICATIONS:  MEDICATIONS  (STANDING):  ampicillin/sulbactam  IVPB      ampicillin/sulbactam  IVPB 3 Gram(s) IV Intermittent every 24 hours  chlorhexidine 0.12% Liquid 15 milliLiter(s) Oral Mucosa every 12 hours  chlorhexidine 2% Cloths 1 Application(s) Topical <User Schedule>  dextrose 5%. 1000 milliLiter(s) (100 mL/Hr) IV Continuous <Continuous>  dextrose 5%. 1000 milliLiter(s) (50 mL/Hr) IV Continuous <Continuous>  dextrose 50% Injectable 25 Gram(s) IV Push once  dextrose 50% Injectable 12.5 Gram(s) IV Push once  dextrose 50% Injectable 25 Gram(s) IV Push once  glucagon  Injectable 1 milliGRAM(s) IntraMuscular once  heparin   Injectable 5000 Unit(s) SubCutaneous every 12 hours  insulin lispro (ADMELOG) corrective regimen sliding scale   SubCutaneous every 6 hours  lactated ringers. 1000 milliLiter(s) (75 mL/Hr) IV Continuous <Continuous>  latanoprost 0.005% Ophthalmic Solution 1 Drop(s) Both EYES at bedtime  levETIRAcetam   Injectable 250 milliGRAM(s) IV Push every 12 hours  levothyroxine 25 MICROGram(s) Oral daily  midodrine 10 milliGRAM(s) Oral every 8 hours  pantoprazole  Injectable 40 milliGRAM(s) IV Push two times a day  petrolatum Ophthalmic Ointment 1 Application(s) Both EYES two times a day  polyethylene glycol 3350 17 Gram(s) Oral daily  senna 2 Tablet(s) Oral at bedtime    MEDICATIONS  (PRN):  albuterol/ipratropium for Nebulization 3 milliLiter(s) Nebulizer every 6 hours PRN Respiratory Distress  dextrose Oral Gel 15 Gram(s) Oral once PRN Blood Glucose LESS THAN 70 milliGRAM(s)/deciliter      LABS:                        7.2    29.94 )-----------( 198      ( 06 Feb 2025 00:24 )             20.6     Hgb Trend: 7.2<--, 7.1<--, 7.2<--, 7.6<--, 7.5<--  02-06    138  |  96[L]  |  111[H]  ----------------------------<  133[H]  4.2   |  23  |  7.14[H]    Ca    8.3[L]      06 Feb 2025 00:24  Phos  6.8     02-06  Mg     2.40     02-06    TPro  6.7  /  Alb  1.9[L]  /  TBili  0.8  /  DBili  x   /  AST  88[H]  /  ALT  46[H]  /  AlkPhos  140[H]  02-06    Creatinine Trend: 7.14<--, 6.56<--, 6.42<--, 5.79<--, 5.12<--, 4.41<--  PT/INR - ( 06 Feb 2025 00:24 )   PT: 18.6 sec;   INR: 1.57 ratio         PTT - ( 06 Feb 2025 00:24 )  PTT:30.3 sec  Urinalysis Basic - ( 06 Feb 2025 00:24 )    Color: x / Appearance: x / SG: x / pH: x  Gluc: 133 mg/dL / Ketone: x  / Bili: x / Urobili: x   Blood: x / Protein: x / Nitrite: x   Leuk Esterase: x / RBC: x / WBC x   Sq Epi: x / Non Sq Epi: x / Bacteria: x      Arterial Blood Gas:  02-05 @ 00:15  7.38/42/98/25/98.8/-0.3  ABG lactate: --    Venous Blood Gas:  02-06 @ 00:24  7.35/47/73/26/95.3  VBG Lactate: 1.3      MICROBIOLOGY:     Culture - Acid Fast - Sputum w/Smear (collected 04 Feb 2025 02:05)  Source: Trach Asp Tracheal Aspirate  Preliminary Report (05 Feb 2025 23:07):    Culture is being performed.        RADIOLOGY:  [ ] Reviewed by me   ****Kassidy Brasher Internal Medicine PGY-1*****    Overnight Events: no acute overnight events  Interval Events: pt seen and examined at bedside. No significant changes.     OBJECTIVE:  ICU Vital Signs Last 24 Hrs  T(C): 37.5 (06 Feb 2025 04:00), Max: 37.6 (06 Feb 2025 00:00)  T(F): 99.5 (06 Feb 2025 04:00), Max: 99.7 (06 Feb 2025 00:00)  HR: 69 (06 Feb 2025 07:00) (68 - 78)  BP: 111/50 (06 Feb 2025 07:00) (102/48 - 119/54)  BP(mean): 68 (06 Feb 2025 07:00) (62 - 73)  ABP: 115/42 (05 Feb 2025 13:00) (115/42 - 127/49)  ABP(mean): 65 (05 Feb 2025 13:00) (65 - 73)  RR: 25 (06 Feb 2025 07:00) (22 - 38)  SpO2: 97% (06 Feb 2025 07:00) (90% - 100%)    O2 Parameters below as of 06 Feb 2025 06:00  Patient On (Oxygen Delivery Method): ventilator    O2 Concentration (%): 40      Mode: CPAP with PS, FiO2: 40, PEEP: 5, PS: 12, ITime: 0.6, MAP: 11    02-05 @ 07:01  -  02-06 @ 07:00  --------------------------------------------------------  IN: 640 mL / OUT: 1260 mL / NET: -620 mL      CAPILLARY BLOOD GLUCOSE      POCT Blood Glucose.: 145 mg/dL (06 Feb 2025 05:11)      PHYSICAL EXAM  GENERAL: Pt intubated  HEAD:  Atraumatic, normocephalic  EYES: Conjunctiva and sclera clear  NECK: Supple, trachea midline, no JVD  HEART: Regular rate and rhythm, no murmurs, rubs, or gallops  LUNGS: Unlabored respirations. Coarse breath sounds b/l.  ABDOMEN: Firm, nontender, nondistended, +BS  EXTREMITIES: 2+ peripheral pulses bilaterally, cap refill<2 secs. No clubbing, cyanosis, or edema  NERVOUS SYSTEM:  unable to assess d/t pt intubated   SKIN: No rashes or lesions      HOSPITAL MEDICATIONS:  MEDICATIONS  (STANDING):  ampicillin/sulbactam  IVPB      ampicillin/sulbactam  IVPB 3 Gram(s) IV Intermittent every 24 hours  chlorhexidine 0.12% Liquid 15 milliLiter(s) Oral Mucosa every 12 hours  chlorhexidine 2% Cloths 1 Application(s) Topical <User Schedule>  dextrose 5%. 1000 milliLiter(s) (100 mL/Hr) IV Continuous <Continuous>  dextrose 5%. 1000 milliLiter(s) (50 mL/Hr) IV Continuous <Continuous>  dextrose 50% Injectable 25 Gram(s) IV Push once  dextrose 50% Injectable 12.5 Gram(s) IV Push once  dextrose 50% Injectable 25 Gram(s) IV Push once  glucagon  Injectable 1 milliGRAM(s) IntraMuscular once  heparin   Injectable 5000 Unit(s) SubCutaneous every 12 hours  insulin lispro (ADMELOG) corrective regimen sliding scale   SubCutaneous every 6 hours  lactated ringers. 1000 milliLiter(s) (75 mL/Hr) IV Continuous <Continuous>  latanoprost 0.005% Ophthalmic Solution 1 Drop(s) Both EYES at bedtime  levETIRAcetam   Injectable 250 milliGRAM(s) IV Push every 12 hours  levothyroxine 25 MICROGram(s) Oral daily  midodrine 10 milliGRAM(s) Oral every 8 hours  pantoprazole  Injectable 40 milliGRAM(s) IV Push two times a day  petrolatum Ophthalmic Ointment 1 Application(s) Both EYES two times a day  polyethylene glycol 3350 17 Gram(s) Oral daily  senna 2 Tablet(s) Oral at bedtime    MEDICATIONS  (PRN):  albuterol/ipratropium for Nebulization 3 milliLiter(s) Nebulizer every 6 hours PRN Respiratory Distress  dextrose Oral Gel 15 Gram(s) Oral once PRN Blood Glucose LESS THAN 70 milliGRAM(s)/deciliter      LABS:                        7.2    29.94 )-----------( 198      ( 06 Feb 2025 00:24 )             20.6     Hgb Trend: 7.2<--, 7.1<--, 7.2<--, 7.6<--, 7.5<--  02-06    138  |  96[L]  |  111[H]  ----------------------------<  133[H]  4.2   |  23  |  7.14[H]    Ca    8.3[L]      06 Feb 2025 00:24  Phos  6.8     02-06  Mg     2.40     02-06    TPro  6.7  /  Alb  1.9[L]  /  TBili  0.8  /  DBili  x   /  AST  88[H]  /  ALT  46[H]  /  AlkPhos  140[H]  02-06    Creatinine Trend: 7.14<--, 6.56<--, 6.42<--, 5.79<--, 5.12<--, 4.41<--  PT/INR - ( 06 Feb 2025 00:24 )   PT: 18.6 sec;   INR: 1.57 ratio         PTT - ( 06 Feb 2025 00:24 )  PTT:30.3 sec  Urinalysis Basic - ( 06 Feb 2025 00:24 )    Color: x / Appearance: x / SG: x / pH: x  Gluc: 133 mg/dL / Ketone: x  / Bili: x / Urobili: x   Blood: x / Protein: x / Nitrite: x   Leuk Esterase: x / RBC: x / WBC x   Sq Epi: x / Non Sq Epi: x / Bacteria: x      Arterial Blood Gas:  02-05 @ 00:15  7.38/42/98/25/98.8/-0.3  ABG lactate: --    Venous Blood Gas:  02-06 @ 00:24  7.35/47/73/26/95.3  VBG Lactate: 1.3      MICROBIOLOGY:     Culture - Acid Fast - Sputum w/Smear (collected 04 Feb 2025 02:05)  Source: Trach Asp Tracheal Aspirate  Preliminary Report (05 Feb 2025 23:07):    Culture is being performed.        RADIOLOGY:  [ ] Reviewed by me

## 2025-02-07 NOTE — PROGRESS NOTE ADULT - SUBJECTIVE AND OBJECTIVE BOX
Date of Service Mode: CPAP with PS, FiO2: 40, PEEP: 5, PS: 14, MAP: 10, PIP: 22   Indication for Geriatrics and Palliative Care Services:     SUBJECTIVE AND OBJECTIVE:  Patient seen and examined at bedside. Patient being followed up for :     INTERVAL HPI/OVERNIGHT EVENTS:      Allergies    No Known Allergies    Intolerances    MEDICATIONS  (STANDING):  ampicillin/sulbactam  IVPB      ampicillin/sulbactam  IVPB 3 Gram(s) IV Intermittent every 24 hours  chlorhexidine 0.12% Liquid 15 milliLiter(s) Oral Mucosa every 12 hours  chlorhexidine 2% Cloths 1 Application(s) Topical <User Schedule>  dextrose 5%. 1000 milliLiter(s) (50 mL/Hr) IV Continuous <Continuous>  dextrose 5%. 1000 milliLiter(s) (100 mL/Hr) IV Continuous <Continuous>  dextrose 50% Injectable 25 Gram(s) IV Push once  dextrose 50% Injectable 12.5 Gram(s) IV Push once  dextrose 50% Injectable 25 Gram(s) IV Push once  glucagon  Injectable 1 milliGRAM(s) IntraMuscular once  heparin   Injectable 5000 Unit(s) SubCutaneous every 12 hours  latanoprost 0.005% Ophthalmic Solution 1 Drop(s) Both EYES at bedtime  levETIRAcetam  IVPB 250 milliGRAM(s) IV Intermittent every 12 hours  levothyroxine 25 MICROGram(s) Oral daily  midodrine 10 milliGRAM(s) Oral every 8 hours  pantoprazole  Injectable 40 milliGRAM(s) IV Push daily  petrolatum Ophthalmic Ointment 1 Application(s) Both EYES two times a day  polyethylene glycol 3350 17 Gram(s) Oral daily  senna 2 Tablet(s) Oral at bedtime  tranexamic acid Injectable for Nebulization 500 milliGRAM(s) Inhalation every 8 hours    MEDICATIONS  (PRN):  albuterol/ipratropium for Nebulization 3 milliLiter(s) Nebulizer every 6 hours PRN Respiratory Distress  dextrose Oral Gel 15 Gram(s) Oral once PRN Blood Glucose LESS THAN 70 milliGRAM(s)/deciliter      ITEMS UNCHECKED ARE NOT PRESENT    PRESENT SYMPTOMS: [ ]Unable to self-report due to altered mental status- see [ ] CPOT [ ] PAINADS [ ] RDOS  Source if other than patient:  [ ]Family   [ ]Team     Pain:  [ ]yes [ ]no  QOL impact -   Location -                    Aggravating factors -  Quality -  Radiation -  Timing-  Severity (0-10 scale):  Minimal acceptable level / Pain Goal (0-10 scale):     Dyspnea:                           [ ]Mild [ ]Moderate [ ]Severe    Anxiety:                             [ ]Mild [ ]Moderate [ ]Severe  Agitation:                          [ ]Mild [ ]Moderate [ ]Severe  Fatigue:                             [ ]Mild [ ]Moderate [ ]Severe  Nausea:                             [ ]Mild [ ]Moderate [ ]Severe  Loss of appetite:              [ ]Mild [ ]Moderate [ ]Severe  Constipation:                   [ ]Mild [ ]Moderate [ ]Severe  Diarrhea:                          [ ]Mild [ ]Moderate [ ]Severe  Pruritus:                            [ ]Mild [ ]Moderate [ ]Severe  Depression:                      [ ]Mild [ ]Moderate [ ]Severe    CPOT:    https://www.[facility].org/getattachment/[email or web address]/Critical-Care-Pain-Observation-Tool-(CPOT)    PCSSQ[Palliative Care Spiritual Screening Question]   Severity (0-10):  Score of 4 or > indicate consideration of Chaplaincy referral.  Chaplaincy Referral: [ ] yes [ ] refused [ ] following [x ] deferred    Caregiver [place]? : [ ] yes [ ] no [ ] Declined [x ] Deferred              Social work referral [ ] Patient & Family Centered Care Referral [ ]     Anticipatory Grief present?:  [ ] yes [ ] no  [ x] Deferred                  Social work referral [ ] Chaplaincy Referral[ ]    Other Symptoms:  [ ]All other review of systems negative   [ ] Unable to obtain due to poor mentation     PHYSICAL EXAM:  Vital Signs Last 24 Hrs  T(C): 37.3 (07 Feb 2025 20:00), Max: 37.7 (07 Feb 2025 16:00)  T(F): 99.1 (07 Feb 2025 20:00), Max: 99.9 (07 Feb 2025 16:00)  HR: 74 (07 Feb 2025 23:08) (68 - 78)  BP: 111/56 (07 Feb 2025 22:00) (96/41 - 131/62)  BP(mean): 71 (07 Feb 2025 22:00) (56 - 88)  RR: 25 (07 Feb 2025 22:00) (18 - 35)  SpO2: 96% (07 Feb 2025 23:08) (89% - 100%)    Parameters below as of 07 Feb 2025 19:43  Patient On (Oxygen Delivery Method): ventilator       Mode: CPAP with PS  FiO2: 40  PEEP: 5  PS: 14  MAP: 10  PIP: 22    I&O's Summary    06 Feb 2025 07:01  -  07 Feb 2025 07:00  --------------------------------------------------------  IN: 2320 mL / OUT: 985 mL / NET: 1335 mL    07 Feb 2025 07:01  -  07 Feb 2025 22:33  --------------------------------------------------------  IN: 980 mL / OUT: 370 mL / NET: 610 mL         GENERAL:  [ ]Alert  [ ]Oriented x   [ ]Lethargic  [ ]Cachexia  [ ]Unarousable  [ ]Verbal  [ ]Non-Verbal  [ ] No Distress  Behavioral:   [ ] Anxiety  [ ] Delirium [ ] Agitation [ ] Calm  [ ] Other  HEENT:  [ ]Normal  [ ] Temporal Wasting  [ ]Dry mouth   [ ]ET Tube/Trach  [ ]Oral lesions  [ ] Mucositis  PULMONARY:   [ ]Clear [ ]Tachypnea  [ ]Audible excessive secretions   [ ]Rhonchi        [ ]Right [ ]Left [ ]Bilateral  [ ]Crackles        [ ]Right [ ]Left [ ]Bilateral  [ ]Wheezing     [ ]Right [ ]Left [ ]Bilateral  [ ]Diminished breath sounds [ ]right [ ]left [ ]bilateral  CARDIOVASCULAR:    [ ]Regular [ ]Irregular [ ]Tachy  [ ]Candelario [ ]Murmur [ ]Other  GASTROINTESTINAL:  [ ]Soft  [ ]Distended   [ ]+BS  [ ]Non tender [ ]Tender  [ ]PEG [ ]OGT/ NGT  Last BM:   GENITOURINARY:  [ ]Normal [ ] Incontinent   [ ]Oliguria/Anuria   [ ]Leal  MUSCULOSKELETAL:   [ ]Normal   [ ]Weakness  [ ]Bed/Wheelchair bound [ ]Edema  [  ] amputation  [  ] contraction  NEUROLOGIC:   [ ]No focal deficits  [ ]Cognitive impairment  [ ]Dysphagia [ ]Dysarthria [ ]Paresis [ ]Other   SKIN: See Nursing Skin Assessment for further details  [ ]Normal    [ ]Rash  [ ]Pressure ulcer(s)       Present on admission [ ]y [ ]n   [  ]  Wound    [  ] hyperpigmentation    CRITICAL CARE:  [ ]Shock Present  [ ]Septic [ ]Cardiogenic [ ]Neurologic [ ]Hypovolemic  [ ]Vasopressors [ ]Inotropes  [ ]Respiratory failure present [ ]Mechanical Ventilation [ ]Non-invasive ventilatory support [ ]High-Flow Mode: CPAP with PS, FiO2: 40, PEEP: 5, PS: 14, MAP: 10, PIP: 22  [ ]Acute  [ ]Chronic [ ]Hypoxic  [ ]Hypercarbic [ ]Other  [ ]Other organ failure     LABS:  reviewed                         6.6    26.12 )-----------( 225      ( 07 Feb 2025 00:10 )             18.7   02-07    139  |  96[L]  |  116[H]  ----------------------------<  142[H]  4.2   |  23  |  7.73[H]    Ca    8.5      07 Feb 2025 00:10  Phos  7.9     02-07  Mg     2.40     02-07    TPro  6.7  /  Alb  1.7[L]  /  TBili  0.7  /  DBili  x   /  AST  78[H]  /  ALT  40  /  AlkPhos  127[H]  02-07  PT/INR - ( 07 Feb 2025 00:10 )   PT: 17.7 sec;   INR: 1.53 ratio         PTT - ( 07 Feb 2025 00:10 )  PTT:30.6 sec  Urinalysis Basic - ( 07 Feb 2025 00:10 )    Color: x / Appearance: x / SG: x / pH: x  Gluc: 142 mg/dL / Ketone: x  / Bili: x / Urobili: x   Blood: x / Protein: x / Nitrite: x   Leuk Esterase: x / RBC: x / WBC x   Sq Epi: x / Non Sq Epi: x / Bacteria: x      CAPILLARY BLOOD GLUCOSE              RADIOLOGY & ADDITIONAL STUDIES: reviewed     Protein Calorie Malnutrition Present: [ ]mild [ ]moderate [ ]severe [ ]underweight [ ]morbid obesity  https://www.andeal.org/vault/4981/web/files/ONC/Table_Clinical%20Characteristics%20to%20Document%20Malnutrition-White%20JV%20et%20al%202012.pdf    Height (cm): 167.6 (01-28-25 @ 21:12)  Weight (kg): 48 (01-28-25 @ 21:12)  BMI (kg/m2): 17.1 (01-28-25 @ 21:12)    [ ]PPSV2 < or = 30%  [ ]significant weight loss [ ]poor nutritional intake [ ]anasarca   [ ]Artificial Nutrition    REFERRALS:   [ ]Chaplaincy  [ ]Hospice  [ ]Child Life  [ ]Social Work  [ ]Case management [ ]Holistic Therapy          Date of Service : 2/7/2025  Indication for Geriatrics and Palliative Care Services:     SUBJECTIVE AND OBJECTIVE:  Patient seen and examined at bedside. Patient being followed up for :     INTERVAL HPI/OVERNIGHT EVENTS:      Allergies    No Known Allergies    Intolerances    MEDICATIONS  (STANDING):  ampicillin/sulbactam  IVPB      ampicillin/sulbactam  IVPB 3 Gram(s) IV Intermittent every 24 hours  chlorhexidine 0.12% Liquid 15 milliLiter(s) Oral Mucosa every 12 hours  chlorhexidine 2% Cloths 1 Application(s) Topical <User Schedule>  dextrose 5%. 1000 milliLiter(s) (50 mL/Hr) IV Continuous <Continuous>  dextrose 5%. 1000 milliLiter(s) (100 mL/Hr) IV Continuous <Continuous>  dextrose 50% Injectable 25 Gram(s) IV Push once  dextrose 50% Injectable 12.5 Gram(s) IV Push once  dextrose 50% Injectable 25 Gram(s) IV Push once  glucagon  Injectable 1 milliGRAM(s) IntraMuscular once  heparin   Injectable 5000 Unit(s) SubCutaneous every 12 hours  latanoprost 0.005% Ophthalmic Solution 1 Drop(s) Both EYES at bedtime  levETIRAcetam  IVPB 250 milliGRAM(s) IV Intermittent every 12 hours  levothyroxine 25 MICROGram(s) Oral daily  midodrine 10 milliGRAM(s) Oral every 8 hours  pantoprazole  Injectable 40 milliGRAM(s) IV Push daily  petrolatum Ophthalmic Ointment 1 Application(s) Both EYES two times a day  polyethylene glycol 3350 17 Gram(s) Oral daily  senna 2 Tablet(s) Oral at bedtime  tranexamic acid Injectable for Nebulization 500 milliGRAM(s) Inhalation every 8 hours    MEDICATIONS  (PRN):  albuterol/ipratropium for Nebulization 3 milliLiter(s) Nebulizer every 6 hours PRN Respiratory Distress  dextrose Oral Gel 15 Gram(s) Oral once PRN Blood Glucose LESS THAN 70 milliGRAM(s)/deciliter      ITEMS UNCHECKED ARE NOT PRESENT    PRESENT SYMPTOMS: [ ]Unable to self-report due to altered mental status- see [ ] CPOT [ ] PAINADS [ ] RDOS  Source if other than patient:  [ ]Family   [ ]Team     Pain:  [ ]yes [ ]no  QOL impact -   Location -                    Aggravating factors -  Quality -  Radiation -  Timing-  Severity (0-10 scale):  Minimal acceptable level / Pain Goal (0-10 scale):     Dyspnea:                           [ ]Mild [ ]Moderate [ ]Severe    Anxiety:                             [ ]Mild [ ]Moderate [ ]Severe  Agitation:                          [ ]Mild [ ]Moderate [ ]Severe  Fatigue:                             [ ]Mild [ ]Moderate [ ]Severe  Nausea:                             [ ]Mild [ ]Moderate [ ]Severe  Loss of appetite:              [ ]Mild [ ]Moderate [ ]Severe  Constipation:                   [ ]Mild [ ]Moderate [ ]Severe  Diarrhea:                          [ ]Mild [ ]Moderate [ ]Severe  Pruritus:                            [ ]Mild [ ]Moderate [ ]Severe  Depression:                      [ ]Mild [ ]Moderate [ ]Severe    CPOT:    https://www.[facility].org/getattachment/[email or web address]/Critical-Care-Pain-Observation-Tool-(CPOT)    PCSSQ[Palliative Care Spiritual Screening Question]   Severity (0-10):  Score of 4 or > indicate consideration of Chaplaincy referral.  Chaplaincy Referral: [ ] yes [ ] refused [ ] following [x ] deferred    Caregiver [place]? : [ ] yes [ ] no [ ] Declined [x ] Deferred              Social work referral [ ] Patient & Family Centered Care Referral [ ]     Anticipatory Grief present?:  [ ] yes [ ] no  [ x] Deferred                  Social work referral [ ] Chaplaincy Referral[ ]    Other Symptoms:  [ ]All other review of systems negative   [ ] Unable to obtain due to poor mentation     PHYSICAL EXAM:  Vital Signs Last 24 Hrs  T(C): 37.3 (07 Feb 2025 20:00), Max: 37.7 (07 Feb 2025 16:00)  T(F): 99.1 (07 Feb 2025 20:00), Max: 99.9 (07 Feb 2025 16:00)  HR: 74 (07 Feb 2025 23:08) (68 - 78)  BP: 111/56 (07 Feb 2025 22:00) (96/41 - 131/62)  BP(mean): 71 (07 Feb 2025 22:00) (56 - 88)  RR: 25 (07 Feb 2025 22:00) (18 - 35)  SpO2: 96% (07 Feb 2025 23:08) (89% - 100%)    Parameters below as of 07 Feb 2025 19:43  Patient On (Oxygen Delivery Method): ventilator       Mode: CPAP with PS  FiO2: 40  PEEP: 5  PS: 14  MAP: 10  PIP: 22    I&O's Summary    06 Feb 2025 07:01  -  07 Feb 2025 07:00  --------------------------------------------------------  IN: 2320 mL / OUT: 985 mL / NET: 1335 mL    07 Feb 2025 07:01  -  07 Feb 2025 22:33  --------------------------------------------------------  IN: 980 mL / OUT: 370 mL / NET: 610 mL         GENERAL:  [ ]Alert  [ ]Oriented x   [ ]Lethargic  [ ]Cachexia  [ ]Unarousable  [ ]Verbal  [ ]Non-Verbal  [ ] No Distress  Behavioral:   [ ] Anxiety  [ ] Delirium [ ] Agitation [ ] Calm  [ ] Other  HEENT:  [ ]Normal  [ ] Temporal Wasting  [ ]Dry mouth   [ ]ET Tube/Trach  [ ]Oral lesions  [ ] Mucositis  PULMONARY:   [ ]Clear [ ]Tachypnea  [ ]Audible excessive secretions   [ ]Rhonchi        [ ]Right [ ]Left [ ]Bilateral  [ ]Crackles        [ ]Right [ ]Left [ ]Bilateral  [ ]Wheezing     [ ]Right [ ]Left [ ]Bilateral  [ ]Diminished breath sounds [ ]right [ ]left [ ]bilateral  CARDIOVASCULAR:    [ ]Regular [ ]Irregular [ ]Tachy  [ ]Candelario [ ]Murmur [ ]Other  GASTROINTESTINAL:  [ ]Soft  [ ]Distended   [ ]+BS  [ ]Non tender [ ]Tender  [ ]PEG [ ]OGT/ NGT  Last BM:   GENITOURINARY:  [ ]Normal [ ] Incontinent   [ ]Oliguria/Anuria   [ ]Leal  MUSCULOSKELETAL:   [ ]Normal   [ ]Weakness  [ ]Bed/Wheelchair bound [ ]Edema  [  ] amputation  [  ] contraction  NEUROLOGIC:   [ ]No focal deficits  [ ]Cognitive impairment  [ ]Dysphagia [ ]Dysarthria [ ]Paresis [ ]Other   SKIN: See Nursing Skin Assessment for further details  [ ]Normal    [ ]Rash  [ ]Pressure ulcer(s)       Present on admission [ ]y [ ]n   [  ]  Wound    [  ] hyperpigmentation    CRITICAL CARE:  [ ]Shock Present  [ ]Septic [ ]Cardiogenic [ ]Neurologic [ ]Hypovolemic  [ ]Vasopressors [ ]Inotropes  [ ]Respiratory failure present [ ]Mechanical Ventilation [ ]Non-invasive ventilatory support [ ]High-Flow Mode: CPAP with PS, FiO2: 40, PEEP: 5, PS: 14, MAP: 10, PIP: 22  [ ]Acute  [ ]Chronic [ ]Hypoxic  [ ]Hypercarbic [ ]Other  [ ]Other organ failure     LABS:  reviewed                         6.6    26.12 )-----------( 225      ( 07 Feb 2025 00:10 )             18.7   02-07    139  |  96[L]  |  116[H]  ----------------------------<  142[H]  4.2   |  23  |  7.73[H]    Ca    8.5      07 Feb 2025 00:10  Phos  7.9     02-07  Mg     2.40     02-07    TPro  6.7  /  Alb  1.7[L]  /  TBili  0.7  /  DBili  x   /  AST  78[H]  /  ALT  40  /  AlkPhos  127[H]  02-07  PT/INR - ( 07 Feb 2025 00:10 )   PT: 17.7 sec;   INR: 1.53 ratio         PTT - ( 07 Feb 2025 00:10 )  PTT:30.6 sec  Urinalysis Basic - ( 07 Feb 2025 00:10 )    Color: x / Appearance: x / SG: x / pH: x  Gluc: 142 mg/dL / Ketone: x  / Bili: x / Urobili: x   Blood: x / Protein: x / Nitrite: x   Leuk Esterase: x / RBC: x / WBC x   Sq Epi: x / Non Sq Epi: x / Bacteria: x      CAPILLARY BLOOD GLUCOSE              RADIOLOGY & ADDITIONAL STUDIES: reviewed     Protein Calorie Malnutrition Present: [ ]mild [ ]moderate [ ]severe [ ]underweight [ ]morbid obesity  https://www.andeal.org/vault/3896/web/files/ONC/Table_Clinical%20Characteristics%20to%20Document%20Malnutrition-White%20JV%20et%20al%202012.pdf    Height (cm): 167.6 (01-28-25 @ 21:12)  Weight (kg): 48 (01-28-25 @ 21:12)  BMI (kg/m2): 17.1 (01-28-25 @ 21:12)    [ ]PPSV2 < or = 30%  [ ]significant weight loss [ ]poor nutritional intake [ ]anasarca   [ ]Artificial Nutrition    REFERRALS:   [ ]Chaplaincy  [ ]Hospice  [ ]Child Life  [ ]Social Work  [ ]Case management [ ]Holistic Therapy          Date of Service : 2/7/2025  Indication for Geriatrics and Palliative Care Services: goals of care    SUBJECTIVE AND OBJECTIVE:  Patient seen and examined at bedside. Patient intubated , unarousable despite being off sedatives since last week.     INTERVAL HPI/OVERNIGHT EVENTS:  No acute events      Allergies    No Known Allergies    Intolerances    MEDICATIONS  (STANDING):  ampicillin/sulbactam  IVPB      ampicillin/sulbactam  IVPB 3 Gram(s) IV Intermittent every 24 hours  chlorhexidine 0.12% Liquid 15 milliLiter(s) Oral Mucosa every 12 hours  chlorhexidine 2% Cloths 1 Application(s) Topical <User Schedule>  dextrose 5%. 1000 milliLiter(s) (50 mL/Hr) IV Continuous <Continuous>  dextrose 5%. 1000 milliLiter(s) (100 mL/Hr) IV Continuous <Continuous>  dextrose 50% Injectable 25 Gram(s) IV Push once  dextrose 50% Injectable 12.5 Gram(s) IV Push once  dextrose 50% Injectable 25 Gram(s) IV Push once  glucagon  Injectable 1 milliGRAM(s) IntraMuscular once  heparin   Injectable 5000 Unit(s) SubCutaneous every 12 hours  latanoprost 0.005% Ophthalmic Solution 1 Drop(s) Both EYES at bedtime  levETIRAcetam  IVPB 250 milliGRAM(s) IV Intermittent every 12 hours  levothyroxine 25 MICROGram(s) Oral daily  midodrine 10 milliGRAM(s) Oral every 8 hours  pantoprazole  Injectable 40 milliGRAM(s) IV Push daily  petrolatum Ophthalmic Ointment 1 Application(s) Both EYES two times a day  polyethylene glycol 3350 17 Gram(s) Oral daily  senna 2 Tablet(s) Oral at bedtime  tranexamic acid Injectable for Nebulization 500 milliGRAM(s) Inhalation every 8 hours    MEDICATIONS  (PRN):  albuterol/ipratropium for Nebulization 3 milliLiter(s) Nebulizer every 6 hours PRN Respiratory Distress  dextrose Oral Gel 15 Gram(s) Oral once PRN Blood Glucose LESS THAN 70 milliGRAM(s)/deciliter      ITEMS UNCHECKED ARE NOT PRESENT    PRESENT SYMPTOMS: [ x]Unable to self-report due to altered mental status- see [ ] CPOT [x ] PAINADS [ x] RDOS  Source if other than patient:  [ ]Family   [ ]Team     Pain:  [ ]yes [ ]no  QOL impact -   Location -                    Aggravating factors -  Quality -  Radiation -  Timing-  Severity (0-10 scale):  Minimal acceptable level / Pain Goal (0-10 scale):     Dyspnea:                           [ ]Mild [ ]Moderate [ ]Severe  Anxiety:                             [ ]Mild [ ]Moderate [ ]Severe  Agitation:                          [ ]Mild [ ]Moderate [ ]Severe  Fatigue:                             [ ]Mild [ ]Moderate [ ]Severe  Nausea:                             [ ]Mild [ ]Moderate [ ]Severe  Loss of appetite:              [ ]Mild [ ]Moderate [ ]Severe  Constipation:                   [ ]Mild [ ]Moderate [ ]Severe  Diarrhea:                          [ ]Mild [ ]Moderate [ ]Severe    CPOT:    https://www.[facility].org/getattachment/[email or web address]/Critical-Care-Pain-Observation-Tool-(CPOT)    PCSSQ[Palliative Care Spiritual Screening Question]   Severity (0-10):  Score of 4 or > indicate consideration of Chaplaincy referral.  Chaplaincy Referral: [x ] yes [ ] refused [ ] following [ ] deferred    Caregiver [place]? : [ ] yes [ ] no [ ] Declined [x ] Deferred              Social work referral [ ] Patient & Family Centered Care Referral [ ]     Anticipatory Grief present?:  [ x] yes [ ] no  [ ] Deferred                  Social work referral [ ] Chaplaincy Referral[x ]    Other Symptoms:  [ ]All other review of systems negative   [ x] Unable to obtain due to poor mentation     PHYSICAL EXAM:  Vital Signs Last 24 Hrs  T(C): 37.3 (07 Feb 2025 20:00), Max: 37.7 (07 Feb 2025 16:00)  T(F): 99.1 (07 Feb 2025 20:00), Max: 99.9 (07 Feb 2025 16:00)  HR: 74 (07 Feb 2025 23:08) (68 - 78)  BP: 111/56 (07 Feb 2025 22:00) (96/41 - 131/62)  BP(mean): 71 (07 Feb 2025 22:00) (56 - 88)  RR: 25 (07 Feb 2025 22:00) (18 - 35)  SpO2: 96% (07 Feb 2025 23:08) (89% - 100%)    Parameters below as of 07 Feb 2025 19:43  Patient On (Oxygen Delivery Method): ventilator       Mode: CPAP with PS  FiO2: 40  PEEP: 5  PS: 14  MAP: 10  PIP: 22    I&O's Summary    06 Feb 2025 07:01  -  07 Feb 2025 07:00  --------------------------------------------------------  IN: 2320 mL / OUT: 985 mL / NET: 1335 mL    07 Feb 2025 07:01  -  07 Feb 2025 22:33  --------------------------------------------------------  IN: 980 mL / OUT: 370 mL / NET: 610 mL       GENERAL:  [ ]Alert  [ ]Oriented x   [ ]Lethargic  [ ]Cachexia  [x ]Unarousable  [ ]Verbal  [ x]Non-Verbal  [x ] No Distress  Behavioral:   [ ] Anxiety  [ ] Delirium [ ] Agitation [ ] Calm  [ x] Other-encephalopathy   HEENT:  [ ]Normal  [ ] Temporal Wasting  [ ]Dry mouth   [ x]ET Tube  [ ]Oral lesions  [ ] Mucositis  PULMONARY: no accessory muscle use, on ventilator   [ ]Clear [ ]Tachypnea  [ ]Audible excessive secretions   [ ]Rhonchi        [ ]Right [ ]Left [ ]Bilateral  [ ]Crackles        [ ]Right [ ]Left [ ]Bilateral  [ ]Wheezing     [ ]Right [ ]Left [ ]Bilateral  [ ]Diminished breath sounds [ ]right [ ]left [ ]bilateral  CARDIOVASCULAR:    [ x]Regular [ ]Irregular [ ]Tachy  [ ]Candelario [ ]Murmur [ ]Other  GASTROINTESTINAL:  [ x]Soft  [ ]Distended   [ ]+BS  [ x]Non tender [ ]Tender  [ ]PEG [x ]OGT/ NGT  Last BM: 2/6  GENITOURINARY:  [ ]Normal [ ] Incontinent   [ ]Oliguria/Anuria   [ x]Leal  MUSCULOSKELETAL:   [ ]Normal   [ ]Weakness  [x ]Bed/Wheelchair bound [ ]Edema  [  ] amputation  [  ] contraction  NEUROLOGIC:   [ ]No focal deficits  [x ]Cognitive impairment  [x ]Dysphagia [ ]Dysarthria [ ]Paresis [ ]Other   SKIN: See Nursing Skin Assessment for further details  [ ]Normal    [ ]Rash  [ ]Pressure ulcer(s)       Present on admission [ ]y [ ]n   [  ]  Wound    [  ] hyperpigmentation    CRITICAL CARE:  [ ]Shock Present  [ ]Septic [ ]Cardiogenic [ ]Neurologic [ ]Hypovolemic  [ ]Vasopressors [ ]Inotropes  [x ]Respiratory failure present [ x]Mechanical Ventilation [ ]Non-invasive ventilatory support [ ]High-Flow Mode: CPAP with PS, FiO2: 40, PEEP: 5, PS: 14, MAP: 10, PIP: 22  [ x]Acute  [ ]Chronic [ ]Hypoxic  [ ]Hypercarbic [ ]Other  [ ]Other organ failure     LABS:  reviewed                         6.6    26.12 )-----------( 225      ( 07 Feb 2025 00:10 )             18.7   02-07    139  |  96[L]  |  116[H]  ----------------------------<  142[H]  4.2   |  23  |  7.73[H]    Ca    8.5      07 Feb 2025 00:10  Phos  7.9     02-07  Mg     2.40     02-07    TPro  6.7  /  Alb  1.7[L]  /  TBili  0.7  /  DBili  x   /  AST  78[H]  /  ALT  40  /  AlkPhos  127[H]  02-07  PT/INR - ( 07 Feb 2025 00:10 )   PT: 17.7 sec;   INR: 1.53 ratio         PTT - ( 07 Feb 2025 00:10 )  PTT:30.6 sec  Urinalysis Basic - ( 07 Feb 2025 00:10 )    Color: x / Appearance: x / SG: x / pH: x  Gluc: 142 mg/dL / Ketone: x  / Bili: x / Urobili: x   Blood: x / Protein: x / Nitrite: x   Leuk Esterase: x / RBC: x / WBC x   Sq Epi: x / Non Sq Epi: x / Bacteria: x      CAPILLARY BLOOD GLUCOSE              RADIOLOGY & ADDITIONAL STUDIES: reviewed     Protein Calorie Malnutrition Present: [ ]mild [ ]moderate [ ]severe [ ]underweight [ ]morbid obesity  https://www.andeal.org/vault/1702/web/files/ONC/Table_Clinical%20Characteristics%20to%20Document%20Malnutrition-White%20JV%20et%20al%202012.pdf    Height (cm): 167.6 (01-28-25 @ 21:12)  Weight (kg): 48 (01-28-25 @ 21:12)  BMI (kg/m2): 17.1 (01-28-25 @ 21:12)    [ x]PPSV2 < or = 30%  [ ]significant weight loss [ ]poor nutritional intake [ ]anasarca   [ ]Artificial Nutrition    REFERRALS:   [ ]Chaplaincy  [ ]Hospice  [ ]Child Life  [ ]Social Work  [ x]Case management [ ]Holistic Therapy

## 2025-02-07 NOTE — PROGRESS NOTE ADULT - NS ATTEST RISK PROBLEM GEN_ALL_CORE FT
1. Number and complexity of problems addressed for this patient:    1.1 Moderate (At least 1)  [ ] 1 or more chronic illnesses with exacerbation, progression, or side effects of treatment  [ ] 2 or more stable chronic illnesses  [ ] 1 undiagnosed new problem with uncertain prognosis  [ ] 1 acute illness with systemic symptoms  [ ] 1 acute complicated injury  1.2 High (At least 1)   [ ] 1 or more chronic illnesses with severe exacerbation, progression, or side effects of treatment  [x ] 1 acute or chronic illnesses or injuries that may pose a threat to life or bodily function    2. Amount and/or Complexity of Data that was Reviewed and Analyzed for this case:       Moderate (1 out of 3)       High (2 out of 3)  2.1. (Any combination of 3 of the following)   [x ] Prior External notes were reviewed  [x ] Each test result was reviewed (see "LABS" and "RADIOLOGY & ADDITIONAL STUDIES" above)  [ ] The following tests were ordered and/or reviewed (Only count 1 point for ordering or reviewing a unique test):  	[ ]CBC  	[ ] Chemistry   	[ ] Imaging   	[ ] Other:   [x ] Assessment requiring an independent historian   		Name of historian and relationship: Bedside RN   2.2  [ ] Personally review and interpretation of  image or testing   2.3  [ x] Discussion of management or test interpretation with external physician/other qualified health care professional\appropriate source (not separately reported)    3. Risk of Complications and/or Morbidity or Mortality of for this Patient’s Management:  3.1 Moderate risk of morbidity from additional diagnostic testing or treatment (At least 1):   [ ] Prescription drug management   [ ] Decision regarding minor surgery, treatment, or procedure with identified patient or procedure risk factors  [ ] Decision regarding elective major surgery, treatment, or procedure without identified patient or procedure risk factors   [ ] Diagnosis or treatment significantly limited by social determinants of health   [ ] Other:   3.2 High risk of morbidity from additional diagnostic testing or treatment (At least 1):   [ ] Drug therapy requiring intensive monitoring for toxicity   [ ] Decision regarding elective major surgery, treatment, or procedure with identified patient or procedure risk factors   [ ] Decision regarding emergency major surgery, treatment, or procedure   [ ] Decision regarding hospitalization or escalation of hospital-level of care  [ x] Decision not to resuscitate because of poor prognosis   [ ] Decision to proceed or not with artificial nutrition   [ ] Parenteral controlled substance  [ ] Other:

## 2025-02-07 NOTE — PROGRESS NOTE ADULT - SUBJECTIVE AND OBJECTIVE BOX
****Kassidy Brasher Internal Medicine PGY-1*****    Overnight Events: pt had blood tinged clot in throat  Interval Events:    OBJECTIVE:  ICU Vital Signs Last 24 Hrs  T(C): 37.6 (07 Feb 2025 04:00), Max: 37.6 (07 Feb 2025 04:00)  T(F): 99.7 (07 Feb 2025 04:00), Max: 99.7 (07 Feb 2025 04:00)  HR: 72 (07 Feb 2025 07:33) (62 - 75)  BP: 111/55 (07 Feb 2025 07:00) (98/47 - 131/62)  BP(mean): 71 (07 Feb 2025 07:00) (63 - 88)  ABP: --  ABP(mean): --  RR: 20 (07 Feb 2025 07:00) (18 - 33)  SpO2: 99% (07 Feb 2025 07:33) (96% - 100%)    O2 Parameters below as of 07 Feb 2025 07:00  Patient On (Oxygen Delivery Method): ventilator    O2 Concentration (%): 30      Mode: CPAP with PS, FiO2: 30, PEEP: 5, PS: 14, MAP: 10, PIP: 21    02-06 @ 07:01 - 02-07 @ 07:00  --------------------------------------------------------  IN: 2320 mL / OUT: 985 mL / NET: 1335 mL    02-07 @ 07:01 - 02-07 @ 07:49  --------------------------------------------------------  IN: 75 mL / OUT: 0 mL / NET: 75 mL      CAPILLARY BLOOD GLUCOSE      POCT Blood Glucose.: 145 mg/dL (06 Feb 2025 05:11)      PHYSICAL EXAM  GENERAL: Pt intubated  HEAD:  Atraumatic, normocephalic  EYES: Conjunctiva and sclera clear  NECK: Supple, trachea midline, no JVD  HEART: Regular rate and rhythm, no murmurs, rubs, or gallops  LUNGS: Unlabored respirations. Coarse breath sounds b/l.  ABDOMEN: Firm, nontender, nondistended, +BS  EXTREMITIES: 2+ peripheral pulses bilaterally, cap refill<2 secs. No clubbing, cyanosis, or edema  NERVOUS SYSTEM:  unable to assess d/t pt intubated   SKIN: No rashes or lesions        HOSPITAL MEDICATIONS:  MEDICATIONS  (STANDING):  ampicillin/sulbactam  IVPB      ampicillin/sulbactam  IVPB 3 Gram(s) IV Intermittent every 24 hours  chlorhexidine 0.12% Liquid 15 milliLiter(s) Oral Mucosa every 12 hours  chlorhexidine 2% Cloths 1 Application(s) Topical <User Schedule>  dextrose 5%. 1000 milliLiter(s) (100 mL/Hr) IV Continuous <Continuous>  dextrose 5%. 1000 milliLiter(s) (50 mL/Hr) IV Continuous <Continuous>  dextrose 50% Injectable 25 Gram(s) IV Push once  dextrose 50% Injectable 12.5 Gram(s) IV Push once  dextrose 50% Injectable 25 Gram(s) IV Push once  glucagon  Injectable 1 milliGRAM(s) IntraMuscular once  heparin   Injectable 5000 Unit(s) SubCutaneous every 12 hours  lactated ringers. 1000 milliLiter(s) (75 mL/Hr) IV Continuous <Continuous>  lactated ringers. 1000 milliLiter(s) (75 mL/Hr) IV Continuous <Continuous>  latanoprost 0.005% Ophthalmic Solution 1 Drop(s) Both EYES at bedtime  levETIRAcetam   Injectable 250 milliGRAM(s) IV Push every 12 hours  levothyroxine 25 MICROGram(s) Oral daily  midodrine 10 milliGRAM(s) Oral every 8 hours  pantoprazole  Injectable 40 milliGRAM(s) IV Push two times a day  petrolatum Ophthalmic Ointment 1 Application(s) Both EYES two times a day  polyethylene glycol 3350 17 Gram(s) Oral daily  senna 2 Tablet(s) Oral at bedtime    MEDICATIONS  (PRN):  albuterol/ipratropium for Nebulization 3 milliLiter(s) Nebulizer every 6 hours PRN Respiratory Distress  dextrose Oral Gel 15 Gram(s) Oral once PRN Blood Glucose LESS THAN 70 milliGRAM(s)/deciliter      LABS:                        6.6    26.12 )-----------( 225      ( 07 Feb 2025 00:10 )             18.7     Hgb Trend: 6.6<--, 7.2<--, 7.1<--, 7.2<--, 7.6<--  02-07    139  |  96[L]  |  116[H]  ----------------------------<  142[H]  4.2   |  23  |  7.73[H]    Ca    8.5      07 Feb 2025 00:10  Phos  7.9     02-07  Mg     2.40     02-07    TPro  6.7  /  Alb  1.7[L]  /  TBili  0.7  /  DBili  x   /  AST  78[H]  /  ALT  40  /  AlkPhos  127[H]  02-07    Creatinine Trend: 7.73<--, 7.14<--, 6.56<--, 6.42<--, 5.79<--, 5.12<--  PT/INR - ( 07 Feb 2025 00:10 )   PT: 17.7 sec;   INR: 1.53 ratio         PTT - ( 07 Feb 2025 00:10 )  PTT:30.6 sec  Urinalysis Basic - ( 07 Feb 2025 00:10 )    Color: x / Appearance: x / SG: x / pH: x  Gluc: 142 mg/dL / Ketone: x  / Bili: x / Urobili: x   Blood: x / Protein: x / Nitrite: x   Leuk Esterase: x / RBC: x / WBC x   Sq Epi: x / Non Sq Epi: x / Bacteria: x        Venous Blood Gas:  02-07 @ 00:10  7.31/58/51/29/78.8  VBG Lactate: 1.5  Venous Blood Gas:  02-06 @ 00:24  7.35/47/73/26/95.3  VBG Lactate: 1.3      MICROBIOLOGY:       RADIOLOGY:  [ ] Reviewed by me   ****Kassidy Brasher Internal Medicine PGY-1*****    Overnight Events: pt had blood tinged clot in throat  Interval Events: pt seen and examined at bedside. No significant changes.    OBJECTIVE:  ICU Vital Signs Last 24 Hrs  T(C): 37.6 (07 Feb 2025 04:00), Max: 37.6 (07 Feb 2025 04:00)  T(F): 99.7 (07 Feb 2025 04:00), Max: 99.7 (07 Feb 2025 04:00)  HR: 72 (07 Feb 2025 07:33) (62 - 75)  BP: 111/55 (07 Feb 2025 07:00) (98/47 - 131/62)  BP(mean): 71 (07 Feb 2025 07:00) (63 - 88)  ABP: --  ABP(mean): --  RR: 20 (07 Feb 2025 07:00) (18 - 33)  SpO2: 99% (07 Feb 2025 07:33) (96% - 100%)    O2 Parameters below as of 07 Feb 2025 07:00  Patient On (Oxygen Delivery Method): ventilator    O2 Concentration (%): 30      Mode: CPAP with PS, FiO2: 30, PEEP: 5, PS: 14, MAP: 10, PIP: 21    02-06 @ 07:01 - 02-07 @ 07:00  --------------------------------------------------------  IN: 2320 mL / OUT: 985 mL / NET: 1335 mL    02-07 @ 07:01 - 02-07 @ 07:49  --------------------------------------------------------  IN: 75 mL / OUT: 0 mL / NET: 75 mL      CAPILLARY BLOOD GLUCOSE      POCT Blood Glucose.: 145 mg/dL (06 Feb 2025 05:11)      PHYSICAL EXAM  GENERAL: Pt intubated  HEAD:  Atraumatic, normocephalic  EYES: Conjunctiva and sclera clear  NECK: Supple, trachea midline, no JVD  HEART: Regular rate and rhythm, no murmurs, rubs, or gallops  LUNGS: Unlabored respirations. Coarse breath sounds b/l.  ABDOMEN: Firm, nontender, nondistended, +BS  EXTREMITIES: 2+ peripheral pulses bilaterally, cap refill<2 secs. No clubbing, cyanosis, or edema  NERVOUS SYSTEM:  unable to assess d/t pt intubated   SKIN: No rashes or lesions        HOSPITAL MEDICATIONS:  MEDICATIONS  (STANDING):  ampicillin/sulbactam  IVPB      ampicillin/sulbactam  IVPB 3 Gram(s) IV Intermittent every 24 hours  chlorhexidine 0.12% Liquid 15 milliLiter(s) Oral Mucosa every 12 hours  chlorhexidine 2% Cloths 1 Application(s) Topical <User Schedule>  dextrose 5%. 1000 milliLiter(s) (100 mL/Hr) IV Continuous <Continuous>  dextrose 5%. 1000 milliLiter(s) (50 mL/Hr) IV Continuous <Continuous>  dextrose 50% Injectable 25 Gram(s) IV Push once  dextrose 50% Injectable 12.5 Gram(s) IV Push once  dextrose 50% Injectable 25 Gram(s) IV Push once  glucagon  Injectable 1 milliGRAM(s) IntraMuscular once  heparin   Injectable 5000 Unit(s) SubCutaneous every 12 hours  lactated ringers. 1000 milliLiter(s) (75 mL/Hr) IV Continuous <Continuous>  lactated ringers. 1000 milliLiter(s) (75 mL/Hr) IV Continuous <Continuous>  latanoprost 0.005% Ophthalmic Solution 1 Drop(s) Both EYES at bedtime  levETIRAcetam   Injectable 250 milliGRAM(s) IV Push every 12 hours  levothyroxine 25 MICROGram(s) Oral daily  midodrine 10 milliGRAM(s) Oral every 8 hours  pantoprazole  Injectable 40 milliGRAM(s) IV Push two times a day  petrolatum Ophthalmic Ointment 1 Application(s) Both EYES two times a day  polyethylene glycol 3350 17 Gram(s) Oral daily  senna 2 Tablet(s) Oral at bedtime    MEDICATIONS  (PRN):  albuterol/ipratropium for Nebulization 3 milliLiter(s) Nebulizer every 6 hours PRN Respiratory Distress  dextrose Oral Gel 15 Gram(s) Oral once PRN Blood Glucose LESS THAN 70 milliGRAM(s)/deciliter      LABS:                        6.6    26.12 )-----------( 225      ( 07 Feb 2025 00:10 )             18.7     Hgb Trend: 6.6<--, 7.2<--, 7.1<--, 7.2<--, 7.6<--  02-07    139  |  96[L]  |  116[H]  ----------------------------<  142[H]  4.2   |  23  |  7.73[H]    Ca    8.5      07 Feb 2025 00:10  Phos  7.9     02-07  Mg     2.40     02-07    TPro  6.7  /  Alb  1.7[L]  /  TBili  0.7  /  DBili  x   /  AST  78[H]  /  ALT  40  /  AlkPhos  127[H]  02-07    Creatinine Trend: 7.73<--, 7.14<--, 6.56<--, 6.42<--, 5.79<--, 5.12<--  PT/INR - ( 07 Feb 2025 00:10 )   PT: 17.7 sec;   INR: 1.53 ratio         PTT - ( 07 Feb 2025 00:10 )  PTT:30.6 sec  Urinalysis Basic - ( 07 Feb 2025 00:10 )    Color: x / Appearance: x / SG: x / pH: x  Gluc: 142 mg/dL / Ketone: x  / Bili: x / Urobili: x   Blood: x / Protein: x / Nitrite: x   Leuk Esterase: x / RBC: x / WBC x   Sq Epi: x / Non Sq Epi: x / Bacteria: x        Venous Blood Gas:  02-07 @ 00:10  7.31/58/51/29/78.8  VBG Lactate: 1.5  Venous Blood Gas:  02-06 @ 00:24  7.35/47/73/26/95.3  VBG Lactate: 1.3      MICROBIOLOGY:       RADIOLOGY:  [ ] Reviewed by me

## 2025-02-07 NOTE — GOALS OF CARE CONVERSATION - ADVANCED CARE PLANNING - CONVERSATION DETAILS
Reviewed with family current hospitalization course including MRI findings with diffuse anoxic brain injury, EEG without significant cerebral activity due to anoxia sustained from cardiac arrest. Reviewed that despite being off sedatives for the past week, patient has not had any improvement in his mental status.   Also reviewed that despite current medical interventions, patient remains critically ill with worsening renal function and with cavitary lung lesion as well.     Shared with family patient's overall poor prognosis and unlikelihood of having a meaningful recovery. Counseled family about next step options including pursuing trach/peg with placement in NH vs compassionate extubation from ventilator with transition to comfort measures and hospice if stable for transfer. Addressed family's questions/concerns about hospice.     Wife shares that she does not feel that patient would be able to tolerate surgeries and does not think patient would not find it an acceptable quality of life with trach/peg in NH.     Family in agreement for DNR with no escalation of medical care. They plan for compassionate extubation tentatively on Tuesday after family has visited.     Emotional support provided Reviewed with family current hospitalization course including MRI findings with diffuse anoxic brain injury, EEG without significant cerebral activity due to anoxia sustained from cardiac arrest. Reviewed that despite being off sedatives for the past week, patient has not had any improvement in his mental status.   Also reviewed that despite current medical interventions, patient remains critically ill with worsening renal function and with cavitary lung lesion as well.     Shared with family patient's overall poor prognosis and unlikelihood of having a meaningful recovery. Counseled family about next step options including pursuing trach/peg with placement in NH vs compassionate extubation from ventilator with transition to comfort measures and hospice if stable for transfer. Addressed family's questions/concerns about prognosis after liberation from ventilator as questions/concerns about hospice.     Wife shares that she does not feel that patient would be able to tolerate surgeries and does not think patient would not find it an acceptable quality of life with trach/peg in NH.     Family in agreement for DNR with no escalation of medical care. They plan for compassionate extubation tentatively on Tuesday after family has visited.     Emotional support provided

## 2025-02-07 NOTE — PROGRESS NOTE ADULT - ASSESSMENT
79-year-old male PMH CAD (s/p stent 2011 and CABG 2017), hypothyroidism, gout, COPD presenting after cardiac arrest at home of unclear etiology, acute hypoxemic respiratory failure due to aspiration pneumonia +/- cavitary lesion/lung abscess +/- septic shock due to UTI      =====Neurologic=====  #Altered Mental Status  Patient is AOx0 after being found down for approx 25 minutes, possible anoxia in setting of prolonged down time prior to ROSC  CT head 1/28 with no acute intracranial hemorrhage, mass effect, midline shift  Neurology following  vEEG stopped 2/1, prop weaned off  poor prognosis for meaningful neurological functional recovery, discussed prognosis with wife today 2/5  MRI with diffuse anoxic brain injury    - Pt is intubated  - Keppra 500mg q12hr, decrease levetiracetam to 250 mg Q12H 2/5    =====Cardiovascular=====  #Cardiac arrest  Pt s/p cardiac arrest with ROSC obtained in the field per EMS prior to arrival. Pt hypotensive upon arrival.  Pt on midodrine  TTE 1/29: EF 65%, no abnormalities    - Titrate off pressors, pt off levo  - Monitor on telemetry     =====Pulmonary=====  #AHRF  Patient obtunded, intubated in setting of cardiac arrest   CXR s/p intubation with left lung base opacity may reflect atelectasis vs. infection   Pt s/p bronchoscopy overnight 1/28 showing large blood clot extending from the distal trachea, occluding the left mainstem bronchus, extending to the left lower lobe bronchus  CT chest 1/28: Cavitary opacity in the left lower lobe measuring up to 7.9 cm. most likely represents pulmonary abscess, less likely neoplasm  Pt follows with Dr. Guardado outpatient, concern for VOLODYMYR  S/p vanc+azithromycin, urine legionella negative  S/p bronch by IP 1/30  Bronchial cx, gram stain (1/30) consistent w/ body nicolasa  Check serum fungitel    - Treat infection empirically with Unasyn for 5 days (2/3- )  - duonebs q6hr PRN  - tracheal aspirate of AFB and quant    =====GI=====  #Transaminitis  Pt with increasing LFTs and coags concerning for shock liver  - continue to support blood pressure and monitor    #Diet  - pt has OG tube, nutrition recs appreciated, on tube feeds    =====Renal/=====  #DOV, Cr uptrending, likely ATN iso shock  pt has yu in  4mg bumex 1/31, s/p bumex drip  - will bolus IVF and monitor output  - maintenance fluids with LR    #Metabolic acidosis  - in setting of cardiac arrest and prolonged downtime with tissue hypoperfusion causing metabolic acidosis 2/2 lactate  - S/p bicarb push  - continue to trend lactate     =====Infectious Disease=====  #UTI  s/p Zosyn, azithro, and vanc.  - urine cultures (1/28) growing Klebsiella  - BCxs (1/28) (-)  - Legionella Ag (1/30) (-)  - Unasyn 5 days (2/3- )    =====Endocrine=====  pt has hypothyroidism, on levothyroxine 25 mcg at home  - IV synthroid 18mcg QHS      =====Heme/Onc=====  - DVT PPX: held due to thrombocytopenia, restarted Heparin SubQ 2/3    =====MICU General=====  Intubated 1/28  AB: Unasyn  DVT: restarted heparin 2/3    Extensive goals of care conversation with wife at bedside. Discussed patient's MRI findings with diffuse anoxic brain injury, EEG without significant cerebral activity and lack of meaningful mental status. He has severe anoxic encephalopathy and it has been >1 week since admission without mental status recovery. She states that he would not want to live like this, but she feels she cannot make the decision to extubate.   She reports she wants to consider him going to a facility with possibility that he would come home with improvement of neurologic function. She also inquires about home hospice. Discussed with wife that transfer to a facility involves trach and peg and with lack of neurological outcome and inability to protect his airway he would not be able to come home. Plan for family discussion on Friday. 79-year-old male PMH CAD (s/p stent 2011 and CABG 2017), hypothyroidism, gout, COPD presenting after cardiac arrest at home of unclear etiology, acute hypoxemic respiratory failure due to aspiration pneumonia +/- cavitary lesion/lung abscess +/- septic shock due to UTI      =====Neurologic=====  #Altered Mental Status  Patient is AOx0 after being found down for approx 25 minutes, possible anoxia in setting of prolonged down time prior to ROSC  CT head 1/28 with no acute intracranial hemorrhage, mass effect, midline shift  Neurology following  vEEG stopped 2/1, prop weaned off  poor prognosis for meaningful neurological functional recovery, discussed prognosis with wife today 2/5  MRI with diffuse anoxic brain injury    - Pt is intubated  - Keppra 500mg q12hr, decrease levetiracetam to 250 mg Q12H 2/5    =====Cardiovascular=====  #Cardiac arrest  Pt s/p cardiac arrest with ROSC obtained in the field per EMS prior to arrival. Pt hypotensive upon arrival.  Pt on midodrine  TTE 1/29: EF 65%, no abnormalities    - Titrate off pressors, pt off levo  - Monitor on telemetry     =====Pulmonary=====  #AHRF  Patient obtunded, intubated in setting of cardiac arrest   CXR s/p intubation with left lung base opacity may reflect atelectasis vs. infection   Pt s/p bronchoscopy overnight 1/28 showing large blood clot extending from the distal trachea, occluding the left mainstem bronchus, extending to the left lower lobe bronchus  CT chest 1/28: Cavitary opacity in the left lower lobe measuring up to 7.9 cm. most likely represents pulmonary abscess, less likely neoplasm  Pt follows with Dr. Guardado outpatient, concern for VOLODYMYR  S/p vanc+azithromycin, urine legionella negative  S/p bronch by IP 1/30  Bronchial cx, gram stain (1/30) consistent w/ body nicolasa  Check serum fungitel    - Treat infection empirically with Unasyn for 5 days (2/3- )  - duonebs q6hr PRN  - tracheal aspirate of AFB and quant    =====GI=====  #Transaminitis  Pt with increasing LFTs and coags concerning for shock liver  - continue to support blood pressure and monitor    #Diet  - pt has OG tube, nutrition recs appreciated, on tube feeds    =====Renal/=====  #DOV, Cr uptrending, likely ATN iso shock  pt has yu in  4mg bumex 1/31, s/p bumex drip  - will bolus IVF and monitor output  - maintenance fluids with LR    #Metabolic acidosis  - in setting of cardiac arrest and prolonged downtime with tissue hypoperfusion causing metabolic acidosis 2/2 lactate  - S/p bicarb push  - continue to trend lactate     =====Infectious Disease=====  #UTI  s/p Zosyn, azithro, and vanc.  - urine cultures (1/28) growing Klebsiella  - BCxs (1/28) (-)  - Legionella Ag (1/30) (-)  - Unasyn 5 days (2/3- )    =====Endocrine=====  pt has hypothyroidism, on levothyroxine 25 mcg at home  - IV synthroid 18mcg QHS      =====Heme/Onc=====  - DVT PPX: held due to thrombocytopenia, restarted Heparin SubQ 2/3      #Anemia  - Drop in hg to 6.6 noted 2/7  - CTM, transfuse pending GOC    =====MICU General=====  Intubated 1/28  AB: Unasyn  DVT: restarted heparin 2/3    Extensive goals of care conversation with wife at bedside. Discussed patient's MRI findings with diffuse anoxic brain injury, EEG without significant cerebral activity and lack of meaningful mental status. He has severe anoxic encephalopathy and it has been >1 week since admission without mental status recovery. She states that he would not want to live like this, but she feels she cannot make the decision to extubate.   She reports she wants to consider him going to a facility with possibility that he would come home with improvement of neurologic function. She also inquires about home hospice. Discussed with wife that transfer to a facility involves trach and peg and with lack of neurological outcome and inability to protect his airway he would not be able to come home. Plan for family discussion on Friday.

## 2025-02-07 NOTE — PROGRESS NOTE ADULT - ATTENDING COMMENTS
79M with CAD, CABG, not on plavix or a/c, COPD here after cardiac arrest at home of unclear etiology, acute hypoxemic respiratory failure due to aspiration pneumonia +/- cavitary lesion/lung abscess +/- septic shock due to UTI.    #Neuro: concern for anoxic brain injury in setting of prolonged down time during cardiac arrest. No meaningful mental status. MRI with diffuse anoxic brain injury. Keppra decreased per neuro recs.   #CV: s/p cardiac arrest of unclear etiology, possible hypoxemic arrest given respiratory symptoms on day of presentation.  TTE WNL.   #Pulm: CT chest showing cavitary lesion in LLL->? abscess; cultures negative and AFB negative (prior hx of ?VOLODYMYR) c/w unasyn for now (plan for 10 days of abx through 2/7). Now with hemoptysis  #ID: will change abx to unasyn to cover anaerobes in the setting of cavitary PNA. Pending serum fungitell (sputum 200)  #Renal/Fluid: DOV/ ATN in setting of shock; Bun/ Cr continues to rise, however non oliguric. Electrolytes WNL.   #Heme: H/H decreased to 6.6-pending GOC tonight prior to PRBC. Leukocytosis plateaued.  Cultures negative to date.   #Endo: on synthroid  #GI:  tube feeds at goal. transaminitis continues to improve  # Skin/Lines: +yu  #DVT ppx: restart hep subq for dvt ppx  # Dispo/Ethics: Extensive goals of care conversation with wife at bedside. Discussed patient's MRI findings with diffuse anoxic brain injury, EEG without significant cerebral activity and lack of meaningful mental status. He has severe anoxic encephalopathy and it has been >1 week since admission without mental status recovery. She states that he would not want to live like this, but she feels she cannot make the decision to extubate.   She reports she wants to consider him going to a facility with possibility that he would come home with improvement of neurologic function. She also inquires about home hospice. Discussed with wife that transfer to a facility involves trach and peg and with lack of neurological outcome and inability to protect his airway he would not be able to come home. Plan for family discussion today.

## 2025-02-08 NOTE — CHART NOTE - NSCHARTNOTEFT_GEN_A_CORE
Spoke with wife at bedside to provide update. Confirmed plan for no escalation of care and DNR (continuing current medications, confirmed no further lab draws), patient to remain intubated until Tuesday when they are planning for palliative extubation.  All questions answered.      Jimena Faustin MD  Pulmonary and Critical Care Fellow

## 2025-02-08 NOTE — PROGRESS NOTE ADULT - ATTENDING COMMENTS
I have personally seen and examined the patient.  I fully participated in the care of this patient.  I have made amendments to the documentation where necessary, and agree with the history, physical exam, and plan as documented by the Resident.    Mr. Holman 78 yo man PMHx CAD, CABG, COPD, p/w cardiac arrest at home of unclear down time, possibly hypoxic arrest in the setting of aspiration pneumonia with cavitary lesion seen on imaging. Per [facility] discussion on 2/7/25 plan of care for DNR with no escalation of medical care, with compassionate extubation on Tuesday after family has visited.    Continue keppra 250 mg q 12 for seizure ppx. Continue vent support with AC//14/40%/+5. On nebulized TXA for episode of hemoptysis, now resolved. Continue unasyn for R cavitary pneumonia. Discussed with family today to stop daily lab draws.

## 2025-02-08 NOTE — PROGRESS NOTE ADULT - ASSESSMENT
79-year-old male PMH CAD (s/p stent 2011 and CABG 2017), hypothyroidism, gout, COPD presenting after cardiac arrest at home of unclear etiology, acute hypoxemic respiratory failure due to aspiration pneumonia +/- cavitary lesion/lung abscess +/- septic shock due to UTI      =====Neurologic=====  #Altered Mental Status  Patient is AOx0 after being found down for approx 25 minutes, possible anoxia in setting of prolonged down time prior to ROSC  CT head 1/28 with no acute intracranial hemorrhage, mass effect, midline shift  Neurology following  vEEG stopped 2/1, prop weaned off  poor prognosis for meaningful neurological functional recovery, discussed prognosis with wife today 2/5  MRI with diffuse anoxic brain injury    - Pt is intubated  - Keppra 500mg q12hr, decrease levetiracetam to 250 mg Q12H 2/5    =====Cardiovascular=====  #Cardiac arrest  Pt s/p cardiac arrest with ROSC obtained in the field per EMS prior to arrival. Pt hypotensive upon arrival.  Pt on midodrine  TTE 1/29: EF 65%, no abnormalities    - Titrate off pressors, pt off levo  - Monitor on telemetry     =====Pulmonary=====  #AHRF  Patient obtunded, intubated in setting of cardiac arrest   CXR s/p intubation with left lung base opacity may reflect atelectasis vs. infection   Pt s/p bronchoscopy overnight 1/28 showing large blood clot extending from the distal trachea, occluding the left mainstem bronchus, extending to the left lower lobe bronchus  CT chest 1/28: Cavitary opacity in the left lower lobe measuring up to 7.9 cm. most likely represents pulmonary abscess, less likely neoplasm  Pt follows with Dr. Guardado outpatient, concern for VOLODYMYR  S/p vanc+azithromycin, urine legionella negative  S/p bronch by IP 1/30  Bronchial cx, gram stain (1/30) consistent w/ body nicolasa  Check serum fungitel    - Treat infection empirically with Unasyn for 5 days (2/3- )  - duonebs q6hr PRN  - tracheal aspirate of AFB and quant    =====GI=====  #Transaminitis  Pt with increasing LFTs and coags concerning for shock liver  - continue to support blood pressure and monitor    #Diet  - pt has OG tube, nutrition recs appreciated, on tube feeds    =====Renal/=====  #DOV, Cr uptrending, likely ATN iso shock  pt has yu in  4mg bumex 1/31, s/p bumex drip  - will bolus IVF and monitor output  - maintenance fluids with LR    #Metabolic acidosis  - in setting of cardiac arrest and prolonged downtime with tissue hypoperfusion causing metabolic acidosis 2/2 lactate  - S/p bicarb push  - continue to trend lactate     =====Infectious Disease=====  #UTI  s/p Zosyn, azithro, and vanc.  - urine cultures (1/28) growing Klebsiella  - BCxs (1/28) (-)  - Legionella Ag (1/30) (-)  - Unasyn 5 days (2/3- )    =====Endocrine=====  pt has hypothyroidism, on levothyroxine 25 mcg at home  - IV synthroid 18mcg QHS      =====Heme/Onc=====  - DVT PPX: held due to thrombocytopenia, restarted Heparin SubQ 2/3      #Anemia  - Drop in hg to 6.6 noted 2/7  - CTM, transfuse pending GOC    =====MICU General=====  Intubated 1/28  AB: Unasyn  DVT: restarted heparin 2/3    Extensive goals of care conversation with wife at bedside. Discussed patient's MRI findings with diffuse anoxic brain injury, EEG without significant cerebral activity and lack of meaningful mental status. He has severe anoxic encephalopathy and it has been >1 week since admission without mental status recovery. She states that he would not want to live like this, but she feels she cannot make the decision to extubate.   She reports she wants to consider him going to a facility with possibility that he would come home with improvement of neurologic function. She also inquires about home hospice. Discussed with wife that transfer to a facility involves trach and peg and with lack of neurological outcome and inability to protect his airway he would not be able to come home. Plan for family discussion on Friday.  Per GOC conversation on 2/7/25, will plan for possible palliative extubation on Tuesday

## 2025-02-08 NOTE — PROGRESS NOTE ADULT - SUBJECTIVE AND OBJECTIVE BOX
INTERVAL HPI/OVERNIGHT EVENTS:    SUBJECTIVE: Patient seen and examined at bedside.       VITAL SIGNS:  ICU Vital Signs Last 24 Hrs  T(C): 37 (08 Feb 2025 08:00), Max: 37.7 (07 Feb 2025 16:00)  T(F): 98.6 (08 Feb 2025 08:00), Max: 99.9 (07 Feb 2025 16:00)  HR: 63 (08 Feb 2025 09:00) (63 - 78)  BP: 126/54 (08 Feb 2025 09:00) (96/41 - 127/60)  BP(mean): 75 (08 Feb 2025 09:00) (56 - 83)  ABP: --  ABP(mean): --  RR: 17 (08 Feb 2025 09:00) (16 - 35)  SpO2: 100% (08 Feb 2025 09:00) (91% - 100%)    O2 Parameters below as of 08 Feb 2025 09:00  Patient On (Oxygen Delivery Method): ventilator    O2 Concentration (%): 40      Mode: AC/ CMV (Assist Control/ Continuous Mandatory Ventilation), RR (machine): 14, TV (machine): 350, FiO2: 40, PEEP: 5, ITime: 0.9, MAP: 10, PIP: 29  Plateau pressure:   P/F ratio:     02-07 @ 07:01  -  02-08 @ 07:00  --------------------------------------------------------  IN: 1220 mL / OUT: 695 mL / NET: 525 mL      CAPILLARY BLOOD GLUCOSE        ECG:    PHYSICAL EXAM:  VITALS:   T(C): 37 (02-08-25 @ 08:00), Max: 37.7 (02-07-25 @ 16:00)  HR: 63 (02-08-25 @ 09:00) (63 - 78)  BP: 126/54 (02-08-25 @ 09:00) (96/41 - 127/60)  RR: 17 (02-08-25 @ 09:00) (16 - 35)  SpO2: 100% (02-08-25 @ 09:00) (91% - 100%)    GENERAL: NAD, lying in bed comfortably  HEAD:  Atraumatic, Normocephalic  EYES: EOMI, PERRLA, conjunctiva and sclera clear  ENT: Moist mucous membranes  NECK: Supple, No JVD  CHEST/LUNG: CTABL; No rales, rhonchi, wheezing, or rubs. Unlabored respirations  HEART: RRR. No M/R/G  ABDOMEN: Soft, nontender, non-distended, normoactive BS. No hepatomegaly  EXTREMITIES:  2+ Peripheral Pulses, brisk capillary refill. No clubbing, cyanosis, or edema  NERVOUS SYSTEM:  Alert & Oriented X3, speech clear. No deficits, CN II-XII intact. Normal sensation   MSK: FROM all 4 extremities, full and equal strength  PSYCH: Normal affect, normal speech, normal behavior  SKIN: No rashes or lesions      MEDICATIONS:  MEDICATIONS  (STANDING):  ampicillin/sulbactam  IVPB      ampicillin/sulbactam  IVPB 3 Gram(s) IV Intermittent every 24 hours  chlorhexidine 0.12% Liquid 15 milliLiter(s) Oral Mucosa every 12 hours  chlorhexidine 2% Cloths 1 Application(s) Topical <User Schedule>  dextrose 5%. 1000 milliLiter(s) (50 mL/Hr) IV Continuous <Continuous>  dextrose 5%. 1000 milliLiter(s) (100 mL/Hr) IV Continuous <Continuous>  dextrose 50% Injectable 25 Gram(s) IV Push once  dextrose 50% Injectable 12.5 Gram(s) IV Push once  dextrose 50% Injectable 25 Gram(s) IV Push once  glucagon  Injectable 1 milliGRAM(s) IntraMuscular once  heparin   Injectable 5000 Unit(s) SubCutaneous every 12 hours  latanoprost 0.005% Ophthalmic Solution 1 Drop(s) Both EYES at bedtime  levETIRAcetam  IVPB 250 milliGRAM(s) IV Intermittent every 12 hours  levothyroxine 25 MICROGram(s) Oral daily  midodrine 10 milliGRAM(s) Oral every 8 hours  pantoprazole  Injectable 40 milliGRAM(s) IV Push daily  petrolatum Ophthalmic Ointment 1 Application(s) Both EYES two times a day  polyethylene glycol 3350 17 Gram(s) Oral daily  senna 2 Tablet(s) Oral at bedtime  tranexamic acid Injectable for Nebulization 500 milliGRAM(s) Inhalation every 8 hours    MEDICATIONS  (PRN):  albuterol/ipratropium for Nebulization 3 milliLiter(s) Nebulizer every 6 hours PRN Respiratory Distress  dextrose Oral Gel 15 Gram(s) Oral once PRN Blood Glucose LESS THAN 70 milliGRAM(s)/deciliter      ALLERGIES:  Allergies    No Known Allergies    Intolerances        LABS:                        6.6    26.12 )-----------( 225      ( 07 Feb 2025 00:10 )             18.7     02-07    139  |  96[L]  |  116[H]  ----------------------------<  142[H]  4.2   |  23  |  7.73[H]    Ca    8.5      07 Feb 2025 00:10  Phos  7.9     02-07  Mg     2.40     02-07    TPro  6.7  /  Alb  1.7[L]  /  TBili  0.7  /  DBili  x   /  AST  78[H]  /  ALT  40  /  AlkPhos  127[H]  02-07    PT/INR - ( 07 Feb 2025 00:10 )   PT: 17.7 sec;   INR: 1.53 ratio         PTT - ( 07 Feb 2025 00:10 )  PTT:30.6 sec  Urinalysis Basic - ( 07 Feb 2025 00:10 )    Color: x / Appearance: x / SG: x / pH: x  Gluc: 142 mg/dL / Ketone: x  / Bili: x / Urobili: x   Blood: x / Protein: x / Nitrite: x   Leuk Esterase: x / RBC: x / WBC x   Sq Epi: x / Non Sq Epi: x / Bacteria: x        RADIOLOGY & ADDITIONAL TESTS: Reviewed.

## 2025-02-09 NOTE — PROGRESS NOTE ADULT - ATTENDING COMMENTS
Mr. Holman 80 yo man PMHx CAD, CABG, COPD, p/w cardiac arrest at home of unclear down time, possibly hypoxic arrest in the setting of aspiration pneumonia with cavitary lesion seen on imaging. MRI demonstrates severe anoxic brain injury post arrest. Per [facility] discussion on 2/7/25 plan of care for DNR with no escalation of medical care, with compassionate extubation on Tuesday after family has visited.    This AM, + gag, cough reflex and triggering the vent. No further episodes of hemoptysis.   Continue keppra 250 mg q 12 for seizure ppx. Continue vent support with AC//14/40%/+5. On nebulized TXA for episode of hemoptysis, now resolved. Continue unasyn for R cavitary pneumonia. No daily lab draws. Pending family visitation with compassionate extubation Tuesday.

## 2025-02-09 NOTE — PROGRESS NOTE ADULT - SUBJECTIVE AND OBJECTIVE BOX
****Kassidy Brasher Internal Medicine PGY-1*****    Overnight Events: no acute overnight events  Interval Events: pt seen and examined at bedside. No significant changes.    OBJECTIVE:  ICU Vital Signs Last 24 Hrs  T(C): 36.5 (09 Feb 2025 04:00), Max: 37 (08 Feb 2025 08:00)  T(F): 97.7 (09 Feb 2025 04:00), Max: 98.6 (08 Feb 2025 08:00)  HR: 66 (09 Feb 2025 07:28) (62 - 67)  BP: 127/57 (09 Feb 2025 06:00) (98/47 - 127/57)  BP(mean): 77 (09 Feb 2025 06:00) (54 - 78)  ABP: --  ABP(mean): --  RR: 14 (09 Feb 2025 06:00) (14 - 22)  SpO2: 100% (09 Feb 2025 07:28) (99% - 100%)    O2 Parameters below as of 09 Feb 2025 04:00  Patient On (Oxygen Delivery Method): ventilator    O2 Concentration (%): 40      Mode: AC/ CMV (Assist Control/ Continuous Mandatory Ventilation), RR (machine): 14, TV (machine): 350, FiO2: 40, PEEP: 5, ITime: 0.74, MAP: 9, PIP: 33    02-08 @ 07:01  -  02-09 @ 07:00  --------------------------------------------------------  IN: 930 mL / OUT: 750 mL / NET: 180 mL      CAPILLARY BLOOD GLUCOSE          PHYSICAL EXAM  GENERAL: Pt intubated  HEAD:  Atraumatic, normocephalic  EYES: Conjunctiva and sclera clear  NECK: Supple, trachea midline, no JVD  HEART: Regular rate and rhythm, no murmurs, rubs, or gallops  LUNGS: Unlabored respirations. Coarse breath sounds b/l.  ABDOMEN: Firm, nontender, nondistended, +BS  EXTREMITIES: 2+ peripheral pulses bilaterally, cap refill<2 secs. No clubbing, cyanosis, or edema  NERVOUS SYSTEM:  unable to assess d/t pt intubated   SKIN: No rashes or lesions      HOSPITAL MEDICATIONS:  MEDICATIONS  (STANDING):  ampicillin/sulbactam  IVPB      ampicillin/sulbactam  IVPB 3 Gram(s) IV Intermittent every 24 hours  chlorhexidine 0.12% Liquid 15 milliLiter(s) Oral Mucosa every 12 hours  chlorhexidine 2% Cloths 1 Application(s) Topical <User Schedule>  dextrose 5%. 1000 milliLiter(s) (100 mL/Hr) IV Continuous <Continuous>  dextrose 5%. 1000 milliLiter(s) (50 mL/Hr) IV Continuous <Continuous>  dextrose 50% Injectable 25 Gram(s) IV Push once  dextrose 50% Injectable 12.5 Gram(s) IV Push once  dextrose 50% Injectable 25 Gram(s) IV Push once  glucagon  Injectable 1 milliGRAM(s) IntraMuscular once  heparin   Injectable 5000 Unit(s) SubCutaneous every 12 hours  latanoprost 0.005% Ophthalmic Solution 1 Drop(s) Both EYES at bedtime  levETIRAcetam  IVPB 250 milliGRAM(s) IV Intermittent every 12 hours  levothyroxine 25 MICROGram(s) Oral daily  midodrine 10 milliGRAM(s) Oral every 8 hours  pantoprazole  Injectable 40 milliGRAM(s) IV Push daily  petrolatum Ophthalmic Ointment 1 Application(s) Both EYES two times a day  polyethylene glycol 3350 17 Gram(s) Oral daily  senna 2 Tablet(s) Oral at bedtime  tranexamic acid Injectable for Nebulization 500 milliGRAM(s) Inhalation every 8 hours    MEDICATIONS  (PRN):  albuterol/ipratropium for Nebulization 3 milliLiter(s) Nebulizer every 6 hours PRN Respiratory Distress  dextrose Oral Gel 15 Gram(s) Oral once PRN Blood Glucose LESS THAN 70 milliGRAM(s)/deciliter      LABS:    Hgb Trend: 6.6<--, 7.2<--, 7.1<--, 7.2<--, 7.6<--        Creatinine Trend: 7.73<--, 7.14<--, 6.56<--, 6.42<--, 5.79<--, 5.12<--            MICROBIOLOGY:       RADIOLOGY:  [ ] Reviewed by me

## 2025-02-09 NOTE — PROGRESS NOTE ADULT - ASSESSMENT
79-year-old male PMH CAD (s/p stent 2011 and CABG 2017), hypothyroidism, gout, COPD presenting after cardiac arrest at home of unclear etiology, acute hypoxemic respiratory failure due to aspiration pneumonia +/- cavitary lesion/lung abscess +/- septic shock due to UTI      =====Neurologic=====  #Altered Mental Status  Patient is AOx0 after being found down for approx 25 minutes, possible anoxia in setting of prolonged down time prior to ROSC  CT head 1/28 with no acute intracranial hemorrhage, mass effect, midline shift  Neurology following  vEEG stopped 2/1, prop weaned off  poor prognosis for meaningful neurological functional recovery, discussed prognosis with wife today 2/5  MRI with diffuse anoxic brain injury    - Pt is intubated  - Keppra 500mg q12hr, decrease levetiracetam to 250 mg Q12H 2/5    =====Cardiovascular=====  #Cardiac arrest  Pt s/p cardiac arrest with ROSC obtained in the field per EMS prior to arrival. Pt hypotensive upon arrival.  Pt on midodrine  TTE 1/29: EF 65%, no abnormalities    - Titrate off pressors, pt off levo  - Monitor on telemetry     =====Pulmonary=====  #AHRF  Patient obtunded, intubated in setting of cardiac arrest   CXR s/p intubation with left lung base opacity may reflect atelectasis vs. infection   Pt s/p bronchoscopy overnight 1/28 showing large blood clot extending from the distal trachea, occluding the left mainstem bronchus, extending to the left lower lobe bronchus  CT chest 1/28: Cavitary opacity in the left lower lobe measuring up to 7.9 cm. most likely represents pulmonary abscess, less likely neoplasm  Pt follows with Dr. Guardado outpatient, concern for VOLODYMYR  S/p vanc+azithromycin, urine legionella negative  S/p bronch by IP 1/30  Bronchial cx, gram stain (1/30) consistent w/ body nicolasa  Check serum fungitel    - Treat infection empirically with Unasyn for 5 days (2/3- )  - duonebs q6hr PRN  - tracheal aspirate of AFB and quant    =====GI=====  #Transaminitis  Pt with increasing LFTs and coags concerning for shock liver  - continue to support blood pressure and monitor    #Diet  - pt has OG tube, nutrition recs appreciated, on tube feeds    =====Renal/=====  #DOV, Cr uptrending, likely ATN iso shock  pt has yu in  4mg bumex 1/31, s/p bumex drip  - will bolus IVF and monitor output  - maintenance fluids with LR    #Metabolic acidosis  - in setting of cardiac arrest and prolonged downtime with tissue hypoperfusion causing metabolic acidosis 2/2 lactate  - S/p bicarb push  - continue to trend lactate     =====Infectious Disease=====  #UTI  s/p Zosyn, azithro, and vanc.  - urine cultures (1/28) growing Klebsiella  - BCxs (1/28) (-)  - Legionella Ag (1/30) (-)  - Unasyn 5 days (2/3- )    =====Endocrine=====  pt has hypothyroidism, on levothyroxine 25 mcg at home  - IV synthroid 18mcg QHS      =====Heme/Onc=====  - DVT PPX: held due to thrombocytopenia, restarted Heparin SubQ 2/3      #Anemia  - Drop in hg to 6.6 noted 2/7  - CTM, transfuse pending GOC    =====MICU General=====  Intubated 1/28  AB: Unasyn  DVT: restarted heparin 2/3    Extensive goals of care conversation with wife at bedside. Discussed patient's MRI findings with diffuse anoxic brain injury, EEG without significant cerebral activity and lack of meaningful mental status. He has severe anoxic encephalopathy and it has been >1 week since admission without mental status recovery. She states that he would not want to live like this, but she feels she cannot make the decision to extubate.   She reports she wants to consider him going to a facility with possibility that he would come home with improvement of neurologic function. She also inquires about home hospice. Discussed with wife that transfer to a facility involves trach and peg and with lack of neurological outcome and inability to protect his airway he would not be able to come home. Plan for family discussion on Friday.  Per GOC conversation on 2/7/25, will plan for possible palliative extubation on Tuesday 79-year-old male PMH CAD (s/p stent 2011 and CABG 2017), hypothyroidism, gout, COPD presenting after cardiac arrest at home of unclear etiology, acute hypoxemic respiratory failure due to aspiration pneumonia +/- cavitary lesion/lung abscess +/- septic shock due to UTI. Plan for palliative extubation Tuesday 2/11.       =====Neurologic=====  #Altered Mental Status  Patient is AOx0 after being found down for approx 25 minutes, possible anoxia in setting of prolonged down time prior to ROSC  CT head 1/28 with no acute intracranial hemorrhage, mass effect, midline shift  Neurology following  vEEG stopped 2/1, prop weaned off  poor prognosis for meaningful neurological functional recovery, discussed prognosis with wife today 2/5  MRI with diffuse anoxic brain injury    - Pt is intubated  - Keppra 500mg q12hr, decrease levetiracetam to 250 mg Q12H 2/5    =====Cardiovascular=====  #Cardiac arrest  Pt s/p cardiac arrest with ROSC obtained in the field per EMS prior to arrival. Pt hypotensive upon arrival.  Pt on midodrine  TTE 1/29: EF 65%, no abnormalities    - Titrate off pressors, pt off levo  - Monitor on telemetry     =====Pulmonary=====  #AHRF  Patient obtunded, intubated in setting of cardiac arrest   CXR s/p intubation with left lung base opacity may reflect atelectasis vs. infection   Pt s/p bronchoscopy overnight 1/28 showing large blood clot extending from the distal trachea, occluding the left mainstem bronchus, extending to the left lower lobe bronchus  CT chest 1/28: Cavitary opacity in the left lower lobe measuring up to 7.9 cm. most likely represents pulmonary abscess, less likely neoplasm  Pt follows with Dr. Guardado outpatient, concern for VOLODYMYR  S/p vanc+azithromycin, urine legionella negative  S/p bronch by IP 1/30  Bronchial cx, gram stain (1/30) consistent w/ body nicolasa    - Treat infection empirically with Unasyn (2/3- )  - duonebs q6hr PRN    =====GI=====  #Transaminitis  Pt with increasing LFTs and coags concerning for shock liver  - continue to support blood pressure   - stopped lab draws per family wishes     #Diet  - pt has OG tube, nutrition recs appreciated, on tube feeds    =====Renal/=====  #DOV, Cr uptrending, likely ATN iso shock  pt has yu in  4mg bumex 1/31, s/p bumex drip  - will bolus IVF and monitor output  - maintenance fluids with LR  - stopped lab draws per family wishes     #Metabolic acidosis  - in setting of cardiac arrest and prolonged downtime with tissue hypoperfusion causing metabolic acidosis 2/2 lactate  - S/p bicarb push  - stopped lab draws per family wishes     =====Infectious Disease=====  #UTI  s/p Zosyn, azithro, and vanc.  - urine cultures (1/28) growing Klebsiella  - BCxs (1/28) (-)  - Legionella Ag (1/30) (-)  - Unasyn 5 days (2/3- )    =====Endocrine=====  pt has hypothyroidism, on levothyroxine 25 mcg at home  - IV synthroid 18mcg QHS      =====Heme/Onc=====  - DVT PPX: held due to thrombocytopenia, restarted Heparin SubQ 2/3      #Anemia  - Drop in hg to 6.6 noted 2/7  - CTM, transfusions not in alignment with GOC    =====MICU General=====  Intubated 1/28  AB: Unasyn  DVT: restarted heparin 2/3    Extensive goals of care conversation with wife at bedside. Discussed patient's MRI findings with diffuse anoxic brain injury, EEG without significant cerebral activity and lack of meaningful mental status. He has severe anoxic encephalopathy and it has been >1 week since admission without mental status recovery. She states that he would not want to live like this, but she feels she cannot make the decision to extubate.   She reports she wants to consider him going to a facility with possibility that he would come home with improvement of neurologic function. She also inquires about home hospice. Discussed with wife that transfer to a facility involves trach and peg and with lack of neurological outcome and inability to protect his airway he would not be able to come home. Plan for family discussion on Friday.  Per GOC conversation on 2/7/25, will plan for possible palliative extubation on Tuesday 79-year-old male PMH CAD (s/p stent 2011 and CABG 2017), hypothyroidism, gout, COPD presenting after cardiac arrest at home of unclear etiology, acute hypoxemic respiratory failure due to aspiration pneumonia +/- cavitary lesion/lung abscess +/- septic shock due to UTI. Plan for palliative extubation Tuesday 2/11.       =====Neurologic=====  #Altered Mental Status  Patient is AOx0 after being found down for approx 25 minutes, possible anoxia in setting of prolonged down time prior to ROSC  CT head 1/28 with no acute intracranial hemorrhage, mass effect, midline shift  Neurology following  vEEG stopped 2/1, prop weaned off  poor prognosis for meaningful neurological functional recovery, discussed prognosis with wife today 2/5  MRI with diffuse anoxic brain injury    - Pt is intubated  - Keppra 500mg q12hr, decrease levetiracetam to 250 mg Q12H 2/5    =====Cardiovascular=====  #Cardiac arrest  Pt s/p cardiac arrest with ROSC obtained in the field per EMS prior to arrival. Pt hypotensive upon arrival.  Pt on midodrine  TTE 1/29: EF 65%, no abnormalities    - Titrate off pressors, pt off levo  - Monitor on telemetry     =====Pulmonary=====  #AHRF  Patient obtunded, intubated in setting of cardiac arrest   CXR s/p intubation with left lung base opacity may reflect atelectasis vs. infection   Pt s/p bronchoscopy overnight 1/28 showing large blood clot extending from the distal trachea, occluding the left mainstem bronchus, extending to the left lower lobe bronchus  CT chest 1/28: Cavitary opacity in the left lower lobe measuring up to 7.9 cm. most likely represents pulmonary abscess, less likely neoplasm  Pt follows with Dr. Guardado outpatient, concern for VOLODYMYR  S/p vanc+azithromycin, urine legionella negative  S/p bronch by IP 1/30  Bronchial cx, gram stain (1/30) consistent w/ body nicolasa    - Treat infection empirically with Unasyn (2/3- )  - duonebs q6hr PRN    =====GI=====  #Transaminitis  Pt with increasing LFTs and coags concerning for shock liver  - continue to support blood pressure   - stopped lab draws per family wishes     #Diet  - pt has OG tube, nutrition recs appreciated, on tube feeds    =====Renal/=====  #DOV, Cr uptrending, likely ATN iso shock  pt has yu in  4mg bumex 1/31, s/p bumex drip  - will bolus IVF and monitor output  - maintenance fluids with LR  - stopped lab draws per family wishes     #Metabolic acidosis  - in setting of cardiac arrest and prolonged downtime with tissue hypoperfusion causing metabolic acidosis 2/2 lactate  - S/p bicarb push  - stopped lab draws per family wishes     =====Infectious Disease=====  #UTI  s/p Zosyn, azithro, and vanc.  - urine cultures (1/28) growing Klebsiella  - BCxs (1/28) (-)  - Legionella Ag (1/30) (-)  - Unasyn 5 days (2/3- )    =====Endocrine=====  pt has hypothyroidism, on levothyroxine 25 mcg at home  - IV synthroid 18mcg QHS      =====Heme/Onc=====  - DVT PPX: held due to thrombocytopenia, restarted Heparin SubQ 2/3      #Anemia  - Drop in hg to 6.6 noted 2/7  - CTM, transfusions not in alignment with GOC    =====MICU General=====  Intubated 1/28  AB: Unasyn  DVT: restarted heparin 2/3    Extensive goals of care conversation with wife at bedside. Discussed patient's MRI findings with diffuse anoxic brain injury, EEG without significant cerebral activity and lack of meaningful mental status. He has severe anoxic encephalopathy and it has been >1 week since admission without mental status recovery.  Per GOC conversation on 2/7/25, will plan for possible palliative extubation on Tuesday

## 2025-02-10 NOTE — PROGRESS NOTE ADULT - ATTENDING COMMENTS
80 yo man PMHx CAD, CABG, COPD, p/w cardiac arrest at home of unclear down time, possibly hypoxic arrest in the setting of aspiration pneumonia with cavitary lesion seen on imaging. MRI demonstrates severe anoxic brain injury post arrest.   Still on ventilator.  Had some hemoptysis over weekend but resolved on txa    # acute hypoxemic respiratory failure  # hemoptysis  # cardiac arrest  # anoxic brain injury  # cavitary pneumonia  - c/w full vent support for now  - off sedation, appears comfortable  - c/w keppra, nebulized TXA, last day unasyn today  - plan for compassionate/elective extubation tomorrow

## 2025-02-10 NOTE — PROGRESS NOTE ADULT - NS ATTEND AMEND GEN_ALL_CORE FT
Agree with above, patient's family leaning toward comfort care measures. Tentatively planned for compassionate extubation tomorrow.

## 2025-02-10 NOTE — PROGRESS NOTE ADULT - ASSESSMENT
79 year old male with sickle cell trait, anemia presenting to [facility] s/p cardiac arrest      Anemia  -undergoing care with Dr Fountain of [facility]  -H&H 7.2/19.8  -multifactorial  -Transfuse for Hgb < 7.0  -coagulopathy in setting of shock liver  -please send off hemolysis labs  -pls send off coags and Factors V, VII, VIII, X  -Supportive care   - no further lab draws, as per family's wishes      Septic shock/PEA arrest  -UTI  -acute hypoxemic respiratory failure, intubated, requiring pressors  s/p cardiac arrest  -cavitary lung lesion  -DOV due to shock  -Monitor for DIC, pls check Fibrinogen and would consider cryo if < 150  -appreciate MICU recs    Thrombocytopenia  -plts 38K  -likely secondary to shock liver  -pls transfuse for plts < 10K or < 30K with fever or < 50K with bleeding  - no further lab draws as per family's wishes      will continue to follow    Pearl Nieto NP  Hematology/Oncology  [facility]  [phone] (Office)  [phone] (Alt office)  Evenings and weekends please call MD on call or office

## 2025-02-10 NOTE — PROGRESS NOTE ADULT - SUBJECTIVE AND OBJECTIVE BOX
Date of Service : 2/10/2025  Indication for Geriatrics and Palliative Care Services: goals of care    SUBJECTIVE AND OBJECTIVE:  Patient seen and examined at bedside. Patient intubated , unarousable despite being off sedatives since last week.     INTERVAL HPI/OVERNIGHT EVENTS:  No acute events      Allergies    No Known Allergies    Intolerances    MEDICATIONS  (STANDING):  chlorhexidine 0.12% Liquid 15 milliLiter(s) Oral Mucosa every 12 hours  chlorhexidine 2% Cloths 1 Application(s) Topical <User Schedule>  dextrose 5%. 1000 milliLiter(s) (100 mL/Hr) IV Continuous <Continuous>  dextrose 5%. 1000 milliLiter(s) (50 mL/Hr) IV Continuous <Continuous>  dextrose 50% Injectable 25 Gram(s) IV Push once  dextrose 50% Injectable 12.5 Gram(s) IV Push once  dextrose 50% Injectable 25 Gram(s) IV Push once  glucagon  Injectable 1 milliGRAM(s) IntraMuscular once  heparin   Injectable 5000 Unit(s) SubCutaneous every 12 hours  latanoprost 0.005% Ophthalmic Solution 1 Drop(s) Both EYES at bedtime  levETIRAcetam  IVPB 250 milliGRAM(s) IV Intermittent every 12 hours  levothyroxine 25 MICROGram(s) Oral daily  midodrine 10 milliGRAM(s) Oral every 8 hours  pantoprazole  Injectable 40 milliGRAM(s) IV Push daily  petrolatum Ophthalmic Ointment 1 Application(s) Both EYES two times a day  polyethylene glycol 3350 17 Gram(s) Oral daily  senna 2 Tablet(s) Oral at bedtime    MEDICATIONS  (PRN):  albuterol/ipratropium for Nebulization 3 milliLiter(s) Nebulizer every 6 hours PRN Respiratory Distress  dextrose Oral Gel 15 Gram(s) Oral once PRN Blood Glucose LESS THAN 70 milliGRAM(s)/deciliter      ITEMS UNCHECKED ARE NOT PRESENT    PRESENT SYMPTOMS: [ x]Unable to self-report due to altered mental status- see [ ] CPOT [x ] PAINADS [ x] RDOS  Source if other than patient:  [ ]Family   [ ]Team     Pain:  [ ]yes [ ]no  QOL impact -   Location -                    Aggravating factors -  Quality -  Radiation -  Timing-  Severity (0-10 scale):  Minimal acceptable level / Pain Goal (0-10 scale):     Dyspnea:                           [ ]Mild [ ]Moderate [ ]Severe  Anxiety:                             [ ]Mild [ ]Moderate [ ]Severe  Agitation:                          [ ]Mild [ ]Moderate [ ]Severe  Fatigue:                             [ ]Mild [ ]Moderate [ ]Severe  Nausea:                             [ ]Mild [ ]Moderate [ ]Severe  Loss of appetite:              [ ]Mild [ ]Moderate [ ]Severe  Constipation:                   [ ]Mild [ ]Moderate [ ]Severe  Diarrhea:                          [ ]Mild [ ]Moderate [ ]Severe    CPOT:    https://www.[facility].org/getattachment/[email or web address]/Critical-Care-Pain-Observation-Tool-(CPOT)    PCSSQ[Palliative Care Spiritual Screening Question]   Severity (0-10):  Score of 4 or > indicate consideration of Chaplaincy referral.  Chaplaincy Referral: [x ] yes [ ] refused [ ] following [ ] deferred    Caregiver [place]? : [ ] yes [ ] no [ ] Declined [x ] Deferred              Social work referral [ ] Patient & Family Centered Care Referral [ ]     Anticipatory Grief present?:  [ x] yes [ ] no  [ ] Deferred                  Social work referral [ ] Chaplaincy Referral[x ] Caregiver Support Referral [x]     Other Symptoms:  [ ]All other review of systems negative   [ x] Unable to obtain due to poor mentation     PHYSICAL EXAM:  Vital Signs Last 24 Hrs  T(C): 36.7 (10 Feb 2025 12:00), Max: 36.8 (10 Feb 2025 04:00)  T(F): 98.1 (10 Feb 2025 12:00), Max: 98.2 (10 Feb 2025 04:00)  HR: 67 (10 Feb 2025 18:18) (64 - 70)  BP: 101/43 (10 Feb 2025 16:00) (101/43 - 122/49)  BP(mean): 61 (10 Feb 2025 16:00) (61 - 81)  RR: 14 (10 Feb 2025 16:00) (14 - 34)  SpO2: 100% (10 Feb 2025 18:18) (97% - 100%)    Parameters below as of 10 Feb 2025 18:18  Patient On (Oxygen Delivery Method): ventilator      GENERAL:  [ ]Alert  [ ]Oriented x   [ ]Lethargic  [ ]Cachexia  [x ]Unarousable  [ ]Verbal  [ x]Non-Verbal  [x ] No Distress  Behavioral:   [ ] Anxiety  [ ] Delirium [ ] Agitation [ ] Calm  [ x] Other-encephalopathy   HEENT:  [ ]Normal  [ ] Temporal Wasting  [ ]Dry mouth   [ x]ET Tube  [ ]Oral lesions  [ ] Mucositis  PULMONARY: no accessory muscle use, on ventilator   [ ]Clear [ ]Tachypnea  [ ]Audible excessive secretions   [ ]Rhonchi        [ ]Right [ ]Left [ ]Bilateral  [ ]Crackles        [ ]Right [ ]Left [ ]Bilateral  [ ]Wheezing     [ ]Right [ ]Left [ ]Bilateral  [ ]Diminished breath sounds [ ]right [ ]left [ ]bilateral  CARDIOVASCULAR:    [ x]Regular [ ]Irregular [ ]Tachy  [ ]Candelario [ ]Murmur [ ]Other  GASTROINTESTINAL:  [ x]Soft  [ ]Distended   [ ]+BS  [ x]Non tender [ ]Tender  [ ]PEG [x ]OGT/ NGT  Last BM: 2/10  GENITOURINARY:  [ ]Normal [ ] Incontinent   [ ]Oliguria/Anuria   [ x]Leal  MUSCULOSKELETAL:   [ ]Normal   [ ]Weakness  [x ]Bed/Wheelchair bound [ ]Edema  [  ] amputation  [  ] contraction  NEUROLOGIC:   [ ]No focal deficits  [x ]Cognitive impairment  [x ]Dysphagia [ ]Dysarthria [ ]Paresis [ ]Other   SKIN: See Nursing Skin Assessment for further details  [ ]Normal    [ ]Rash  [ ]Pressure ulcer(s)       Present on admission [ ]y [ ]n   [  ]  Wound    [  ] hyperpigmentation    CRITICAL CARE:  [ ]Shock Present  [ ]Septic [ ]Cardiogenic [ ]Neurologic [ ]Hypovolemic  [ ]Vasopressors [ ]Inotropes  [x ]Respiratory failure present [ x]Mechanical Ventilation [ ]Non-invasive ventilatory support [ ]High-Flow Mode: CPAP with PS, FiO2: 40, PEEP: 5, PS: 14, MAP: 10, PIP: 22  [ x]Acute  [ ]Chronic [ ]Hypoxic  [ ]Hypercarbic [ ]Other  [ ]Other organ failure     LABS:  reviewed                         6.6    26.12 )-----------( 225      ( 07 Feb 2025 00:10 )             18.7   02-07    139  |  96[L]  |  116[H]  ----------------------------<  142[H]  4.2   |  23  |  7.73[H]    Ca    8.5      07 Feb 2025 00:10  Phos  7.9     02-07  Mg     2.40     02-07    TPro  6.7  /  Alb  1.7[L]  /  TBili  0.7  /  DBili  x   /  AST  78[H]  /  ALT  40  /  AlkPhos  127[H]  02-07  PT/INR - ( 07 Feb 2025 00:10 )   PT: 17.7 sec;   INR: 1.53 ratio         PTT - ( 07 Feb 2025 00:10 )  PTT:30.6 sec  Urinalysis Basic - ( 07 Feb 2025 00:10 )    Color: x / Appearance: x / SG: x / pH: x  Gluc: 142 mg/dL / Ketone: x  / Bili: x / Urobili: x   Blood: x / Protein: x / Nitrite: x   Leuk Esterase: x / RBC: x / WBC x   Sq Epi: x / Non Sq Epi: x / Bacteria: x      CAPILLARY BLOOD GLUCOSE              RADIOLOGY & ADDITIONAL STUDIES: reviewed     Protein Calorie Malnutrition Present: [ ]mild [ ]moderate [ ]severe [ ]underweight [ ]morbid obesity  https://www.andeal.org/vault/2440/web/files/ONC/Table_Clinical%20Characteristics%20to%20Document%20Malnutrition-White%20JV%20et%20al%202012.pdf    Height (cm): 167.6 (01-28-25 @ 21:12)  Weight (kg): 48 (01-28-25 @ 21:12)  BMI (kg/m2): 17.1 (01-28-25 @ 21:12)    [ x]PPSV2 < or = 30%  [ ]significant weight loss [ ]poor nutritional intake [ ]anasarca   [ ]Artificial Nutrition    REFERRALS:   [ ]Chaplaincy  [ ]Hospice  [ ]Child Life  [ ]Social Work  [ x]Case management [ ]Holistic Therapy

## 2025-02-10 NOTE — PROGRESS NOTE ADULT - ASSESSMENT
79-year-old male PMH CAD (s/p stent 2011 and CABG 2017), hypothyroidism, gout, COPD presenting after cardiac arrest at home of unclear etiology, acute hypoxemic respiratory failure due to aspiration pneumonia +/- cavitary lesion/lung abscess +/- septic shock due to UTI. Plan for palliative extubation Tuesday 2/11.       =====Neurologic=====  #Altered Mental Status  Patient is AOx0 after being found down for approx 25 minutes, possible anoxia in setting of prolonged down time prior to ROSC  CT head 1/28 with no acute intracranial hemorrhage, mass effect, midline shift  Neurology following  vEEG stopped 2/1, prop weaned off  poor prognosis for meaningful neurological functional recovery, discussed prognosis with wife today 2/5  MRI with diffuse anoxic brain injury  extubate tuesday 2/11/25    - Pt is intubated  - Keppra 500mg q12hr, decrease levetiracetam to 250 mg Q12H 2/5    =====Cardiovascular=====  #Cardiac arrest  Pt s/p cardiac arrest with ROSC obtained in the field per EMS prior to arrival. Pt hypotensive upon arrival.  Pt on midodrine  TTE 1/29: EF 65%, no abnormalities    - Titrate off pressors, pt off levo  - Monitor on telemetry     =====Pulmonary=====  #AHRF  Patient obtunded, intubated in setting of cardiac arrest   CXR s/p intubation with left lung base opacity may reflect atelectasis vs. infection   Pt s/p bronchoscopy overnight 1/28 showing large blood clot extending from the distal trachea, occluding the left mainstem bronchus, extending to the left lower lobe bronchus  CT chest 1/28: Cavitary opacity in the left lower lobe measuring up to 7.9 cm. most likely represents pulmonary abscess, less likely neoplasm  Pt follows with Dr. Guardado outpatient, concern for VOLODYMYR  S/p vanc+azithromycin, urine legionella negative  S/p bronch by IP 1/30  Bronchial cx, gram stain (1/30) consistent w/ body nicolasa    - Treat infection empirically with Unasyn (2/3-)  - duonebs q6hr PRN    =====GI=====  #Transaminitis  Pt with increasing LFTs and coags concerning for shock liver  - continue to support blood pressure   - stopped lab draws per family wishes     #Diet  - pt has OG tube, nutrition recs appreciated, on tube feeds    =====Renal/=====  #DOV, Cr uptrending, likely ATN iso shock  pt has yu in  4mg bumex 1/31, s/p bumex drip  - will bolus IVF and monitor output  - maintenance fluids with LR  - stopped lab draws per family wishes     #Metabolic acidosis  - in setting of cardiac arrest and prolonged downtime with tissue hypoperfusion causing metabolic acidosis 2/2 lactate  - S/p bicarb push  - stopped lab draws per family wishes     =====Infectious Disease=====  #UTI  s/p Zosyn, azithro, and vanc.  - urine cultures (1/28) growing Klebsiella  - BCxs (1/28) (-)  - Legionella Ag (1/30) (-)  - Unasyn 5 days (2/3- )    =====Endocrine=====  pt has hypothyroidism, on levothyroxine 25 mcg at home  - IV synthroid 18mcg QHS      =====Heme/Onc=====  - DVT PPX: held due to thrombocytopenia, restarted Heparin SubQ 2/3      #Anemia  - Drop in hg to 6.6 noted 2/7  - CTM, transfusions not in alignment with GOC    =====MICU General=====  Intubated 1/28  AB: Unasyn  DVT: restarted heparin 2/3    Extensive goals of care conversation with wife at bedside. Discussed patient's MRI findings with diffuse anoxic brain injury, EEG without significant cerebral activity and lack of meaningful mental status. He has severe anoxic encephalopathy and it has been >1 week since admission without mental status recovery.  Per GOC conversation on 2/7/25, will plan for possible palliative extubation on Tuesday 79-year-old male PMH CAD (s/p stent 2011 and CABG 2017), hypothyroidism, gout, COPD presenting after cardiac arrest at home of unclear etiology, acute hypoxemic respiratory failure due to aspiration pneumonia +/- cavitary lesion/lung abscess +/- septic shock due to UTI. Plan for palliative extubation Tuesday 2/11.       =====Neurologic=====  #Altered Mental Status  Patient is AOx0 after being found down for approx 25 minutes, possible anoxia in setting of prolonged down time prior to ROSC  CT head 1/28 with no acute intracranial hemorrhage, mass effect, midline shift  Neurology following  vEEG stopped 2/1, prop weaned off  poor prognosis for meaningful neurological functional recovery, discussed prognosis with wife today 2/5  MRI with diffuse anoxic brain injury  plan palliative extubation tuesday 2/11/25    - Pt is intubated  - Keppra 500mg q12hr, decrease levetiracetam to 250 mg Q12H 2/5    =====Cardiovascular=====  #Cardiac arrest  Pt s/p cardiac arrest with ROSC obtained in the field per EMS prior to arrival. Pt hypotensive upon arrival.  Pt on midodrine  TTE 1/29: EF 65%, no abnormalities    - Titrate off pressors, pt off levo  - Monitor on telemetry     =====Pulmonary=====  #AHRF  Patient obtunded, intubated in setting of cardiac arrest   CXR s/p intubation with left lung base opacity may reflect atelectasis vs. infection   Pt s/p bronchoscopy overnight 1/28 showing large blood clot extending from the distal trachea, occluding the left mainstem bronchus, extending to the left lower lobe bronchus  CT chest 1/28: Cavitary opacity in the left lower lobe measuring up to 7.9 cm. most likely represents pulmonary abscess, less likely neoplasm  Pt follows with Dr. Guardado outpatient, concern for VOLODYMYR  S/p vanc+azithromycin, urine legionella negative  S/p bronch by IP 1/30  Bronchial cx, gram stain (1/30) consistent w/ body nicolasa    - Treat infection empirically with Unasyn (2/3-)  - duonebs q6hr PRN    =====GI=====  #Transaminitis  Pt with increasing LFTs and coags concerning for shock liver  - continue to support blood pressure   - stopped lab draws per family wishes     #Diet  - pt has OG tube, nutrition recs appreciated, on tube feeds    =====Renal/=====  #DOV, Cr uptrending, likely ATN iso shock  pt has yu in  4mg bumex 1/31, s/p bumex drip  - maintenance fluids with LR  - stopped lab draws per family wishes     #Metabolic acidosis  - in setting of cardiac arrest and prolonged downtime with tissue hypoperfusion causing metabolic acidosis 2/2 lactate  - S/p bicarb push  - stopped lab draws per family wishes     =====Infectious Disease=====  #UTI  s/p Zosyn, azithro, and vanc.  - urine cultures (1/28) growing Klebsiella  - BCxs (1/28) (-)  - Legionella Ag (1/30) (-)  - Unasyn (2/3- )  #PNA  - Unasyn (2/3- )    =====Endocrine=====  pt has hypothyroidism, on levothyroxine 25 mcg at home  - IV synthroid 18mcg QHS      =====Heme/Onc=====  - DVT PPX: held due to thrombocytopenia, restarted Heparin SubQ 2/3      #Anemia  - Drop in hg to 6.6 noted 2/7  - CTM, transfusions not in alignment with GOC    =====MICU General=====  Intubated 1/28  AB: Unasyn  DVT: restarted heparin 2/3    Extensive goals of care conversation with wife at bedside. Discussed patient's MRI findings with diffuse anoxic brain injury, EEG without significant cerebral activity and lack of meaningful mental status. He has severe anoxic encephalopathy and it has been >1 week since admission without mental status recovery.  Per GOC conversation on 2/7/25, will plan for possible palliative extubation on Tuesday

## 2025-02-10 NOTE — PROGRESS NOTE ADULT - SUBJECTIVE AND OBJECTIVE BOX
Reason for consult: Anemia    Patient seen and examined. No acute events overnight.       Allergies    No Known Allergies        MEDICATIONS  (STANDING):  chlorhexidine 0.12% Liquid 15 milliLiter(s) Oral Mucosa every 12 hours  chlorhexidine 2% Cloths 1 Application(s) Topical <User Schedule>  dextrose 5%. 1000 milliLiter(s) (100 mL/Hr) IV Continuous <Continuous>  dextrose 5%. 1000 milliLiter(s) (50 mL/Hr) IV Continuous <Continuous>  dextrose 50% Injectable 25 Gram(s) IV Push once  dextrose 50% Injectable 12.5 Gram(s) IV Push once  dextrose 50% Injectable 25 Gram(s) IV Push once  glucagon  Injectable 1 milliGRAM(s) IntraMuscular once  insulin lispro (ADMELOG) corrective regimen sliding scale   SubCutaneous every 6 hours  latanoprost 0.005% Ophthalmic Solution 1 Drop(s) Both EYES at bedtime  levETIRAcetam   Injectable 500 milliGRAM(s) IV Push every 12 hours  levothyroxine Injectable 18 MICROGram(s) IV Push at bedtime  mupirocin 2% Ointment 1 Application(s) Topical two times a day  norepinephrine Infusion 0.729 MICROgram(s)/kG/Min (65.6 mL/Hr) IV Continuous <Continuous>  pantoprazole  Injectable 40 milliGRAM(s) IV Push two times a day  petrolatum Ophthalmic Ointment 1 Application(s) Both EYES two times a day  piperacillin/tazobactam IVPB.. 3.375 Gram(s) IV Intermittent every 12 hours  propofol Infusion 10 MICROgram(s)/kG/Min (2.88 mL/Hr) IV Continuous <Continuous>    MEDICATIONS  (PRN):  albuterol/ipratropium for Nebulization 3 milliLiter(s) Nebulizer every 6 hours PRN Respiratory Distress  dextrose Oral Gel 15 Gram(s) Oral once PRN Blood Glucose LESS THAN 70 milliGRAM(s)/deciliter          VITAL SIGNS:  ICU Vital Signs Last 24 Hrs  T(C): 36.8 (10 Feb 2025 04:00), Max: 37.1 (09 Feb 2025 16:00)  T(F): 98.2 (10 Feb 2025 04:00), Max: 98.8 (09 Feb 2025 16:00)  HR: 66 (10 Feb 2025 07:48) (62 - 69)  BP: 113/47 (10 Feb 2025 06:00) (101/46 - 121/56)  BP(mean): 66 (10 Feb 2025 06:00) (63 - 81)  ABP: --  ABP(mean): --  RR: 18 (10 Feb 2025 06:00) (14 - 24)  SpO2: 100% (10 Feb 2025 07:48) (97% - 100%)    O2 Parameters below as of 10 Feb 2025 07:48  Patient On (Oxygen Delivery Method): CPAP 5/10/40%          PE  NAD  Awake, alert  Anicteric, MMM  RRR  CTAB  Abd soft, NT, ND  No c/c/e  No rash grossly  FROM                          7.2    16.27 )-----------( 38       ( 30 Jan 2025 01:00 )             19.8       01-30    134[L]  |  91[L]  |  49[H]  ----------------------------<  95  4.2   |  29  |  2.62[H]    Ca    7.5[L]      30 Jan 2025 01:00  Phos  4.7     01-30  Mg     2.10     01-30    TPro  6.2  /  Alb  1.8[L]  /  TBili  1.2  /  DBili  x   /  AST  585[H]  /  ALT  294[H]  /  AlkPhos  63  01-30

## 2025-02-10 NOTE — PROGRESS NOTE ADULT - SUBJECTIVE AND OBJECTIVE BOX
INTERVAL HPI/OVERNIGHT EVENTS:  -MRI with anoxic brain injury  -plan to extubated 2/11/25    SUBJECTIVE: Patient seen and examined at bedside. Patient intubated      VITAL SIGNS:  ICU Vital Signs Last 24 Hrs  T(C): 36.8 (10 Feb 2025 04:00), Max: 37.1 (09 Feb 2025 16:00)  T(F): 98.2 (10 Feb 2025 04:00), Max: 98.8 (09 Feb 2025 16:00)  HR: 66 (10 Feb 2025 07:48) (62 - 69)  BP: 113/47 (10 Feb 2025 06:00) (101/46 - 121/56)  BP(mean): 66 (10 Feb 2025 06:00) (63 - 81)  ABP: --  ABP(mean): --  RR: 18 (10 Feb 2025 06:00) (14 - 24)  SpO2: 100% (10 Feb 2025 07:48) (97% - 100%)    O2 Parameters below as of 10 Feb 2025 07:48  Patient On (Oxygen Delivery Method): CPAP 5/10/40%          Mode: CPAP with PS, FiO2: 40, PEEP: 5, PS: 10, MAP: 8  Plateau pressure:   P/F ratio:     02-09 @ 07:01  -  02-10 @ 07:00  --------------------------------------------------------  IN: 960 mL / OUT: 525 mL / NET: 435 mL      CAPILLARY BLOOD GLUCOSE        ECG:    PHYSICAL EXAM:    GENERAL: Intubated  HEAD: NC/AT  EYES: Pupils fixed  LUNGS: intubated, CPAP settings above  CARDIAC: regular rate  ABDOMEN: Soft, non tender, non distended, no rebound, no guarding  EXT: no deformities.  NEURO: Intubated, A&Ox0  SKIN: Warm and dry.   PSYCH: intubated     MEDICATIONS:  MEDICATIONS  (STANDING):  ampicillin/sulbactam  IVPB      ampicillin/sulbactam  IVPB 3 Gram(s) IV Intermittent every 24 hours  chlorhexidine 0.12% Liquid 15 milliLiter(s) Oral Mucosa every 12 hours  chlorhexidine 2% Cloths 1 Application(s) Topical <User Schedule>  dextrose 5%. 1000 milliLiter(s) (100 mL/Hr) IV Continuous <Continuous>  dextrose 5%. 1000 milliLiter(s) (50 mL/Hr) IV Continuous <Continuous>  dextrose 50% Injectable 25 Gram(s) IV Push once  dextrose 50% Injectable 12.5 Gram(s) IV Push once  dextrose 50% Injectable 25 Gram(s) IV Push once  glucagon  Injectable 1 milliGRAM(s) IntraMuscular once  heparin   Injectable 5000 Unit(s) SubCutaneous every 12 hours  latanoprost 0.005% Ophthalmic Solution 1 Drop(s) Both EYES at bedtime  levETIRAcetam  IVPB 250 milliGRAM(s) IV Intermittent every 12 hours  levothyroxine 25 MICROGram(s) Oral daily  midodrine 10 milliGRAM(s) Oral every 8 hours  pantoprazole  Injectable 40 milliGRAM(s) IV Push daily  petrolatum Ophthalmic Ointment 1 Application(s) Both EYES two times a day  polyethylene glycol 3350 17 Gram(s) Oral daily  senna 2 Tablet(s) Oral at bedtime  tranexamic acid Injectable for Nebulization 500 milliGRAM(s) Inhalation every 8 hours    MEDICATIONS  (PRN):  albuterol/ipratropium for Nebulization 3 milliLiter(s) Nebulizer every 6 hours PRN Respiratory Distress  dextrose Oral Gel 15 Gram(s) Oral once PRN Blood Glucose LESS THAN 70 milliGRAM(s)/deciliter      ALLERGIES:  Allergies    No Known Allergies    Intolerances        LABS:                RADIOLOGY & ADDITIONAL TESTS: Reviewed.   INTERVAL HPI/OVERNIGHT EVENTS:  -no acute overnight events  - plan to extubate w/ family tomorrow.    SUBJECTIVE: Patient seen and examined at bedside. A&Ox0, on CPAP, unresponsive. No family at bedise today.      VITAL SIGNS:  ICU Vital Signs Last 24 Hrs  T(C): 36.8 (10 Feb 2025 04:00), Max: 37.1 (09 Feb 2025 16:00)  T(F): 98.2 (10 Feb 2025 04:00), Max: 98.8 (09 Feb 2025 16:00)  HR: 66 (10 Feb 2025 07:48) (62 - 69)  BP: 113/47 (10 Feb 2025 06:00) (101/46 - 121/56)  BP(mean): 66 (10 Feb 2025 06:00) (63 - 81)  ABP: --  ABP(mean): --  RR: 18 (10 Feb 2025 06:00) (14 - 24)  SpO2: 100% (10 Feb 2025 07:48) (97% - 100%)    O2 Parameters below as of 10 Feb 2025 07:48  Patient On (Oxygen Delivery Method): CPAP 5/10/40%      Mode: CPAP with PS, FiO2: 40, PEEP: 5, PS: 10, MAP: 8  Plateau pressure:   P/F ratio:     02-09 @ 07:01  -  02-10 @ 07:00  --------------------------------------------------------  IN: 960 mL / OUT: 525 mL / NET: 435 mL      CAPILLARY BLOOD GLUCOSE        ECG:    PHYSICAL EXAM:    GENERAL: Unresponsive  HEAD: NC/AT  EYES: Pupils mid and fixed  LUNGS: CPAP settings above  CARDIAC: regular rate  ABDOMEN: Soft, non tender, non distended, no rebound, no guarding  EXT: no deformities.  NEURO: Intubated, A&Ox0  SKIN: Warm and dry.   PSYCH: intubated     MEDICATIONS:  MEDICATIONS  (STANDING):  ampicillin/sulbactam  IVPB      ampicillin/sulbactam  IVPB 3 Gram(s) IV Intermittent every 24 hours  chlorhexidine 0.12% Liquid 15 milliLiter(s) Oral Mucosa every 12 hours  chlorhexidine 2% Cloths 1 Application(s) Topical <User Schedule>  dextrose 5%. 1000 milliLiter(s) (100 mL/Hr) IV Continuous <Continuous>  dextrose 5%. 1000 milliLiter(s) (50 mL/Hr) IV Continuous <Continuous>  dextrose 50% Injectable 25 Gram(s) IV Push once  dextrose 50% Injectable 12.5 Gram(s) IV Push once  dextrose 50% Injectable 25 Gram(s) IV Push once  glucagon  Injectable 1 milliGRAM(s) IntraMuscular once  heparin   Injectable 5000 Unit(s) SubCutaneous every 12 hours  latanoprost 0.005% Ophthalmic Solution 1 Drop(s) Both EYES at bedtime  levETIRAcetam  IVPB 250 milliGRAM(s) IV Intermittent every 12 hours  levothyroxine 25 MICROGram(s) Oral daily  midodrine 10 milliGRAM(s) Oral every 8 hours  pantoprazole  Injectable 40 milliGRAM(s) IV Push daily  petrolatum Ophthalmic Ointment 1 Application(s) Both EYES two times a day  polyethylene glycol 3350 17 Gram(s) Oral daily  senna 2 Tablet(s) Oral at bedtime  tranexamic acid Injectable for Nebulization 500 milliGRAM(s) Inhalation every 8 hours    MEDICATIONS  (PRN):  albuterol/ipratropium for Nebulization 3 milliLiter(s) Nebulizer every 6 hours PRN Respiratory Distress  dextrose Oral Gel 15 Gram(s) Oral once PRN Blood Glucose LESS THAN 70 milliGRAM(s)/deciliter      ALLERGIES:  Allergies    No Known Allergies    Intolerances        LABS:  - no ongoing labs              RADIOLOGY & ADDITIONAL TESTS: Reviewed.

## 2025-02-11 NOTE — PROGRESS NOTE ADULT - PROBLEM SELECTOR PLAN 4
- Patient with worsening renal function; CR- 7.14 on 2/7  - management as per primary team.
- Patient with worsening renal function; CR- 7.14 on 2/7  - no escalation of care as per goc discussion
- Patient with worsening renal function; CR- 7.14 today  - management as per primary team.

## 2025-02-11 NOTE — CHART NOTE - NSCHARTNOTEFT_GEN_A_CORE
______________ CRITICAL CARE SEVERE MALNUTRITION FOLLOW-UP ________________          79y  Male s/p cardiac arrest now with severe anoxic brain injury.  Pt. DNR.  As documented, no further escalation of care, no further lab draws.  Pt. pending compassionate extubation for today.  Hence, current medical condition precludes any further nutritional interventions.            Chioma Rdz, SEBASTIANN, CDN       pager [phone] or MS Teams

## 2025-02-11 NOTE — DISCHARGE NOTE FOR THE EXPIRED PATIENT - HOSPITAL COURSE
80 yo M PMH CAD (s/p stent 2011 and CABG 2017), hypothyroidism, gout, COPD admitted on 1/28 after cardiact arrest with ROSC requiring pressor support for cardiogenic shock, with hospital course c/b left mainstem clot, LLL cavitary abscess treated wtih broad spectrum empric antibiosis, shock liver, DOV likely ATN 2/2 arrest, and MRI head performed on 2/1 demonstrating diffuse anoxic brain injury consistent with prolonged cardiac arrest and patient's persistent obtunded mental status despite weaning off sedation.

## 2025-02-11 NOTE — PROGRESS NOTE ADULT - CRITICAL CARE ATTENDING COMMENT
Critically ill patient requiring frequent bedside visits with therapy changes.

## 2025-02-11 NOTE — PROGRESS NOTE ADULT - ASSESSMENT
79-year-old male PMH CAD (s/p stent 2011 and CABG 2017), hypothyroidism, gout, COPD presenting after cardiac arrest at home of unclear etiology, acute hypoxemic respiratory failure due to aspiration pneumonia +/- cavitary lesion/lung abscess +/- septic shock due to UTI. Plan for palliative extubation Tuesday 2/11.       =====Neurologic=====  #Altered Mental Status  Patient is AOx0 after being found down for approx 25 minutes, possible anoxia in setting of prolonged down time prior to ROSC  CT head 1/28 with no acute intracranial hemorrhage, mass effect, midline shift  Neurology following  vEEG stopped 2/1, prop weaned off  poor prognosis for meaningful neurological functional recovery, discussed prognosis with wife today 2/5  MRI with diffuse anoxic brain injury  plan palliative extubation tuesday 2/11/25    - Pt is intubated  - levetiracetam to 250 mg Q12H solution    =====Cardiovascular=====  #Cardiac arrest  Pt s/p cardiac arrest with ROSC obtained in the field per EMS prior to arrival. Pt hypotensive upon arrival.  Pt on midodrine  TTE 1/29: EF 65%, no abnormalities    - Titrate off pressors, pt off levo  - Monitor on telemetry     =====Pulmonary=====  #AHRF  Patient obtunded, intubated in setting of cardiac arrest   CXR s/p intubation with left lung base opacity may reflect atelectasis vs. infection   Pt s/p bronchoscopy overnight 1/28 showing large blood clot extending from the distal trachea, occluding the left mainstem bronchus, extending to the left lower lobe bronchus  CT chest 1/28: Cavitary opacity in the left lower lobe measuring up to 7.9 cm. most likely represents pulmonary abscess, less likely neoplasm  Pt follows with Dr. Guardado outpatient, concern for VOLODYMYR  S/p vanc+azithromycin, urine legionella negative  S/p bronch by IP 1/30  Bronchial cx, gram stain (1/30) consistent w/ body nicolasa    - Treat infection empirically with Unasyn (2/3-)  - duonebs q6hr PRN    =====GI=====  #Transaminitis  Pt with increasing LFTs and coags concerning for shock liver  - continue to support blood pressure   - stopped lab draws per family wishes     #Diet  - pt has OG tube, nutrition recs appreciated, on tube feeds    =====Renal/=====  #DOV, Cr uptrending, likely ATN iso shock  pt has yu in  4mg bumex 1/31, s/p bumex drip  - maintenance fluids with LR  - stopped lab draws per family wishes     #Metabolic acidosis  - in setting of cardiac arrest and prolonged downtime with tissue hypoperfusion causing metabolic acidosis 2/2 lactate  - S/p bicarb push  - stopped lab draws per family wishes     =====Infectious Disease=====  #UTI  s/p Zosyn, azithro, and vanc.  - urine cultures (1/28) growing Klebsiella  - BCxs (1/28) (-)  - Legionella Ag (1/30) (-)  - Unasyn (2/3- )  #PNA  - Unasyn (2/3- )    =====Endocrine=====  pt has hypothyroidism, on levothyroxine 25 mcg at home  - IV synthroid 18mcg QHS      =====Heme/Onc=====  - DVT PPX: held due to thrombocytopenia, restarted Heparin SubQ 2/3      #Anemia  - Drop in hg to 6.6 noted 2/7  - CTM, transfusions not in alignment with GOC    =====MICU General=====  Intubated 1/28  AB: Unasyn  DVT: restarted heparin 2/3    Extensive goals of care conversation with wife at bedside. Discussed patient's MRI findings with diffuse anoxic brain injury, EEG without significant cerebral activity and lack of meaningful mental status. He has severe anoxic encephalopathy and it has been >1 week since admission without mental status recovery.  Per GOC conversation on 2/7/25, will plan for possible palliative extubation on Tuesday 79-year-old male PMH CAD (s/p stent 2011 and CABG 2017), hypothyroidism, gout, COPD presenting after cardiac arrest at home of unclear etiology, acute hypoxemic respiratory failure due to aspiration pneumonia +/- cavitary lesion/lung abscess +/- septic shock due to UTI. Plan for palliative extubation Tuesday 2/11.       =====Neurologic=====  #Altered Mental Status  Patient is AOx0 after being found down for approx 25 minutes, possible anoxia in setting of prolonged down time prior to ROSC  CT head 1/28 with no acute intracranial hemorrhage, mass effect, midline shift  Neurology following  vEEG stopped 2/1, prop weaned off  poor prognosis for meaningful neurological functional recovery, discussed prognosis with wife today 2/5  MRI with diffuse anoxic brain injury  plan palliative extubation today 2/11/25    - Pt is intubated  - levetiracetam to 250 mg Q12H solution    =====Cardiovascular=====  #Cardiac arrest  Pt s/p cardiac arrest with ROSC obtained in the field per EMS prior to arrival. Pt hypotensive upon arrival.  Pt on midodrine  TTE 1/29: EF 65%, no abnormalities    - Titrate off pressors, pt off levo  - Monitor on telemetry     =====Pulmonary=====  #AHRF  Patient obtunded, intubated in setting of cardiac arrest   CXR s/p intubation with left lung base opacity may reflect atelectasis vs. infection   Pt s/p bronchoscopy overnight 1/28 showing large blood clot extending from the distal trachea, occluding the left mainstem bronchus, extending to the left lower lobe bronchus  CT chest 1/28: Cavitary opacity in the left lower lobe measuring up to 7.9 cm. most likely represents pulmonary abscess, less likely neoplasm  Pt follows with Dr. Guardado outpatient, concern for VOLODYMYR  S/p vanc+azithromycin, urine legionella negative  S/p bronch by IP 1/30  Bronchial cx, gram stain (1/30) consistent w/ body nicolasa    - s/p Unasyn  - duonebs q6hr PRN    =====GI=====  #Transaminitis  Pt with increasing LFTs and coags concerning for shock liver  - continue to support blood pressure   - stopped lab draws per family wishes     #Diet  - pt has OG tube, nutrition recs appreciated, on tube feeds    =====Renal/=====  #DOV, Cr uptrending, likely ATN iso shock  pt has yu in  4mg bumex 1/31, s/p bumex drip  - maintenance fluids with LR  - stopped lab draws per family wishes     #Metabolic acidosis  - in setting of cardiac arrest and prolonged downtime with tissue hypoperfusion causing metabolic acidosis 2/2 lactate  - S/p bicarb push  - stopped lab draws per family wishes     =====Infectious Disease=====  #UTI  s/p Zosyn, azithro, and vanc.  - urine cultures (1/28) growing Klebsiella  - BCxs (1/28) (-)  - Legionella Ag (1/30) (-)  - s/p Unasyn  #PNA  - s/p Unasyn    =====Endocrine=====  pt has hypothyroidism, on levothyroxine 25 mcg at home  - IV synthroid 18mcg QHS      =====Heme/Onc=====  - DVT PPX: held due to thrombocytopenia, restarted Heparin SubQ 2/3      #Anemia  - Drop in hg to 6.6 noted 2/7  - CTM, transfusions not in alignment with GOC    =====MICU General=====  Intubated 1/28  AB: Unasyn  DVT: restarted heparin 2/3    Extensive goals of care conversation with wife at bedside. Discussed patient's MRI findings with diffuse anoxic brain injury, EEG without significant cerebral activity and lack of meaningful mental status. He has severe anoxic encephalopathy and it has been >1 week since admission without mental status recovery.  Per GOC conversation on 2/7/25, will plan for possible palliative extubation today. Will reach out to Palliative for assistance recs who spoke with family yesterday.

## 2025-02-11 NOTE — PROGRESS NOTE ADULT - TIME BILLING
Time spent for extensive review of the physical chart, electronic medical record, and documentation to obtain collateral information including but not limited to:  [x] Inpatient records (ED, H&P, primary team, and consultants if applicable, care coordination)  [x] Inpatient values/results (biomarkers, immunoassays, imaging, and microbiology results)  [x] Current or proposed treatment plans  [x] Pharmacotherapy review  [x] Discussion and coordinating care with primary team and interdisciplinary staff and floor staff  [x] Discussion including counseling/ education with the  surrogate decision maker, or family
Time spent for extensive review of the physical chart, electronic medical record, and documentation to obtain collateral information including but not limited to:  [x] Inpatient records (ED, H&P, primary team, and consultants if applicable, care coordination)  [x] Inpatient values/results (biomarkers, immunoassays, imaging, and microbiology results)  [x] Current or proposed treatment plans  [x] Pharmacotherapy review  [x] Discussion and coordinating care with primary team and interdisciplinary staff and floor staff  [x] Discussion including counseling/ education with the  surrogate decision maker, or family

## 2025-02-11 NOTE — PROGRESS NOTE ADULT - NUTRITIONAL ASSESSMENT
This patient has been assessed with a concern for Malnutrition and has been determined to have a diagnosis/diagnoses of Severe protein-calorie malnutrition and Underweight (BMI < 19).    This patient is being managed with:   Diet NPO with Tube Feed-  Tube Feeding Modality: Orogastric  Nepro with Carb Steady (NEPRORTH)  Total Volume for 24 Hours (mL): 720  Continuous  Starting Tube Feed Rate {mL per Hour}: 20  Increase Tube Feed Rate by (mL): 10     Every 4 hours  Until Goal Tube Feed Rate (mL per Hour): 40  Tube Feed Duration (in Hours): 18  Tube Feed Start Time: 11:00  Tube Feed Stop Time: 05:00  Liquid Protein Supplement     Qty per Day:  1  Entered: Jan 31 2025  2:55PM  
This patient has been assessed with a concern for Malnutrition and has been determined to have a diagnosis/diagnoses of Severe protein-calorie malnutrition and Underweight (BMI < 19).    This patient is being managed with:   Diet NPO with Tube Feed-  Tube Feeding Modality: Orogastric  Nepro with Carb Steady (NEPRORTH)  Total Volume for 24 Hours (mL): 720  Continuous  Starting Tube Feed Rate {mL per Hour}: 20  Increase Tube Feed Rate by (mL): 10     Every 4 hours  Until Goal Tube Feed Rate (mL per Hour): 40  Tube Feed Duration (in Hours): 18  Tube Feed Start Time: 11:00  Tube Feed Stop Time: 05:00  Liquid Protein Supplement     Qty per Day:  1  Entered: Jan 31 2025  2:55PM  
This patient has been assessed with a concern for Malnutrition and has been determined to have a diagnosis/diagnoses of Severe protein-calorie malnutrition and Underweight (BMI < 19).    This patient is being managed with:   Diet NPO-  Except Medications  Entered: Jan 29 2025  9:18AM  
This patient has been assessed with a concern for Malnutrition and has been determined to have a diagnosis/diagnoses of Severe protein-calorie malnutrition and Underweight (BMI < 19).    This patient is being managed with:   Diet NPO with Tube Feed-  Tube Feeding Modality: Orogastric  Nepro with Carb Steady (NEPRORTH)  Total Volume for 24 Hours (mL): 720  Continuous  Starting Tube Feed Rate {mL per Hour}: 20  Increase Tube Feed Rate by (mL): 10     Every 4 hours  Until Goal Tube Feed Rate (mL per Hour): 40  Tube Feed Duration (in Hours): 18  Tube Feed Start Time: 11:00  Tube Feed Stop Time: 05:00  Liquid Protein Supplement     Qty per Day:  1  Entered: Jan 31 2025  2:55PM  
This patient has been assessed with a concern for Malnutrition and has been determined to have a diagnosis/diagnoses of Severe protein-calorie malnutrition and Underweight (BMI < 19).    This patient is being managed with:   Diet NPO with Tube Feed-  Tube Feeding Modality: Orogastric  [employer] Peptide 1.5 ([id number])  Total Volume for 24 Hours (mL): 900  Continuous  Starting Tube Feed Rate {mL per Hour}: 10  Increase Tube Feed Rate by (mL): 10     Every 4 hours  Until Goal Tube Feed Rate (mL per Hour): 50  Tube Feed Duration (in Hours): 18  Tube Feed Start Time: 11:00  Tube Feed Stop Time: 05:00  Entered: Jan 30 2025  4:31PM  
This patient has been assessed with a concern for Malnutrition and has been determined to have a diagnosis/diagnoses of Severe protein-calorie malnutrition and Underweight (BMI < 19).    This patient is being managed with:   Diet NPO with Tube Feed-  Tube Feeding Modality: Orogastric  Nepro with Carb Steady (NEPRORTH)  Total Volume for 24 Hours (mL): 720  Continuous  Starting Tube Feed Rate {mL per Hour}: 20  Increase Tube Feed Rate by (mL): 10     Every 4 hours  Until Goal Tube Feed Rate (mL per Hour): 40  Tube Feed Duration (in Hours): 18  Tube Feed Start Time: 11:00  Tube Feed Stop Time: 05:00  Liquid Protein Supplement     Qty per Day:  1  Entered: Jan 31 2025  2:55PM  
[id number] Comprehensive

## 2025-02-11 NOTE — PROGRESS NOTE ADULT - PROBLEM SELECTOR PLAN 1
- s/p cardiac arrest  - patient currently intubated.

## 2025-02-11 NOTE — CHART NOTE - NSCHARTNOTEFT_GEN_A_CORE
DEATH NOTE    Called to bedside to evaluate the patient for pulselessness.     On physical exam, patient did not respond to verbal or noxious stimuli. No spontaneous respirations. Absent heart and breath sounds. Absent radial and carotid pulses. Pupils are fixed and dilated. EKG rhythm strip shows asystole. Patient pronounced dead at 5:03PM.  Attending notified. Family declined autopsy.

## 2025-02-11 NOTE — PROGRESS NOTE ADULT - ATTENDING COMMENTS
78 yo man PMHx CAD, CABG, COPD, p/w cardiac arrest at home of unclear down time, possibly hypoxic arrest in the setting of aspiration pneumonia with cavitary lesion seen on imaging. MRI demonstrates severe anoxic brain injury post arrest.   Still on ventilator.  Had some hemoptysis over weekend but resolved on txa      # acute hypoxemic respiratory failure  # hemoptysis  # cardiac arrest  # anoxic brain injury  # cavitary pneumonia  - c/w full vent support for now  - off sedation, appears comfortable  - c/w keppra, nebulized TXA  - plan for compassionate/elective extubation today after wife arrives

## 2025-02-11 NOTE — PROGRESS NOTE ADULT - PROBLEM SELECTOR PLAN 5
Patient requires total support for all ADL's.   Please assist with ADLs  Skin care as per protocol.

## 2025-02-11 NOTE — PROGRESS NOTE ADULT - NSPROGADDITIONALINFOA_GEN_ALL_CORE
In anticipation of a palliative extubation consider the following regimen:         Pre extubation:   -IV Dilaudid 0.2mg x1 20 minutes prior to extubation  -IV Ativan 0.2mg x1 20 minutes prior to extubation          Post extubation:  -IV Dilaudid 0.2mg q1 hour PRN Pain/ Dyspnea/Air hunger/RR>25/Increased work of breathing  -IV Ativan 0.2mg q1 hour PRN Anxiety/ Agitation   -IV Robinul 0.4mg q6 PRN secretions

## 2025-02-11 NOTE — PROGRESS NOTE ADULT - PROBLEM SELECTOR PLAN 8
Spoke with wife Yesenia and son Fede at bedside today. Plan for compassionate extubation at 4PM per wife.   Educated patient/family about what to expect about compassionate extubation process and what to expect post extubation. Emotional support provided    Case reviewed with primary team  Thank you for allowing us to participate in your patient's care. Please page [phone] for any questions/concerns.
Case reviewed with primary team  Thank you for allowing us to participate in your patient's care. Please page [phone] for any questions/concerns.
Case reviewed with primary team  Thank you for allowing us to participate in your patient's care. Please page [phone] for any questions/concerns.

## 2025-02-11 NOTE — PROGRESS NOTE ADULT - SUBJECTIVE AND OBJECTIVE BOX
INTERVAL HPI/OVERNIGHT EVENTS:  no events    SUBJECTIVE: Patient seen and examined at bedside.       VITAL SIGNS:  ICU Vital Signs Last 24 Hrs  T(C): 36.6 (11 Feb 2025 04:00), Max: 36.7 (10 Feb 2025 12:00)  T(F): 97.9 (11 Feb 2025 04:00), Max: 98.1 (10 Feb 2025 12:00)  HR: 67 (11 Feb 2025 07:10) (66 - 70)  BP: 116/51 (11 Feb 2025 04:00) (101/43 - 122/49)  BP(mean): 70 (11 Feb 2025 04:00) (61 - 72)  ABP: --  ABP(mean): --  RR: 18 (11 Feb 2025 04:00) (14 - 34)  SpO2: 100% (11 Feb 2025 07:10) (100% - 100%)    O2 Parameters below as of 11 Feb 2025 04:00  Patient On (Oxygen Delivery Method): ventilator    O2 Concentration (%): 40      Mode: CPAP with PS, FiO2: 40, PEEP: 5, PS: 12, ITime: 0.62, MAP: 8  Plateau pressure:   P/F ratio:     02-10 @ 07:01  -  02-11 @ 07:00  --------------------------------------------------------  IN: 900 mL / OUT: 665 mL / NET: 235 mL      CAPILLARY BLOOD GLUCOSE        ECG:    PHYSICAL EXAM:    General:   HEENT:   Neck:   Respiratory:   Cardiovascular:   Abdomen:   Extremities:  Neurological:    MEDICATIONS:  MEDICATIONS  (STANDING):  chlorhexidine 0.12% Liquid 15 milliLiter(s) Oral Mucosa every 12 hours  chlorhexidine 2% Cloths 1 Application(s) Topical <User Schedule>  dextrose 5%. 1000 milliLiter(s) (100 mL/Hr) IV Continuous <Continuous>  dextrose 5%. 1000 milliLiter(s) (50 mL/Hr) IV Continuous <Continuous>  dextrose 50% Injectable 25 Gram(s) IV Push once  dextrose 50% Injectable 12.5 Gram(s) IV Push once  dextrose 50% Injectable 25 Gram(s) IV Push once  glucagon  Injectable 1 milliGRAM(s) IntraMuscular once  heparin   Injectable 5000 Unit(s) SubCutaneous every 12 hours  latanoprost 0.005% Ophthalmic Solution 1 Drop(s) Both EYES at bedtime  levETIRAcetam  Solution 250 milliGRAM(s) Oral every 12 hours  levothyroxine 25 MICROGram(s) Oral daily  midodrine 10 milliGRAM(s) Oral every 8 hours  pantoprazole  Injectable 40 milliGRAM(s) IV Push daily  petrolatum Ophthalmic Ointment 1 Application(s) Both EYES two times a day  polyethylene glycol 3350 17 Gram(s) Oral daily  senna 2 Tablet(s) Oral at bedtime    MEDICATIONS  (PRN):  albuterol/ipratropium for Nebulization 3 milliLiter(s) Nebulizer every 6 hours PRN Respiratory Distress  dextrose Oral Gel 15 Gram(s) Oral once PRN Blood Glucose LESS THAN 70 milliGRAM(s)/deciliter      ALLERGIES:  Allergies    No Known Allergies    Intolerances        LABS:                RADIOLOGY & ADDITIONAL TESTS: Reviewed.   INTERVAL HPI/OVERNIGHT EVENTS:  no events    SUBJECTIVE: Patient seen and examined at bedside. Intubated, no family present.      VITAL SIGNS:  ICU Vital Signs Last 24 Hrs  T(C): 36.6 (11 Feb 2025 04:00), Max: 36.7 (10 Feb 2025 12:00)  T(F): 97.9 (11 Feb 2025 04:00), Max: 98.1 (10 Feb 2025 12:00)  HR: 67 (11 Feb 2025 07:10) (66 - 70)  BP: 116/51 (11 Feb 2025 04:00) (101/43 - 122/49)  BP(mean): 70 (11 Feb 2025 04:00) (61 - 72)  ABP: --  ABP(mean): --  RR: 18 (11 Feb 2025 04:00) (14 - 34)  SpO2: 100% (11 Feb 2025 07:10) (100% - 100%)    O2 Parameters below as of 11 Feb 2025 04:00  Patient On (Oxygen Delivery Method): ventilator    O2 Concentration (%): 40      Mode: CPAP with PS, FiO2: 40, PEEP: 5, PS: 12, ITime: 0.62, MAP: 8  Plateau pressure:   P/F ratio:     02-10 @ 07:01  -  02-11 @ 07:00  --------------------------------------------------------  IN: 900 mL / OUT: 665 mL / NET: 235 mL      CAPILLARY BLOOD GLUCOSE        ECG:    PHYSICAL EXAM:    General: Not interactive, intubated  HEENT: fixed pupils, NC/AT  Respiratory: intubated  Cardiovascular: regular rate  Abdomen: nondistended  Extremities: no deformities  Neurological: not responsive, no acute changes to mental status    MEDICATIONS:  MEDICATIONS  (STANDING):  chlorhexidine 0.12% Liquid 15 milliLiter(s) Oral Mucosa every 12 hours  chlorhexidine 2% Cloths 1 Application(s) Topical <User Schedule>  dextrose 5%. 1000 milliLiter(s) (100 mL/Hr) IV Continuous <Continuous>  dextrose 5%. 1000 milliLiter(s) (50 mL/Hr) IV Continuous <Continuous>  dextrose 50% Injectable 25 Gram(s) IV Push once  dextrose 50% Injectable 12.5 Gram(s) IV Push once  dextrose 50% Injectable 25 Gram(s) IV Push once  glucagon  Injectable 1 milliGRAM(s) IntraMuscular once  heparin   Injectable 5000 Unit(s) SubCutaneous every 12 hours  latanoprost 0.005% Ophthalmic Solution 1 Drop(s) Both EYES at bedtime  levETIRAcetam  Solution 250 milliGRAM(s) Oral every 12 hours  levothyroxine 25 MICROGram(s) Oral daily  midodrine 10 milliGRAM(s) Oral every 8 hours  pantoprazole  Injectable 40 milliGRAM(s) IV Push daily  petrolatum Ophthalmic Ointment 1 Application(s) Both EYES two times a day  polyethylene glycol 3350 17 Gram(s) Oral daily  senna 2 Tablet(s) Oral at bedtime    MEDICATIONS  (PRN):  albuterol/ipratropium for Nebulization 3 milliLiter(s) Nebulizer every 6 hours PRN Respiratory Distress  dextrose Oral Gel 15 Gram(s) Oral once PRN Blood Glucose LESS THAN 70 milliGRAM(s)/deciliter      ALLERGIES:  Allergies    No Known Allergies    Intolerances        LABS:                RADIOLOGY & ADDITIONAL TESTS: Reviewed.

## 2025-02-11 NOTE — PROGRESS NOTE ADULT - ATTENDING SUPERVISION STATEMENT
Resident
Fellow
Resident

## 2025-02-11 NOTE — PROGRESS NOTE ADULT - PROBLEM SELECTOR PROBLEM 7
Counseling regarding advanced care planning and goals of care

## 2025-02-11 NOTE — PROGRESS NOTE ADULT - PROBLEM SELECTOR PLAN 2
- Patient with anoxia s/p cardiac arrest  - CT Head 1/28 - No acute intracranial hemorrhage, mass effect, or midline shift. Severe sinusitis.  - MRI Head 2/4 - 1. Imaging findings compatible with diffuse hypoxic ischemic injury compatible with patient's history of cardiac arrest. 2. Tiny layering foci of diffusion restriction within the occipital horns for which a tiny amount of hemorrhage cannot be excluded. Ventriculitis cannot be excluded.  - per discussion with primary team, patient has not had improvement in mental status despite being off sedatives since last Saturday   - Neurology recommendations appreciated
- Patient with anoxia s/p cardiac arrest  - CT Head 1/28 - No acute intracranial hemorrhage, mass effect, or midline shift. Severe sinusitis.  - MRI Head 2/4 - 1. Imaging findings compatible with diffuse hypoxic ischemic injury compatible with patient's history of cardiac arrest. 2. Tiny layering foci of diffusion restriction within the occipital horns for which a tiny amount of hemorrhage cannot be excluded. Ventriculitis cannot be excluded.  - per discussion with primary team, patient has not had improvement in mental status despite being off sedatives since last Saturday   - Neurology recommendations appreciated
- CT Head 1/28 - No acute intracranial hemorrhage, mass effect, or midline shift. Severe sinusitis.  - MRI Head 2/4 - 1. Imaging findings compatible with diffuse hypoxic ischemic injury compatible with patient's history of cardiac arrest. 2. Tiny layering foci of diffusion restriction within the occipital horns for which a tiny amount of hemorrhage cannot be excluded. Ventriculitis cannot be excluded.  - per discussion with primary team, patient has not had improvement in mental status despite being off sedatives since last Saturday   - Neurology recommendations appreciated   - management as per primary team.

## 2025-02-11 NOTE — PROGRESS NOTE ADULT - PROBLEM SELECTOR PLAN 7
- HCP paperwork in chart completed in 6/2022 appointing the following: Primary HCP: Yesenia River: [phone]  Alternate HCP: Michael Holman: [phone]  - 2/7 - Family in agreement for DNR with no escalation of medical care. They plan for compassionate extubation tentatively on Tuesday after family has visited.   - 2/10 - Spoke with wife Yesenia who shared her grandchildren were struggling after visiting patient yesterday as they were close to patient. Recommended for Yesenia to speak to her son and daughter-in-law about Child Life Referral for her grandchildren and if interested a referral can be placed; she agreed she would discuss with them to see if they would be interested   Emotional support provided
- HCP paperwork in chart completed in 6/2022 appointing the following: Primary HCP: Yesenia River: [phone]  Alternate HCP: Michael Holman: [phone]  - 2/7 - Family in agreement for DNR with no escalation of medical care. They plan for compassionate extubation tentatively on Tuesday after family has visited.   - 2/10 - Spoke with wife Yesenia who shared her grandchildren were struggling after visiting patient yesterday as they were close to patient. Recommended for Yesenia to speak to her son and daughter-in-law about Child Life Referral for her grandchildren and if interested a referral can be placed; she agreed she would discuss with them to see if they would be interested   Emotional support provided
- HCP paperwork in chart completed in 6/2022 appointing the following: Primary HCP: Yesenia Holman: [phone]  Alternate HCP: Michael Holman: [phone]  - 2/7 - Family meeting today. Shared with family patient's overall poor prognosis.  Counseled family about next step options including pursuing trach/peg with placement in NH vs compassionate extubation from ventilator with transition to comfort measures and hospice if stable for transfer. Addressed family's questions/concerns about prognosis after liberation from ventilator as questions/concerns about hospice.   Family in agreement for DNR with no escalation of medical care. They plan for compassionate extubation tentatively on Tuesday after family has visited.   See goc note   46 minutes spent on advanced care planning/goals of care

## 2025-02-11 NOTE — PROGRESS NOTE ADULT - PROBLEM SELECTOR PLAN 6
Nutrition recommendations appreciated  Patient receiving tube feeds.

## 2025-02-11 NOTE — PROGRESS NOTE ADULT - REASON FOR ADMISSION
Post cardiac arrest

## 2025-02-11 NOTE — PROGRESS NOTE ADULT - PALLIATIVE PERFORMANCE SCALE: SCORE
I think the tingling and pain in your arm is related to a disc problem in your neck and upper back.  Follow-up with the back surgeon for further evaluation.  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  Follow-up with Dr. Schwarz to discuss treatment plan.  See your doctor on Monday as scheduled for further treatment of your pain.  
30%
30%
20%

## 2025-02-11 NOTE — PROGRESS NOTE ADULT - PROBLEM SELECTOR PLAN 3
- CXRAY 1/28 - Left lung base opacity may represent atelectasis.  - CT 1/28 - Cavitary opacity in the left lower lobe measuring up to 7.9 cm. most likely represents pulmonary abscess, less likely neoplasm. Increased bilateral tree-in-bud are initial nodular opacities compared to 3/7/2019 which are likely infectious or inflammatory.  - Patient on mechanical ventilator ; plan for compassionate extubation on Tuesday  - on antibiotics as per primary team   - management as per primary team.
- CXRAY 1/28 - Left lung base opacity may represent atelectasis.  - CT 1/28 - Cavitary opacity in the left lower lobe measuring up to 7.9 cm. most likely represents pulmonary abscess, less likely neoplasm. Increased bilateral tree-in-bud are initial nodular opacities compared to 3/7/2019 which are likely infectious or inflammatory.  - Patient on mechanical ventilator ; plan for compassionate extubation today. See below for recommendations for compassionate extubation
- CXRAY 1/28 - Left lung base opacity may represent atelectasis.  - CT 1/28 - Cavitary opacity in the left lower lobe measuring up to 7.9 cm. most likely represents pulmonary abscess, less likely neoplasm. Increased bilateral tree-in-bud are initial nodular opacities compared to 3/7/2019 which are likely infectious or inflammatory.  - Patient on mechanical ventilator   - on antibiotics as per primary team   - management as per primary team.

## 2025-02-11 NOTE — PROGRESS NOTE ADULT - PROVIDER SPECIALTY LIST ADULT
Heme/Onc
MICU
Neurology
Intervent Pulmonology
MICU
Neurology
Neurology
MICU
Palliative Care

## 2025-02-11 NOTE — PROGRESS NOTE ADULT - SUBJECTIVE AND OBJECTIVE BOX
Date of Service : 2/11/2025  Indication for Geriatrics and Palliative Care Services: goals of care    SUBJECTIVE AND OBJECTIVE:  Patient seen and examined at bedside. Patient intubated , unarousable despite being off sedatives.     INTERVAL HPI/OVERNIGHT EVENTS:  No acute events      Allergies    No Known Allergies    Intolerances    MEDICATIONS  (STANDING):  chlorhexidine 2% Cloths 1 Application(s) Topical <User Schedule>  dextrose 5%. 1000 milliLiter(s) (50 mL/Hr) IV Continuous <Continuous>  dextrose 5%. 1000 milliLiter(s) (100 mL/Hr) IV Continuous <Continuous>  dextrose 50% Injectable 25 Gram(s) IV Push once  dextrose 50% Injectable 12.5 Gram(s) IV Push once  dextrose 50% Injectable 25 Gram(s) IV Push once  glucagon  Injectable 1 milliGRAM(s) IntraMuscular once  heparin   Injectable 5000 Unit(s) SubCutaneous every 12 hours  latanoprost 0.005% Ophthalmic Solution 1 Drop(s) Both EYES at bedtime  levETIRAcetam  Solution 250 milliGRAM(s) Oral every 12 hours  levothyroxine 25 MICROGram(s) Oral daily  LORazepam   Injectable 0.25 milliGRAM(s) IV Push once  midodrine 10 milliGRAM(s) Oral every 8 hours  pantoprazole  Injectable 40 milliGRAM(s) IV Push daily  petrolatum Ophthalmic Ointment 1 Application(s) Both EYES two times a day  polyethylene glycol 3350 17 Gram(s) Oral daily  senna 2 Tablet(s) Oral at bedtime    MEDICATIONS  (PRN):  albuterol/ipratropium for Nebulization 3 milliLiter(s) Nebulizer every 6 hours PRN Respiratory Distress  dextrose Oral Gel 15 Gram(s) Oral once PRN Blood Glucose LESS THAN 70 milliGRAM(s)/deciliter  glycopyrrolate Injectable 0.4 milliGRAM(s) IV Push every 6 hours PRN secretions  glycopyrrolate Injectable 0.4 milliGRAM(s) IV Push every 6 hours PRN secretions  HYDROmorphone  Injectable 0.2 milliGRAM(s) IV Push every 1 hour PRN Mild Pain (1 - 3)  LORazepam   Injectable 0.25 milliGRAM(s) IV Push every 1 hour PRN Agitation        ITEMS UNCHECKED ARE NOT PRESENT    PRESENT SYMPTOMS: [ x]Unable to self-report due to altered mental status- see [ ] CPOT [x ] PAINADS [ x] RDOS  Source if other than patient:  [ ]Family   [ ]Team     Pain:  [ ]yes [ ]no  QOL impact -   Location -                    Aggravating factors -  Quality -  Radiation -  Timing-  Severity (0-10 scale):  Minimal acceptable level / Pain Goal (0-10 scale):     Dyspnea:                           [ ]Mild [ ]Moderate [ ]Severe  Anxiety:                             [ ]Mild [ ]Moderate [ ]Severe  Agitation:                          [ ]Mild [ ]Moderate [ ]Severe  Fatigue:                             [ ]Mild [ ]Moderate [ ]Severe  Nausea:                             [ ]Mild [ ]Moderate [ ]Severe  Loss of appetite:              [ ]Mild [ ]Moderate [ ]Severe  Constipation:                   [ ]Mild [ ]Moderate [ ]Severe  Diarrhea:                          [ ]Mild [ ]Moderate [ ]Severe    CPOT:    https://www.[facility].org/getattachment/[email or web address]-[email or web address]-[email or web address]/Critical-Care-Pain-Observation-Tool-(CPOT)    PCSSQ[Palliative Care Spiritual Screening Question]   Severity (0-10):  Score of 4 or > indicate consideration of Chaplaincy referral.  Chaplaincy Referral: [x ] yes [ ] refused [ ] following [ ] deferred    Caregiver [place]? : [ ] yes [ ] no [ ] Declined [x ] Deferred              Social work referral [ ] Patient & Family Centered Care Referral [ ]     Anticipatory Grief present?:  [ x] yes [ ] no  [ ] Deferred                  Social work referral [ ] Chaplaincy Referral[x ] Caregiver Support Referral [x]     Other Symptoms:  [ ]All other review of systems negative   [ x] Unable to obtain due to poor mentation     PHYSICAL EXAM:  Vital Signs Last 24 Hrs  T(C): 36.3 (11 Feb 2025 16:00), Max: 36.6 (10 Feb 2025 20:00)  T(F): 97.3 (11 Feb 2025 16:00), Max: 97.9 (10 Feb 2025 20:00)  HR: 0 (11 Feb 2025 17:03) (0 - 129)  BP: 74/45 (11 Feb 2025 16:30) (74/45 - 128/60)  BP(mean): 55 (11 Feb 2025 16:30) (55 - 80)  RR: 0 (11 Feb 2025 17:03) (0 - 34)  SpO2: 96% (11 Feb 2025 16:45) (96% - 100%)    Parameters below as of 11 Feb 2025 04:00  Patient On (Oxygen Delivery Method): ventilator    O2 Concentration (%): 40      GENERAL:  [ ]Alert  [ ]Oriented x   [ ]Lethargic  [ ]Cachexia  [x ]Unarousable  [ ]Verbal  [ x]Non-Verbal  [x ] No Distress  Behavioral:   [ ] Anxiety  [ ] Delirium [ ] Agitation [ ] Calm  [ x] Other-encephalopathy   HEENT:  [ ]Normal  [ ] Temporal Wasting  [ ]Dry mouth   [ x]ET Tube  [ ]Oral lesions  [ ] Mucositis  PULMONARY: no accessory muscle use, on ventilator   [ ]Clear [ ]Tachypnea  [ ]Audible excessive secretions   [ ]Rhonchi        [ ]Right [ ]Left [ ]Bilateral  [ ]Crackles        [ ]Right [ ]Left [ ]Bilateral  [ ]Wheezing     [ ]Right [ ]Left [ ]Bilateral  [ ]Diminished breath sounds [ ]right [ ]left [ ]bilateral  CARDIOVASCULAR:    [ x]Regular [ ]Irregular [ ]Tachy  [ ]Candelario [ ]Murmur [ ]Other  GASTROINTESTINAL:  [ x]Soft  [ ]Distended   [ ]+BS  [ x]Non tender [ ]Tender  [ ]PEG [x ]OGT/ NGT  Last BM: 2/10  GENITOURINARY:  [ ]Normal [ ] Incontinent   [ ]Oliguria/Anuria   [ x]Leal  MUSCULOSKELETAL:   [ ]Normal   [ ]Weakness  [x ]Bed/Wheelchair bound [ ]Edema  [  ] amputation  [  ] contraction  NEUROLOGIC:   [ ]No focal deficits  [x ]Cognitive impairment  [x ]Dysphagia [ ]Dysarthria [ ]Paresis [ ]Other   SKIN: See Nursing Skin Assessment for further details  [ ]Normal    [ ]Rash  [ ]Pressure ulcer(s)       Present on admission [ ]y [ ]n   [  ]  Wound    [  ] hyperpigmentation    CRITICAL CARE:  [ ]Shock Present  [ ]Septic [ ]Cardiogenic [ ]Neurologic [ ]Hypovolemic  [ ]Vasopressors [ ]Inotropes  [x ]Respiratory failure present [ x]Mechanical Ventilation [ ]Non-invasive ventilatory support [ ]High-Flow Mode: CPAP with PS, FiO2: 40, PEEP: 5, PS: 14, MAP: 10, PIP: 22  [ x]Acute  [ ]Chronic [ ]Hypoxic  [ ]Hypercarbic [ ]Other  [ ]Other organ failure     LABS:  reviewed                     RADIOLOGY & ADDITIONAL STUDIES: reviewed     Protein Calorie Malnutrition Present: [ ]mild [ ]moderate [ ]severe [ ]underweight [ ]morbid obesity  https://www.andeal.org/vault/2440/web/files/ONC/Table_Clinical%20Characteristics%20to%20Document%20Malnutrition-White%20JV%20et%20al%202012.pdf    Height (cm): 167.6 (01-28-25 @ 21:12)  Weight (kg): 48 (01-28-25 @ 21:12)  BMI (kg/m2): 17.1 (01-28-25 @ 21:12)    [ x]PPSV2 < or = 30%  [ ]significant weight loss [ ]poor nutritional intake [ ]anasarca   [ ]Artificial Nutrition    REFERRALS:   [ ]Chaplaincy  [ ]Hospice  [ ]Child Life  [ ]Social Work  [ x]Case management [ ]Holistic Therapy

## 2025-04-07 NOTE — EEG REPORT - PLACEMENT AND LABELING OF ELECTRODES:
Javier Wan is a 72 y.o. year old male Established patient, here for evaluation of the following chief complaint(s):    Chief Complaint   Patient presents with    Diabetes         Assessment & Plan  Controlled type 2 diabetes mellitus with hyperglycemia, unspecified whether long term insulin use (HCC)   Chronic, at goal (stable), continue current treatment plan  Labs to be done prior to next visit  Orders:    CBC with Auto Differential; Future    Comprehensive Metabolic Panel; Future    Lipid Panel; Future    Hemoglobin A1C; Future    Albumin/Creatinine Ratio, Urine; Future    Mixed hyperlipidemia   Chronic, not at goal (unstable), remains at high risk overall for cardiovascular disease.  He sees cardiology regularly.  At this time, no significant changes being recommended, remain on high-dose statin    Orders:    Lipid Panel; Future    Essential hypertension   Chronic, at goal (stable), continue current treatment plan    Orders:    Comprehensive Metabolic Panel; Future    Lipid Panel; Future    Insomnia, unspecified type   Chronic, at goal (stable), continue current treatment plan         S/P ablation of atrial flutter   Chronic, at goal (stable), no recurrence, continue antiplatelet therapy per cardiology recommendations         Pseudopolyposis of colon without complication, unspecified part of colon (HCC)    Reminder of need for colonoscopy next year         S/P ablation of atrial fibrillation  No new issues       Viral URI   Acute condition, new, likely viral nature of illness discussed with patient and spouse.  Discussed continuing Flonase and adding Mucinex as needed             Patient/guardian was given the opportunity to ask questions regarding the visit today.  Questions were addressed.  They verbalized comfort and understanding of the assessment and plan.    Return for keep next appt as scheduled or see sooner if neded.    SUBJECTIVE/OBJECTIVE:    HPI    Viral URI  New issue  Present x 2 weeks  Nasal 
Statement Selected

## 2025-05-21 NOTE — ED PROVIDER NOTE - ENMT, MLM
07:30
Airway patent, Nasal mucosa clear. Mouth with normal mucosa. Throat has no vesicles, no oropharyngeal exudates and uvula is midline.